# Patient Record
Sex: FEMALE | Race: WHITE | NOT HISPANIC OR LATINO | Employment: OTHER | URBAN - METROPOLITAN AREA
[De-identification: names, ages, dates, MRNs, and addresses within clinical notes are randomized per-mention and may not be internally consistent; named-entity substitution may affect disease eponyms.]

---

## 2017-01-02 ENCOUNTER — GENERIC CONVERSION - ENCOUNTER (OUTPATIENT)
Dept: OTHER | Facility: OTHER | Age: 82
End: 2017-01-02

## 2017-01-03 ENCOUNTER — ALLSCRIPTS OFFICE VISIT (OUTPATIENT)
Dept: OTHER | Facility: OTHER | Age: 82
End: 2017-01-03

## 2017-01-26 ENCOUNTER — ALLSCRIPTS OFFICE VISIT (OUTPATIENT)
Dept: OTHER | Facility: OTHER | Age: 82
End: 2017-01-26

## 2017-03-15 ENCOUNTER — GENERIC CONVERSION - ENCOUNTER (OUTPATIENT)
Dept: OTHER | Facility: OTHER | Age: 82
End: 2017-03-15

## 2017-04-28 ENCOUNTER — ALLSCRIPTS OFFICE VISIT (OUTPATIENT)
Dept: OTHER | Facility: OTHER | Age: 82
End: 2017-04-28

## 2017-05-17 DIAGNOSIS — M81.0 AGE-RELATED OSTEOPOROSIS WITHOUT CURRENT PATHOLOGICAL FRACTURE: ICD-10-CM

## 2017-05-17 DIAGNOSIS — Z13.820 ENCOUNTER FOR SCREENING FOR OSTEOPOROSIS: ICD-10-CM

## 2017-06-06 ENCOUNTER — ALLSCRIPTS OFFICE VISIT (OUTPATIENT)
Dept: OTHER | Facility: OTHER | Age: 82
End: 2017-06-06

## 2017-07-03 DIAGNOSIS — R58 HEMORRHAGE, NOT ELSEWHERE CLASSIFIED: ICD-10-CM

## 2017-07-03 DIAGNOSIS — E11.9 TYPE 2 DIABETES MELLITUS WITHOUT COMPLICATIONS (HCC): ICD-10-CM

## 2017-07-03 DIAGNOSIS — E78.5 HYPERLIPIDEMIA: ICD-10-CM

## 2017-07-03 DIAGNOSIS — M54.2 CERVICALGIA: ICD-10-CM

## 2017-07-03 DIAGNOSIS — E03.9 HYPOTHYROIDISM: ICD-10-CM

## 2017-07-03 DIAGNOSIS — I10 ESSENTIAL (PRIMARY) HYPERTENSION: ICD-10-CM
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
## 2017-07-03 DIAGNOSIS — Z12.31 ENCOUNTER FOR SCREENING MAMMOGRAM FOR MALIGNANT NEOPLASM OF BREAST: ICD-10-CM

## 2017-07-03 DIAGNOSIS — E53.8 DEFICIENCY OF OTHER SPECIFIED B GROUP VITAMINS: ICD-10-CM

## 2017-07-03 DIAGNOSIS — E55.9 VITAMIN D DEFICIENCY: ICD-10-CM

## 2017-07-05 ENCOUNTER — GENERIC CONVERSION - ENCOUNTER (OUTPATIENT)
Dept: OTHER | Facility: OTHER | Age: 82
End: 2017-07-05

## 2017-07-17 ENCOUNTER — HOSPITAL ENCOUNTER (OUTPATIENT)
Dept: MAMMOGRAPHY | Facility: CLINIC | Age: 82
Discharge: HOME | End: 2017-07-17
Payer: MEDICARE

## 2017-07-17 DIAGNOSIS — Z13.820 ENCOUNTER FOR SCREENING FOR OSTEOPOROSIS: ICD-10-CM

## 2017-07-17 DIAGNOSIS — M81.0 AGE-RELATED OSTEOPOROSIS WITHOUT CURRENT PATHOLOGICAL FRACTURE: ICD-10-CM

## 2017-07-17 PROCEDURE — 77080 DXA BONE DENSITY AXIAL: CPT

## 2017-08-16 ENCOUNTER — ALLSCRIPTS OFFICE VISIT (OUTPATIENT)
Dept: OTHER | Facility: OTHER | Age: 82
End: 2017-08-16

## 2017-09-13 ENCOUNTER — GENERIC CONVERSION - ENCOUNTER (OUTPATIENT)
Dept: OTHER | Facility: OTHER | Age: 82
End: 2017-09-13

## 2017-09-14 ENCOUNTER — GENERIC CONVERSION - ENCOUNTER (OUTPATIENT)
Dept: OTHER | Facility: OTHER | Age: 82
End: 2017-09-14

## 2017-09-28 ENCOUNTER — GENERIC CONVERSION - ENCOUNTER (OUTPATIENT)
Dept: OTHER | Facility: OTHER | Age: 82
End: 2017-09-28

## 2017-10-11 ENCOUNTER — ALLSCRIPTS OFFICE VISIT (OUTPATIENT)
Dept: OTHER | Facility: OTHER | Age: 82
End: 2017-10-11

## 2017-10-13 NOTE — PROGRESS NOTES
Assessment  1  Osteoporosis (733 00) (M81 0)   2  Lumbar compression fracture (805 4) (S32 000A)   3  Vitamin D deficiency (268 9) (E55 9)   4  Hypertension (401 9) (I10)    Plan  Hyperlipidemia, Hypertension, Hypothyroidism, Lumbar compression fracture, Osteoporosis, Type 2  diabetes mellitus, Vitamin D deficiency    · (1) CALCIUM IONIZED; Status:Active; Requested for:89Vli5455;    · (1) CALCIUM, URINE 24HR; Status:Active; Requested for:29Lmt4796;    · (1) CBC/PLT/DIFF; Status:Active; Requested for:82Gqo8523;    · (1) CELIAC DISEASE AB PROFILE; Status:Active; Requested for:11Feb2018;    · (1) COMPREHENSIVE METABOLIC PANEL; Status:Active; Requested for:11Feb2018;    · (1) HEMOGLOBIN A1C; Status:Active; Requested for:11Feb2018;    · (1) LDL,DIRECT; Status:Active; Requested for:38Hnl2719;    · (1) PROTEIN ELECTRO, SERUM; Status:Active; Requested for:12Gvq7150;    · (1) T4, FREE; Status:Active; Requested for:11Feb2018;    · (1) TRIGLYCERIDE; Status:Active; Requested for:55Jfl2924;    · (1) TSH; Status:Active; Requested for:11Feb2018;    · (1) VITAMIN D 25-HYDROXY; Status:Active; Requested for:04Dbc0640;    · (LC) Immunofixation, Serum; Status:Active; Requested for:96Oxp9555;    · (Q) PROTEIN,TOTAL AND PROTEIN ELECTROPHORESIS,24 HOUR URINE AND IMMUNOFIXATION;  Status:Active;  Requested for:11Feb2018;    · Follow-up visit in 6 months Evaluation and Treatment  Follow-up  Status: Hold For - Scheduling   Requested for: 11Apr2018  Hypertension, Type 2 diabetes mellitus    · Losartan Potassium 50 MG Oral Tablet; TAKE 1 TABLET DAILY  Lumbar compression fracture, Osteoporosis    · Forteo 600 MCG/2 4ML Subcutaneous Solution; USE 1 INJECTION DAILY  Lumbar compression fracture, Osteoporosis, Vitamin D deficiency    · Vitamin D (Ergocalciferol) 86059 UNIT Oral Capsule; TAKE 1 CAPSULE BY MOUTH EVERY  WEEK  Osteoporosis    · Begin a limited exercise program ; Status:Complete;   Done: 80DLF7122   · Begin or continue regular aerobic exercise  Gradually work up to at least 3 sessions of 30 minutes  of exercise a week ; Status:Complete;   Done: 03MAT1717   · Continue with our present treatment plan ; Status:Complete;   Done: 41GWJ9672   · Eat foods that are high in calcium ; Status:Complete;   Done: 29FYD4188   · Good balance will help you avoid falls  There are many things you can do to maintain or improve  your sense of balance ; Status:Complete;   Done: 97WQM4682   · Limit your use of alcohol to 2 drinks or cans of beer a day ; Status:Complete;   Done: 74MZW2358   · There are many exercise options for seniors:; Status:Complete;   Done: 24TQV1952   · These are things you can do to prevent falls ; Status:Complete;   Done: 96CJN1889   · Use good body mechanics when lifting ; Status:Complete;   Done: 95EMH8148   · We recommend that you follow these steps to lower your risk of osteoporosis  ; Status:Complete;    Done: 62GBT9298   · We want you to wear hip protectors ; Status:Complete;   Done: 93JQE1812   · You need to quit smoking ; Status:Complete;   Done: 03MRY7969   · Call [phone] if: You are constipated ; Status:Complete;   Done: 10QBU8374   · Call [phone] if: You begin to have pain in your lower back ; Status:Complete;   Done:  90TAZ2544   · Call [phone] if: You get a rash ; Status:Complete;   Done: 38FVC6783   · Call [phone] if: You have pain or burning in your stomach after eating ; Status:Complete;    Done: 33QKO9006    Discussion/Summary  Discussion Summary:   Osteoporosis with lumbar compression fracture and vitamin D deficiency will be getting 50,000 units of vitamin D recheck in 4 monthslumbar compression fracture will try and get Forteo approved this medication decrease risk of another fracture with the next 2 years continue follow-up with Dr Liang Bobby     Counseling Documentation With Imm: The patient, patient's family was counseled regarding diagnostic results,-instructions for [facility] factor reductions,-prognosis,-impressions,-risks and benefits of treatment options,-importance of compliance with treatment  total time of encounter was 30 [id number] minutes was spent counseling  Medication SE Review and Pt Understands Tx: Possible side effects of new medications were reviewed with the patient/guardian today  The treatment plan was reviewed with the patient/guardian  The patient/guardian understands and agrees with the treatment plan      Chief Complaint  Chief Complaint Free Text Note Form: f/u compression fx and consult Adeline Winters  History of Present Illness  Osteoporosis (Follow-Up): Most recent DXA results: T-score -1 0 - -2 0  Complications of osteoporosis include a spine fracture  Symptoms:   The patient presents with complaints of severe stable back pain [facility] thoracic lumbar brace)  Vitamin D Deficiency: Disease type: vitamin D deficiency  Hypertension (Follow-Up): The patient presents for follow-up of essential hypertension  The patient states she has been doing well with her blood pressure control since the last visit  She has no comorbid illnesses  Symptoms: The patient is currently asymptomatic  Associated symptoms include no headache,-no focal neurologic deficits-and-no memory loss  Home monitoring: The patient checks her blood pressure sporadically  Blood pressure control has been good  Lifestyle: Diet: She consumes a diverse and healthy diet  Weight Issues: She does not have any weight concerns  Exercise: She does not exercise regularly  Smoking: She does not use tobacco Alcohol: She denies alcohol use  Drug Use: She denies drug use  Medications: the patient is adherent with her medication regimen -She denies medication side effects  Review of Systems  Complete-Female:   Constitutional: No fever, no chills, feels well, no tiredness, no recent weight gain or weight loss     Eyes: No complaints of eye pain, no red eyes, no eyesight problems, no discharge, no dry eyes, no itching of eyes  ENT: no complaints of earache, no loss of hearing, no nose bleeds, no nasal discharge, no sore throat, no hoarseness  Cardiovascular: No complaints of slow heart rate, no fast heart rate, no chest pain, no palpitations, no leg claudication, no lower extremity edema  Respiratory: No complaints of shortness of breath, no wheezing, no cough, no SOB on exertion, no orthopnea, no PND  Gastrointestinal: No complaints of abdominal pain, no constipation, no nausea or vomiting, no diarrhea, no bloody stools  Genitourinary: Refuses mammogram secondary to pain versus benefits reviewed, but-No complaints of dysuria, no incontinence, no pelvic pain, no dysmenorrhea, no vaginal discharge or bleeding  Musculoskeletal: as noted in HPI  Integumentary: No complaints of skin rash or lesions, no itching, no skin wounds, no breast pain or lump  Neurological: No complaints of headache, no confusion, no convulsions, no numbness, no dizziness or fainting, no tingling, no limb weakness, no difficulty walking  Psychiatric: Not suicidal, no sleep disturbance, no anxiety or depression, no change in personality, no emotional problems  Endocrine: No complaints of proptosis, no hot flashes, no muscle weakness, no deepening of the voice, no feelings of weakness  Hematologic/Lymphatic: No complaints of swollen glands, no swollen glands in the neck, does not bleed easily, does not bruise easily  Preventive Quality 65 Older:   Preventive Quality 65 and Older: Falls Risk: The patient fell 0 times in the past 12 months  The patient is currently asymptomatic Symptoms Include:  Associated symptoms:  No associated symptoms are reported  The patient currently has no urinary incontinence symptoms  Glaucoma Screen: Date of last glaucoma screen was,-2014  Active Problems  1  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)   2  Arteriosclerosis of coronary artery (414 00) (I25 10)   3  Cervicalgia (723 1) (M54 2)   4  Depression (311) (F32 9)   5  Encounter for screening mammogram for malignant neoplasm of breast (V76 12) (Z12 31)   6  Esophageal reflux (530 81) (K21 9)   7  Flu vaccine need (V04 81) (Z23)   8  Greater trochanteric bursitis of right hip (726 5) (M70 61)   9  Hyperlipidemia (272 4) (E78 5)   10  Hypertension (401 9) (I10)   11  Hypothyroidism (244 9) (E03 9)   12  Insomnia (780 52) (G47 00)   13  Lumbar pain (724 2) (M54 5)   14  Lumbar spinal stenosis (724 02) (M48 061)   15  Need for pneumococcal vaccine (V03 82) (Z23)   16  Normochromic normocytic anemia (285 9) (D64 9)   17  Osteoarthritis of knee (715 36) (M17 10)   18  Osteoporosis (733 00) (M81 0)   19  Pain in joint of left shoulder (719 41) (M25 512)   20  Right shoulder pain (719 41) (M25 511)   21  Screening for osteoporosis (V82 81) (Z13 820)   22  Skin rash (782 1) (R21)   23  Spinal stenosis (724 00) (M48 00)   24  Trochanteric bursitis of both hips (726 5) (M70 61,M70 62)   25  Type 2 diabetes mellitus (250 00) (E11 9)   26  Vitamin B12 deficiency (266 2) (E53 8)    Past Medical History  1  History of Acute maxillary sinusitis (461 0) (J01 00)   2  History of Cellulitis (682 9) (L03 90)   3  History of Cough (786 2) (R05)   4  History of Ecchymosis (459 89) (R58)   5  History of constipation (V12 79) (Z87 19)   6  History of fall (V15 88) (Z91 81)   7  History of headache (V13 89) (Z87 898)   8  History of hypokalemia (V12 29) (Z86 39)   9  History of low back pain (V13 59) (Z87 39)   10  History of vitamin D deficiency (V12 1) (Z86 39)   11  History of Lower extremity weakness (729 89) (R29 898)  Active Problems And Past Medical History Reviewed: The active problems and past medical history were reviewed and updated today  Surgical History  1  History of Back Surgery   2  History of Previous Stent Placement  Surgical History Reviewed: The surgical history was reviewed and updated today         Family History  Mother    1  Family history of Heart Disease (V17 49)  Father    2  Family history of Cerebral Embolism - With Cerebral Infarction  Family History Reviewed: The family history was reviewed and updated today  Social History   · Caffeine Use   · Former smoker (W21 83) (A06 527)   · Never Drank Alcohol  Social History Reviewed: The social history was reviewed and updated today  The social history was reviewed and is unchanged  Current Meds   1  Aspirin 81 MG TABS; TAKE 1 TABLET DAILY; Therapy: (Jaimee Richmond) to Recorded   2  Atorvastatin Calcium 40 MG Oral Tablet; take 1 tablet by mouth once daily; Therapy: 01DRF9261 to (Evaluate:09Sep2017)  Requested for: 93AFU4109; Last Rx:66Fwx3379   Ordered   3  Baclofen 10 MG Oral Tablet; take 1 tablet daily prn; Therapy: 62WJV0305 to (Evaluate:27Apr2016) Recorded   4  Carvedilol 6 25 MG Oral Tablet; take one tablet by mouth twice daily; Therapy: 78FMG4490 to (Roel Rodriguez)  Requested for: 88HHN5966; Last Rx:06Apr2017   Ordered   5  Clopidogrel Bisulfate 75 MG Oral Tablet; TAKE ONE TABLET BY MOUTH ONCE DAILY; Therapy: 71WIR6544 to (Celestino Breen)  Requested for: 57NVP6856; Last Rx:56Fkl2828   Ordered   6  Cyanocobalamin 1000 MCG/ML Injection Solution; inject one ml sub q mos; Therapy: 49EIW2629 to (Last Rx:51Cwa0856) Ordered   7  FLUoxetine HCl - 10 MG Oral Capsule; TAKE ONE CAPSULE BY MOUTH ONCE DAILY; Therapy: 03Mwy6087 to (Evaluate:08Apr2018)  Requested for: 22QRU7128; Last Rx:32Ros2541   Ordered   8  Levothyroxine Sodium 50 MCG Oral Tablet; TAKE ONE TABLET BY MOUTH ONCE DAILY; Therapy: 56Twf3270 to (Evaluate:88Pln9080)  Requested for: 59Gxp2995; Last Rx:45Tbg6585   Ordered   9  Losartan Potassium 25 MG Oral Tablet; TAKE ONE TABLET BY MOUTH ONCE DAILY; Therapy: 81RVU0599 to (Evaluate:01Apr2018)  Requested for: 74SZL4601; Last Rx:27Yjc5672   Ordered   10   Nitroglycerin 0 4 MG/SPRAY Translingual Solution; USE ONE SPRAY UNDER THE TONGUE EVERY 5    MINUTES, TIMES THREE DOSES, ASDIRECTED BY YOUR PHYSICIAN; Therapy: 02KAJ4562 to (Last Rx:61Myc1092)  Requested for: 60Fpp2489 Ordered   11  Pioglitazone HCl - 15 MG Oral Tablet; TAKE ONE TABLET BY MOUTH ONCE DAILY; Therapy: 56LDZ9569 to (Evaluate:28Wmy9381)  Requested for: 13Vyr1871; Last Rx:27Qfg8960    Ordered   12  TraMADol HCl - 50 MG Oral Tablet; take 0 5 or 1 or 2 tablets by mouth every 8 hours if needed; Therapy: 56ZET8461 to (Evaluate:15Oct2017)  Requested for: 65HLC7972; Last Rx:97Phj9599    Ordered   13  Triamcinolone Acetonide 0 025 % External Cream; APPLY SPARINGLY TO AFFECTED AREA(S) 2 TO    3 TIMES DAILY; Therapy: 14HGO5393 to (Last Rx:62Tee6106)  Requested for: 94EOX6702 Ordered   14  Zolpidem Tartrate 5 MG Oral Tablet; TAKE 1 TABLET DAILY AT BEDTIME; Therapy: 19CDB8998 to (Evaluate:38Vpk0570); Last Rx:27Fey9803 Ordered  Medication List Reviewed: The medication list was reviewed and updated today  Allergies  1  No Known Drug Allergies  2  IV Dye    Vitals  Vital Signs    Recorded: 65JKM7612 03:22PM Recorded: 43ERU1946 03:02PM   Temperature  99 1 F   Heart Rate  66   Systolic 354, LUE, Sitting    Diastolic 85, LUE, Sitting    Height  5 ft 2 in   Weight  155 lb 2 oz   BMI Calculated  28 37   BSA Calculated  1 72   O2 Saturation  95     Physical Exam    Constitutional   General appearance: No acute distress, well appearing and well nourished  Ears, Nose, Mouth, and Throat   Oropharynx: Normal with no erythema, edema, exudate or lesions  Neck   Neck: Supple, symmetric, trachea midline, no masses  Thyroid: Normal, no thyromegaly  Pulmonary   Respiratory effort: No increased work of breathing or signs of respiratory distress  Auscultation of lungs: Clear to auscultation  Cardiovascular   Auscultation of heart: Normal rate and rhythm, normal S1 and S2, no murmurs  Carotid pulses: 2+ bilaterally      Examination of extremities for edema and/or varicosities: Normal     Musculoskeletal She is wearing lumbar support brace  Muscle strength/tone: Normal     Neurologic   Cranial nerves: Cranial nerves II-XII intact  Cortical function: Normal mental status  Reflexes: 2+ and symmetric  Sensation: No sensory loss  Psychiatric   Judgment and insight: Normal     Orientation to person, place, and time: Normal     Recent and remote memory: Intact  Mood and affect: Normal       Socks and shoes removed, Right Foot Findings: normal foot, no swelling, no erythema  The right toes were normal    The sensory exam showed normal vibratory sensation at the level of the toes on the right  Socks and shoes removed, Left Foot Findings: normal foot, no swelling, no erythema  The left toes were normal    The sensory exam showed normal vibratory sensation at the level of the toes on the left  Normal tactile sensation with monofilament testing throughout the left foot  Pulses:   1+ in the posterior tibialis on the right   1+ in the dorsalis pedis on the right  Pulses:   1+ in the posterior tibialis on the left   1+ in the dorsalis pedis on the left  Assign Risk Category: 0: No loss of protective sensation, no deformity  No present risk        Future Appointments    Date/Time Provider Specialty Site   01/16/2018 01:15 PM Doretha Espinal DO Internal Medicine [facility] INTERNAL [address]   05/07/2018 01:00 PM Cole Draper  Internal Medicine [facility] INTERNAL MED [address] [place] [address]     Signatures   Electronically signed by : Delmar Patterson DO; Oct 11 2017  3:31PM EST                       (Author)

## 2017-10-26 ENCOUNTER — GENERIC CONVERSION - ENCOUNTER (OUTPATIENT)
Dept: OTHER | Facility: OTHER | Age: 82
End: 2017-10-26

## 2017-12-16 DIAGNOSIS — E55.9 VITAMIN D DEFICIENCY: ICD-10-CM

## 2017-12-16 DIAGNOSIS — E03.9 HYPOTHYROIDISM: ICD-10-CM

## 2017-12-16 DIAGNOSIS — D64.9 ANEMIA: ICD-10-CM

## 2017-12-16 DIAGNOSIS — I10 ESSENTIAL (PRIMARY) HYPERTENSION: ICD-10-CM

## 2017-12-16 DIAGNOSIS — E53.8 DEFICIENCY OF OTHER SPECIFIED B GROUP VITAMINS: ICD-10-CM

## 2017-12-16 DIAGNOSIS — E11.9 TYPE 2 DIABETES MELLITUS WITHOUT COMPLICATIONS (HCC): ICD-10-CM

## 2017-12-16 DIAGNOSIS — E78.5 HYPERLIPIDEMIA: ICD-10-CM

## 2018-01-13 VITALS
HEART RATE: 70 BPM | DIASTOLIC BLOOD PRESSURE: 82 MMHG | SYSTOLIC BLOOD PRESSURE: 140 MMHG | HEIGHT: 62 IN | TEMPERATURE: 99.3 F | OXYGEN SATURATION: 96 % | BODY MASS INDEX: 27.79 KG/M2 | WEIGHT: 151 LBS

## 2018-01-13 VITALS
SYSTOLIC BLOOD PRESSURE: 160 MMHG | TEMPERATURE: 99.1 F | OXYGEN SATURATION: 95 % | WEIGHT: 155.13 LBS | BODY MASS INDEX: 28.55 KG/M2 | HEART RATE: 66 BPM | DIASTOLIC BLOOD PRESSURE: 85 MMHG | HEIGHT: 62 IN

## 2018-01-13 VITALS
WEIGHT: 151 LBS | OXYGEN SATURATION: 98 % | HEIGHT: 62 IN | BODY MASS INDEX: 27.79 KG/M2 | SYSTOLIC BLOOD PRESSURE: 130 MMHG | HEART RATE: 71 BPM | TEMPERATURE: 98.6 F | DIASTOLIC BLOOD PRESSURE: 70 MMHG

## 2018-01-13 VITALS
DIASTOLIC BLOOD PRESSURE: 82 MMHG | OXYGEN SATURATION: 97 % | HEART RATE: 76 BPM | HEIGHT: 62 IN | BODY MASS INDEX: 28.06 KG/M2 | TEMPERATURE: 99.6 F | WEIGHT: 152.5 LBS | SYSTOLIC BLOOD PRESSURE: 160 MMHG

## 2018-01-14 VITALS
DIASTOLIC BLOOD PRESSURE: 85 MMHG | TEMPERATURE: 99.2 F | WEIGHT: 150.5 LBS | OXYGEN SATURATION: 93 % | HEIGHT: 62 IN | SYSTOLIC BLOOD PRESSURE: 140 MMHG | BODY MASS INDEX: 27.7 KG/M2 | HEART RATE: 89 BPM

## 2018-01-15 NOTE — PROGRESS NOTES
Assessment    1  Encounter for preventive health examination (V70 0) (Z00 00)    Discussion/Summary    Medicare wellness completed  Follow up with Dr Vonnie Hodge as scheduled, labs prior  Impression: Initial Annual Wellness Visit, with preventive exam as well as age and risk appropriate counseling completed  Cardiovascular screening and counseling: screening is current  Diabetes screening and counseling: screening is current  Colorectal cancer screening and counseling: screening not indicated  Breast cancer screening and counseling: screening is current  Cervical cancer screening and counseling: screening not indicated  Abdominal aortic aneurysm screening and counseling: screening not indicated  Glaucoma screening and counseling: screening is current  Immunizations: influenza vaccine is up to date this year, pneumococcal vaccine due today, the patient declines the Zostavax vaccine and the patient declines the Tdap vaccine  Advance Directive Planning: complete and up to date, she was encouraged to follow-up with me to discuss her questions and/or decisions  History of Present Illness  Welcome to Medicare and Wellness Visits: The patient is being seen for the initial annual wellness visit  Medicare Screening and Risk Factors   Hospitalizations: no previous hospitalizations  Once per lifetime medicare screening tests: AAA screening US has not yet been done  Medicare Screening Tests Risk Questions   Abdominal aortic aneurysm risk assessment: none indicated  Osteoporosis risk assessment: female gender, over 48years of age and hx osteoporosis  HIV risk assessment: none indicated  Drug and Alcohol Use: The patient has never smoked cigarettes  The patient reports never drinking alcohol  She has never used illicit drugs  Diet and Physical Activity: Current diet includes well balanced meals  She exercises infrequently  Exercise: walking 10 minutes per day     Mood Disorder and Cognitive Impairment Screening: PHQ-9 Depression Scale   Over the past 2 weeks, how often have you been bothered by the following problems? 1 ) Little interest or pleasure in doing things? Not at all    2 ) Feeling down, depressed or hopeless? Not at all    3 ) Trouble falling asleep or sleeping too much? Not at all    4 ) Feeling tired or having little energy? Not at all    5 ) Poor appetite or overeating? Not at all    6 ) Feeling bad about yourself, or that you are a failure, or have let yourself or your family down? Not at all    7 ) Trouble concentrating on things, such as reading a newspaper or watching television? Not at all    8 ) Moving or speaking so slowly that other people could have noticed, or the opposite, moving or speaking faster than usual? Not at all  How difficult have these problems made it for you to do your work, take care of things at home, or get along with people? Not at all  She denies feeling down, depressed, or hopeless over the past two weeks  She denies feeling little interest or pleasure in doing things over the past two weeks  Cognitive impairment screening: denies difficulty learning/retaining new information, denies difficulty handling complex tasks, denies difficulty with reasoning, denies difficulty with spatial ability and orientation, denies difficulty with language and denies difficulty with behavior  Functional Ability/Level of Safety: Hearing is slightly decreased, slightly decreased in the right ear, slightly decreased in the left ear and a hearing aid is not used  She denies hearing difficulties  The patient is currently able to do activities of daily living with limitations, but able to participate in social activities without limitations and able to drive without limitations  Fall risk factors: The patient fell 1 times in the past 12 months  fell on ice     Advance Directives: Advance directives: living will and advance directives, but no durable power of  for health care directives  Co-Managers and Medical Equipment/Suppliers: See Patient Care Team      Patient Care Team    Care Team Member Role Specialty Office Number   Debbie Mary   [phone]     Review of Systems    Constitutional: negative  Head and Face: negative  Eyes: negative  ENT: negative  Cardiovascular: negative  Respiratory: negative  Gastrointestinal: negative  Genitourinary: negative  Musculoskeletal: negative  Integumentary and Breasts: negative  Neurological: negative  Psychiatric: negative  Active Problems    1  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)   2  Arteriosclerosis of coronary artery (414 00) (I25 10)   3  Cervicalgia (723 1) (M54 2)   4  Depression (311) (F32 9)   5  Esophageal reflux (530 81) (K21 9)   6  Flu vaccine need (V04 81) (Z23)   7  Greater trochanteric bursitis of right hip (726 5) (M70 61)   8  Hyperlipidemia (272 4) (E78 5)   9  Hypertension (401 9) (I10)   10  Hypothyroidism (244 9) (E03 9)   11  Insomnia (780 52) (G47 00)   12  Lumbar spinal stenosis (724 02) (M48 06)   13  Normochromic normocytic anemia (285 9) (D64 9)   14  Osteoarthritis of knee (715 36) (M17 10)   15  Osteoporosis (733 00) (M81 0)   16  Pain in joint of left shoulder (719 41) (M25 512)   17  Right shoulder pain (719 41) (M25 511)   18  Screening for osteoporosis (V82 81) (Z13 820)   19  Skin rash (782 1) (R21)   20  Spinal stenosis (724 00) (M48 00)   21  Type 2 diabetes mellitus (250 00) (E11 9)   22  Vitamin B12 deficiency (266 2) (E53 8)   23   Vitamin D deficiency (268 9) (E55 9)    Past Medical History    · History of Acute maxillary sinusitis (461 0) (J01 00)   · History of Cellulitis (682 9) (L03 90)   · History of Cough (786 2) (R05)   · History of Ecchymosis (459 89) (R58)   · History of constipation (V12 79) (Z87 19)   · History of fall (V15 88) (Z91 81)   · History of headache (V13 89) (N71 153)   · History of hypokalemia (V12 29) (Z86 39)   · History of low back pain (V13 59) (Z87 39)   · History of Lower extremity weakness (729 89) (R29 898)    The active problems and past medical history were reviewed and updated today  Surgical History    · History of Back Surgery   · History of Previous Stent Placement    The surgical history was reviewed and updated today  Family History  Mother    · Family history of Heart Disease (V17 49)  Father    · Family history of Cerebral Embolism - With Cerebral Infarction    The family history was reviewed and updated today  Social History    · Caffeine Use   · Former smoker (Q72 21) (G90 570)   · Never Drank Alcohol  The social history was reviewed and updated today  Current Meds   1  Aspirin 81 MG TABS; TAKE 1 TABLET DAILY; Therapy: (Ramon Goodrich) to Recorded   2  Atorvastatin Calcium 40 MG Oral Tablet; take 1 tablet by mouth once daily; Therapy: 57YGO9182 to (Evaluate:36Spx6435)  Requested for: 97MCG8041; Last   Rx:15Mar2017 Ordered   3  Baclofen 10 MG Oral Tablet; take 1 tablet daily prn; Therapy: 74SUE8065 to (Evaluate:27Apr2016) Recorded   4  Carvedilol 6 25 MG Oral Tablet; take one tablet by mouth twice daily; Therapy: 96IPN1678 to (James Rojas)  Requested for: 90CUE6306; Last   Rx:06Apr2017 Ordered   5  Clopidogrel Bisulfate 75 MG Oral Tablet; TAKE ONE TABLET BY MOUTH ONCE DAILY; Therapy: 07AUL7477 to (Evaluate:44Jlh0764)  Requested for: 94ZFD2702; Last   Rx:44Jeg2090 Ordered   6  Cyanocobalamin 1000 MCG/ML Injection Solution; inject one ml sub q mos; Therapy: 08NXO3012 to (Last Rx:17Oct2016) Ordered   7  FLUoxetine HCl - 10 MG Oral Capsule; TAKE ONE CAPSULE BY MOUTH ONCE DAILY; Therapy: 32Hja2228 to (Evaluate:74Gnh7532)  Requested for: 61EQU4421; Last   Rx:44Baq5440 Ordered   8  Levothyroxine Sodium 50 MCG Oral Tablet; TAKE ONE TABLET BY MOUTH ONCE   DAILY; Therapy: 47Vxq8979 to (Evaluate:02Npi8550)  Requested for: 14Wjj8387; Last   Rx:07Apr2017 Ordered   9  Losartan Potassium 25 MG Oral Tablet; TAKE 1 TABLET DAILY; Therapy: 95YJC8984 to (Aiyana Mask)  Requested for: 68SJB2086; Last   Rx:2017; Status: ACTIVE - Transmit to [facility] Verification Ordered   10  Pioglitazone HCl - 15 MG Oral Tablet; TAKE ONE TABLET BY MOUTH ONCE DAILY; Therapy: 65HSZ6364 to (Evaluate:50Hlr9100)  Requested for: 11Dqy4936; Last    Rx:92Tfi9590 Ordered   11  Triamcinolone Acetonide 0 025 % External Cream; APPLY SPARINGLY TO AFFECTED    AREA(S) 2 TO 3 TIMES DAILY; Therapy: 05LEN8935 to (Last Rx:11Ymi3992)  Requested for: 94ZAB3615 Ordered   12  Zolpidem Tartrate 5 MG Oral Tablet; TAKE 1 TABLET DAILY AT BEDTIME; Therapy: 47YVV8822 to (Evaluate:33Jpr8168); Last Rx:42Njd9740 Ordered    The medication list was reviewed and updated today  Allergies    1  No Known Drug Allergies    2  IV Dye    Immunizations   ** Printed in Appendix #1 below  Vitals  Signs    Temperature: 98 9 F  Heart Rate: 67  Systolic: 975  Diastolic: 80  Height: 5 ft 2 in  Weight: 151 lb 8 0 oz  BMI Calculated: 27 71  BSA Calculated: 1 7  O2 Saturation: 96    Procedure    Procedure:   Inforrmation supplied by a Snellen chart  Results: 20/20 in both eyes with corrective device, 20/20 in the right eye with corrective device, 20/20 in the left eye with corrective device   Color vision was reported by rohini and the results were normal       Future Appointments    Date/Time Provider Specialty Site   2017 01:15 PM Jon Mir DO Internal Medicine [address] [place] [address]   Electronically signed by : Weston Flowers, [facility];  2017  1:52PM EST                       (Author)    Electronically signed by : Margarita Sweeney DO; May  2 2017  2:16PM EST                          Appendix #1     Patient: Rodriguez Doyle; : 1934; MRN: [id number]      1 2 3 4 5    Influenza  06-Nov-2012 18-Sep-2013 15-Oct-2014 30-Sep-2015 14-Oct-2016    PPSV  2006 2012

## 2018-01-22 VITALS
SYSTOLIC BLOOD PRESSURE: 146 MMHG | BODY MASS INDEX: 27.88 KG/M2 | HEIGHT: 62 IN | OXYGEN SATURATION: 96 % | TEMPERATURE: 98.9 F | WEIGHT: 151.5 LBS | HEART RATE: 67 BPM | DIASTOLIC BLOOD PRESSURE: 80 MMHG

## 2018-01-23 NOTE — PROGRESS NOTES
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)  2  Arteriosclerosis of coronary artery (414 00) (I25 10)  3  Cellulitis (682 9) (L03 90)  4  Cough (786 2) (R05)  5  Depression (311) (F32 9)  6  Esophageal reflux (530 81) (K21 9)  7  Flu vaccine need (V04 81) (Z23)  8  Greater trochanteric bursitis of right hip (726 5) (M70 61)  9  Hyperlipidemia (272 4) (E78 5)  10  Hypertension (401 9) (I10)  11  Hypothyroidism (244 9) (E03 9)  12  Insomnia (780 52) (G47 00)  13  Lower back pain (724 2) (M54 5)  14  Lower extremity weakness (729 89) (M62 81)  15  Lumbar spinal stenosis (724 02) (M48 06)  16  Normochromic normocytic anemia (285 9) (D64 9)  17  Osteoarthritis of knee (715 36) (M17 9)  18  Osteoporosis (733 00) (M81 0)  19  Pain in joint of left shoulder (719 41) (M25 512)  20  Right shoulder pain (719 41) (M25 511)  21  Screening for osteoporosis (V82 81) (Z13 820)  22  Skin rash (782 1) (R21)  23  Spinal stenosis (724 00) (M48 00)  24  Type 2 diabetes mellitus (250 00) (E11 9)  25  Vitamin B12 deficiency (266 2) (E53 8)  26  Vitamin D deficiency (268 9) (E55 9)    Social History    · Caffeine Use   · Former smoker (O50 32) (N98 739)   · Never Drank Alcohol    Current Meds  1  Aspirin 81 MG TABS; TAKE 1 TABLET DAILY; Therapy: (Irene Lopez) to Recorded  2   Atorvastatin Calcium 40 MG Oral Tablet; take 1 tablet by mouth once daily; Therapy: 80DPP5130 to (Evaluate:40Iid4297)  Requested for: 14SCH3672; Last   Rx:08Oun2230 Ordered  3  Baclofen 10 MG Oral Tablet; take 1 tablet daily prn; Therapy: 27TMV3177 to (Evaluate:67Kyw3205) Recorded  4  Carvedilol 6 25 MG Oral Tablet; take one tablet by mouth twice daily; Therapy: 08DJV8344 to (Evaluate:19Wgj8856)  Requested for: 25GED7043; Last   Rx:52Mvl5422 Ordered  5  Clopidogrel Bisulfate 75 MG Oral Tablet; TAKE ONE TABLET BY MOUTH ONCE DAILY; Therapy: 00OFJ8935 to (Evaluate:81Nhb1610)  Requested for: 42Hns3179; Last   Rx:48Aee0323 Ordered  6  FLUoxetine HCl - 10 MG Oral Capsule; TAKE ONE CAPSULE BY MOUTH ONCE DAILY; Therapy: 81Yth7115 to (Evaluate:65Sgc6567)  Requested for: 88Mrw0996; Last   Rx:91Ojf8838 Ordered  7  Furosemide 20 MG Oral Tablet; take one tablet by mouth every other day; Therapy: 24LAD3732 to (Evaluate:49Buw9019)  Requested for: 56BXM1917 Recorded  8  Levothyroxine Sodium 50 MCG Oral Tablet; TAKE ONE TABLET BY MOUTH ONCE DAILY; Therapy: 69Gnd0763 to (Garrel Leaver)  Requested for: 33LZY3435; Last   Rx:18Aga8755 Ordered  9  Pioglitazone HCl - 15 MG Oral Tablet; TAKE ONE TABLET BY MOUTH ONCE DAILY; Therapy: 27HEL8838 to (Jadine Grams)  Requested for: 35Mrn6056; Last   Rx:92Llk8531 Ordered  10  Potassium Chloride ER 10 MEQ Oral Tablet Extended Release; TAKE TWO TABLETS BY    MOUTH every other day; Therapy: 25RLA0998 to (Evaluate:84Zps9152)  Requested for: 57DEC9991 Recorded  11  Triamcinolone Acetonide 0 025 % External Cream; APPLY SPARINGLY TO AFFECTED    AREA(S) 2 TO 3 TIMES DAILY; Therapy: 67IUZ4839 to (Last Rx:11Afb3592)  Requested for: 58RDA0171 Ordered  12  Zolpidem Tartrate 5 MG Oral Tablet; TAKE 1 TABLET DAILY AT BEDTIME; Therapy: 08EXP4251 to (Evaluate:22Yfr2938); Last Rx:63Tfy4750 Ordered    Allergies   1  No Known Drug Allergies   2   IV Dye    Vitals   Recorded: 09Aug2016 05:02PM Recorded: 93ZUE1697 89:17JZ   Systolic 827, LUE, Sitting Diastolic 65, LUE, Sitting    Heart Rate  75   Temperature  99 7 F   O2 Saturation  96   Height  5 ft 2 in   Weight  150 lb 8 0 oz   BMI Calculated  27 53   BSA Calculated  1 69     Procedure      Procedure:1  Trigger point injection of the right upper outer quadrant of the buttock1   The site was prepped with alcohol  A 27 gauge needle was inserted  The site was injected with 1 ml(s) 1% Lidocaine  The injectate was mixed with of methylprednisolone (40 mg/ml)  a sterile dressing was applied  Post-Procedure:       1 Amended By: Cheryl Waller;  Aug 10 2016 12:58 PM EST    Future Appointments    Date/Time Provider Specialty Site   01/11/2017 01:00 PM Cheryl Waller DO Internal Medicine [address]   01/11/2017 01:30 PM [facility] [address] INTERNAL [address]     Signatures   Electronically signed by : Duane Velez DO; Aug 10 2016 12:59PM EST                       (Author)

## 2018-02-11 DIAGNOSIS — I10 ESSENTIAL (PRIMARY) HYPERTENSION: ICD-10-CM

## 2018-02-11 DIAGNOSIS — M81.0 AGE-RELATED OSTEOPOROSIS WITHOUT CURRENT PATHOLOGICAL FRACTURE: ICD-10-CM

## 2018-02-11 DIAGNOSIS — E55.9 VITAMIN D DEFICIENCY: ICD-10-CM

## 2018-02-11 DIAGNOSIS — E78.5 HYPERLIPIDEMIA: ICD-10-CM

## 2018-02-11 DIAGNOSIS — E03.9 HYPOTHYROIDISM: ICD-10-CM

## 2018-02-11 DIAGNOSIS — S32.000A CLOSED WEDGE COMPRESSION FRACTURE OF LUMBAR VERTEBRA (HCC): ICD-10-CM

## 2018-02-11 DIAGNOSIS — E11.9 TYPE 2 DIABETES MELLITUS WITHOUT COMPLICATIONS (HCC): ICD-10-CM

## 2018-03-05 DIAGNOSIS — E03.9 HYPOTHYROIDISM, UNSPECIFIED TYPE: Primary | ICD-10-CM

## 2018-03-05 RX ORDER — LEVOTHYROXINE SODIUM 0.05 MG/1
TABLET ORAL
Qty: 90 TABLET | Refills: 0 | Status: SHIPPED | OUTPATIENT
Start: 2018-03-05 | End: 2018-06-06 | Stop reason: SDUPTHER

## 2018-04-02 DIAGNOSIS — M81.0 OSTEOPOROSIS, UNSPECIFIED OSTEOPOROSIS TYPE, UNSPECIFIED PATHOLOGICAL FRACTURE PRESENCE: Primary | ICD-10-CM

## 2018-04-09 ENCOUNTER — IMMUNIZATION (OUTPATIENT)
Dept: INTERNAL MEDICINE CLINIC | Facility: CLINIC | Age: 83
End: 2018-04-09
Payer: MEDICARE

## 2018-04-09 DIAGNOSIS — E53.8 B12 DEFICIENCY: Primary | ICD-10-CM

## 2018-04-09 PROCEDURE — 96372 THER/PROPH/DIAG INJ SC/IM: CPT

## 2018-04-09 RX ORDER — CYANOCOBALAMIN 1000 UG/ML
INJECTION INTRAMUSCULAR; SUBCUTANEOUS
COMMUNITY
Start: 2016-10-14 | End: 2019-11-06 | Stop reason: SDUPTHER

## 2018-04-11 DIAGNOSIS — F32.9 MAJOR DEPRESSIVE DISORDER WITH SINGLE EPISODE, REMISSION STATUS UNSPECIFIED: Primary | ICD-10-CM

## 2018-04-11 RX ORDER — FLUOXETINE 10 MG/1
1 CAPSULE ORAL DAILY
COMMUNITY
Start: 2015-04-24 | End: 2018-04-11 | Stop reason: SDUPTHER

## 2018-04-11 RX ORDER — FLUOXETINE 10 MG/1
10 CAPSULE ORAL DAILY
Qty: 30 CAPSULE | Refills: 5 | Status: SHIPPED | OUTPATIENT
Start: 2018-04-11 | End: 2018-10-09 | Stop reason: SDUPTHER

## 2018-04-18 DIAGNOSIS — E78.5 HYPERLIPIDEMIA, UNSPECIFIED HYPERLIPIDEMIA TYPE: Primary | ICD-10-CM

## 2018-04-18 DIAGNOSIS — E11.8 TYPE 2 DIABETES MELLITUS WITH COMPLICATION, WITHOUT LONG-TERM CURRENT USE OF INSULIN (HCC): ICD-10-CM

## 2018-04-18 RX ORDER — PIOGLITAZONEHYDROCHLORIDE 15 MG/1
1 TABLET ORAL DAILY
COMMUNITY
Start: 2013-12-10 | End: 2018-04-18 | Stop reason: SDUPTHER

## 2018-04-18 RX ORDER — ATORVASTATIN CALCIUM 40 MG/1
1 TABLET, FILM COATED ORAL DAILY
COMMUNITY
Start: 2016-02-17 | End: 2018-04-18 | Stop reason: SDUPTHER

## 2018-04-18 RX ORDER — PIOGLITAZONEHYDROCHLORIDE 15 MG/1
15 TABLET ORAL DAILY
Qty: 90 TABLET | Refills: 2 | Status: SHIPPED | OUTPATIENT
Start: 2018-04-18 | End: 2018-10-18 | Stop reason: SDUPTHER

## 2018-04-18 RX ORDER — ATORVASTATIN CALCIUM 40 MG/1
40 TABLET, FILM COATED ORAL DAILY
Qty: 90 TABLET | Refills: 2 | Status: SHIPPED | OUTPATIENT
Start: 2018-04-18 | End: 2018-10-18 | Stop reason: SDUPTHER

## 2018-04-30 DIAGNOSIS — E55.9 VITAMIN D DEFICIENCY: ICD-10-CM

## 2018-04-30 DIAGNOSIS — I25.84 CORONARY ARTERY DISEASE DUE TO CALCIFIED CORONARY LESION: Primary | ICD-10-CM

## 2018-04-30 DIAGNOSIS — I25.10 CORONARY ARTERY DISEASE DUE TO CALCIFIED CORONARY LESION: Primary | ICD-10-CM

## 2018-04-30 RX ORDER — CARVEDILOL 6.25 MG/1
TABLET ORAL
Qty: 60 TABLET | Refills: 5 | Status: SHIPPED | OUTPATIENT
Start: 2018-04-30 | End: 2018-10-18 | Stop reason: SDUPTHER

## 2018-04-30 RX ORDER — ERGOCALCIFEROL 1.25 MG/1
CAPSULE ORAL
Qty: 4 CAPSULE | Refills: 6 | Status: SHIPPED | OUTPATIENT
Start: 2018-04-30 | End: 2018-10-18 | Stop reason: SDUPTHER

## 2018-05-07 ENCOUNTER — OFFICE VISIT (OUTPATIENT)
Dept: INTERNAL MEDICINE CLINIC | Facility: CLINIC | Age: 83
End: 2018-05-07
Payer: MEDICARE

## 2018-05-07 VITALS
RESPIRATION RATE: 18 BRPM | TEMPERATURE: 97.8 F | HEIGHT: 62 IN | HEART RATE: 61 BPM | SYSTOLIC BLOOD PRESSURE: 134 MMHG | DIASTOLIC BLOOD PRESSURE: 70 MMHG | OXYGEN SATURATION: 96 % | WEIGHT: 154 LBS | BODY MASS INDEX: 28.34 KG/M2

## 2018-05-07 DIAGNOSIS — Z00.00 HEALTH CARE MAINTENANCE: ICD-10-CM

## 2018-05-07 DIAGNOSIS — Z12.39 SCREENING FOR MALIGNANT NEOPLASM OF BREAST: ICD-10-CM

## 2018-05-07 DIAGNOSIS — E53.8 VITAMIN B12 DEFICIENCY: Primary | ICD-10-CM

## 2018-05-07 DIAGNOSIS — E53.8 VITAMIN B 12 DEFICIENCY: Primary | ICD-10-CM

## 2018-05-07 PROBLEM — S32.000A LUMBAR COMPRESSION FRACTURE (HCC): Status: ACTIVE | Noted: 2017-10-11

## 2018-05-07 PROBLEM — E55.9 VITAMIN D DEFICIENCY: Status: ACTIVE | Noted: 2017-10-11

## 2018-05-07 PROCEDURE — 96372 THER/PROPH/DIAG INJ SC/IM: CPT | Performed by: NURSE PRACTITIONER

## 2018-05-07 PROCEDURE — G0439 PPPS, SUBSEQ VISIT: HCPCS | Performed by: NURSE PRACTITIONER

## 2018-05-07 RX ORDER — CYANOCOBALAMIN 1000 UG/ML
1000 INJECTION INTRAMUSCULAR; SUBCUTANEOUS ONCE
Status: COMPLETED | OUTPATIENT
Start: 2018-05-07 | End: 2018-05-07

## 2018-05-07 RX ORDER — CYANOCOBALAMIN 1000 UG/ML
1000 INJECTION INTRAMUSCULAR; SUBCUTANEOUS
Status: CANCELLED | OUTPATIENT
Start: 2018-05-07

## 2018-05-07 RX ADMIN — CYANOCOBALAMIN 1000 MCG: 1000 INJECTION INTRAMUSCULAR; SUBCUTANEOUS at 14:27

## 2018-05-07 NOTE — PROGRESS NOTES
Medicare Wellness completed  Will have mammogram this summer  Shingrix rx given  Will consider Tdap at next visit  HPI:  Jackson Galan is a 80 y o  female here for her Subsequent Wellness Visit      Patient Active Problem List   Diagnosis    Arteriosclerosis of coronary artery    Depression    Esophageal reflux    Hyperlipidemia    Hypertension    Hypothyroidism    Insomnia    Lumbar compression fracture (HCC)    Lumbar spinal stenosis    Normochromic normocytic anemia    Osteoarthritis of knee    Osteoporosis    Spinal stenosis    Type 2 diabetes mellitus ([address] )    Vitamin B12 deficiency    Vitamin D deficiency    Screening for malignant neoplasm of breast    Health care maintenance     Past Medical History:   Diagnosis Date    Cellulitis     last assessed - 09MVF6942    Constipation     last assessed - 97FKV0184    Ecchymosis     last assessed - 97DXT6971    Fall     last assessed - 46EZJ2438    Hypokalemia     last assessed - 02Jun2015    Lower extremity weakness     last assessed - 02DQR0628    Vitamin D deficiency     last assessed - 40Kwu9699     Past Surgical History:   Procedure Laterality Date    BACK SURGERY      OTHER SURGICAL HISTORY      Previous stent placement     Family History   Problem Relation Age of Onset    Heart disease Mother     Other Father      cerebral embolism - with cerebral infarction     History   Smoking Status    Former Smoker    Start date: 5/7/1958   Smokeless Tobacco    Never Used     History   Alcohol Use No      History   Drug Use No       Current Outpatient Prescriptions   Medication Sig Dispense Refill    atorvastatin (LIPITOR) 40 mg tablet Take 1 tablet (40 mg total) by mouth daily 90 tablet 2    carvedilol (COREG) 6 25 mg tablet TAKE ONE TABLET BY MOUTH TWICE DAILY 60 tablet 5    cyanocobalamin 1,000 mcg/mL Inject as directed      ergocalciferol (VITAMIN D2) 50,000 units TAKE ONE CAPSULE BY MOUTH ONCE A WEEK 4 capsule 6    FLUoxetine (PROzac) 10 mg capsule Take 1 capsule (10 mg total) by mouth daily 30 capsule 5    levothyroxine 50 mcg tablet TAKE ONE TABLET BY MOUTH ONCE DAILY 90 tablet 0    pioglitazone (ACTOS) 15 mg tablet Take 1 tablet (15 mg total) by mouth daily 90 tablet 2    teriparatide (FORTEO) 600 MCG/2 4ML injection Inject 0 08 mL (20 mcg total) under the skin daily 2 4 mL 5     No current facility-administered medications for this visit  Allergies not on file  Immunization History   Administered Date(s) Administered    Influenza 10/14/2016, 09/13/2017    Influenza Split High Dose Preservative Free IM 10/15/2014, 09/30/2015, 10/14/2016    Influenza TIV (IM) 11/06/2012, 09/18/2013, 09/13/2017    Pneumococcal Conjugate 13-Valent 04/28/2017    Pneumococcal Polysaccharide PPV23 01/01/2006, 11/06/2012       Patient Care Team:  DO chencho Alegria PCP - General      Medicare Screening Tests and Risk Assessments:  AWV Clinical     ISAR:       Once in a Lifetime Medicare Screening:       Medicare Screening Tests and Risk Assessment:   AAA Risk Assessment    None Indicated:  Yes    Osteoporosis Risk Assessment     Female:  Yes   :  Yes    Age over 48:  Yes    HIV Risk Assessment    None indicated:  Yes        Drug and Alcohol Use:   Tobacco use    Cigarettes:  former smoker    Quit date:  5/7/1958   Tobacco use duration    Tobacco Cessation Readiness    Alcohol use    Alcohol use:  never    Alcohol Treatment Readiness   Illicit Drug Use    Drug use:  never        Diet & Exercise:   Diet   How many servings a day of the following:   Fruits and Vegetables:  1-2 Meat:  1-2    Simple Carbs:  1   Dairy:  1 Soda:  2   Coffee:  2    Exercise    Do you currently exercise?:  currently not exercising       Cognitive Impairment Screening:   Anxiety screenings preformed:   Yes Anxiety screen score:  0   Depression screening preformed:  Yes    Geriatric Depression scale score:  2 PHQ-9 Depression scale score:  2   Depression screening results:  mild to moderate symptoms   Cognitive Impairment Screening    Do you have difficulty learning or retaining new information?:  No Do you have difficulty handling new tasks?:  No   Do you have difficulty with reasoning?:  No Do you have difficulty with spatial ability and orientation?:  No   Do you have difficulty with language?:  No Do you have difficulty with behavior?:  No       Functional Ability/Level of Safety:   Hearing    Hearing difficulties:  No Bilateral:  normal   Left:  normal Right:  normal   Hearing aid:  No    Hearing Impairment Assessment    Hearing status:  No impairment   Current Activities    Status:  unlimited ADL's, unlimited driving, limited social activities   Help needed with the folllowing:    Using the phone:  No Transportation:  No    Preparing Meals:  No   Doing Housework:  No Doing Laundry:  No   Managing Medications:  No Managing Money:  No   ADL    Fall Risk   Have you fallen in the last 12 months?:  No    Injury History       Home Safety:   Are there hazards in your environment?:  No   Home Safety Risk Factors   Unfamilar with surroundings:  No Uneven floors:  No   Stairs or handrail saftey risk:  Yes Loose rugs:  No   Household clutter:  No Poor household lighting:  No   No grab bars in bathroom:  No Further evaluation needed:  No       Advanced Directives:   Advanced Directives    Living Will:  No Durable POA for healthcare:  No   Advanced directive:  No    Patient's End of Life Decisions        Urinary Incontinence:   Do you have urinary incontinence?:  No        Glaucoma:            Provider Screening     Preventative Screening/Counseling:   Cardiovascular Screening/Counseling:   (Labs Q5 years, EKG optional one-time)   General:  Screening Current           Diabetes Screening/Counseling:   (2 tests/year if Pre-Diabetes or 1 test/year if no Diabetes)   General:  Screening Current           Colorectal Cancer Screening/Counseling:   (FOBT Q1 yr; Flex Sig Q4 yrs or Q10 yrs after Screening Colonoscopy; Screening Colonoscpy Q2 yrs High Risk or Q10 yrs Low Risk; Barium Enema Q2 yrs High Risk or Q4 yrs Low Risk)   General:  Screening Current           Prostate Cancer Screening/Counseling:   (Annual)          Breast Cancer Screening/Counseling:   (Baseline Age 28 - 43; Annual Age 36+)   General:  Screening Current          Cervical Cancer Screening/Counseling:   (Annual for High Risk or Childbearing Age with Abnormal Pap in Last 3 yrs; Every 2 all others)   General:  Screening Not Indicated           Osteoporosis Screening/Counseling:   (Every 2 Yrs if at risk or more if medically necessary)   General:  Screening Current           AAA Screening/Counseling:   (Once per Lifetime with risk factors)    General:  Screening Not Indicated           Glaucoma Screening/Counseling:   (Annual)   General:  Screening Current          HIV Screening/Counseling:   (Voluntary; Once annually for high risk OR 3 times for Pregnancy at diagnosis of IUP; 3rd trimester; and at Labor   General:  Screening Not Indicated           Hepatitis C Screening:             Immunizations:   Influenza (annual): Influenza UTD This Year   Pneumococcal (Once in a Lifetime):  Lifetime Vaccine Completed   Zostavax (Medicare D Coverage, Pt >70 yo):  Risks & Benefits Discussed   Tdap (Non-Medicare Wellness Visit required):  Risks & Benefits Discussed       Other Preventative Couseling (Non-Medicare Wellness Visit Required):       Referrals (Non-Medicare Wellness Visit Required):       Medical Equipment/Suppliers:           No exam data present  Reviewed Updated [facility] Prior Wellness Visits:   Last Medicare wellness visit information was reviewed, patient interviewed , no change since last AWVno  Last Medicare wellness visit information was reviewed, patient interviewed and updates made to the record today yes    Assessment and Plan:  1   Screening for malignant neoplasm of breast  Mammo screening bilateral w 3d & cad 2   Health care maintenance         Health Maintenance Due   Topic Date Due    URINE MICROALBUMIN  05/27/1944    Depression Screening PHQ-9  05/27/1946    DTaP,Tdap,and Td Vaccines (1 - Tdap) 05/27/1955    Fall Risk  05/27/1999    Urinary Incontinence Screening  05/27/1999    GLAUCOMA SCREENING 67+ YR  05/27/2001    HEMOGLOBIN A1C  12/17/2016    OPHTHALMOLOGY EXAM  09/13/2017

## 2018-05-09 RX ORDER — CYANOCOBALAMIN 1000 UG/ML
1000 INJECTION INTRAMUSCULAR; SUBCUTANEOUS
Status: SHIPPED | OUTPATIENT
Start: 2018-05-09

## 2018-05-09 RX ADMIN — CYANOCOBALAMIN 1000 MCG: 1000 INJECTION INTRAMUSCULAR; SUBCUTANEOUS at 11:35

## 2018-05-10 LAB — HBA1C MFR BLD HPLC: 7.9 %

## 2018-06-06 DIAGNOSIS — E03.9 HYPOTHYROIDISM, UNSPECIFIED TYPE: ICD-10-CM

## 2018-06-08 ENCOUNTER — TELEPHONE (OUTPATIENT)
Dept: INTERNAL MEDICINE CLINIC | Facility: CLINIC | Age: 83
End: 2018-06-08

## 2018-06-13 RX ORDER — LEVOTHYROXINE SODIUM 0.05 MG/1
TABLET ORAL
Qty: 90 TABLET | Refills: 0 | Status: SHIPPED | OUTPATIENT
Start: 2018-06-13 | End: 2018-10-18 | Stop reason: SDUPTHER

## 2018-06-18 DIAGNOSIS — I25.10 CORONARY ARTERY DISEASE INVOLVING NATIVE HEART WITHOUT ANGINA PECTORIS, UNSPECIFIED VESSEL OR LESION TYPE: Primary | ICD-10-CM

## 2018-06-18 RX ORDER — CLOPIDOGREL BISULFATE 75 MG/1
75 TABLET ORAL DAILY
Qty: 90 TABLET | Refills: 2 | Status: SHIPPED | OUTPATIENT
Start: 2018-06-18 | End: 2018-10-18 | Stop reason: SDUPTHER

## 2018-07-06 ENCOUNTER — HOSPITAL ENCOUNTER (OUTPATIENT)
Dept: MAMMOGRAPHY | Facility: CLINIC | Age: 83
Discharge: HOME | End: 2018-07-06
Payer: MEDICARE

## 2018-07-06 ENCOUNTER — OFFICE VISIT (OUTPATIENT)
Dept: INTERNAL MEDICINE CLINIC | Facility: CLINIC | Age: 83
End: 2018-07-06
Payer: MEDICARE

## 2018-07-06 VITALS
HEART RATE: 63 BPM | WEIGHT: 153 LBS | DIASTOLIC BLOOD PRESSURE: 80 MMHG | RESPIRATION RATE: 18 BRPM | BODY MASS INDEX: 28.16 KG/M2 | TEMPERATURE: 98.2 F | OXYGEN SATURATION: 95 % | SYSTOLIC BLOOD PRESSURE: 122 MMHG | HEIGHT: 62 IN

## 2018-07-06 DIAGNOSIS — Z12.39 SCREENING FOR MALIGNANT NEOPLASM OF BREAST: ICD-10-CM

## 2018-07-06 DIAGNOSIS — Z12.39 SCREENING FOR MALIGNANT NEOPLASM OF BREAST: Primary | ICD-10-CM

## 2018-07-06 PROBLEM — Z12.31 SCREENING MAMMOGRAM, ENCOUNTER FOR: Status: ACTIVE | Noted: 2018-07-06

## 2018-07-06 PROCEDURE — 99212 OFFICE O/P EST SF 10 MIN: CPT | Performed by: NURSE PRACTITIONER

## 2018-07-06 PROCEDURE — 77063 BREAST TOMOSYNTHESIS BI: CPT

## 2018-07-06 PROCEDURE — 77067 SCR MAMMO BI INCL CAD: CPT

## 2018-07-06 NOTE — PROGRESS NOTES
Assessment/Plan:    Clinical breast exam within normal limits, will do screening mammogram        Diagnoses and all orders for this visit:    Screening for malignant neoplasm of breast  -     Mammo screening bilateral w 3d & cad; Future    The patient was counseled regarding instructions for management, risk factor reductions, patient and family education,impressions, risks and benefits of treatment options, side effects of medications, importance of compliance with treatment  The treatment plan was reviewed with the patient/guardian and patient/guardian understands and agrees with the treatment plan  Current Outpatient Prescriptions:     atorvastatin (LIPITOR) 40 mg tablet, Take 1 tablet (40 mg total) by mouth daily, Disp: 90 tablet, Rfl: 2    carvedilol (COREG) 6 25 mg tablet, TAKE ONE TABLET BY MOUTH TWICE DAILY, Disp: 60 tablet, Rfl: 5    clopidogrel (PLAVIX) 75 mg tablet, Take 1 tablet (75 mg total) by mouth daily, Disp: 90 tablet, Rfl: 2    cyanocobalamin 1,000 mcg/mL, Inject as directed, Disp: , Rfl:     ergocalciferol (VITAMIN D2) 50,000 units, TAKE ONE CAPSULE BY MOUTH ONCE A WEEK, Disp: 4 capsule, Rfl: 6    FLUoxetine (PROzac) 10 mg capsule, Take 1 capsule (10 mg total) by mouth daily, Disp: 30 capsule, Rfl: 5    levothyroxine 50 mcg tablet, TAKE 1 TABLET BY MOUTH ONCE DAILY, Disp: 90 tablet, Rfl: 0    pioglitazone (ACTOS) 15 mg tablet, Take 1 tablet (15 mg total) by mouth daily, Disp: 90 tablet, Rfl: 2    teriparatide (FORTEO) 600 MCG/2 4ML injection, Inject 0 08 mL (20 mcg total) under the skin daily, Disp: 2 4 mL, Rfl: 5    Current Facility-Administered Medications:     cyanocobalamin injection 1,000 mcg, 1,000 mcg, Intramuscular, Q30 Days, Mary Santiago DO, 1,000 mcg at 05/09/18 1135    Subjective:      Patient ID: Jon Alex is a 80 y o  female  Edy Naylor is here for a CBE, needs mammogram  No complaints today          The following portions of the patient's history were reviewed and updated as appropriate:   She has a past medical history of Cellulitis; Constipation; Ecchymosis; Fall; Hypokalemia; Lower extremity weakness; and Vitamin D deficiency  ,   does not have any pertinent problems on file  ,   has a past surgical history that includes Back surgery and Other surgical history  ,  family history includes Heart disease in her mother; Other in her father  ,   reports that she has quit smoking  She started smoking about 60 years ago  She has never used smokeless tobacco  She reports that she does not drink alcohol or use drugs  ,  is allergic to iodine; iv dye  [iodinated diagnostic agents]; and other       Review of Systems   Constitutional: Negative  Psychiatric/Behavioral: Negative  Objective:  /80 (BP Location: Left arm)   Pulse 63   Temp 98 2 °F (36 8 °C)   Resp 18   Ht 5' 2" (1 575 m)   Wt 69 4 kg (153 lb)   SpO2 95%   BMI 27 98 kg/m²     Lab Review  not applicable     Imaging: No results found  Physical Exam   Constitutional: She is oriented to person, place, and time  She appears well-developed and well-nourished  Pulmonary/Chest: Right breast exhibits no inverted nipple, no mass, no nipple discharge, no skin change and no tenderness  Left breast exhibits no inverted nipple, no mass, no nipple discharge, no skin change and no tenderness  Breasts are symmetrical    Neurological: She is alert and oriented to person, place, and time  Psychiatric: She has a normal mood and affect

## 2018-09-04 ENCOUNTER — CLINICAL SUPPORT (OUTPATIENT)
Dept: INTERNAL MEDICINE CLINIC | Facility: CLINIC | Age: 83
End: 2018-09-04
Payer: MEDICARE

## 2018-09-04 DIAGNOSIS — Z23 NEED FOR INFLUENZA VACCINATION: Primary | ICD-10-CM

## 2018-09-04 PROCEDURE — G0008 ADMIN INFLUENZA VIRUS VAC: HCPCS

## 2018-09-04 PROCEDURE — 90662 IIV NO PRSV INCREASED AG IM: CPT

## 2018-10-01 DIAGNOSIS — M81.0 OSTEOPOROSIS, UNSPECIFIED OSTEOPOROSIS TYPE, UNSPECIFIED PATHOLOGICAL FRACTURE PRESENCE: ICD-10-CM

## 2018-10-09 DIAGNOSIS — F32.9 MAJOR DEPRESSIVE DISORDER WITH SINGLE EPISODE, REMISSION STATUS UNSPECIFIED: ICD-10-CM

## 2018-10-09 RX ORDER — FLUOXETINE 10 MG/1
CAPSULE ORAL
Qty: 30 CAPSULE | Refills: 5 | Status: SHIPPED | OUTPATIENT
Start: 2018-10-09 | End: 2018-10-18 | Stop reason: SDUPTHER

## 2018-10-18 ENCOUNTER — OFFICE VISIT (OUTPATIENT)
Dept: INTERNAL MEDICINE CLINIC | Facility: CLINIC | Age: 83
End: 2018-10-18
Payer: MEDICARE

## 2018-10-18 VITALS
DIASTOLIC BLOOD PRESSURE: 80 MMHG | TEMPERATURE: 97.7 F | WEIGHT: 154 LBS | SYSTOLIC BLOOD PRESSURE: 130 MMHG | RESPIRATION RATE: 16 BRPM | BODY MASS INDEX: 28.34 KG/M2 | HEART RATE: 63 BPM | HEIGHT: 62 IN | OXYGEN SATURATION: 94 %

## 2018-10-18 DIAGNOSIS — I10 HYPERTENSION, UNSPECIFIED TYPE: ICD-10-CM

## 2018-10-18 DIAGNOSIS — F32.9 MAJOR DEPRESSIVE DISORDER WITH SINGLE EPISODE, REMISSION STATUS UNSPECIFIED: ICD-10-CM

## 2018-10-18 DIAGNOSIS — I25.10 CORONARY ARTERY DISEASE DUE TO CALCIFIED CORONARY LESION: ICD-10-CM

## 2018-10-18 DIAGNOSIS — M80.00XD OSTEOPOROSIS WITH CURRENT PATHOLOGICAL FRACTURE WITH ROUTINE HEALING, UNSPECIFIED OSTEOPOROSIS TYPE, SUBSEQUENT ENCOUNTER: Primary | ICD-10-CM

## 2018-10-18 DIAGNOSIS — M48.062 SPINAL STENOSIS OF LUMBAR REGION WITH NEUROGENIC CLAUDICATION: ICD-10-CM

## 2018-10-18 DIAGNOSIS — I25.84 CORONARY ARTERY DISEASE DUE TO CALCIFIED CORONARY LESION: ICD-10-CM

## 2018-10-18 DIAGNOSIS — M81.0 OSTEOPOROSIS, UNSPECIFIED OSTEOPOROSIS TYPE, UNSPECIFIED PATHOLOGICAL FRACTURE PRESENCE: ICD-10-CM

## 2018-10-18 DIAGNOSIS — E03.9 HYPOTHYROIDISM, UNSPECIFIED TYPE: ICD-10-CM

## 2018-10-18 DIAGNOSIS — E53.8 VITAMIN B12 DEFICIENCY: ICD-10-CM

## 2018-10-18 DIAGNOSIS — E78.5 HYPERLIPIDEMIA, UNSPECIFIED HYPERLIPIDEMIA TYPE: ICD-10-CM

## 2018-10-18 DIAGNOSIS — D64.9 NORMOCHROMIC NORMOCYTIC ANEMIA: ICD-10-CM

## 2018-10-18 DIAGNOSIS — E55.9 VITAMIN D DEFICIENCY: ICD-10-CM

## 2018-10-18 DIAGNOSIS — E11.9 TYPE 2 DIABETES MELLITUS WITHOUT COMPLICATION, WITHOUT LONG-TERM CURRENT USE OF INSULIN (HCC): ICD-10-CM

## 2018-10-18 DIAGNOSIS — E11.8 TYPE 2 DIABETES MELLITUS WITH COMPLICATION, WITHOUT LONG-TERM CURRENT USE OF INSULIN (HCC): ICD-10-CM

## 2018-10-18 DIAGNOSIS — I25.10 CORONARY ARTERY DISEASE INVOLVING NATIVE HEART WITHOUT ANGINA PECTORIS, UNSPECIFIED VESSEL OR LESION TYPE: ICD-10-CM

## 2018-10-18 LAB — SL AMB POCT HEMOGLOBIN AIC: 7.7

## 2018-10-18 PROCEDURE — 99214 OFFICE O/P EST MOD 30 MIN: CPT | Performed by: INTERNAL MEDICINE

## 2018-10-18 PROCEDURE — 83036 HEMOGLOBIN GLYCOSYLATED A1C: CPT | Performed by: INTERNAL MEDICINE

## 2018-10-18 PROCEDURE — 36416 COLLJ CAPILLARY BLOOD SPEC: CPT | Performed by: INTERNAL MEDICINE

## 2018-10-18 RX ORDER — FLUOXETINE 10 MG/1
10 CAPSULE ORAL DAILY
Qty: 90 CAPSULE | Refills: 2 | Status: SHIPPED | OUTPATIENT
Start: 2018-10-18 | End: 2019-09-10

## 2018-10-18 RX ORDER — ERGOCALCIFEROL 1.25 MG/1
50000 CAPSULE ORAL WEEKLY
Qty: 12 CAPSULE | Refills: 3 | Status: SHIPPED | OUTPATIENT
Start: 2018-10-18 | End: 2019-02-27

## 2018-10-18 RX ORDER — ZOLPIDEM TARTRATE 5 MG/1
5 TABLET ORAL
COMMUNITY

## 2018-10-18 RX ORDER — LEVOTHYROXINE SODIUM 0.05 MG/1
50 TABLET ORAL DAILY
Qty: 90 TABLET | Refills: 2 | Status: SHIPPED | OUTPATIENT
Start: 2018-10-18 | End: 2019-10-25 | Stop reason: SDUPTHER

## 2018-10-18 RX ORDER — ASPIRIN 81 MG/1
81 TABLET, CHEWABLE ORAL
COMMUNITY

## 2018-10-18 RX ORDER — PIOGLITAZONEHYDROCHLORIDE 15 MG/1
15 TABLET ORAL DAILY
Qty: 90 TABLET | Refills: 0 | Status: SHIPPED | OUTPATIENT
Start: 2018-10-18 | End: 2019-07-15 | Stop reason: SDUPTHER

## 2018-10-18 RX ORDER — CLOPIDOGREL BISULFATE 75 MG/1
75 TABLET ORAL DAILY
Qty: 90 TABLET | Refills: 0 | Status: SHIPPED | OUTPATIENT
Start: 2018-10-18 | End: 2020-06-25 | Stop reason: SDUPTHER

## 2018-10-18 RX ORDER — CARVEDILOL 6.25 MG/1
6.25 TABLET ORAL 2 TIMES DAILY
Qty: 180 TABLET | Refills: 2 | Status: SHIPPED | OUTPATIENT
Start: 2018-10-18 | End: 2019-09-02 | Stop reason: SDUPTHER

## 2018-10-18 RX ORDER — ATORVASTATIN CALCIUM 40 MG/1
40 TABLET, FILM COATED ORAL DAILY
Qty: 90 TABLET | Refills: 2 | Status: SHIPPED | OUTPATIENT
Start: 2018-10-18 | End: 2019-05-30 | Stop reason: SDUPTHER

## 2018-10-18 NOTE — PROGRESS NOTES
Assessment/Plan:    1  Type 2 diabetes A1c is elevated tight control not indicated in this patient diet lifestyle medications will continue  2  Patient with low back pain has resolved does have some evidence of trochanteric bursitis may consider injection if pain recurs is doing much better at this time  3  Patient with coronary disease hyperlipidemia medications will continue will see her back in 4 months labs for her return is presently asymptomatic  4  History of compression fracture osteoporosis will be continuing [facility]         Diagnoses and all orders for this visit:    Osteoporosis with current pathological fracture with routine healing, unspecified osteoporosis type, subsequent encounter  -     POCT hemoglobin A1c  -     CBC and differential; Future  -     Comprehensive metabolic panel; Future  -     Hemoglobin A1C; Future  -     Vitamin D 25 hydroxy; Future  -     Lipid Panel with Direct LDL reflex; Future  -     LDL cholesterol, direct; Future  -     Microalbumin / creatinine urine ratio; Future    Vitamin D deficiency  -     POCT hemoglobin A1c  -     CBC and differential; Future  -     Comprehensive metabolic panel; Future  -     Hemoglobin A1C; Future  -     Vitamin D 25 hydroxy; Future  -     Lipid Panel with Direct LDL reflex; Future  -     LDL cholesterol, direct; Future  -     Microalbumin / creatinine urine ratio; Future  -     ergocalciferol (VITAMIN D2) 50,000 units; Take 1 capsule (50,000 Units total) by mouth once a week    Vitamin B12 deficiency  -     POCT hemoglobin A1c  -     CBC and differential; Future  -     Comprehensive metabolic panel; Future  -     Hemoglobin A1C; Future  -     Vitamin D 25 hydroxy; Future  -     Lipid Panel with Direct LDL reflex; Future  -     LDL cholesterol, direct; Future  -     Microalbumin / creatinine urine ratio;  Future    Normochromic normocytic anemia  -     POCT hemoglobin A1c  -     CBC and differential; Future  -     Comprehensive metabolic panel; Future  -     Hemoglobin A1C; Future  -     Vitamin D 25 hydroxy; Future  -     Lipid Panel with Direct LDL reflex; Future  -     LDL cholesterol, direct; Future  -     Microalbumin / creatinine urine ratio; Future    Type 2 diabetes mellitus without complication, without long-term current use of insulin (HCC)  -     POCT hemoglobin A1c  -     CBC and differential; Future  -     Comprehensive metabolic panel; Future  -     Hemoglobin A1C; Future  -     Vitamin D 25 hydroxy; Future  -     Lipid Panel with Direct LDL reflex; Future  -     LDL cholesterol, direct; Future  -     Microalbumin / creatinine urine ratio; Future    Spinal stenosis of lumbar region with neurogenic claudication    Hypothyroidism, unspecified type  -     TSH, 3rd generation with Free T4 reflex; Future  -     levothyroxine 50 mcg tablet; Take 1 tablet (50 mcg total) by mouth daily    Hyperlipidemia, unspecified hyperlipidemia type  -     pioglitazone (ACTOS) 15 mg tablet; Take 1 tablet (15 mg total) by mouth daily  -     atorvastatin (LIPITOR) 40 mg tablet; Take 1 tablet (40 mg total) by mouth daily    Hypertension, unspecified type    Major depressive disorder with single episode, remission status unspecified  -     FLUoxetine (PROzac) 10 mg capsule; Take 1 capsule (10 mg total) by mouth daily    Type 2 diabetes mellitus with complication, without long-term current use of insulin ([facility])  -     pioglitazone (ACTOS) 15 mg tablet; Take 1 tablet (15 mg total) by mouth daily  -     atorvastatin (LIPITOR) 40 mg tablet; Take 1 tablet (40 mg total) by mouth daily    Coronary artery disease due to calcified coronary lesion  -     carvedilol (COREG) 6 25 mg tablet; Take 1 tablet (6 25 mg total) by mouth 2 (two) times a day    Osteoporosis, unspecified osteoporosis type, unspecified pathological fracture presence  -     teriparatide (FORTEO) 600 MCG/2 4ML injection;  Inject 0 08 mL (20 mcg total) under the skin daily    Coronary artery disease involving native heart without angina pectoris, unspecified vessel or lesion type  -     clopidogrel (PLAVIX) 75 mg tablet; Take 1 tablet (75 mg total) by mouth daily    Other orders  -     zolpidem (AMBIEN) 5 mg tablet; Take 5 mg by mouth  -     aspirin 81 mg chewable tablet;  Chew 81 mg         Scheduled Meds:    Current Facility-Administered Medications:  cyanocobalamin 1,000 mcg Intramuscular Q30 Days Rehan Pastures, DO     Continuous Infusions:   PRN Meds:   Scheduled Meds:    Current Facility-Administered Medications:  cyanocobalamin 1,000 mcg Intramuscular Q30 Days Rehan Pastures, DO     Continuous Infusions:   Scheduled Meds:    Current Facility-Administered Medications:  cyanocobalamin 1,000 mcg Intramuscular Q30 Days Rehan Pastures, DO       Current Outpatient Prescriptions:     aspirin 81 mg chewable tablet, Chew 81 mg, Disp: , Rfl:     atorvastatin (LIPITOR) 40 mg tablet, Take 1 tablet (40 mg total) by mouth daily, Disp: 90 tablet, Rfl: 2    carvedilol (COREG) 6 25 mg tablet, Take 1 tablet (6 25 mg total) by mouth 2 (two) times a day, Disp: 180 tablet, Rfl: 2    clopidogrel (PLAVIX) 75 mg tablet, Take 1 tablet (75 mg total) by mouth daily, Disp: 90 tablet, Rfl: 0    cyanocobalamin 1,000 mcg/mL, Inject as directed, Disp: , Rfl:     ergocalciferol (VITAMIN D2) 50,000 units, Take 1 capsule (50,000 Units total) by mouth once a week, Disp: 12 capsule, Rfl: 3    FLUoxetine (PROzac) 10 mg capsule, Take 1 capsule (10 mg total) by mouth daily, Disp: 90 capsule, Rfl: 2    levothyroxine 50 mcg tablet, Take 1 tablet (50 mcg total) by mouth daily, Disp: 90 tablet, Rfl: 2    pioglitazone (ACTOS) 15 mg tablet, Take 1 tablet (15 mg total) by mouth daily, Disp: 90 tablet, Rfl: 0    teriparatide (FORTEO) 600 MCG/2 4ML injection, Inject 0 08 mL (20 mcg total) under the skin daily, Disp: 2 4 mL, Rfl: 0    zolpidem (AMBIEN) 5 mg tablet, Take 5 mg by mouth, Disp: , Rfl:     Current Facility-Administered Medications:    cyanocobalamin injection 1,000 mcg, 1,000 mcg, Intramuscular, Q30 Days, Danna Solis DO, 1,000 mcg at 05/09/18 1135      The patient was counseled regarding instructions for management, risk factor reductions, patient and family education,impressions, risks and benefits of treatment options, side effects of medications, importance of compliance with treatment  The treatment plan was reviewed with the patient/guardian and patient/guardian understands and agrees with the treatment plan  Subjective:      Patient ID: Ana Lilia Lopez is a 80 y o  female  Lumbar pain 1 day improving has hx compression fx, sx improving        The following portions of the patient's history were reviewed and updated as appropriate:   She has a past medical history of Cellulitis; Constipation; Ecchymosis; Fall; Hypokalemia; Lower extremity weakness; and Vitamin D deficiency  ,   does not have any pertinent problems on file  ,   has a past surgical history that includes Back surgery and Other surgical history  ,  family history includes Heart disease in her mother; Other in her father  ,   reports that she has quit smoking  She started smoking about 60 years ago  She has never used smokeless tobacco  She reports that she does not drink alcohol or use drugs  ,  is allergic to iodine; iv dye  [iodinated diagnostic agents]; and other       Review of Systems   Constitutional: Negative for appetite change, chills, fatigue, fever and unexpected weight change  HENT: Negative for congestion, ear pain, facial swelling, hearing loss, mouth sores, nosebleeds, postnasal drip, rhinorrhea, sinus pain, sore throat, trouble swallowing and voice change  Eyes: Negative for pain, discharge, redness and visual disturbance  Respiratory: Negative for apnea, chest tightness, shortness of breath, wheezing and stridor  Cardiovascular: Negative for chest pain, palpitations and leg swelling     Gastrointestinal: Negative for abdominal distention, abdominal pain, blood in stool, constipation, diarrhea and vomiting  Endocrine: Negative for cold intolerance, heat intolerance, polydipsia, polyphagia and polyuria  Genitourinary: Negative for difficulty urinating, dysuria, flank pain, frequency, genital sores, hematuria and urgency  Musculoskeletal: Positive for back pain  Negative for arthralgias  Skin: Negative for rash and wound  Allergic/Immunologic: Negative for environmental allergies, food allergies and immunocompromised state  Neurological: Negative for dizziness, tremors, seizures, syncope, facial asymmetry, speech difficulty, weakness, light-headedness, numbness and headaches  Hematological: Negative for adenopathy  Does not bruise/bleed easily  Psychiatric/Behavioral: Negative for agitation, behavioral problems, dysphoric mood, hallucinations, self-injury, sleep disturbance and suicidal ideas  The patient is not hyperactive  Objective:     Physical Exam   Constitutional: She is oriented to person, place, and time  She appears well-developed  HENT:   Right Ear: External ear normal    Left Ear: External ear normal    Eyes: Right eye exhibits no discharge  Left eye exhibits no discharge  No scleral icterus  Neck: Carotid bruit is not present  No tracheal deviation present  No thyroid mass and no thyromegaly present  Cardiovascular: Normal rate, regular rhythm, normal heart sounds and intact distal pulses  Exam reveals no gallop and no friction rub  No murmur heard  Pulmonary/Chest: No respiratory distress  She has no wheezes  She has no rales  Musculoskeletal: She exhibits no edema  Lymphadenopathy:     She has no cervical adenopathy  Neurological: She is alert and oriented to person, place, and time  Coordination normal    Psychiatric: She has a normal mood and affect  Her behavior is normal  Judgment and thought content normal    Nursing note and vitals reviewed        Vitals:    10/18/18 1343 10/18/18 1527   BP: 130/80   BP Location:  Left arm   Patient Position:  Sitting   Pulse: 63    Resp: 16    Temp: 97 7 °F (36 5 °C)    SpO2: 94%    Weight: 69 9 kg (154 lb)    Height: 5' 2" (1 575 m)

## 2018-10-24 DIAGNOSIS — I10 HYPERTENSION, UNSPECIFIED TYPE: ICD-10-CM

## 2018-10-24 DIAGNOSIS — E11.9 TYPE 2 DIABETES MELLITUS WITHOUT COMPLICATION, WITHOUT LONG-TERM CURRENT USE OF INSULIN (HCC): Primary | ICD-10-CM

## 2018-10-24 RX ORDER — LOSARTAN POTASSIUM 50 MG/1
TABLET ORAL
Qty: 90 TABLET | Refills: 3 | Status: SHIPPED | OUTPATIENT
Start: 2018-10-24 | End: 2019-11-06 | Stop reason: SDUPTHER

## 2019-02-19 LAB
CREAT ?TM UR-SCNC: 286 UMOL/L
EXT MICROALBUMIN URINE RANDOM: 52.4
HBA1C MFR BLD HPLC: 7.9 %
MICROALBUMIN/CREAT UR: 18.3 MG/G{CREAT}

## 2019-02-27 ENCOUNTER — OFFICE VISIT (OUTPATIENT)
Dept: INTERNAL MEDICINE CLINIC | Facility: CLINIC | Age: 84
End: 2019-02-27
Payer: MEDICARE

## 2019-02-27 VITALS
BODY MASS INDEX: 28.26 KG/M2 | TEMPERATURE: 98.2 F | RESPIRATION RATE: 18 BRPM | OXYGEN SATURATION: 98 % | HEART RATE: 60 BPM | DIASTOLIC BLOOD PRESSURE: 80 MMHG | WEIGHT: 153.6 LBS | SYSTOLIC BLOOD PRESSURE: 140 MMHG | HEIGHT: 62 IN

## 2019-02-27 DIAGNOSIS — E55.9 VITAMIN D DEFICIENCY: ICD-10-CM

## 2019-02-27 DIAGNOSIS — E11.42 DIABETIC PERIPHERAL NEUROPATHY (HCC): ICD-10-CM

## 2019-02-27 DIAGNOSIS — F32.A DEPRESSION, UNSPECIFIED DEPRESSION TYPE: ICD-10-CM

## 2019-02-27 DIAGNOSIS — E66.3 OVERWEIGHT (BMI 25.0-29.9): ICD-10-CM

## 2019-02-27 DIAGNOSIS — E53.8 VITAMIN B12 DEFICIENCY: ICD-10-CM

## 2019-02-27 DIAGNOSIS — K21.9 GASTROESOPHAGEAL REFLUX DISEASE, ESOPHAGITIS PRESENCE NOT SPECIFIED: ICD-10-CM

## 2019-02-27 DIAGNOSIS — I10 HYPERTENSION, UNSPECIFIED TYPE: Primary | ICD-10-CM

## 2019-02-27 DIAGNOSIS — E11.8 TYPE 2 DIABETES MELLITUS WITH COMPLICATION, WITHOUT LONG-TERM CURRENT USE OF INSULIN (HCC): ICD-10-CM

## 2019-02-27 DIAGNOSIS — E11.9 TYPE 2 DIABETES MELLITUS WITHOUT COMPLICATION, WITHOUT LONG-TERM CURRENT USE OF INSULIN (HCC): ICD-10-CM

## 2019-02-27 DIAGNOSIS — M80.00XD OSTEOPOROSIS WITH CURRENT PATHOLOGICAL FRACTURE WITH ROUTINE HEALING, UNSPECIFIED OSTEOPOROSIS TYPE, SUBSEQUENT ENCOUNTER: ICD-10-CM

## 2019-02-27 DIAGNOSIS — E03.9 HYPOTHYROIDISM, UNSPECIFIED TYPE: ICD-10-CM

## 2019-02-27 DIAGNOSIS — D64.9 NORMOCHROMIC NORMOCYTIC ANEMIA: ICD-10-CM

## 2019-02-27 PROCEDURE — 96372 THER/PROPH/DIAG INJ SC/IM: CPT

## 2019-02-27 PROCEDURE — 99214 OFFICE O/P EST MOD 30 MIN: CPT | Performed by: INTERNAL MEDICINE

## 2019-02-27 RX ORDER — ACETAMINOPHEN 160 MG
2000 TABLET,DISINTEGRATING ORAL DAILY
Qty: 100 CAPSULE | Refills: 2 | Status: SHIPPED | OUTPATIENT
Start: 2019-02-27

## 2019-02-27 RX ADMIN — CYANOCOBALAMIN 1000 MCG: 1000 INJECTION INTRAMUSCULAR; SUBCUTANEOUS at 12:50

## 2019-02-27 NOTE — LETTER
February 27, 2019     Corrinne Righter, MD  [address] [place] [address]    Patient: Salina Constantino   YOB: 1934   Date of Visit: 2/27/2019       Dear Dr Crispin Greer: Thank you for referring Salina Constantino to me for evaluation  Below are my notes for this consultation  If you have questions, please do not hesitate to call me  I look forward to following your patient along with you  Sincerely,        Markus Rushing DO        CC: No Recipients  Markus Rushing DO  2/27/2019  1:50 PM  Sign at close encounter  Assessment/Plan:  Tight control type 2 diabetes not indicated non compliant with diet  1 decreased vibration sense early diabetic neuropathy will observe  2  Type 2 diabetes not well controlled diet lifestyle stressed will recheck in 6 months  3  Hypertension is at goal  4  Vitamin-D deficiency is at goal will discontinue 49340 units of vitamin D2 at start 2000 units of vitamin D3 a day  5  Hypothyroidism continue thyroid medication recheck in 6 months  6  Depression is stable on medication fluoxetine          Diagnoses and all orders for this visit:    Hypertension, unspecified type  -     CBC and differential; Future  -     Comprehensive metabolic panel; Future  -     Hemoglobin A1C; Future  -     Lipid Panel with Direct LDL reflex; Future  -     LDL cholesterol, direct; Future  -     TSH, 3rd generation with Free T4 reflex; Future  -     Microalbumin / creatinine urine ratio; Future  -     Vitamin D 25 hydroxy; Future  -     Vitamin B12; Future    Gastroesophageal reflux disease, esophagitis presence not specified  -     CBC and differential; Future  -     Comprehensive metabolic panel; Future  -     Hemoglobin A1C; Future  -     Lipid Panel with Direct LDL reflex; Future  -     LDL cholesterol, direct; Future  -     TSH, 3rd generation with Free T4 reflex; Future  -     Microalbumin / creatinine urine ratio; Future  -     Vitamin D 25 hydroxy;  Future  -     Vitamin B12; Future    Osteoporosis with current pathological fracture with routine healing, unspecified osteoporosis type, subsequent encounter  -     CBC and differential; Future  -     Comprehensive metabolic panel; Future  -     Hemoglobin A1C; Future  -     Lipid Panel with Direct LDL reflex; Future  -     LDL cholesterol, direct; Future  -     TSH, 3rd generation with Free T4 reflex; Future  -     Microalbumin / creatinine urine ratio; Future  -     Vitamin D 25 hydroxy; Future  -     Vitamin B12; Future  -     Cholecalciferol (VITAMIN D3) 2000 units capsule; Take 1 capsule (2,000 Units total) by mouth daily    Type 2 diabetes mellitus without complication, without long-term current use of insulin ([facility])  -     CBC and differential; Future  -     Comprehensive metabolic panel; Future  -     Hemoglobin A1C; Future  -     Lipid Panel with Direct LDL reflex; Future  -     LDL cholesterol, direct; Future  -     TSH, 3rd generation with Free T4 reflex; Future  -     Microalbumin / creatinine urine ratio; Future  -     Vitamin D 25 hydroxy; Future  -     Vitamin B12; Future    Vitamin B12 deficiency  -     CBC and differential; Future  -     Comprehensive metabolic panel; Future  -     Hemoglobin A1C; Future  -     Lipid Panel with Direct LDL reflex; Future  -     LDL cholesterol, direct; Future  -     TSH, 3rd generation with Free T4 reflex; Future  -     Microalbumin / creatinine urine ratio; Future  -     Vitamin D 25 hydroxy; Future  -     Vitamin B12; Future    Vitamin D deficiency  -     CBC and differential; Future  -     Comprehensive metabolic panel; Future  -     Hemoglobin A1C; Future  -     Lipid Panel with Direct LDL reflex; Future  -     LDL cholesterol, direct; Future  -     TSH, 3rd generation with Free T4 reflex; Future  -     Microalbumin / creatinine urine ratio; Future  -     Vitamin D 25 hydroxy; Future  -     Vitamin B12; Future  -     Cholecalciferol (VITAMIN D3) 2000 units capsule;  Take 1 capsule (2,000 Units total) by mouth daily    Normochromic normocytic anemia  -     Vitamin D 25 hydroxy; Future  -     Vitamin B12; Future    Type 2 diabetes mellitus with complication, without long-term current use of insulin (HCC)  -     Vitamin D 25 hydroxy; Future  -     Vitamin B12; Future    Hypothyroidism, unspecified type    Diabetic peripheral neuropathy (HCC)    Overweight (BMI 25 0-29  9)    Depression, unspecified depression type        The patient was counseled regarding instructions for management, risk factor reductions, patient and family education,impressions, risks and benefits of treatment options, side effects of medications, importance of compliance with treatment  The treatment plan was reviewed with the patient/guardian and patient/guardian understands and agrees with the treatment plan              Current Outpatient Medications:     aspirin 81 mg chewable tablet, Chew 81 mg, Disp: , Rfl:     atorvastatin (LIPITOR) 40 mg tablet, Take 1 tablet (40 mg total) by mouth daily, Disp: 90 tablet, Rfl: 2    carvedilol (COREG) 6 25 mg tablet, Take 1 tablet (6 25 mg total) by mouth 2 (two) times a day, Disp: 180 tablet, Rfl: 2    clopidogrel (PLAVIX) 75 mg tablet, Take 1 tablet (75 mg total) by mouth daily, Disp: 90 tablet, Rfl: 0    cyanocobalamin 1,000 mcg/mL, Inject as directed, Disp: , Rfl:     FLUoxetine (PROzac) 10 mg capsule, Take 1 capsule (10 mg total) by mouth daily, Disp: 90 capsule, Rfl: 2    levothyroxine 50 mcg tablet, Take 1 tablet (50 mcg total) by mouth daily, Disp: 90 tablet, Rfl: 2    losartan (COZAAR) 50 mg tablet, TAKE ONE TABLET BY MOUTH ONCE DAILY, Disp: 90 tablet, Rfl: 3    pioglitazone (ACTOS) 15 mg tablet, Take 1 tablet (15 mg total) by mouth daily, Disp: 90 tablet, Rfl: 0    teriparatide (FORTEO) 600 MCG/2 4ML injection, Inject 0 08 mL (20 mcg total) under the skin daily, Disp: 2 4 mL, Rfl: 0    zolpidem (AMBIEN) 5 mg tablet, Take 5 mg by mouth, Disp: , Rfl:     Cholecalciferol (VITAMIN D3) 2000 units capsule, Take 1 capsule (2,000 Units total) by mouth daily, Disp: 100 capsule, Rfl: 2    Current Facility-Administered Medications:     cyanocobalamin injection 1,000 mcg, 1,000 mcg, Intramuscular, Q30 Days, Shaji Nguyễn DO, 1,000 mcg at 02/27/19 1250    Subjective:      Patient ID: Kurt Orozco is a 80 y o  female  HPI    The following portions of the patient's history were reviewed and updated as appropriate:   She has a past medical history of Cellulitis, Constipation, Ecchymosis, Fall, Hypokalemia, Lower extremity weakness, and Vitamin D deficiency  ,  does not have any pertinent problems on file  ,   has a past surgical history that includes Back surgery and Other surgical history  ,  family history includes Heart disease in her mother; Other in her father  ,   reports that she has quit smoking  She started smoking about 60 years ago  She has never used smokeless tobacco  She reports that she does not drink alcohol or use drugs  ,  is allergic to iodine; iv dye  [iodinated diagnostic agents]; and other       Review of Systems   Constitutional: Negative for appetite change, chills, fatigue, fever and unexpected weight change  HENT: Negative for congestion, ear pain, facial swelling, hearing loss, mouth sores, nosebleeds, postnasal drip, rhinorrhea, sinus pain, sore throat, trouble swallowing and voice change  Eyes: Negative for pain, discharge, redness and visual disturbance  Respiratory: Negative for apnea, chest tightness, shortness of breath, wheezing and stridor  Cardiovascular: Negative for chest pain, palpitations and leg swelling  Gastrointestinal: Negative for abdominal distention, abdominal pain, blood in stool, constipation, diarrhea and vomiting  Endocrine: Negative for cold intolerance, heat intolerance, polydipsia, polyphagia and polyuria  Genitourinary: Negative for difficulty urinating, dysuria, flank pain, frequency, genital sores, hematuria and urgency  Musculoskeletal: Negative for arthralgias and back pain  Skin: Negative for rash and wound  Allergic/Immunologic: Negative for environmental allergies, food allergies and immunocompromised state  Neurological: Negative for dizziness, tremors, seizures, syncope, facial asymmetry, speech difficulty, weakness, light-headedness, numbness and headaches  Hematological: Negative for adenopathy  Does not bruise/bleed easily  Psychiatric/Behavioral: Negative for agitation, behavioral problems, dysphoric mood, hallucinations, self-injury, sleep disturbance and suicidal ideas  The patient is not hyperactive  Objective:  /80 (BP Location: Left arm, Patient Position: Sitting)   Pulse 60   Temp 98 2 °F (36 8 °C) (Tympanic)   Resp 18   Ht 5' 2" (1 575 m)   Wt 69 7 kg (153 lb 9 6 oz)   SpO2 98%   BMI 28 09 kg/m²      Lab Review  Orders Only on 02/19/2019   Component Date Value    Hemoglobin A1C 02/19/2019 7 9     EXT Creatinine Urine 02/19/2019 286 0     Microalbum  ,U,Random 02/19/2019 52 4     EXTERNAL Microalb/Creat * 02/19/2019 18 3    Office Visit on 10/18/2018   Component Date Value    Hemoglobin A1C 10/18/2018 7 7          Imaging  @[email or web address]@     No orders to display     No results found for this or any previous visit  Patient's shoes and socks removed  Right Foot/Ankle   Right Foot Inspection  Skin Exam: skin normal and skin intact no dry skin, no warmth, no callus, no erythema, no maceration, no abnormal color, no pre-ulcer, no ulcer and no callus                          Toe Exam: no swelling, no tenderness, erythema and  no right toe deformity  Sensory   Vibration: diminished  Proprioception: intact   Monofilament testing: intact  Vascular    The right DP pulse is 1+  The right PT pulse is 1+     Right Toe  - Comprehensive Exam  Ecchymosis: none  Swelling: none   Tenderness: none         Left Foot/Ankle  Left Foot Inspection  Skin Exam: skin normal and skin intactno dry skin, no warmth, no erythema, no maceration, normal color, no pre-ulcer, no ulcer and no callus                         Toe Exam: no swelling, no tenderness, no erythema and no left toe deformity                   Sensory   Vibration: diminished  Proprioception: intact  Monofilament: intact  Vascular    The left DP pulse is 1+  The left PT pulse is 1+  Left Toe  - Comprehensive Exam  Ecchymosis: none  Swelling: none   Tenderness: none       Assign Risk Category:  No deformity present; Loss of protective sensation; No weak pulses       Risk: 1       Physical Exam   Constitutional: She is oriented to person, place, and time  She appears well-developed  HENT:   Right Ear: External ear normal    Left Ear: External ear normal    Eyes: Right eye exhibits no discharge  Left eye exhibits no discharge  No scleral icterus  Neck: Carotid bruit is not present  No tracheal deviation present  No thyroid mass and no thyromegaly present  Cardiovascular: Normal rate, regular rhythm, normal heart sounds and intact distal pulses  Exam reveals no gallop and no friction rub  Pulses are no weak pulses  No murmur heard  Pulses:       Dorsalis pedis pulses are 1+ on the right side, and 1+ on the left side  Posterior tibial pulses are 1+ on the right side, and 1+ on the left side  Pulmonary/Chest: No respiratory distress  She has no wheezes  She has no rales  Musculoskeletal: She exhibits no edema  Feet:   Right Foot:   Skin Integrity: Negative for ulcer, skin breakdown, erythema, warmth, callus or dry skin  Left Foot:   Skin Integrity: Negative for ulcer, skin breakdown, erythema, warmth, callus or dry skin  Lymphadenopathy:     She has no cervical adenopathy  Neurological: She is alert and oriented to person, place, and time  Coordination normal    Psychiatric: She has a normal mood and affect  Her behavior is normal  Judgment and thought content normal    Nursing note and vitals reviewed            BMI Counseling: Body mass index is 28 09 kg/m²  Discussed the patient's BMI with her  The BMI is above average  BMI counseling and education was provided to the patient  Nutrition recommendations include reducing portion sizes, decreasing overall calorie intake, 3-5 servings of fruits/vegetables daily, reducing fast food intake, consuming healthier snacks, decreasing soda and/or juice intake and moderation in carbohydrate intake  Exercise recommendations include moderate aerobic physical activity for 150 minutes/week

## 2019-02-27 NOTE — PROGRESS NOTES
Assessment/Plan:  Tight control type 2 diabetes not indicated non compliant with diet  1 decreased vibration sense early diabetic neuropathy will observe  2  Type 2 diabetes not well controlled diet lifestyle stressed will recheck in 6 months  3  Hypertension is at goal  4  Vitamin-D deficiency is at goal will discontinue 97160 units of vitamin D2 at start 2000 units of vitamin D3 a day  5  Hypothyroidism continue thyroid medication recheck in 6 months  6  Depression is stable on medication fluoxetine          Diagnoses and all orders for this visit:    Hypertension, unspecified type  -     CBC and differential; Future  -     Comprehensive metabolic panel; Future  -     Hemoglobin A1C; Future  -     Lipid Panel with Direct LDL reflex; Future  -     LDL cholesterol, direct; Future  -     TSH, 3rd generation with Free T4 reflex; Future  -     Microalbumin / creatinine urine ratio; Future  -     Vitamin D 25 hydroxy; Future  -     Vitamin B12; Future    Gastroesophageal reflux disease, esophagitis presence not specified  -     CBC and differential; Future  -     Comprehensive metabolic panel; Future  -     Hemoglobin A1C; Future  -     Lipid Panel with Direct LDL reflex; Future  -     LDL cholesterol, direct; Future  -     TSH, 3rd generation with Free T4 reflex; Future  -     Microalbumin / creatinine urine ratio; Future  -     Vitamin D 25 hydroxy; Future  -     Vitamin B12; Future    Osteoporosis with current pathological fracture with routine healing, unspecified osteoporosis type, subsequent encounter  -     CBC and differential; Future  -     Comprehensive metabolic panel; Future  -     Hemoglobin A1C; Future  -     Lipid Panel with Direct LDL reflex; Future  -     LDL cholesterol, direct; Future  -     TSH, 3rd generation with Free T4 reflex; Future  -     Microalbumin / creatinine urine ratio; Future  -     Vitamin D 25 hydroxy;  Future  -     Vitamin B12; Future  -     Cholecalciferol (VITAMIN D3) 2000 units capsule; Take 1 capsule (2,000 Units total) by mouth daily    Type 2 diabetes mellitus without complication, without long-term current use of insulin (HCC)  -     CBC and differential; Future  -     Comprehensive metabolic panel; Future  -     Hemoglobin A1C; Future  -     Lipid Panel with Direct LDL reflex; Future  -     LDL cholesterol, direct; Future  -     TSH, 3rd generation with Free T4 reflex; Future  -     Microalbumin / creatinine urine ratio; Future  -     Vitamin D 25 hydroxy; Future  -     Vitamin B12; Future    Vitamin B12 deficiency  -     CBC and differential; Future  -     Comprehensive metabolic panel; Future  -     Hemoglobin A1C; Future  -     Lipid Panel with Direct LDL reflex; Future  -     LDL cholesterol, direct; Future  -     TSH, 3rd generation with Free T4 reflex; Future  -     Microalbumin / creatinine urine ratio; Future  -     Vitamin D 25 hydroxy; Future  -     Vitamin B12; Future    Vitamin D deficiency  -     CBC and differential; Future  -     Comprehensive metabolic panel; Future  -     Hemoglobin A1C; Future  -     Lipid Panel with Direct LDL reflex; Future  -     LDL cholesterol, direct; Future  -     TSH, 3rd generation with Free T4 reflex; Future  -     Microalbumin / creatinine urine ratio; Future  -     Vitamin D 25 hydroxy; Future  -     Vitamin B12; Future  -     Cholecalciferol (VITAMIN D3) 2000 units capsule; Take 1 capsule (2,000 Units total) by mouth daily    Normochromic normocytic anemia  -     Vitamin D 25 hydroxy; Future  -     Vitamin B12; Future    Type 2 diabetes mellitus with complication, without long-term current use of insulin (HCC)  -     Vitamin D 25 hydroxy; Future  -     Vitamin B12; Future    Hypothyroidism, unspecified type    Diabetic peripheral neuropathy (HCC)    Overweight (BMI 25 0-29  9)    Depression, unspecified depression type        The patient was counseled regarding instructions for management, risk factor reductions, patient and family education,impressions, risks and benefits of treatment options, side effects of medications, importance of compliance with treatment  The treatment plan was reviewed with the patient/guardian and patient/guardian understands and agrees with the treatment plan  Current Outpatient Medications:     aspirin 81 mg chewable tablet, Chew 81 mg, Disp: , Rfl:     atorvastatin (LIPITOR) 40 mg tablet, Take 1 tablet (40 mg total) by mouth daily, Disp: 90 tablet, Rfl: 2    carvedilol (COREG) 6 25 mg tablet, Take 1 tablet (6 25 mg total) by mouth 2 (two) times a day, Disp: 180 tablet, Rfl: 2    clopidogrel (PLAVIX) 75 mg tablet, Take 1 tablet (75 mg total) by mouth daily, Disp: 90 tablet, Rfl: 0    cyanocobalamin 1,000 mcg/mL, Inject as directed, Disp: , Rfl:     FLUoxetine (PROzac) 10 mg capsule, Take 1 capsule (10 mg total) by mouth daily, Disp: 90 capsule, Rfl: 2    levothyroxine 50 mcg tablet, Take 1 tablet (50 mcg total) by mouth daily, Disp: 90 tablet, Rfl: 2    losartan (COZAAR) 50 mg tablet, TAKE ONE TABLET BY MOUTH ONCE DAILY, Disp: 90 tablet, Rfl: 3    pioglitazone (ACTOS) 15 mg tablet, Take 1 tablet (15 mg total) by mouth daily, Disp: 90 tablet, Rfl: 0    teriparatide (FORTEO) 600 MCG/2 4ML injection, Inject 0 08 mL (20 mcg total) under the skin daily, Disp: 2 4 mL, Rfl: 0    zolpidem (AMBIEN) 5 mg tablet, Take 5 mg by mouth, Disp: , Rfl:     Cholecalciferol (VITAMIN D3) 2000 units capsule, Take 1 capsule (2,000 Units total) by mouth daily, Disp: 100 capsule, Rfl: 2    Current Facility-Administered Medications:     cyanocobalamin injection 1,000 mcg, 1,000 mcg, Intramuscular, Q30 Days, Michaela Chawla DO, 1,000 mcg at 02/27/19 1250    Subjective:      Patient ID: Ryley Harding is a 80 y o  female      HPI    The following portions of the patient's history were reviewed and updated as appropriate:   She has a past medical history of Cellulitis, Constipation, Ecchymosis, Fall, Hypokalemia, Lower extremity weakness, and Vitamin D deficiency  ,  does not have any pertinent problems on file  ,   has a past surgical history that includes Back surgery and Other surgical history  ,  family history includes Heart disease in her mother; Other in her father  ,   reports that she has quit smoking  She started smoking about 60 years ago  She has never used smokeless tobacco  She reports that she does not drink alcohol or use drugs  ,  is allergic to iodine; iv dye  [iodinated diagnostic agents]; and other       Review of Systems   Constitutional: Negative for appetite change, chills, fatigue, fever and unexpected weight change  HENT: Negative for congestion, ear pain, facial swelling, hearing loss, mouth sores, nosebleeds, postnasal drip, rhinorrhea, sinus pain, sore throat, trouble swallowing and voice change  Eyes: Negative for pain, discharge, redness and visual disturbance  Respiratory: Negative for apnea, chest tightness, shortness of breath, wheezing and stridor  Cardiovascular: Negative for chest pain, palpitations and leg swelling  Gastrointestinal: Negative for abdominal distention, abdominal pain, blood in stool, constipation, diarrhea and vomiting  Endocrine: Negative for cold intolerance, heat intolerance, polydipsia, polyphagia and polyuria  Genitourinary: Negative for difficulty urinating, dysuria, flank pain, frequency, genital sores, hematuria and urgency  Musculoskeletal: Negative for arthralgias and back pain  Skin: Negative for rash and wound  Allergic/Immunologic: Negative for environmental allergies, food allergies and immunocompromised state  Neurological: Negative for dizziness, tremors, seizures, syncope, facial asymmetry, speech difficulty, weakness, light-headedness, numbness and headaches  Hematological: Negative for adenopathy  Does not bruise/bleed easily     Psychiatric/Behavioral: Negative for agitation, behavioral problems, dysphoric mood, hallucinations, self-injury, sleep disturbance and suicidal ideas  The patient is not hyperactive  Objective:  /80 (BP Location: Left arm, Patient Position: Sitting)   Pulse 60   Temp 98 2 °F (36 8 °C) (Tympanic)   Resp 18   Ht 5' 2" (1 575 m)   Wt 69 7 kg (153 lb 9 6 oz)   SpO2 98%   BMI 28 09 kg/m²     Lab Review  Orders Only on 02/19/2019   Component Date Value    Hemoglobin A1C 02/19/2019 7 9     EXT Creatinine Urine 02/19/2019 286 0     Microalbum  ,U,Random 02/19/2019 52 4     EXTERNAL Microalb/Creat * 02/19/2019 18 3    Office Visit on 10/18/2018   Component Date Value    Hemoglobin A1C 10/18/2018 7 7          Imaging  @[email or web address]@     No orders to display     No results found for this or any previous visit  Patient's shoes and socks removed  Right Foot/Ankle   Right Foot Inspection  Skin Exam: skin normal and skin intact no dry skin, no warmth, no callus, no erythema, no maceration, no abnormal color, no pre-ulcer, no ulcer and no callus                          Toe Exam: no swelling, no tenderness, erythema and  no right toe deformity  Sensory   Vibration: diminished  Proprioception: intact   Monofilament testing: intact  Vascular    The right DP pulse is 1+  The right PT pulse is 1+  Right Toe  - Comprehensive Exam  Ecchymosis: none  Swelling: none   Tenderness: none         Left Foot/Ankle  Left Foot Inspection  Skin Exam: skin normal and skin intactno dry skin, no warmth, no erythema, no maceration, normal color, no pre-ulcer, no ulcer and no callus                         Toe Exam: no swelling, no tenderness, no erythema and no left toe deformity                   Sensory   Vibration: diminished  Proprioception: intact  Monofilament: intact  Vascular    The left DP pulse is 1+  The left PT pulse is 1+  Left Toe  - Comprehensive Exam  Ecchymosis: none  Swelling: none   Tenderness: none       Assign Risk Category:  No deformity present; Loss of protective sensation;  No weak pulses       Risk: 1 Physical Exam   Constitutional: She is oriented to person, place, and time  She appears well-developed  HENT:   Right Ear: External ear normal    Left Ear: External ear normal    Eyes: Right eye exhibits no discharge  Left eye exhibits no discharge  No scleral icterus  Neck: Carotid bruit is not present  No tracheal deviation present  No thyroid mass and no thyromegaly present  Cardiovascular: Normal rate, regular rhythm, normal heart sounds and intact distal pulses  Exam reveals no gallop and no friction rub  Pulses are no weak pulses  No murmur heard  Pulses:       Dorsalis pedis pulses are 1+ on the right side, and 1+ on the left side  Posterior tibial pulses are 1+ on the right side, and 1+ on the left side  Pulmonary/Chest: No respiratory distress  She has no wheezes  She has no rales  Musculoskeletal: She exhibits no edema  Feet:   Right Foot:   Skin Integrity: Negative for ulcer, skin breakdown, erythema, warmth, callus or dry skin  Left Foot:   Skin Integrity: Negative for ulcer, skin breakdown, erythema, warmth, callus or dry skin  Lymphadenopathy:     She has no cervical adenopathy  Neurological: She is alert and oriented to person, place, and time  Coordination normal    Psychiatric: She has a normal mood and affect  Her behavior is normal  Judgment and thought content normal    Nursing note and vitals reviewed  BMI Counseling: Body mass index is 28 09 kg/m²  Discussed the patient's BMI with her  The BMI is above average  BMI counseling and education was provided to the patient  Nutrition recommendations include reducing portion sizes, decreasing overall calorie intake, 3-5 servings of fruits/vegetables daily, reducing fast food intake, consuming healthier snacks, decreasing soda and/or juice intake and moderation in carbohydrate intake  Exercise recommendations include moderate aerobic physical activity for 150 minutes/week

## 2019-02-27 NOTE — PATIENT INSTRUCTIONS
Diabetes in the Older Adult   WHAT YOU NEED TO KNOW:   What do I need to know if I am an older adult with diabetes? Older adults with diabetes are at risk for heart disease, stroke, kidney disease, blindness, and nerve damage  You may also be at risk for any of the following:  · Poor nutrition or low blood sugar levels    · Confusion or problems with memory, attention, or learning new things    · Trouble controlling urination or frequent urinary tract infections    · Trouble with coordination or balance    · Falls and injuries    · Pain    · Depression    · Open sores on your legs or feet  What are the ABCs of diabetes? The ABCs stand for certain things you can do to manage or prevent problems caused by diabetes:  · A  stands for A1c test   This test shows the average amount of sugar in your blood over the past 2 to 3 months  High levels of sugar in your blood can cause damage to your heart, blood vessels, kidneys, feet, and eyes  Most older adults with diabetes should have an A1c level less than 7 5  Ask your healthcare provider if this A1c goal is right for you  Your provider can help you make changes if your A1c is too high  · B  stands for blood pressure   High blood pressure can increase your risk for a heart attack, stroke, or kidney disease  Most older adults with diabetes should have a systolic blood pressure (first number) of 140  Your diastolic blood pressure (second number) should be below 90  Ask your healthcare provider if these blood pressure goals are right for you  · C  stands for cholesterol   High levels of cholesterol can block your arteries and cause a heart attack or stroke  Ask your healthcare provider what your cholesterol levels should be  · S  stands for stop smoking   Nicotine and other chemicals in cigarettes and cigars can cause lung damage and make it more difficult to manage your diabetes  What can I do to manage the ABCs and prevent problems caused by diabetes?    · Check your blood sugar levels as directed  Your healthcare provider will tell you when and how often to check during the day  Your healthcare provider will also tell you what your blood sugar levels should be before and after a meal  You may need to check for ketones in your urine or blood if your level is higher than directed  Write down your results and show them to your healthcare provider  Your provider may use the results to make changes to your medicine, food, or exercise schedules  Ask your healthcare provider for more information about how to treat a high or low blood sugar level  · Follow your meal plan as directed  A dietitian will help you make a meal plan to keep your blood sugar level steady and make sure you get enough nutrition  Do not skip meals  Your blood sugar level may drop too low if you have taken diabetes medicine and do not eat  Ask your healthcare provider about programs in your community that can deliver the meals to your home  · Try to be active for 30 to 60 minutes most days of the week  Exercise can help keep your blood sugar level steady, decrease your risk of heart disease, and help you lose weight  It can also help improve your balance and decrease your risk for falls  Work with your healthcare provider to create an exercise plan  Ask a family member or friend to exercise with you  Start slow and exercise for 5 to 10 minutes at a time  Examples of activities include walking or swimming  Include muscle strengthening activities 2 to 3 days each week  Include balance training 2 to 3 times each week  Activities that help increase balance include yoga and south chi      · Maintain a healthy weight  Ask your healthcare provider how much you should weigh  A healthy weight can help you control your diabetes and prevent heart disease  Ask your provider to help you create a weight loss plan if you are overweight  Together you can set manageable weight loss goals  · Do not smoke  Ask your healthcare provider for information if you currently smoke and need help to quit  Do not use e-cigarettes or smokeless tobacco in place of cigarettes or to help you quit  They still contain nicotine  · Manage stress  Stress may increase your blood sugar level  Deep breathing, muscle relaxation, and music may help you relax  Ask your healthcare provider for more information about these practices  What else can I do to manage my diabetes? · Check your feet every day for sores  Look at your whole foot, including the bottom, and between and under your toes  Check for wounds, corns, and calluses  Use a mirror to see the bottom of your feet  The skin on your feet may be shiny, tight, dry, or darker than normal  Your feet may also be cold and pale  Feel your feet by running your hands along the tops, bottoms, sides, and between your toes  Redness, swelling, and warmth are signs of blood flow problems that can lead to a foot ulcer  Do not try to remove corns or calluses yourself  · Wear medical alert identification  Wear medical alert jewelry or carry a card that says you have diabetes  Ask your healthcare provider where to get these items  · Ask about vaccines  You have a higher risk for serious illness if you get the flu, pneumonia, or hepatitis  Ask your healthcare provider if you should get a flu, pneumonia, shingles, or hepatitis B vaccine, and when to get the vaccine  · Keep all appointments  You may need to return to have your A1c checked every 3 months  You will need to return at least once each year to have your feet checked  You will need an eye exam once a year to check for retinopathy  You will also need urine tests every year to check for kidney problems  You may need tests to monitor for heart disease  Write down your questions so you remember to ask them during your visits  · Get help from family and friends    You may need help checking your blood sugar level, giving insulin injections, or preparing your meals  Ask your family and friends to help you with these tasks  Talk to your healthcare provider if you do not have someone at home to help you  A healthcare provider can come to your home to help you with these tasks  Call 911 for any of the following:   · You have any of the following signs of a stroke:      ¨ Numbness or drooping on one side of your face     ¨ Weakness in an arm or leg    ¨ Confusion or difficulty speaking    ¨ Dizziness, a severe headache, or vision loss    · You have any of the following signs of a heart attack:      ¨ Squeezing, pressure, or pain in your chest that lasts longer than 5 minutes or returns    ¨ Discomfort or pain in your back, neck, jaw, stomach, or arm     ¨ Trouble breathing    ¨ Nausea or vomiting    ¨ Lightheadedness or a sudden cold sweat, especially with chest pain or trouble breathing  When should I seek immediate care? · You have severe abdominal pain, or the pain spreads to your back  You may also be vomiting  · You have trouble staying awake or focusing  · You are shaking or sweating  · You have blurred or double vision  · Your breath has a fruity, sweet smell  · Your breathing is deep and labored, or rapid and shallow  · Your heartbeat is fast and weak  · You fall and get hurt  When should I contact my healthcare provider? · You are vomiting or have diarrhea  · You have an upset stomach and cannot eat the foods on your meal plan  · You feel weak or more tired than usual      · You feel dizzy, have headaches, or are easily irritated  · Your skin is red, warm, dry, or swollen  · You have a wound that does not heal      · You have numbness in your arms or legs  · You have trouble coping with your illness, or you feel anxious or depressed  · You have problems with your memory  · You have changes in your vision       · You have questions or concerns about your condition or care  CARE AGREEMENT:   You have the right to help plan your care  Learn about your health condition and how it may be treated  Discuss treatment options with your caregivers to decide what care you want to receive  You always have the right to refuse treatment  The above information is an  only  It is not intended as medical advice for individual conditions or treatments  Talk to your doctor, nurse or pharmacist before following any medical regimen to see if it is safe and effective for you  © 2017 [employer]  Information is for End User's use only and may not be sold, redistributed or otherwise used for commercial purposes  All illustrations and images included in CareNotes® are the copyrighted property of [employer]  or Stevie Dickerson

## 2019-03-12 ENCOUNTER — TELEPHONE (OUTPATIENT)
Dept: INTERNAL MEDICINE CLINIC | Facility: CLINIC | Age: 84
End: 2019-03-12

## 2019-05-30 DIAGNOSIS — E11.8 TYPE 2 DIABETES MELLITUS WITH COMPLICATION, WITHOUT LONG-TERM CURRENT USE OF INSULIN (HCC): ICD-10-CM

## 2019-05-30 DIAGNOSIS — E78.5 HYPERLIPIDEMIA, UNSPECIFIED HYPERLIPIDEMIA TYPE: ICD-10-CM

## 2019-05-30 RX ORDER — ATORVASTATIN CALCIUM 40 MG/1
40 TABLET, FILM COATED ORAL DAILY
Qty: 90 TABLET | Refills: 2 | Status: SHIPPED | OUTPATIENT
Start: 2019-05-30 | End: 2020-09-08

## 2019-07-08 DIAGNOSIS — M80.00XD OSTEOPOROSIS WITH CURRENT PATHOLOGICAL FRACTURE WITH ROUTINE HEALING, UNSPECIFIED OSTEOPOROSIS TYPE, SUBSEQUENT ENCOUNTER: Primary | ICD-10-CM

## 2019-07-15 DIAGNOSIS — E78.5 HYPERLIPIDEMIA, UNSPECIFIED HYPERLIPIDEMIA TYPE: ICD-10-CM

## 2019-07-15 DIAGNOSIS — E11.8 TYPE 2 DIABETES MELLITUS WITH COMPLICATION, WITHOUT LONG-TERM CURRENT USE OF INSULIN (HCC): ICD-10-CM

## 2019-07-15 RX ORDER — PIOGLITAZONEHYDROCHLORIDE 15 MG/1
TABLET ORAL
Qty: 90 TABLET | Refills: 0 | Status: SHIPPED | OUTPATIENT
Start: 2019-07-15 | End: 2019-11-18 | Stop reason: SDUPTHER

## 2019-08-13 ENCOUNTER — OFFICE VISIT (OUTPATIENT)
Dept: INTERNAL MEDICINE CLINIC | Facility: CLINIC | Age: 84
End: 2019-08-13
Payer: MEDICARE

## 2019-08-13 VITALS
WEIGHT: 152 LBS | TEMPERATURE: 98 F | HEART RATE: 60 BPM | BODY MASS INDEX: 27.97 KG/M2 | DIASTOLIC BLOOD PRESSURE: 80 MMHG | RESPIRATION RATE: 16 BRPM | HEIGHT: 62 IN | SYSTOLIC BLOOD PRESSURE: 140 MMHG | OXYGEN SATURATION: 98 %

## 2019-08-13 DIAGNOSIS — M70.62 GREATER TROCHANTERIC BURSITIS OF LEFT HIP: Primary | ICD-10-CM

## 2019-08-13 DIAGNOSIS — M54.32 LEFT SIDED SCIATICA: ICD-10-CM

## 2019-08-13 PROCEDURE — 20610 DRAIN/INJ JOINT/BURSA W/O US: CPT | Performed by: INTERNAL MEDICINE

## 2019-08-13 PROCEDURE — 99213 OFFICE O/P EST LOW 20 MIN: CPT | Performed by: INTERNAL MEDICINE

## 2019-08-13 RX ORDER — METHYLPREDNISOLONE ACETATE 40 MG/ML
2 INJECTION, SUSPENSION INTRA-ARTICULAR; INTRALESIONAL; INTRAMUSCULAR; SOFT TISSUE
Status: COMPLETED | OUTPATIENT
Start: 2019-08-13 | End: 2019-08-13

## 2019-08-13 RX ORDER — LIDOCAINE HYDROCHLORIDE 20 MG/ML
3 INJECTION, SOLUTION INFILTRATION; PERINEURAL
Status: COMPLETED | OUTPATIENT
Start: 2019-08-13 | End: 2019-08-13

## 2019-08-13 RX ADMIN — LIDOCAINE HYDROCHLORIDE 3 ML: 20 INJECTION, SOLUTION INFILTRATION; PERINEURAL at 15:40

## 2019-08-13 RX ADMIN — METHYLPREDNISOLONE ACETATE 2 ML: 40 INJECTION, SUSPENSION INTRA-ARTICULAR; INTRALESIONAL; INTRAMUSCULAR; SOFT TISSUE at 15:40

## 2019-08-13 NOTE — PATIENT INSTRUCTIONS
1 patient with left trochanteric bursitis much improved after injection see her back in September is scheduled  2   Patient with left sciatica decreased straight leg raising on the left I felt her problem was mostly left trochanteric bursitis injection was given which is probably correct because of her improvement with injection

## 2019-08-13 NOTE — PROGRESS NOTES
Assessment/Plan:    1 patient with left trochanteric bursitis much improved after injection see her back in September is scheduled  2  Patient with left sciatica decreased straight leg raising on the left I felt her problem was mostly left trochanteric bursitis injection was given which is probably correct because of her improvement with injection    Large joint arthrocentesis: L greater trochanteric bursa  Date/Time: 8/13/2019 3:40 PM  Consent given by: patient  Site marked: site marked  Timeout: Immediately prior to procedure a time out was called to verify the correct patient, procedure, equipment, support staff and site/side marked as required   Supporting Documentation  Indications: pain   Procedure Details  Location: hip - L greater trochanteric bursa  Preparation: Patient was prepped and draped in the usual sterile fashion  Needle gauge: Spinal needle  Ultrasound guidance: no  Approach: lateral  Medications administered: 2 mL methylPREDNISolone acetate 40 mg/mL; 3 mL lidocaine 2 %    Patient tolerance: patient tolerated the procedure well with no immediate complications  Dressing:  Sterile dressing applied           Diagnoses and all orders for this visit:    Greater trochanteric bursitis of left hip    Left sided sciatica    Other orders  -     Large joint arthrocentesis        The patient was counseled regarding instructions for management, risk factor reductions, patient and family education,impressions, risks and benefits of treatment options, side effects of medications, importance of compliance with treatment  The treatment plan was reviewed with the patient/guardian and patient/guardian understands and agrees with the treatment plan              Current Outpatient Medications:     aspirin 81 mg chewable tablet, Chew 81 mg, Disp: , Rfl:     atorvastatin (LIPITOR) 40 mg tablet, Take 1 tablet (40 mg total) by mouth daily, Disp: 90 tablet, Rfl: 2    carvedilol (COREG) 6 25 mg tablet, Take 1 tablet (6 25 mg total) by mouth 2 (two) times a day, Disp: 180 tablet, Rfl: 2    Cholecalciferol (VITAMIN D3) 2000 units capsule, Take 1 capsule (2,000 Units total) by mouth daily, Disp: 100 capsule, Rfl: 2    clopidogrel (PLAVIX) 75 mg tablet, Take 1 tablet (75 mg total) by mouth daily, Disp: 90 tablet, Rfl: 0    cyanocobalamin 1,000 mcg/mL, Inject as directed, Disp: , Rfl:     FLUoxetine (PROzac) 10 mg capsule, Take 1 capsule (10 mg total) by mouth daily, Disp: 90 capsule, Rfl: 2    Insulin Pen Needle 31G X 5 MM MISC, by Does not apply route daily, Disp: 100 each, Rfl: 2    levothyroxine 50 mcg tablet, Take 1 tablet (50 mcg total) by mouth daily, Disp: 90 tablet, Rfl: 2    losartan (COZAAR) 50 mg tablet, TAKE ONE TABLET BY MOUTH ONCE DAILY, Disp: 90 tablet, Rfl: 3    pioglitazone (ACTOS) 15 mg tablet, TAKE 1 TABLET BY MOUTH DAILY, Disp: 90 tablet, Rfl: 0    teriparatide (FORTEO) 600 MCG/2 4ML injection, Inject 0 08 mL (20 mcg total) under the skin daily, Disp: 2 4 mL, Rfl: 0    zolpidem (AMBIEN) 5 mg tablet, Take 5 mg by mouth, Disp: , Rfl:     Current Facility-Administered Medications:     cyanocobalamin injection 1,000 mcg, 1,000 mcg, Intramuscular, Q30 Days, Gila Gates DO, 1,000 mcg at 02/27/19 1250    Subjective:      Patient ID: Alisia Rutherford is a 80 y o  female  Left sciatica pain 1 week ago down pain now level 3-4 at worst 9 last week      The following portions of the patient's history were reviewed and updated as appropriate:   She has a past medical history of Cellulitis, Constipation, Ecchymosis, Fall, Hypokalemia, Lower extremity weakness, and Vitamin D deficiency  ,  does not have any pertinent problems on file  ,   has a past surgical history that includes Back surgery and Other surgical history  ,  family history includes Heart disease in her mother; Other in her father  ,   reports that she has quit smoking  She started smoking about 61 years ago   She has never used smokeless tobacco  She reports that she does not drink alcohol or use drugs  ,  is allergic to iodine; iv dye  [iodinated diagnostic agents]; and other       Review of Systems   Constitutional: Negative for appetite change, chills, fatigue, fever and unexpected weight change  HENT: Negative for congestion, ear pain, facial swelling, hearing loss, mouth sores, nosebleeds, postnasal drip, rhinorrhea, sinus pain, sore throat, trouble swallowing and voice change  Eyes: Negative for pain, discharge, redness and visual disturbance  Respiratory: Negative for apnea, chest tightness, shortness of breath, wheezing and stridor  Cardiovascular: Negative for chest pain, palpitations and leg swelling  Gastrointestinal: Negative for abdominal distention, abdominal pain, blood in stool, constipation, diarrhea and vomiting  Endocrine: Negative for cold intolerance, heat intolerance, polydipsia, polyphagia and polyuria  Genitourinary: Negative for difficulty urinating, dysuria, flank pain, frequency, genital sores, hematuria and urgency  Musculoskeletal: Positive for back pain and gait problem  Negative for arthralgias  Skin: Negative for rash and wound  Allergic/Immunologic: Negative for environmental allergies, food allergies and immunocompromised state  Neurological: Negative for dizziness, tremors, seizures, syncope, facial asymmetry, speech difficulty, weakness, light-headedness, numbness and headaches  Hematological: Negative for adenopathy  Does not bruise/bleed easily  Psychiatric/Behavioral: Negative for agitation, behavioral problems, dysphoric mood, hallucinations, self-injury, sleep disturbance and suicidal ideas  The patient is not hyperactive            Objective:  /80 (BP Location: Left arm, Patient Position: Sitting)   Pulse 60   Temp 98 °F (36 7 °C) (Tympanic)   Resp 16   Ht 5' 2" (1 575 m)   Wt 68 9 kg (152 lb)   SpO2 98%   BMI 27 80 kg/m²     Lab Review  Orders Only on 02/19/2019   Component Date Value    Hemoglobin A1C 02/19/2019 7 9     EXT Creatinine Urine 02/19/2019 286 0     Microalbum  ,U,Random 02/19/2019 52 4     EXTERNAL Microalb/Creat * 02/19/2019 18 3          Imaging  @[email or web address]@     No orders to display     No results found for this or any previous visit  Physical Exam   Constitutional: She is oriented to person, place, and time  She appears well-developed  HENT:   Right Ear: External ear normal    Left Ear: External ear normal    Eyes: Right eye exhibits no discharge  Left eye exhibits no discharge  No scleral icterus  Neck: Carotid bruit is not present  No tracheal deviation present  No thyroid mass and no thyromegaly present  Cardiovascular: Normal rate, regular rhythm, normal heart sounds and intact distal pulses  Exam reveals no gallop and no friction rub  No murmur heard  Pulmonary/Chest: No respiratory distress  She has no wheezes  She has no rales  Musculoskeletal: She exhibits no edema  Straight leg raising on the right 30° with pain on the left 30° with more pain did have severe pain with pressure over the left greater trochanter   Lymphadenopathy:     She has no cervical adenopathy  Neurological: She is alert and oriented to person, place, and time  Coordination normal    Psychiatric: She has a normal mood and affect  Her behavior is normal  Judgment and thought content normal    Nursing note and vitals reviewed

## 2019-08-28 LAB
CREAT ?TM UR-SCNC: 96.9 UMOL/L
EXT MICROALBUMIN URINE RANDOM: 12.8
HBA1C MFR BLD HPLC: 8.3 %
MICROALBUMIN/CREAT UR: 13.2 MG/G{CREAT}

## 2019-09-02 DIAGNOSIS — I25.84 CORONARY ARTERY DISEASE DUE TO CALCIFIED CORONARY LESION: ICD-10-CM

## 2019-09-02 DIAGNOSIS — I25.10 CORONARY ARTERY DISEASE DUE TO CALCIFIED CORONARY LESION: ICD-10-CM

## 2019-09-04 RX ORDER — CARVEDILOL 6.25 MG/1
TABLET ORAL
Qty: 180 TABLET | Refills: 2 | Status: SHIPPED | OUTPATIENT
Start: 2019-09-04 | End: 2020-06-18 | Stop reason: SDUPTHER

## 2019-09-10 ENCOUNTER — OFFICE VISIT (OUTPATIENT)
Dept: INTERNAL MEDICINE CLINIC | Facility: CLINIC | Age: 84
End: 2019-09-10
Payer: MEDICARE

## 2019-09-10 VITALS
HEIGHT: 62 IN | BODY MASS INDEX: 28.52 KG/M2 | TEMPERATURE: 99 F | SYSTOLIC BLOOD PRESSURE: 125 MMHG | WEIGHT: 155 LBS | OXYGEN SATURATION: 98 % | HEART RATE: 56 BPM | RESPIRATION RATE: 16 BRPM | DIASTOLIC BLOOD PRESSURE: 75 MMHG

## 2019-09-10 DIAGNOSIS — E53.8 VITAMIN B12 DEFICIENCY: ICD-10-CM

## 2019-09-10 DIAGNOSIS — E78.5 HYPERLIPIDEMIA, UNSPECIFIED HYPERLIPIDEMIA TYPE: ICD-10-CM

## 2019-09-10 DIAGNOSIS — E55.9 VITAMIN D DEFICIENCY: ICD-10-CM

## 2019-09-10 DIAGNOSIS — F32.A DEPRESSION, UNSPECIFIED DEPRESSION TYPE: ICD-10-CM

## 2019-09-10 DIAGNOSIS — M80.00XD OSTEOPOROSIS WITH CURRENT PATHOLOGICAL FRACTURE WITH ROUTINE HEALING, UNSPECIFIED OSTEOPOROSIS TYPE, SUBSEQUENT ENCOUNTER: ICD-10-CM

## 2019-09-10 DIAGNOSIS — E03.9 HYPOTHYROIDISM, UNSPECIFIED TYPE: Primary | ICD-10-CM

## 2019-09-10 DIAGNOSIS — Z23 NEED FOR INFLUENZA VACCINATION: ICD-10-CM

## 2019-09-10 DIAGNOSIS — E11.9 TYPE 2 DIABETES MELLITUS WITHOUT COMPLICATION, WITHOUT LONG-TERM CURRENT USE OF INSULIN (HCC): ICD-10-CM

## 2019-09-10 DIAGNOSIS — I10 HYPERTENSION, UNSPECIFIED TYPE: ICD-10-CM

## 2019-09-10 PROCEDURE — 90662 IIV NO PRSV INCREASED AG IM: CPT

## 2019-09-10 PROCEDURE — 99214 OFFICE O/P EST MOD 30 MIN: CPT | Performed by: INTERNAL MEDICINE

## 2019-09-10 PROCEDURE — G0008 ADMIN INFLUENZA VIRUS VAC: HCPCS

## 2019-09-10 NOTE — PROGRESS NOTES
Assessment/Plan:    1 type 2 diabetes A1c is at goal recheck in 6 months  2  Hypertension is at goal recheck in 6 months continue medication  3  Hyperlipidemia at goal recheck in 6 months  4  Osteoporosis will finish out Forteo will check DEXA scan will check vitamin-D level in 6 months  5  Hypothyroidism is at goal recheck in 6 months  6  Patient with depression will wean off fluoxetine by taking 10 mg every other day for 2 weeks and then stop            Diagnoses and all orders for this visit:    Hypothyroidism, unspecified type  -     Vitamin B12; Future  -     CBC and differential; Future  -     Comprehensive metabolic panel; Future  -     Hemoglobin A1C; Future  -     Lipid Panel with Direct LDL reflex; Future  -     LDL cholesterol, direct; Future  -     Vitamin D 25 hydroxy; Future  -     TSH, 3rd generation with Free T4 reflex; Future  -     Magnesium; Future    Hypertension, unspecified type  -     Vitamin B12; Future  -     CBC and differential; Future  -     Comprehensive metabolic panel; Future  -     Hemoglobin A1C; Future  -     Lipid Panel with Direct LDL reflex; Future  -     LDL cholesterol, direct; Future  -     Vitamin D 25 hydroxy; Future  -     TSH, 3rd generation with Free T4 reflex; Future  -     Magnesium; Future    Hyperlipidemia, unspecified hyperlipidemia type  -     Vitamin B12; Future  -     CBC and differential; Future  -     Comprehensive metabolic panel; Future  -     Hemoglobin A1C; Future  -     Lipid Panel with Direct LDL reflex; Future  -     LDL cholesterol, direct; Future  -     Vitamin D 25 hydroxy; Future  -     TSH, 3rd generation with Free T4 reflex; Future  -     Magnesium; Future    Depression, unspecified depression type  -     Vitamin B12; Future  -     CBC and differential; Future  -     Comprehensive metabolic panel; Future  -     Hemoglobin A1C; Future  -     Lipid Panel with Direct LDL reflex; Future  -     LDL cholesterol, direct;  Future  -     Vitamin D 25 hydroxy; Future  -     TSH, 3rd generation with Free T4 reflex; Future  -     Magnesium; Future    Type 2 diabetes mellitus without complication, without long-term current use of insulin (HCC)  -     Vitamin B12; Future  -     CBC and differential; Future  -     Comprehensive metabolic panel; Future  -     Hemoglobin A1C; Future  -     Lipid Panel with Direct LDL reflex; Future  -     LDL cholesterol, direct; Future  -     Vitamin D 25 hydroxy; Future  -     TSH, 3rd generation with Free T4 reflex; Future  -     Magnesium; Future    Vitamin B12 deficiency  -     Vitamin B12; Future  -     CBC and differential; Future  -     Comprehensive metabolic panel; Future  -     Hemoglobin A1C; Future  -     Lipid Panel with Direct LDL reflex; Future  -     LDL cholesterol, direct; Future  -     Vitamin D 25 hydroxy; Future  -     TSH, 3rd generation with Free T4 reflex; Future  -     Magnesium; Future    Vitamin D deficiency  -     Vitamin B12; Future  -     CBC and differential; Future  -     Comprehensive metabolic panel; Future  -     Hemoglobin A1C; Future  -     Lipid Panel with Direct LDL reflex; Future  -     LDL cholesterol, direct; Future  -     Vitamin D 25 hydroxy; Future  -     TSH, 3rd generation with Free T4 reflex; Future  -     Magnesium; Future    Osteoporosis with current pathological fracture with routine healing, unspecified osteoporosis type, subsequent encounter  -     Vitamin B12; Future  -     CBC and differential; Future  -     Comprehensive metabolic panel; Future  -     Hemoglobin A1C; Future  -     Lipid Panel with Direct LDL reflex; Future  -     LDL cholesterol, direct; Future  -     Vitamin D 25 hydroxy; Future  -     TSH, 3rd generation with Free T4 reflex; Future  -     Magnesium; Future  -     DXA bone density spine hip and pelvis;  Future        The patient was counseled regarding instructions for management, risk factor reductions, patient and family education,impressions, risks and benefits of treatment options, side effects of medications, importance of compliance with treatment  The treatment plan was reviewed with the patient/guardian and patient/guardian understands and agrees with the treatment plan  Current Outpatient Medications:     aspirin 81 mg chewable tablet, Chew 81 mg, Disp: , Rfl:     atorvastatin (LIPITOR) 40 mg tablet, Take 1 tablet (40 mg total) by mouth daily, Disp: 90 tablet, Rfl: 2    carvedilol (COREG) 6 25 mg tablet, TAKE 1 TABLET BY MOUTH TWICE DAILY, Disp: 180 tablet, Rfl: 2    Cholecalciferol (VITAMIN D3) 2000 units capsule, Take 1 capsule (2,000 Units total) by mouth daily, Disp: 100 capsule, Rfl: 2    clopidogrel (PLAVIX) 75 mg tablet, Take 1 tablet (75 mg total) by mouth daily, Disp: 90 tablet, Rfl: 0    cyanocobalamin 1,000 mcg/mL, Inject as directed, Disp: , Rfl:     Insulin Pen Needle 31G X 5 MM MISC, by Does not apply route daily, Disp: 100 each, Rfl: 2    levothyroxine 50 mcg tablet, Take 1 tablet (50 mcg total) by mouth daily, Disp: 90 tablet, Rfl: 2    losartan (COZAAR) 50 mg tablet, TAKE ONE TABLET BY MOUTH ONCE DAILY, Disp: 90 tablet, Rfl: 3    pioglitazone (ACTOS) 15 mg tablet, TAKE 1 TABLET BY MOUTH DAILY, Disp: 90 tablet, Rfl: 0    teriparatide (FORTEO) 600 MCG/2 4ML injection, Inject 0 08 mL (20 mcg total) under the skin daily, Disp: 2 4 mL, Rfl: 0    zolpidem (AMBIEN) 5 mg tablet, Take 5 mg by mouth, Disp: , Rfl:     Current Facility-Administered Medications:     cyanocobalamin injection 1,000 mcg, 1,000 mcg, Intramuscular, Q30 Days, Arti Lantigua DO, 1,000 mcg at 02/27/19 1250    Subjective:      Patient ID: Jennifer Lopez is a 80 y o  female      Tolerating medications well no chest pain or shortness of breath      The following portions of the patient's history were reviewed and updated as appropriate:   She has a past medical history of Cellulitis, Constipation, Ecchymosis, Fall, Hypokalemia, Lower extremity weakness, and Vitamin D deficiency  ,  does not have any pertinent problems on file  ,   has a past surgical history that includes Back surgery and Other surgical history  ,  family history includes Heart disease in her mother; Other in her father  ,   reports that she has quit smoking  She started smoking about 61 years ago  She has never used smokeless tobacco  She reports that she does not drink alcohol or use drugs  ,  is allergic to iodine; iv dye  [iodinated diagnostic agents]; and other       Review of Systems   Constitutional: Negative for appetite change, chills, fatigue, fever and unexpected weight change  HENT: Negative for congestion, ear pain, facial swelling, hearing loss, mouth sores, nosebleeds, postnasal drip, rhinorrhea, sinus pain, sore throat, trouble swallowing and voice change  Eyes: Negative for pain, discharge, redness and visual disturbance  Respiratory: Negative for apnea, chest tightness, shortness of breath, wheezing and stridor  Cardiovascular: Negative for chest pain, palpitations and leg swelling  Gastrointestinal: Negative for abdominal distention, abdominal pain, blood in stool, constipation, diarrhea and vomiting  Endocrine: Negative for cold intolerance, heat intolerance, polydipsia, polyphagia and polyuria  Genitourinary: Negative for difficulty urinating, dysuria, flank pain, frequency, genital sores, hematuria and urgency  Musculoskeletal: Negative for arthralgias and back pain  Skin: Negative for rash and wound  Allergic/Immunologic: Negative for environmental allergies, food allergies and immunocompromised state  Neurological: Negative for dizziness, tremors, seizures, syncope, facial asymmetry, speech difficulty, weakness, light-headedness, numbness and headaches  Hematological: Negative for adenopathy  Does not bruise/bleed easily     Psychiatric/Behavioral: Negative for agitation, behavioral problems, dysphoric mood, hallucinations, self-injury, sleep disturbance and suicidal ideas  The patient is not hyperactive  Objective:  Pulse 56   Temp 99 °F (37 2 °C)   Resp 16   Ht 5' 2" (1 575 m)   Wt 70 3 kg (155 lb)   SpO2 98%   BMI 28 35 kg/m²     Lab Review  Orders Only on 08/28/2019   Component Date Value    Hemoglobin A1C 08/28/2019 8 3    Orders Only on 08/28/2019   Component Date Value    EXT Creatinine Urine 08/28/2019 96 9     Microalbum  ,U,Random 08/28/2019 12 8     EXTERNAL Microalb/Creat * 08/28/2019 13 2          Imaging  @[email or web address]@     DXA bone density spine hip and pelvis    (Results Pending)     No results found for this or any previous visit  Physical Exam   Constitutional: She is oriented to person, place, and time  She appears well-developed  HENT:   Right Ear: External ear normal    Left Ear: External ear normal    Eyes: Right eye exhibits no discharge  Left eye exhibits no discharge  No scleral icterus  Neck: Carotid bruit is not present  No tracheal deviation present  No thyroid mass and no thyromegaly present  Cardiovascular: Normal rate, regular rhythm, normal heart sounds and intact distal pulses  Exam reveals no gallop and no friction rub  No murmur heard  Pulmonary/Chest: No respiratory distress  She has no wheezes  She has no rales  Musculoskeletal: She exhibits no edema  Lymphadenopathy:     She has no cervical adenopathy  Neurological: She is alert and oriented to person, place, and time  Coordination normal    Psychiatric: She has a normal mood and affect  Her behavior is normal  Judgment and thought content normal    Nursing note and vitals reviewed

## 2019-09-10 NOTE — PATIENT INSTRUCTIONS
1 type 2 diabetes A1c is at goal recheck in 6 months  2  Hypertension is at goal recheck in 6 months continue medication  3  Hyperlipidemia at goal recheck in 6 months  4  Osteoporosis will finish out Forteo will check DEXA scan will check vitamin-D level in 6 months  5  Hypothyroidism is at goal recheck in 6 months  6   Patient with depression will wean off fluoxetine by taking 10 mg every other day for 2 weeks and then stop

## 2019-09-14 DIAGNOSIS — I25.10 CORONARY ARTERY DISEASE INVOLVING NATIVE HEART WITHOUT ANGINA PECTORIS, UNSPECIFIED VESSEL OR LESION TYPE: ICD-10-CM

## 2019-09-16 RX ORDER — CLOPIDOGREL BISULFATE 75 MG/1
TABLET ORAL
Qty: 90 TABLET | Refills: 2 | Status: SHIPPED | OUTPATIENT
Start: 2019-09-16 | End: 2019-11-06 | Stop reason: SDUPTHER

## 2019-09-17 DIAGNOSIS — I25.10 CORONARY ARTERY DISEASE INVOLVING NATIVE HEART WITHOUT ANGINA PECTORIS, UNSPECIFIED VESSEL OR LESION TYPE: ICD-10-CM

## 2019-09-20 RX ORDER — CLOPIDOGREL BISULFATE 75 MG/1
TABLET ORAL
Qty: 90 TABLET | Refills: 2 | Status: SHIPPED | OUTPATIENT
Start: 2019-09-20 | End: 2019-11-06 | Stop reason: SDUPTHER

## 2019-10-01 ENCOUNTER — HOSPITAL ENCOUNTER (OUTPATIENT)
Dept: MAMMOGRAPHY | Facility: CLINIC | Age: 84
Discharge: HOME | End: 2019-10-01
Payer: MEDICARE

## 2019-10-01 DIAGNOSIS — M80.00XD OSTEOPOROSIS WITH CURRENT PATHOLOGICAL FRACTURE WITH ROUTINE HEALING, UNSPECIFIED OSTEOPOROSIS TYPE, SUBSEQUENT ENCOUNTER: ICD-10-CM

## 2019-10-01 PROCEDURE — 77080 DXA BONE DENSITY AXIAL: CPT

## 2019-10-21 NOTE — PATIENT INSTRUCTIONS
Medicare Wellness completed  Will have mammogram this summer  Shingrix rx given  Will consider Tdap at next visit  DISCHARGE

## 2019-10-25 DIAGNOSIS — E03.9 HYPOTHYROIDISM, UNSPECIFIED TYPE: ICD-10-CM

## 2019-10-29 RX ORDER — LEVOTHYROXINE SODIUM 0.05 MG/1
TABLET ORAL
Qty: 90 TABLET | Refills: 2 | Status: SHIPPED | OUTPATIENT
Start: 2019-10-29 | End: 2020-08-04 | Stop reason: SDUPTHER

## 2019-11-06 ENCOUNTER — APPOINTMENT (OUTPATIENT)
Dept: LAB | Facility: HOSPITAL | Age: 84
End: 2019-11-06
Attending: INTERNAL MEDICINE
Payer: MEDICARE

## 2019-11-06 ENCOUNTER — OFFICE VISIT (OUTPATIENT)
Dept: INTERNAL MEDICINE CLINIC | Facility: CLINIC | Age: 84
End: 2019-11-06
Payer: MEDICARE

## 2019-11-06 VITALS
OXYGEN SATURATION: 98 % | WEIGHT: 156 LBS | BODY MASS INDEX: 28.71 KG/M2 | SYSTOLIC BLOOD PRESSURE: 180 MMHG | HEART RATE: 56 BPM | RESPIRATION RATE: 16 BRPM | TEMPERATURE: 98.3 F | HEIGHT: 62 IN | DIASTOLIC BLOOD PRESSURE: 85 MMHG

## 2019-11-06 DIAGNOSIS — E03.9 HYPOTHYROIDISM, UNSPECIFIED TYPE: Primary | ICD-10-CM

## 2019-11-06 DIAGNOSIS — E53.8 VITAMIN B12 DEFICIENCY: ICD-10-CM

## 2019-11-06 DIAGNOSIS — E11.42 DIABETIC PERIPHERAL NEUROPATHY (HCC): ICD-10-CM

## 2019-11-06 DIAGNOSIS — E55.9 VITAMIN D DEFICIENCY: ICD-10-CM

## 2019-11-06 DIAGNOSIS — E11.9 TYPE 2 DIABETES MELLITUS WITHOUT COMPLICATION, WITHOUT LONG-TERM CURRENT USE OF INSULIN (HCC): ICD-10-CM

## 2019-11-06 DIAGNOSIS — I10 HYPERTENSION, UNSPECIFIED TYPE: ICD-10-CM

## 2019-11-06 DIAGNOSIS — E03.9 HYPOTHYROIDISM, UNSPECIFIED TYPE: ICD-10-CM

## 2019-11-06 LAB
25(OH)D3 SERPL-MCNC: 51.9 NG/ML (ref 30–100)
CA-I BLD-SCNC: 1.18 MMOL/L (ref 1.12–1.32)

## 2019-11-06 PROCEDURE — 84165 PROTEIN E-PHORESIS SERUM: CPT | Performed by: PATHOLOGY

## 2019-11-06 PROCEDURE — 84165 PROTEIN E-PHORESIS SERUM: CPT

## 2019-11-06 PROCEDURE — 82306 VITAMIN D 25 HYDROXY: CPT

## 2019-11-06 PROCEDURE — 86334 IMMUNOFIX E-PHORESIS SERUM: CPT | Performed by: PATHOLOGY

## 2019-11-06 PROCEDURE — 84166 PROTEIN E-PHORESIS/URINE/CSF: CPT | Performed by: PATHOLOGY

## 2019-11-06 PROCEDURE — 82607 VITAMIN B-12: CPT

## 2019-11-06 PROCEDURE — 36415 COLL VENOUS BLD VENIPUNCTURE: CPT

## 2019-11-06 PROCEDURE — 84166 PROTEIN E-PHORESIS/URINE/CSF: CPT | Performed by: INTERNAL MEDICINE

## 2019-11-06 PROCEDURE — 82330 ASSAY OF CALCIUM: CPT

## 2019-11-06 PROCEDURE — 99214 OFFICE O/P EST MOD 30 MIN: CPT | Performed by: INTERNAL MEDICINE

## 2019-11-06 PROCEDURE — 86334 IMMUNOFIX E-PHORESIS SERUM: CPT

## 2019-11-06 PROCEDURE — 83036 HEMOGLOBIN GLYCOSYLATED A1C: CPT

## 2019-11-06 RX ORDER — LOSARTAN POTASSIUM 100 MG/1
100 TABLET ORAL DAILY
Qty: 90 TABLET | Refills: 2 | Status: SHIPPED | OUTPATIENT
Start: 2019-11-06 | End: 2020-09-08

## 2019-11-06 NOTE — PROGRESS NOTES
Assessment/Plan:  140/90  1 type 2 diabetes will get A1c  2  Patient with compression fractures of the lumbar spine DEXA scan not able to adequately assess her bone density in the lumbar spine did have some decreased bone density of her arm despite being on Forteo for 1 year will maintain her on Prolia injections twice a year will be obtaining that  3  Patient with hypertension not at goal will increase losartan to 100 mg once a day she may take 2  50 mg until they are gone   Will be getting her blood pressure check at the local hospital or lab blood pressure goal less than 140/90  4  Osteoporosis without the expected improvement on Forteo will get laboratory work now           There are no diagnoses linked to this encounter  The patient was counseled regarding instructions for management, risk factor reductions, patient and family education,impressions, risks and benefits of treatment options, side effects of medications, importance of compliance with treatment  The treatment plan was reviewed with the patient/guardian and patient/guardian understands and agrees with the treatment plan              Current Outpatient Medications:     aspirin 81 mg chewable tablet, Chew 81 mg, Disp: , Rfl:     atorvastatin (LIPITOR) 40 mg tablet, Take 1 tablet (40 mg total) by mouth daily, Disp: 90 tablet, Rfl: 2    carvedilol (COREG) 6 25 mg tablet, TAKE 1 TABLET BY MOUTH TWICE DAILY, Disp: 180 tablet, Rfl: 2    Cholecalciferol (VITAMIN D3) 2000 units capsule, Take 1 capsule (2,000 Units total) by mouth daily, Disp: 100 capsule, Rfl: 2    clopidogrel (PLAVIX) 75 mg tablet, Take 1 tablet (75 mg total) by mouth daily, Disp: 90 tablet, Rfl: 0    levothyroxine 50 mcg tablet, TAKE 1 TABLET BY MOUTH ONCE DAILY, Disp: 90 tablet, Rfl: 2    losartan (COZAAR) 50 mg tablet, TAKE ONE TABLET BY MOUTH ONCE DAILY, Disp: 90 tablet, Rfl: 3    pioglitazone (ACTOS) 15 mg tablet, TAKE 1 TABLET BY MOUTH DAILY, Disp: 90 tablet, Rfl: 0   zolpidem (AMBIEN) 5 mg tablet, Take 5 mg by mouth, Disp: , Rfl:     Current Facility-Administered Medications:     cyanocobalamin injection 1,000 mcg, 1,000 mcg, Intramuscular, Q30 Days, Aaron Parada DO, 1,000 mcg at 02/27/19 1250    Subjective:      Patient ID: Roby Stahl is a 80 y o  female  Tolerating medications well no pain      The following portions of the patient's history were reviewed and updated as appropriate:   She has a past medical history of Cellulitis, Constipation, Ecchymosis, Fall, Hypokalemia, Lower extremity weakness, and Vitamin D deficiency  ,  does not have any pertinent problems on file  ,   has a past surgical history that includes Back surgery and Other surgical history  ,  family history includes Heart disease in her mother; Other in her father  ,   reports that she has quit smoking  She started smoking about 61 years ago  She has never used smokeless tobacco  She reports that she does not drink alcohol or use drugs  ,  is allergic to iodine; iv dye  [iodinated diagnostic agents]; and other       Review of Systems   Constitutional: Negative for appetite change, chills, fatigue, fever and unexpected weight change  HENT: Negative for congestion, ear pain, facial swelling, hearing loss, mouth sores, nosebleeds, postnasal drip, rhinorrhea, sinus pain, sore throat, trouble swallowing and voice change  Eyes: Negative for pain, discharge, redness and visual disturbance  Respiratory: Negative for apnea, chest tightness, shortness of breath, wheezing and stridor  Cardiovascular: Negative for chest pain, palpitations and leg swelling  Gastrointestinal: Negative for abdominal distention, abdominal pain, blood in stool, constipation, diarrhea and vomiting  Endocrine: Negative for cold intolerance, heat intolerance, polydipsia, polyphagia and polyuria  Genitourinary: Negative for difficulty urinating, dysuria, flank pain, frequency, genital sores, hematuria and urgency  Musculoskeletal: Negative for arthralgias and back pain  Skin: Negative for rash and wound  Allergic/Immunologic: Negative for environmental allergies, food allergies and immunocompromised state  Neurological: Negative for dizziness, tremors, seizures, syncope, facial asymmetry, speech difficulty, weakness, light-headedness, numbness and headaches  Hematological: Negative for adenopathy  Does not bruise/bleed easily  Psychiatric/Behavioral: Negative for agitation, behavioral problems, dysphoric mood, hallucinations, self-injury, sleep disturbance and suicidal ideas  The patient is not hyperactive  Objective:  Pulse 56   Temp 98 3 °F (36 8 °C) (Tympanic)   Resp 16   Ht 5' 2" (1 575 m)   Wt 70 8 kg (156 lb)   SpO2 98%   BMI 28 53 kg/m²     Lab Review  Orders Only on 08/28/2019   Component Date Value    Hemoglobin A1C 08/28/2019 8 3    Orders Only on 08/28/2019   Component Date Value    EXT Creatinine Urine 08/28/2019 96 9     Microalbum  ,U,Random 08/28/2019 12 8     EXTERNAL Microalb/Creat * 08/28/2019 13 2          Imaging  @[email or web address]@     No orders to display     No results found for this or any previous visit  Physical Exam   Constitutional: She is oriented to person, place, and time  She appears well-developed  HENT:   Right Ear: External ear normal    Left Ear: External ear normal    Eyes: Right eye exhibits no discharge  Left eye exhibits no discharge  No scleral icterus  Neck: Carotid bruit is not present  No tracheal deviation present  No thyroid mass and no thyromegaly present  Cardiovascular: Normal rate, regular rhythm, normal heart sounds and intact distal pulses  Exam reveals no gallop and no friction rub  No murmur heard  Pulmonary/Chest: No respiratory distress  She has no wheezes  She has no rales  Musculoskeletal: She exhibits no edema  Lymphadenopathy:     She has no cervical adenopathy     Neurological: She is alert and oriented to person, place, and time  Coordination normal    Psychiatric: She has a normal mood and affect  Her behavior is normal  Judgment and thought content normal    Nursing note and vitals reviewed

## 2019-11-06 NOTE — PATIENT INSTRUCTIONS
1 type 2 diabetes will get A1c  2  Patient with compression fractures of the lumbar spine DEXA scan not able to adequately assess her bone density in the lumbar spine did have some decreased bone density of her arm despite being on Forteo for 1 year will maintain her on Prolia injections twice a year will be obtaining that  3  Patient with hypertension not at goal will increase losartan to 100 mg once a day she may take 2  50 mg until they are gone   Will be getting her blood pressure check at the local hospital or lab blood pressure goal less than 140/90  4   Osteoporosis without the expected improvement on Forteo will get laboratory work now

## 2019-11-07 LAB
ALBUMIN SERPL ELPH-MCNC: 3.86 G/DL (ref 3.5–5)
ALBUMIN SERPL ELPH-MCNC: 48.2 % (ref 52–65)
ALBUMIN UR ELPH-MCNC: 100 %
ALPHA1 GLOB MFR UR ELPH: 0 %
ALPHA1 GLOB SERPL ELPH-MCNC: 0.33 G/DL (ref 0.1–0.4)
ALPHA1 GLOB SERPL ELPH-MCNC: 4.1 % (ref 2.5–5)
ALPHA2 GLOB MFR UR ELPH: 0 %
ALPHA2 GLOB SERPL ELPH-MCNC: 0.97 G/DL (ref 0.4–1.2)
ALPHA2 GLOB SERPL ELPH-MCNC: 12.1 % (ref 7–13)
B-GLOBULIN MFR UR ELPH: 0 %
BETA GLOB ABNORMAL SERPL ELPH-MCNC: 0.42 G/DL (ref 0.4–0.8)
BETA1 GLOB SERPL ELPH-MCNC: 5.2 % (ref 5–13)
BETA2 GLOB SERPL ELPH-MCNC: 6 % (ref 2–8)
BETA2+GAMMA GLOB SERPL ELPH-MCNC: 0.48 G/DL (ref 0.2–0.5)
EST. AVERAGE GLUCOSE BLD GHB EST-MCNC: 194 MG/DL
GAMMA GLOB ABNORMAL SERPL ELPH-MCNC: 1.95 G/DL (ref 0.5–1.6)
GAMMA GLOB MFR UR ELPH: 0 %
GAMMA GLOB SERPL ELPH-MCNC: 24.4 % (ref 12–22)
HBA1C MFR BLD: 8.4 % (ref 4.2–6.3)
IGG/ALB SER: 0.93 {RATIO} (ref 1.1–1.8)
INTERPRETATION UR IFE-IMP: NORMAL
M PEAK ID 2: 5.6 %
M PROTEIN 1 MFR SERPL ELPH: 4.4 %
M PROTEIN 1 SERPL ELPH-MCNC: 0.35 G/DL
M PROTEIN 2 SERPL ELPH-MCNC: 0.45 G/DL
M PROTEIN 3 MFR SERPL ELPH: 11.9 %
M PROTEIN 3 SERPL ELPH-MCNC: 0.95 G/DL
PROT PATTERN SERPL ELPH-IMP: ABNORMAL
PROT PATTERN UR ELPH-IMP: NORMAL
PROT SERPL-MCNC: 8 G/DL (ref 6.4–8.2)
PROT UR-MCNC: 8 MG/DL
VIT B12 SERPL-MCNC: 378 PG/ML (ref 100–900)

## 2019-11-18 DIAGNOSIS — E11.8 TYPE 2 DIABETES MELLITUS WITH COMPLICATION, WITHOUT LONG-TERM CURRENT USE OF INSULIN (HCC): ICD-10-CM

## 2019-11-18 DIAGNOSIS — E78.5 HYPERLIPIDEMIA, UNSPECIFIED HYPERLIPIDEMIA TYPE: ICD-10-CM

## 2019-11-18 RX ORDER — PIOGLITAZONEHYDROCHLORIDE 15 MG/1
TABLET ORAL
Qty: 90 TABLET | Refills: 0 | Status: SHIPPED | OUTPATIENT
Start: 2019-11-18 | End: 2020-03-02

## 2019-11-27 DIAGNOSIS — D47.2 TRICLONAL GAMMOPATHY: Primary | ICD-10-CM

## 2019-11-29 ENCOUNTER — TELEPHONE (OUTPATIENT)
Dept: INTERNAL MEDICINE CLINIC | Facility: CLINIC | Age: 84
End: 2019-11-29

## 2019-11-29 ENCOUNTER — TELEPHONE (OUTPATIENT)
Dept: HEMATOLOGY ONCOLOGY | Facility: CLINIC | Age: 84
End: 2019-11-29

## 2019-11-29 NOTE — TELEPHONE ENCOUNTER
----- Message from Annie Treviño DO sent at 11/27/2019  3:16 PM EST -----  Patient with abnormal laboratory work a Patient with ticlonal gammopathy will refer to Dr Oilver Isaac

## 2019-11-29 NOTE — TELEPHONE ENCOUNTER
New Patient Encounter    New Patient Intake Form   Patient Details:  Bebe Spring  1934  [id number]    Background Information:  [address] starts by opening a telephone encounter and gathering the following information   Who is calling to schedule? If not self, relationship to patient? 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 11/2019   Is patient aware of diagnosis? Yes   Reason for visit? NP DX   Have you had any testing done? If so: when, where? Yes   Are records in [email or web address]? yes   Was the patient told to bring a disk? no   Scheduling Information:   Preferred [facility]:  [facility]     Requesting Specific Provider? Dr Vladimir Draper   Are there any dates/time the patient cannot be seen? Afternoons only prefers Friday      Miscellaneous: N/A   After completing the above information, please route to Financial Counselor and the appropriate Nurse Navigator for review

## 2019-12-06 ENCOUNTER — TELEPHONE (OUTPATIENT)
Dept: INTERNAL MEDICINE CLINIC | Facility: CLINIC | Age: 84
End: 2019-12-06

## 2019-12-06 NOTE — TELEPHONE ENCOUNTER
Dr Jana Sahu [facility] doctors office called with blood pressure changes and vision changes in pt today, looking to speak to dr Preston Tolentino       Call addressed with marleni in meantime     Dr would like to speak with dr Preston Tolentino when in:    Phone - [phone] -  Direct to the dr Familia Moreno   optomitrist

## 2020-01-10 ENCOUNTER — CONSULT (OUTPATIENT)
Dept: HEMATOLOGY ONCOLOGY | Facility: CLINIC | Age: 85
End: 2020-01-10
Payer: COMMERCIAL

## 2020-01-10 ENCOUNTER — HOSPITAL ENCOUNTER (OUTPATIENT)
Dept: RADIOLOGY | Facility: HOSPITAL | Age: 85
Discharge: HOME | End: 2020-01-10
Attending: INTERNAL MEDICINE
Payer: COMMERCIAL

## 2020-01-10 ENCOUNTER — APPOINTMENT (OUTPATIENT)
Dept: LAB | Facility: HOSPITAL | Age: 85
End: 2020-01-10
Attending: INTERNAL MEDICINE
Payer: COMMERCIAL

## 2020-01-10 VITALS
BODY MASS INDEX: 28.34 KG/M2 | RESPIRATION RATE: 16 BRPM | DIASTOLIC BLOOD PRESSURE: 88 MMHG | WEIGHT: 154 LBS | HEIGHT: 62 IN | TEMPERATURE: 98.8 F | HEART RATE: 66 BPM | OXYGEN SATURATION: 98 % | SYSTOLIC BLOOD PRESSURE: 120 MMHG

## 2020-01-10 DIAGNOSIS — D47.2 MGUS (MONOCLONAL GAMMOPATHY OF UNKNOWN SIGNIFICANCE): ICD-10-CM

## 2020-01-10 DIAGNOSIS — D47.2 MGUS (MONOCLONAL GAMMOPATHY OF UNKNOWN SIGNIFICANCE): Primary | ICD-10-CM

## 2020-01-10 DIAGNOSIS — D47.2 TRICLONAL GAMMOPATHY: ICD-10-CM

## 2020-01-10 LAB
ALBUMIN SERPL BCP-MCNC: 3.4 G/DL (ref 3.5–5)
ALP SERPL-CCNC: 61 U/L (ref 46–116)
ALT SERPL W P-5'-P-CCNC: 19 U/L (ref 12–78)
ANION GAP SERPL CALCULATED.3IONS-SCNC: 7 MMOL/L (ref 4–13)
AST SERPL W P-5'-P-CCNC: 13 U/L (ref 5–45)
BASOPHILS # BLD AUTO: 0.05 THOUSANDS/ΜL (ref 0–0.1)
BASOPHILS NFR BLD AUTO: 1 % (ref 0–1)
BILIRUB SERPL-MCNC: 0.8 MG/DL (ref 0.2–1)
BUN SERPL-MCNC: 16 MG/DL (ref 5–25)
CALCIUM SERPL-MCNC: 8.9 MG/DL (ref 8.3–10.1)
CHLORIDE SERPL-SCNC: 104 MMOL/L (ref 100–108)
CO2 SERPL-SCNC: 30 MMOL/L (ref 21–32)
CREAT SERPL-MCNC: 0.96 MG/DL (ref 0.6–1.3)
EOSINOPHIL # BLD AUTO: 0.15 THOUSAND/ΜL (ref 0–0.61)
EOSINOPHIL NFR BLD AUTO: 2 % (ref 0–6)
ERYTHROCYTE [DISTWIDTH] IN BLOOD BY AUTOMATED COUNT: 13.7 % (ref 11.6–15.1)
GFR SERPL CREATININE-BSD FRML MDRD: 54 ML/MIN/1.73SQ M
GLUCOSE SERPL-MCNC: 205 MG/DL (ref 65–140)
HCT VFR BLD AUTO: 40.8 % (ref 34.8–46.1)
HGB BLD-MCNC: 12.6 G/DL (ref 11.5–15.4)
IMM GRANULOCYTES # BLD AUTO: 0.04 THOUSAND/UL (ref 0–0.2)
IMM GRANULOCYTES NFR BLD AUTO: 1 % (ref 0–2)
LYMPHOCYTES # BLD AUTO: 2.08 THOUSANDS/ΜL (ref 0.6–4.47)
LYMPHOCYTES NFR BLD AUTO: 29 % (ref 14–44)
MCH RBC QN AUTO: 29.3 PG (ref 26.8–34.3)
MCHC RBC AUTO-ENTMCNC: 30.9 G/DL (ref 31.4–37.4)
MCV RBC AUTO: 95 FL (ref 82–98)
MONOCYTES # BLD AUTO: 0.96 THOUSAND/ΜL (ref 0.17–1.22)
MONOCYTES NFR BLD AUTO: 14 % (ref 4–12)
NEUTROPHILS # BLD AUTO: 3.84 THOUSANDS/ΜL (ref 1.85–7.62)
NEUTS SEG NFR BLD AUTO: 53 % (ref 43–75)
NRBC BLD AUTO-RTO: 0 /100 WBCS
PLATELET # BLD AUTO: 258 THOUSANDS/UL (ref 149–390)
PMV BLD AUTO: 10.2 FL (ref 8.9–12.7)
POTASSIUM SERPL-SCNC: 3.4 MMOL/L (ref 3.5–5.3)
PROT SERPL-MCNC: 8.3 G/DL (ref 6.4–8.2)
RBC # BLD AUTO: 4.3 MILLION/UL (ref 3.81–5.12)
SODIUM SERPL-SCNC: 141 MMOL/L (ref 136–145)
WBC # BLD AUTO: 7.12 THOUSAND/UL (ref 4.31–10.16)

## 2020-01-10 PROCEDURE — 99203 OFFICE O/P NEW LOW 30 MIN: CPT | Performed by: INTERNAL MEDICINE

## 2020-01-10 PROCEDURE — 83883 ASSAY NEPHELOMETRY NOT SPEC: CPT

## 2020-01-10 PROCEDURE — 84165 PROTEIN E-PHORESIS SERUM: CPT

## 2020-01-10 PROCEDURE — 36415 COLL VENOUS BLD VENIPUNCTURE: CPT

## 2020-01-10 PROCEDURE — 80053 COMPREHEN METABOLIC PANEL: CPT

## 2020-01-10 PROCEDURE — 85810 BLOOD VISCOSITY EXAMINATION: CPT

## 2020-01-10 PROCEDURE — 77075 RADEX OSSEOUS SURVEY COMPL: CPT

## 2020-01-10 PROCEDURE — 84165 PROTEIN E-PHORESIS SERUM: CPT | Performed by: PATHOLOGY

## 2020-01-10 PROCEDURE — 82232 ASSAY OF BETA-2 PROTEIN: CPT

## 2020-01-10 PROCEDURE — 85025 COMPLETE CBC W/AUTO DIFF WBC: CPT

## 2020-01-10 RX ORDER — BACLOFEN 10 MG/1
TABLET ORAL
Refills: 3 | COMMUNITY
Start: 2019-10-14

## 2020-01-10 NOTE — PROGRESS NOTES
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, IgA, IgG, and IgM  Patient is overall asymptomatic  Patient will need further workup and follow-up afterwards  - XR bone survey complete >12 mos; Future  - CBC and differential; Future  - Comprehensive metabolic panel; Future  - Protein electrophoresis, serum; Future  - Immunoglobulin free LT chains blood; Future  - Beta 2 microglobulin, serum; Future  - Viscosity, serum; Future  - CT bone marrow biopsy and aspiration; Future    2  Triclonal gammopathy     - Ambulatory referral to Hematology / Oncology      Made patient aware regarding side effects of chemotherapy, including but not limited to fatigue cytopenia, increased risk for infection, potential kidney, liver injuries neuropathies et al    Made patient aware to call MD or go to ED for any fever,  Chills, bleeding, easy bruise, unhealed wound, chest pain, abdominal pain et al                45        minutes were spent face to face with patient with greater than 50% of the time spent in counseling or coordination of care including discussions of treatment instructions  All of the patient's questions were answered to their satisfactory during this discussion  Advised pt to call if there is any further questions  Interval History:     1/10/2020: His 11year-old female, above summarized MGUS, hypertension, hyperlipidemia, hypothyroidism, transferring care to [facility]      Reported overall at baseline health status body weight stable no bone pain no lumps bumps no bleeding easy bruise  No other hematology oncology history in the past            Problem list:     Patient Active Problem List   Diagnosis    Arteriosclerosis of coronary artery    Depression    Esophageal reflux    Hyperlipidemia    Hypertension    Hypothyroidism    Insomnia    Lumbar compression fracture (HCC)    Lumbar spinal stenosis    Normochromic normocytic anemia    Osteoarthritis of knee    Osteoporosis    Spinal stenosis    Type 2 diabetes mellitus (HCC)    Vitamin B12 deficiency    Vitamin D deficiency    Screening for malignant neoplasm of breast    Health care maintenance    Screening mammogram, encounter for    Diabetic peripheral neuropathy (HCC)    Greater trochanteric bursitis of left hip    Left sided sciatica    Triclonal gammopathy         PHYSICIAL EXAMINATION:     Vital Signs:   /88 (BP Location: Right arm)   Pulse 66   Temp 98 8 °F (37 1 °C) (Oral)   Resp 16   Ht 5' 2" (1 575 m)   Wt 69 9 kg (154 lb)   SpO2 98%   BMI 28 17 kg/m²   Body surface area is 1 71 meters squared  Ht Readings from Last 3 Encounters:   01/10/20 5' 2" (1 575 m)   11/06/19 5' 2" (1 575 m)   09/10/19 5' 2" (1 575 m)       Wt Readings from Last 3 Encounters:   01/10/20 69 9 kg (154 lb)   11/06/19 70 8 kg (156 lb)   09/10/19 70 3 kg (155 lb)        Temp Readings from Last 3 Encounters:   01/10/20 98 8 °F (37 1 °C) (Oral)   11/06/19 98 3 °F (36 8 °C) (Tympanic)   09/10/19 99 °F (37 2 °C)        BP Readings from Last 3 Encounters:   01/10/20 120/88   11/06/19 (!) 180/85   09/10/19 125/75         Pulse Readings from Last 3 Encounters:   01/10/20 66   11/06/19 56   09/10/19 56         No significant change comparing to last visit       GEN: Alert, awake oriented x3, in no acute distress  HEENT- No pallor, icterus, cyanosis, no oral mucosal lesions,   LAD - no palpable cervical, clavicle, axillary, inguinal LAD  Heart- normal S1 S2, regular rate and rhythm, No murmur, rubs  Lungs- decreased breathing sound bilateral    Abdomen- soft, Non tender, bowel sounds present  Extremities- No cyanosis, clubbing, edema  Neuro- No focal neurological deficit           PAST MEDICAL HISTORY:   has a past medical history of Cellulitis, Constipation, Ecchymosis, Fall, Hypokalemia, Lower extremity weakness, and Vitamin D deficiency  PAST SURGICAL HISTORY:   has a past surgical history that includes Back surgery and Other surgical history  CURRENT MEDICATIONS:   Current Outpatient Medications   Medication Sig Dispense Refill    aspirin 81 mg chewable tablet Chew 81 mg      atorvastatin (LIPITOR) 40 mg tablet Take 1 tablet (40 mg total) by mouth daily 90 tablet 2    baclofen 10 mg tablet TAKE 1 TABLET BY MOUTH NIGHTLY AT BEDTIME AS NEEDED AS DIRECTED  3    carvedilol (COREG) 6 25 mg tablet TAKE 1 TABLET BY MOUTH TWICE DAILY 180 tablet 2    Cholecalciferol (VITAMIN D3) 2000 units capsule Take 1 capsule (2,000 Units total) by mouth daily 100 capsule 2    clopidogrel (PLAVIX) 75 mg tablet Take 1 tablet (75 mg total) by mouth daily 90 tablet 0    levothyroxine 50 mcg tablet TAKE 1 TABLET BY MOUTH ONCE DAILY 90 tablet 2    losartan (COZAAR) 100 MG tablet Take 1 tablet (100 mg total) by mouth daily 90 tablet 2    pioglitazone (ACTOS) 15 mg tablet TAKE 1 TABLET BY MOUTH DAILY 90 tablet 0    zolpidem (AMBIEN) 5 mg tablet Take 5 mg by mouth       Current Facility-Administered Medications   Medication Dose Route Frequency Provider Last Rate Last Dose    cyanocobalamin injection 1,000 mcg  1,000 mcg Intramuscular Q30 Days Raiza Sheriff DO   1,000 mcg at 02/27/19 1250     [unfilled]    SOCIAL HISTORY:   reports that she has quit smoking  She started smoking about 61 years ago  She has never used smokeless tobacco  She reports that she does not drink alcohol or use drugs  FAMILY HISTORY:  family history includes Heart disease in her mother;  Other in her father  ALLERGIES:  is allergic to iodine; iv dye  [iodinated diagnostic agents]; and other  REVIEW OF SYSTEMS:  Please note that a 14-point review of systems was performed to include Constitutional, HEENT, Respiratory, CVS, GI, , Musculoskeletal, Integumentary, Neurologic, Rheumatologic, Endocrinologic, Psychiatric, Lymphatic, and Hematologic/Oncologic systems were reviewed and are negative unless otherwise stated in HPI  Positive and negative findings pertinent to this evaluation are incorporated into the history of present illness  LAB:  Lab Results   Component Value Date    WBC 5 8 02/08/2016    HGB 12 2 02/08/2016    HCT 35 9 02/08/2016    MCV 89 02/08/2016     02/08/2016     Lab Results   Component Value Date     02/08/2016    K 4 1 02/08/2016    CL 99 02/08/2016    CO2 23 02/08/2016    BUN 25 02/08/2016    CREATININE 0 98 02/08/2016    CALCIUM 9 2 02/08/2016    AST 13 02/08/2016    ALT 8 02/08/2016    ALKPHOS 41 02/08/2016    PROT 7 1 02/08/2016    TP 8 0 11/06/2019    BILITOT 0 4 02/08/2016       CBC with diff:       Invalid input(s):  RBC, TOTALCELLSCOUNTED, SEGS%, GRANS%, LYMPHS%, EOS%, BASO%, ABNEUT, ABGRANS, ABLYMPHS, ABMOMOS, ABEOS, ABBASO    CMP:      Invalid input(s): ALBUMIN    IMAGING:  XR bone survey complete >12 mos    (Results Pending)   CT bone marrow biopsy and aspiration    (Results Pending)     No results found

## 2020-01-11 LAB
KAPPA LC FREE SER-MCNC: 37.5 MG/L (ref 3.3–19.4)
KAPPA LC FREE/LAMBDA FREE SER: 3.68 {RATIO} (ref 0.26–1.65)
LAMBDA LC FREE SERPL-MCNC: 10.2 MG/L (ref 5.7–26.3)

## 2020-01-13 LAB — B2 MICROGLOB SERPL-MCNC: 1.6 MG/L (ref 0.6–2.4)

## 2020-01-14 LAB
ALBUMIN SERPL ELPH-MCNC: 3.84 G/DL (ref 3.5–5)
ALBUMIN SERPL ELPH-MCNC: 49.2 % (ref 52–65)
ALPHA1 GLOB SERPL ELPH-MCNC: 0.3 G/DL (ref 0.1–0.4)
ALPHA1 GLOB SERPL ELPH-MCNC: 3.8 % (ref 2.5–5)
ALPHA2 GLOB SERPL ELPH-MCNC: 0.95 G/DL (ref 0.4–1.2)
ALPHA2 GLOB SERPL ELPH-MCNC: 12.2 % (ref 7–13)
BETA GLOB ABNORMAL SERPL ELPH-MCNC: 0.42 G/DL (ref 0.4–0.8)
BETA1 GLOB SERPL ELPH-MCNC: 5.4 % (ref 5–13)
BETA2 GLOB SERPL ELPH-MCNC: 1.3 % (ref 2–8)
BETA2+GAMMA GLOB SERPL ELPH-MCNC: 0.1 G/DL (ref 0.2–0.5)
GAMMA GLOB ABNORMAL SERPL ELPH-MCNC: 2.19 G/DL (ref 0.5–1.6)
GAMMA GLOB SERPL ELPH-MCNC: 28.1 % (ref 12–22)
IGG/ALB SER: 0.97 {RATIO} (ref 1.1–1.8)
M PEAK ID 2: 5.2 %
M PROTEIN 1 MFR SERPL ELPH: 4.1 %
M PROTEIN 1 SERPL ELPH-MCNC: 0.32 G/DL
M PROTEIN 2 SERPL ELPH-MCNC: 0.41 G/DL
M PROTEIN 3 MFR SERPL ELPH: 10.9 %
M PROTEIN 3 SERPL ELPH-MCNC: 0.85 G/DL
PROT PATTERN SERPL ELPH-IMP: ABNORMAL
PROT SERPL-MCNC: 7.8 G/DL (ref 6.4–8.2)

## 2020-01-15 ENCOUNTER — HOSPITAL ENCOUNTER (OUTPATIENT)
Dept: CT IMAGING | Facility: HOSPITAL | Age: 85
Discharge: HOME | End: 2020-01-15
Attending: INTERNAL MEDICINE | Admitting: RADIOLOGY
Payer: COMMERCIAL

## 2020-01-15 VITALS
TEMPERATURE: 97.3 F | HEART RATE: 60 BPM | SYSTOLIC BLOOD PRESSURE: 174 MMHG | WEIGHT: 153 LBS | BODY MASS INDEX: 28.16 KG/M2 | OXYGEN SATURATION: 98 % | DIASTOLIC BLOOD PRESSURE: 82 MMHG | RESPIRATION RATE: 15 BRPM | HEIGHT: 62 IN

## 2020-01-15 DIAGNOSIS — D47.2 MGUS (MONOCLONAL GAMMOPATHY OF UNKNOWN SIGNIFICANCE): ICD-10-CM

## 2020-01-15 LAB — GLUCOSE SERPL-MCNC: 169 MG/DL (ref 65–140)

## 2020-01-15 PROCEDURE — 88364 INSITU HYBRIDIZATION (FISH): CPT | Performed by: PATHOLOGY

## 2020-01-15 PROCEDURE — 99152 MOD SED SAME PHYS/QHP 5/>YRS: CPT

## 2020-01-15 PROCEDURE — 82948 REAGENT STRIP/BLOOD GLUCOSE: CPT

## 2020-01-15 PROCEDURE — 99153 MOD SED SAME PHYS/QHP EA: CPT

## 2020-01-15 PROCEDURE — 88311 DECALCIFY TISSUE: CPT | Performed by: PATHOLOGY

## 2020-01-15 PROCEDURE — 88185 FLOWCYTOMETRY/TC ADD-ON: CPT

## 2020-01-15 PROCEDURE — 88184 FLOWCYTOMETRY/ TC 1 MARKER: CPT

## 2020-01-15 PROCEDURE — 77012 CT SCAN FOR NEEDLE BIOPSY: CPT

## 2020-01-15 PROCEDURE — 88342 IMHCHEM/IMCYTCHM 1ST ANTB: CPT | Performed by: PATHOLOGY

## 2020-01-15 PROCEDURE — 88365 INSITU HYBRIDIZATION (FISH): CPT | Performed by: PATHOLOGY

## 2020-01-15 PROCEDURE — 38222 DX BONE MARROW BX & ASPIR: CPT

## 2020-01-15 PROCEDURE — 88313 SPECIAL STAINS GROUP 2: CPT | Performed by: PATHOLOGY

## 2020-01-15 PROCEDURE — 88305 TISSUE EXAM BY PATHOLOGIST: CPT | Performed by: PATHOLOGY

## 2020-01-15 PROCEDURE — 85097 BONE MARROW INTERPRETATION: CPT | Performed by: PATHOLOGY

## 2020-01-15 PROCEDURE — 88341 IMHCHEM/IMCYTCHM EA ADD ANTB: CPT | Performed by: PATHOLOGY

## 2020-01-15 RX ORDER — FENTANYL CITRATE 50 UG/ML
INJECTION, SOLUTION INTRAMUSCULAR; INTRAVENOUS CODE/TRAUMA/SEDATION MEDICATION
Status: COMPLETED | OUTPATIENT
Start: 2020-01-15 | End: 2020-01-15

## 2020-01-15 RX ORDER — SODIUM CHLORIDE 9 MG/ML
75 INJECTION, SOLUTION INTRAVENOUS CONTINUOUS
Status: DISCONTINUED | OUTPATIENT
Start: 2020-01-15 | End: 2020-01-19 | Stop reason: HOSPADM

## 2020-01-15 RX ORDER — MIDAZOLAM HYDROCHLORIDE 2 MG/2ML
INJECTION, SOLUTION INTRAMUSCULAR; INTRAVENOUS CODE/TRAUMA/SEDATION MEDICATION
Status: COMPLETED | OUTPATIENT
Start: 2020-01-15 | End: 2020-01-15

## 2020-01-15 RX ADMIN — MIDAZOLAM HYDROCHLORIDE 1 MG: 1 INJECTION, SOLUTION INTRAMUSCULAR; INTRAVENOUS at 09:18

## 2020-01-15 RX ADMIN — MIDAZOLAM HYDROCHLORIDE 1 MG: 1 INJECTION, SOLUTION INTRAMUSCULAR; INTRAVENOUS at 08:57

## 2020-01-15 RX ADMIN — FENTANYL CITRATE 50 MCG: 50 INJECTION, SOLUTION INTRAMUSCULAR; INTRAVENOUS at 09:18

## 2020-01-15 RX ADMIN — FENTANYL CITRATE 50 MCG: 50 INJECTION, SOLUTION INTRAMUSCULAR; INTRAVENOUS at 08:58

## 2020-01-15 NOTE — H&P
Interventional Radiology  History and Physical 1/15/2020     History of Present Illness:  80year old female with MGUS is referred for bone marrow biopsy      Past Medical History:   Diagnosis Date    Cellulitis     last assessed - [id number]    Constipation     last assessed - [id number]   Jeral Hose Ecchymosis     last assessed - [id number]    Fall     last assessed - [id number]    Hypokalemia     last assessed - [id number]    Lower extremity weakness     last assessed - [id number]    Vitamin D deficiency     last assessed - [id number]        Past Surgical History:   Procedure Laterality Date    BACK SURGERY      OTHER SURGICAL HISTORY      Previous stent placement        Social History     Tobacco Use   Smoking Status Former Smoker    Start date: 5/7/1958   Smokeless Tobacco Never Used        Social History     Substance and Sexual Activity   Alcohol Use No        Social History     Substance and Sexual Activity   Drug Use No        Allergies   Allergen Reactions    Iodine Other (See Comments)     IV CONTRAST DYE    Iv Dye  [Iodinated Diagnostic Agents]     Other        Current Outpatient Medications   Medication Sig Dispense Refill    aspirin 81 mg chewable tablet Chew 81 mg      atorvastatin (LIPITOR) 40 mg tablet Take 1 tablet (40 mg total) by mouth daily 90 tablet 2    baclofen 10 mg tablet TAKE 1 TABLET BY MOUTH NIGHTLY AT BEDTIME AS NEEDED AS DIRECTED  3    carvedilol (COREG) 6 25 mg tablet TAKE 1 TABLET BY MOUTH TWICE DAILY 180 tablet 2    Cholecalciferol (VITAMIN D3) 2000 units capsule Take 1 capsule (2,000 Units total) by mouth daily 100 capsule 2    clopidogrel (PLAVIX) 75 mg tablet Take 1 tablet (75 mg total) by mouth daily 90 tablet 0    levothyroxine 50 mcg tablet TAKE 1 TABLET BY MOUTH ONCE DAILY 90 tablet 2    losartan (COZAAR) 100 MG tablet Take 1 tablet (100 mg total) by mouth daily 90 tablet 2    pioglitazone (ACTOS) 15 mg tablet TAKE 1 TABLET BY MOUTH DAILY 90 tablet 0    zolpidem (AMBIEN) 5 mg tablet Take 5 mg by mouth       Current Facility-Administered Medications   Medication Dose Route Frequency Provider Last Rate Last Dose    cyanocobalamin injection 1,000 mcg  1,000 mcg Intramuscular Q30 Days Macrina DO Nancy   1,000 mcg at 02/27/19 1250          Objective:    Vitals:    01/15/20 0817   BP: 161/77   Pulse: 61   Resp: 12   Temp: (!) 97 °F (36 1 °C)   TempSrc: Temporal   SpO2: 98%   Weight: 69 4 kg (153 lb)   Height: 5' 2" (1 575 m)      Physical Exam    Const: NAD    Lab Results   Component Value Date    WBC 7 12 01/10/2020    HGB 12 6 01/10/2020    HCT 40 8 01/10/2020    MCV 95 01/10/2020     01/10/2020     I have personally reviewed pertinent imaging and laboratory results  Assessment/Plan:  - Bone marrow biopsy  This procedure has been fully reviewed with the patient and written informed consent has been obtained

## 2020-01-15 NOTE — DISCHARGE INSTRUCTIONS
Bone Biopsy   WHAT YOU NEED TO KNOW:   What do I need to know about a bone biopsy? A bone biopsy is a procedure to take a sample of bone tissue  It may be done using a special needle or during surgery  A bone biopsy may be done to test for cancer, infection, or bone disease  How do I prepare for a bone biopsy? · You may need blood or urine tests before the procedure  Your healthcare provider will talk to you about how to prepare for surgery  He may tell you not to eat or drink anything after midnight on the day of your surgery  He will tell you what medicines to take or not take on the day of your surgery  · You may be given an antibiotic through your IV to help prevent a bacterial infection  Contrast liquid may be used during the procedure to help the bone or tumor show up better in pictures  Tell your healthcare provider if you have an allergy to contrast liquid  You will need someone to drive you home after the procedure  What will happen during a bone biopsy? · You may be given general anesthesia to keep you asleep and free from pain during surgery  You may get general anesthesia if your healthcare provider needs to make an incision to remove a larger piece of bone  You may instead be given local anesthesia with medicine to help you relax  With local anesthesia, you may still feel pressure or pushing during surgery, but you should not feel any pain  Your healthcare provider may use contrast liquid to help your bone show up better in pictures  He may use it in both types of bone biopsies  · If a bone biopsy will be done with a needle, your healthcare provider will insert a needle through your skin and into your bone  He will remove a small amount of bone tissue through the needle  He will remove the needle and hold pressure on the wound for several minutes  A small bandage will be placed over your wound       · If your healthcare provider needs to remove a larger piece of bone, he will make an incision in your skin  He may use a drill to remove a piece of your bone  Cement or a metal yfn may be placed into your bone to prevent it from breaking  Your healthcare provider may close the incision with stitches or surgical tape and cover it with a bandage  What will happen after a bone biopsy? You will be monitored by healthcare providers until you are awake and your vital signs are stable  Your vital signs include your blood pressure, heart rate, and breathing  You may need an x-ray to look for breaks in your bone  You may go home after your procedure or may need to spend the night in the hospital    What are the risks of a bone biopsy? You may bleed more than expected  Your bone may become infected or weak  Your bone may break during or after the procedure  The needle may break and cause nerve or blood vessel damage  You may have swelling and pain  CARE AGREEMENT:   You have the right to help plan your care  Learn about your health condition and how it may be treated  Discuss treatment options with your caregivers to decide what care you want to receive  You always have the right to refuse treatment  The above information is an  only  It is not intended as medical advice for individual conditions or treatments  Talk to your doctor, nurse or pharmacist before following any medical regimen to see if it is safe and effective for you  © 2017 [employer]  Information is for End User's use only and may not be sold, redistributed or otherwise used for commercial purposes  All illustrations and images included in CareNotes® are the copyrighted property of A D [employer] M [employer]  or Stevie Dickerson  Bone Biopsy   WHAT YOU NEED TO KNOW:   A bone biopsy is a procedure to take a sample of bone tissue  It may be done using a special needle or during surgery  A bone biopsy may be done to test for cancer, infection, or bone disease     DISCHARGE INSTRUCTIONS:   Seek care immediately if:   · Blood soaks through your bandage  · Your stitches come apart  · Your bone breaks  Contact your healthcare provider if:   · You have a fever or chills  · Your wound is red, swollen, or draining pus  · You have nausea or vomiting  · Your skin is itchy, swollen, or you have a rash  · You have questions or concerns about your condition or care  Medicines: You may need any of the following:  · Acetaminophen  decreases pain and fever  It is available without a doctor's order  Ask how much to take and how often to take it  Follow directions  Acetaminophen can cause liver damage if not taken correctly  · Take your medicine as directed  Contact your healthcare provider if you think your medicine is not helping or if you have side effects  Tell him or her if you are allergic to any medicine  Keep a list of the medicines, vitamins, and herbs you take  Include the amounts, and when and why you take them  Bring the list or the pill bottles to follow-up visits  Carry your medicine list with you in case of an emergency  Care for your wound as directed:  Remove your bandage as directed  If you have strips of medical tape over your incision, allow them to fall off on their own  If they do not fall off within 14 days, gently peel them off  Carefully wash the wound with soap and water  Dry the area and put on new, clean bandages as directed  Change your bandages when they get wet or dirty  Do not go in hot tubs or take baths for 48 hours or as directed by your healthcare provider  Check your wound every day for signs of infection such as redness, swelling, or pus  Self-care:   · Apply ice  on your bone for 15 to 20 minutes every hour or as directed  Use an ice pack, or put crushed ice in a plastic bag  Cover it with a towel  Ice helps prevent tissue damage and decreases swelling and pain  · Do not exercise  for at least 48 hours  Too much activity may prevent healing   Rest and do quiet activities  Ask your healthcare provider when you can drive and return to your normal activities  · Elevate  your arm or leg above the level of your heart as often as you can  This will help decrease swelling and pain  Prop your arm or leg on pillows or blankets to keep it elevated comfortably  · Drink liquids as directed  Liquid will help flush the contrast liquid out of your body  Ask how much liquid to drink each day and which liquids are best for you  Follow up with your healthcare provider as directed: Your healthcare provider will call you with the results of your biopsy  Write down your questions so you remember to ask them during your visits  © 2017 [employer] Information is for End User's use only and may not be sold, redistributed or otherwise used for commercial purposes  All illustrations and images included in CareNotes® are the copyrighted property of [employer]  or Stevie Dickerson  The above information is an  only  It is not intended as medical advice for individual conditions or treatments  Talk to your doctor, nurse or pharmacist before following any medical regimen to see if it is safe and effective for you

## 2020-01-15 NOTE — BRIEF OP NOTE (RAD/CATH)
CT BONE MARROW BIOPSY AND ASPIRATION Procedure Note    PATIENT NAME: Devon Villafana  : 1934  MRN: [id number]    Pre-op Diagnosis:   1  MGUS (monoclonal gammopathy of unknown significance)      Post-op Diagnosis:   1  MGUS (monoclonal gammopathy of unknown significance)        Surgeon:   Pierre Ludwig MD    Estimated Blood Loss: 2 mL    Findings: Successful right ilium bone marrow biopsy with aspirate  Specimens: 7 mL bone marrow aspirate and 1 core needle sample      Complications:  None    Anesthesia: Conscious sedation and Local    Pierre Ludwig MD     Date: 1/15/2020  Time: 9:29 AM

## 2020-01-16 LAB — VISC SER: 2 REL.SALINE (ref 1.6–1.9)

## 2020-01-17 LAB — SCAN RESULT: NORMAL

## 2020-01-21 LAB — HBA1C MFR BLD HPLC: 8.3 %

## 2020-01-27 LAB — MISCELLANEOUS LAB TEST RESULT: NORMAL

## 2020-01-28 ENCOUNTER — TELEPHONE (OUTPATIENT)
Dept: INTERNAL MEDICINE CLINIC | Facility: CLINIC | Age: 85
End: 2020-01-28

## 2020-01-28 DIAGNOSIS — M80.00XD OSTEOPOROSIS WITH CURRENT PATHOLOGICAL FRACTURE WITH ROUTINE HEALING, UNSPECIFIED OSTEOPOROSIS TYPE, SUBSEQUENT ENCOUNTER: Primary | ICD-10-CM

## 2020-01-31 ENCOUNTER — OFFICE VISIT (OUTPATIENT)
Dept: HEMATOLOGY ONCOLOGY | Facility: CLINIC | Age: 85
End: 2020-01-31
Payer: COMMERCIAL

## 2020-01-31 VITALS
RESPIRATION RATE: 16 BRPM | HEIGHT: 62 IN | WEIGHT: 155 LBS | OXYGEN SATURATION: 95 % | SYSTOLIC BLOOD PRESSURE: 110 MMHG | TEMPERATURE: 96.8 F | DIASTOLIC BLOOD PRESSURE: 70 MMHG | HEART RATE: 65 BPM | BODY MASS INDEX: 28.52 KG/M2

## 2020-01-31 DIAGNOSIS — D47.2 MGUS (MONOCLONAL GAMMOPATHY OF UNKNOWN SIGNIFICANCE): ICD-10-CM

## 2020-01-31 DIAGNOSIS — C85.90 LYMPHOMA, UNSPECIFIED BODY REGION, UNSPECIFIED LYMPHOMA TYPE (HCC): ICD-10-CM

## 2020-01-31 PROCEDURE — 99214 OFFICE O/P EST MOD 30 MIN: CPT | Performed by: INTERNAL MEDICINE

## 2020-01-31 PROCEDURE — 1160F RVW MEDS BY RX/DR IN RCRD: CPT | Performed by: INTERNAL MEDICINE

## 2020-01-31 NOTE — PROGRESS NOTES
HEMATOLOGY / [address] NOTE    Primary Care Provider: Pricila Strange DO  Referring Provider:    MRN: [id number]  : 1934    Reason for Encounter:    Chief Complaint   Patient presents with    Follow-up     2 week          History of Hematology / Oncology Illness:     Manpreet Mckenzie is a 80 y o  female who came in  to establish care with oncology      1,  MGUS  - previously managed at [facility], no bone marrow biopsy, no treatment  2, B cell lymphoma / low grade   - 2020 : bone marrow biopsy showed lymphoma; no imaging ( no need)     Assessment / Plan:     1  Lymphoma, unspecified body region, unspecified lymphoma type ([facility] [address] )  - low grade, no indication for therapy; observation  - made patient aware the indication for treatment include constitutional symptoms, bulky lymph nodes, cytopenia  Will closely monitor patient  Will follow patient in 6 months after labs        - CBC and differential; Future  - Comprehensive metabolic panel; Future  - LD,Blood; Future    2  MGUS (monoclonal gammopathy of unknown significance)    - CBC and differential; Future  - Comprehensive metabolic panel; Future  - Protein electrophoresis, serum; Future  - Immunoglobulin free LT chains blood; Future  - Viscosity, serum; Future            45        minutes were spent face to face with patient with greater than 50% of the time spent in counseling or coordination of care including discussions of treatment instructions  All of the patient's questions were answered to their satisfactory during this discussion  Advised pt to call if there is any further questions  Interval History:     1/10/2020: His 11year-old female, above summarized MGUS, hypertension, hyperlipidemia, hypothyroidism, transferring care to [facility]  Reported overall at baseline health status body weight stable no bone pain no lumps bumps no bleeding easy bruise      No other hematology oncology history in the past     2020 :  80year-old female, came in for follow-up, doing well, no constitutional symptoms  Body weight stable  No new lumps bumps  Problem list:       Patient Active Problem List   Diagnosis    Arteriosclerosis of coronary artery    Depression    Esophageal reflux    Hyperlipidemia    Hypertension    Hypothyroidism    Insomnia    Lumbar compression fracture (HCC)    Lumbar spinal stenosis    Normochromic normocytic anemia    Osteoarthritis of knee    Osteoporosis    Spinal stenosis    Type 2 diabetes mellitus (HCC)    Vitamin B12 deficiency    Vitamin D deficiency    Screening for malignant neoplasm of breast    Health care maintenance    Screening mammogram, encounter for    Diabetic peripheral neuropathy (HCC)    Greater trochanteric bursitis of left hip    Left sided sciatica    Triclonal gammopathy         PHYSICIAL EXAMINATION:     Vital Signs:   /70 (BP Location: Right arm)   Pulse 65   Temp (!) 96 8 °F (36 °C) (Tympanic)   Resp 16   Ht 5' 2" (1 575 m)   Wt 70 3 kg (155 lb)   SpO2 95%   BMI 28 35 kg/m²   Body surface area is 1 72 meters squared  Ht Readings from Last 3 Encounters:   01/31/20 5' 2" (1 575 m)   01/15/20 5' 2" (1 575 m)   01/10/20 5' 2" (1 575 m)       Wt Readings from Last 3 Encounters:   01/31/20 70 3 kg (155 lb)   01/15/20 69 4 kg (153 lb)   01/10/20 69 9 kg (154 lb)        Temp Readings from Last 3 Encounters:   01/31/20 (!) 96 8 °F (36 °C) (Tympanic)   01/15/20 (!) 97 3 °F (36 3 °C) (Temporal)   01/10/20 98 8 °F (37 1 °C) (Oral)        BP Readings from Last 3 Encounters:   01/31/20 110/70   01/15/20 (!) 174/82   01/10/20 120/88         Pulse Readings from Last 3 Encounters:   01/31/20 65   01/15/20 60   01/10/20 66       No major findings on physical examination  No palpable lymphadenopathy         GEN: Alert, awake oriented x3, in no acute distress  HEENT- No pallor, icterus, cyanosis, no oral mucosal lesions,   LAD - no palpable cervical, clavicle, axillary, inguinal LAD  Heart- normal S1 S2, regular rate and rhythm, No murmur, rubs  Lungs- decreased breathing sound bilateral    Abdomen- soft, Non tender, bowel sounds present  Extremities- No cyanosis, clubbing, edema  Neuro- No focal neurological deficit           PAST MEDICAL HISTORY:   has a past medical history of Cellulitis, Constipation, Diabetes mellitus ([address] ), Disease of thyroid gland, Ecchymosis, Fall, Hyperlipidemia, Hypertension, Hypokalemia, Lower extremity weakness, and Vitamin D deficiency  PAST SURGICAL HISTORY:   has a past surgical history that includes Back surgery; Other surgical history; Cholecystectomy; Hysterectomy; Colonoscopy; Coronary angioplasty with stent; and CT bone marrow biopsy and aspiration (1/15/2020)      CURRENT MEDICATIONS:     Current Outpatient Medications   Medication Sig Dispense Refill    aspirin 81 mg chewable tablet Chew 81 mg      atorvastatin (LIPITOR) 40 mg tablet Take 1 tablet (40 mg total) by mouth daily 90 tablet 2    baclofen 10 mg tablet TAKE 1 TABLET BY MOUTH NIGHTLY AT BEDTIME AS NEEDED AS DIRECTED  3    carvedilol (COREG) 6 25 mg tablet TAKE 1 TABLET BY MOUTH TWICE DAILY 180 tablet 2    Cholecalciferol (VITAMIN D3) 2000 units capsule Take 1 capsule (2,000 Units total) by mouth daily 100 capsule 2    clopidogrel (PLAVIX) 75 mg tablet Take 1 tablet (75 mg total) by mouth daily 90 tablet 0    denosumab (PROLIA) 60 mg/mL Inject 1 mL (60 mg total) under the skin once for 1 dose 1 mL 0    levothyroxine 50 mcg tablet TAKE 1 TABLET BY MOUTH ONCE DAILY 90 tablet 2    losartan (COZAAR) 100 MG tablet Take 1 tablet (100 mg total) by mouth daily 90 tablet 2    pioglitazone (ACTOS) 15 mg tablet TAKE 1 TABLET BY MOUTH DAILY 90 tablet 0    zolpidem (AMBIEN) 5 mg tablet Take 5 mg by mouth       Current Facility-Administered Medications   Medication Dose Route Frequency Provider Last Rate Last Dose    cyanocobalamin injection 1,000 mcg  1,000 mcg Intramuscular Q30 Days Hayley Valenzuela, DO   1,000 mcg at 02/27/19 1250     [unfilled]    SOCIAL HISTORY:   reports that she has quit smoking  She started smoking about 61 years ago  She has never used smokeless tobacco  She reports that she does not drink alcohol or use drugs  FAMILY HISTORY:  family history includes Heart disease in her mother; Other in her father  ALLERGIES:  is allergic to iodine; iv dye  [iodinated diagnostic agents]; and other  REVIEW OF SYSTEMS:  Please note that a 14-point review of systems was performed to include Constitutional, HEENT, Respiratory, CVS, GI, , Musculoskeletal, Integumentary, Neurologic, Rheumatologic, Endocrinologic, Psychiatric, Lymphatic, and Hematologic/Oncologic systems were reviewed and are negative unless otherwise stated in HPI  Positive and negative findings pertinent to this evaluation are incorporated into the history of present illness  LAB:    Lab Results   Component Value Date    WBC 7 12 01/10/2020    HGB 12 6 01/10/2020    HCT 40 8 01/10/2020    MCV 95 01/10/2020     01/10/2020       Lab Results   Component Value Date     02/08/2016    SODIUM 141 01/10/2020    K 3 4 (L) 01/10/2020     01/10/2020    CO2 30 01/10/2020    AGAP 7 01/10/2020    BUN 16 01/10/2020    CREATININE 0 96 01/10/2020    GLUC 205 (H) 01/10/2020    CALCIUM 8 9 01/10/2020    AST 13 01/10/2020    ALT 19 01/10/2020    ALKPHOS 61 01/10/2020    PROT 7 1 02/08/2016    TP 8 3 (H) 01/10/2020    TP 7 8 01/10/2020    BILITOT 0 4 02/08/2016    TBILI 0 80 01/10/2020    EGFR 54 01/10/2020       CBC with diff:       Invalid input(s):  RBC, TOTALCELLSCOUNTED, SEGS%, GRANS%, LYMPHS%, EOS%, BASO%, ABNEUT, ABGRANS, ABLYMPHS, ABMOMOS, ABEOS, ABBASO    CMP:      Invalid input(s): ALBUMIN    IMAGING:    No orders to display     No results found

## 2020-02-13 LAB — MISCELLANEOUS LAB TEST RESULT: NORMAL

## 2020-03-01 DIAGNOSIS — E11.8 TYPE 2 DIABETES MELLITUS WITH COMPLICATION, WITHOUT LONG-TERM CURRENT USE OF INSULIN (HCC): ICD-10-CM

## 2020-03-01 DIAGNOSIS — E78.5 HYPERLIPIDEMIA, UNSPECIFIED HYPERLIPIDEMIA TYPE: ICD-10-CM

## 2020-03-02 RX ORDER — PIOGLITAZONEHYDROCHLORIDE 15 MG/1
TABLET ORAL
Qty: 90 TABLET | Refills: 0 | Status: SHIPPED | OUTPATIENT
Start: 2020-03-02 | End: 2020-06-25 | Stop reason: SDUPTHER

## 2020-04-14 ENCOUNTER — TELEPHONE (OUTPATIENT)
Dept: INTERNAL MEDICINE CLINIC | Facility: CLINIC | Age: 85
End: 2020-04-14

## 2020-06-18 ENCOUNTER — OFFICE VISIT (OUTPATIENT)
Dept: INTERNAL MEDICINE CLINIC | Facility: CLINIC | Age: 85
End: 2020-06-18
Payer: COMMERCIAL

## 2020-06-18 VITALS
DIASTOLIC BLOOD PRESSURE: 85 MMHG | SYSTOLIC BLOOD PRESSURE: 160 MMHG | BODY MASS INDEX: 26.17 KG/M2 | TEMPERATURE: 98.8 F | HEART RATE: 82 BPM | RESPIRATION RATE: 16 BRPM | WEIGHT: 142.2 LBS | HEIGHT: 62 IN | OXYGEN SATURATION: 96 %

## 2020-06-18 DIAGNOSIS — M80.00XD OSTEOPOROSIS WITH CURRENT PATHOLOGICAL FRACTURE WITH ROUTINE HEALING, UNSPECIFIED OSTEOPOROSIS TYPE, SUBSEQUENT ENCOUNTER: ICD-10-CM

## 2020-06-18 DIAGNOSIS — E11.8 TYPE 2 DIABETES MELLITUS WITH COMPLICATION, WITHOUT LONG-TERM CURRENT USE OF INSULIN (HCC): Primary | ICD-10-CM

## 2020-06-18 DIAGNOSIS — E53.8 VITAMIN B12 DEFICIENCY: ICD-10-CM

## 2020-06-18 DIAGNOSIS — E55.9 VITAMIN D DEFICIENCY: ICD-10-CM

## 2020-06-18 DIAGNOSIS — R10.32 LEFT LOWER QUADRANT PAIN: ICD-10-CM

## 2020-06-18 DIAGNOSIS — E11.9 TYPE 2 DIABETES MELLITUS WITHOUT COMPLICATION, WITHOUT LONG-TERM CURRENT USE OF INSULIN (HCC): ICD-10-CM

## 2020-06-18 DIAGNOSIS — I25.10 CORONARY ARTERY DISEASE DUE TO CALCIFIED CORONARY LESION: ICD-10-CM

## 2020-06-18 DIAGNOSIS — Z00.00 MEDICARE ANNUAL WELLNESS VISIT, SUBSEQUENT: ICD-10-CM

## 2020-06-18 DIAGNOSIS — E78.5 HYPERLIPIDEMIA, UNSPECIFIED HYPERLIPIDEMIA TYPE: ICD-10-CM

## 2020-06-18 DIAGNOSIS — I10 HYPERTENSION, UNSPECIFIED TYPE: ICD-10-CM

## 2020-06-18 DIAGNOSIS — I25.84 CORONARY ARTERY DISEASE DUE TO CALCIFIED CORONARY LESION: ICD-10-CM

## 2020-06-18 DIAGNOSIS — E03.9 HYPOTHYROIDISM, UNSPECIFIED TYPE: ICD-10-CM

## 2020-06-18 PROBLEM — M75.00 ADHESIVE CAPSULITIS OF SHOULDER: Status: ACTIVE | Noted: 2020-06-18

## 2020-06-18 PROBLEM — M54.2 NECK PAIN: Status: ACTIVE | Noted: 2020-06-18

## 2020-06-18 PROCEDURE — 83036 HEMOGLOBIN GLYCOSYLATED A1C: CPT | Performed by: INTERNAL MEDICINE

## 2020-06-18 PROCEDURE — G0439 PPPS, SUBSEQ VISIT: HCPCS | Performed by: INTERNAL MEDICINE

## 2020-06-18 PROCEDURE — 99214 OFFICE O/P EST MOD 30 MIN: CPT | Performed by: INTERNAL MEDICINE

## 2020-06-18 RX ORDER — CARVEDILOL 6.25 MG/1
TABLET ORAL
Qty: 180 TABLET | Refills: 2 | Status: SHIPPED | OUTPATIENT
Start: 2020-06-18 | End: 2020-06-23 | Stop reason: SDUPTHER

## 2020-06-18 NOTE — PROGRESS NOTES
Assessment/Plan: prolia will get labs then shot    1 hypertension not at goal will increase carvedilol to from 3 125 to 6 25 twice a day with meals blood pressure goal less than 140/80 will be coming in for Prolia injection for osteoporosis will check blood pressure at that time  2  Type 2 diabetes A1c is at goal will recheck in 4 months do A1c in the office at that time  3  Patient with left lower quadrant pain and diarrhea will get CT scan of the abdomen  4  Patient with hypothyroidism hyperlipidemia will be getting laboratory work soon and also following up with Dr Clif Moser  5  B12 and vitamin-D deficiency will be getting laboratory work  6  Will be getting Prolia for osteoporosis if calcium is normal         Diagnoses and all orders for this visit:    Type 2 diabetes mellitus with complication, without long-term current use of insulin ([facility])  -     POCT hemoglobin A1c    Medicare annual wellness visit, subsequent    Vitamin D deficiency    Vitamin B12 deficiency    Type 2 diabetes mellitus without complication, without long-term current use of insulin ([facility] [address] )    Hypertension, unspecified type    Osteoporosis with current pathological fracture with routine healing, unspecified osteoporosis type, subsequent encounter  -     denosumab (PROLIA) 60 mg/mL; Inject 1 mL (60 mg total) under the skin once for 1 dose    BMI 26 0-26 9,adult    Coronary artery disease due to calcified coronary lesion  -     carvedilol (COREG) 6 25 mg tablet; 1 bid with meals    Hyperlipidemia, unspecified hyperlipidemia type    Hypothyroidism, unspecified type    Left lower quadrant pain  -     CT abdomen pelvis wo contrast; Future        The patient was counseled regarding instructions for management, risk factor reductions, patient and family education,impressions, risks and benefits of treatment options, side effects of medications, importance of compliance with treatment   The treatment plan was reviewed with the patient/guardian and patient/guardian understands and agrees with the treatment plan  Current Outpatient Medications:     aspirin 81 mg chewable tablet, Chew 81 mg, Disp: , Rfl:     atorvastatin (LIPITOR) 40 mg tablet, Take 1 tablet (40 mg total) by mouth daily, Disp: 90 tablet, Rfl: 2    baclofen 10 mg tablet, TAKE 1 TABLET BY MOUTH NIGHTLY AT BEDTIME AS NEEDED AS DIRECTED, Disp: , Rfl: 3    carvedilol (COREG) 6 25 mg tablet, 1 bid with meals, Disp: 180 tablet, Rfl: 2    Cholecalciferol (VITAMIN D3) 2000 units capsule, Take 1 capsule (2,000 Units total) by mouth daily, Disp: 100 capsule, Rfl: 2    clopidogrel (PLAVIX) 75 mg tablet, Take 1 tablet (75 mg total) by mouth daily, Disp: 90 tablet, Rfl: 0    levothyroxine 50 mcg tablet, TAKE 1 TABLET BY MOUTH ONCE DAILY, Disp: 90 tablet, Rfl: 2    losartan (COZAAR) 100 MG tablet, Take 1 tablet (100 mg total) by mouth daily, Disp: 90 tablet, Rfl: 2    pioglitazone (ACTOS) 15 mg tablet, TAKE 1 TABLET BY MOUTH DAILY, Disp: 90 tablet, Rfl: 0    zolpidem (AMBIEN) 5 mg tablet, Take 5 mg by mouth, Disp: , Rfl:     denosumab (PROLIA) 60 mg/mL, Inject 1 mL (60 mg total) under the skin once for 1 dose, Disp: 1 mL, Rfl: 0    Current Facility-Administered Medications:     cyanocobalamin injection 1,000 mcg, 1,000 mcg, Intramuscular, Q30 Days, Shilpi Calero DO, 1,000 mcg at 02/27/19 1250    Subjective:      Patient ID: Maged Mireles is a 80 y o  female  Lower abdominal pain 1 month diarrhea severe pain, 1 bm per day MOM yesterday       The following portions of the patient's history were reviewed and updated as appropriate:   She has a past medical history of Cellulitis, Constipation, Diabetes mellitus ([address] ), Disease of thyroid gland, Ecchymosis, Fall, Hyperlipidemia, Hypertension, Hypokalemia, Lower extremity weakness, and Vitamin D deficiency  ,  does not have any pertinent problems on file  ,   has a past surgical history that includes Back surgery;  Other surgical history; Cholecystectomy; Hysterectomy; Colonoscopy; Coronary angioplasty with stent; and CT bone marrow biopsy and aspiration (1/15/2020)  ,  family history includes Heart disease in her mother; Other in her father  ,   reports that she has quit smoking  She started smoking about 62 years ago  She has never used smokeless tobacco  She reports that she does not drink alcohol or use drugs  ,  is allergic to iodine; iv dye  [iodinated diagnostic agents]; and other       Review of Systems   Constitutional: Negative for appetite change, chills, fatigue, fever and unexpected weight change  HENT: Negative for congestion, ear pain, facial swelling, hearing loss, mouth sores, nosebleeds, postnasal drip, rhinorrhea, sinus pain, sore throat, trouble swallowing and voice change  Eyes: Negative for pain, discharge, redness and visual disturbance  Respiratory: Negative for apnea, chest tightness, shortness of breath, wheezing and stridor  Cardiovascular: Negative for chest pain, palpitations and leg swelling  Gastrointestinal: Positive for abdominal pain and diarrhea  Negative for abdominal distention, blood in stool, constipation and vomiting  Endocrine: Negative for cold intolerance, heat intolerance, polydipsia, polyphagia and polyuria  Genitourinary: Negative for difficulty urinating, dysuria, flank pain, frequency, genital sores, hematuria and urgency  Musculoskeletal: Negative for arthralgias and back pain  Skin: Negative for rash and wound  Allergic/Immunologic: Negative for environmental allergies, food allergies and immunocompromised state  Neurological: Negative for dizziness, tremors, seizures, syncope, facial asymmetry, speech difficulty, weakness, light-headedness, numbness and headaches  Hematological: Negative for adenopathy  Does not bruise/bleed easily     Psychiatric/Behavioral: Negative for agitation, behavioral problems, dysphoric mood, hallucinations, self-injury, sleep disturbance and suicidal ideas  The patient is not hyperactive  Objective:  /85 (BP Location: Left arm, Patient Position: Sitting)   Pulse 82   Temp 98 8 °F (37 1 °C) (Tympanic)   Resp 16   Ht 5' 2" (1 575 m)   Wt 64 5 kg (142 lb 3 2 oz)   SpO2 96%   BMI 26 01 kg/m²     Lab Review  Orders Only on 01/21/2020   Component Date Value    Hemoglobin A1C 01/21/2020 8 3    Hospital Outpatient Visit on 01/15/2020   Component Date Value    Case Report 01/15/2020                      Value:Surgical Pathology Report                         Case: [id number]                                   Authorizing Provider:  Annie Worley MD PhD          Collected:           01/15/2020 0919              Ordering Location:     [facility] Received:            01/15/2020 1139                                     CT Scan                                                                      Pathologist:           Ramiro Fang MD                                                         Specimens:   A) - Iliac Crest, Right, core                                                                       B) - Iliac Crest, Right, clot                                                                       C) - Iliac Crest, Right, slide                                                             Addendum 2 01/15/2020                      Value: This result contains rich text formatting which cannot be displayed here   Addendum 01/15/2020                      Value: This result contains rich text formatting which cannot be displayed here   Final Diagnosis 01/15/2020                      Value: This result contains rich text formatting which cannot be displayed here   Note 01/15/2020                      Value: This result contains rich text formatting which cannot be displayed here   Additional Information 01/15/2020                      Value: This result contains rich text formatting which cannot be displayed here      Intraoperative Consultat* 01/15/2020                      Value: This result contains rich text formatting which cannot be displayed here  [facility] Gross Description 01/15/2020                      Value: This result contains rich text formatting which cannot be displayed here      POC Glucose 01/15/2020 169*    Miscellaneous Lab Test R* 01/15/2020 CYTOGENETICS     Scan Result 01/15/2020 SEE WRITTEN REPORT     Miscellaneous Lab Test R* 01/15/2020 SEE WRITTEN REPORT    Appointment on 01/10/2020   Component Date Value    WBC 01/10/2020 7 12     RBC 01/10/2020 4 30     Hemoglobin 01/10/2020 12 6     Hematocrit 01/10/2020 40 8     MCV 01/10/2020 95     MCH 01/10/2020 29 3     MCHC 01/10/2020 30 9*    RDW 01/10/2020 13 7     MPV 01/10/2020 10 2     Platelets 62/49/6095 258     nRBC 01/10/2020 0     Neutrophils Relative 01/10/2020 53     Immat GRANS % 01/10/2020 1     Lymphocytes Relative 01/10/2020 29     Monocytes Relative 01/10/2020 14*    Eosinophils Relative 01/10/2020 2     Basophils Relative 01/10/2020 1     Neutrophils Absolute 01/10/2020 3 84     Immature Grans Absolute 01/10/2020 0 04     Lymphocytes Absolute 01/10/2020 2 08     Monocytes Absolute 01/10/2020 0 96     Eosinophils Absolute 01/10/2020 0 15     Basophils Absolute 01/10/2020 0 05     Sodium 01/10/2020 141     Potassium 01/10/2020 3 4*    Chloride 01/10/2020 104     CO2 01/10/2020 30     ANION GAP 01/10/2020 7     BUN 01/10/2020 16     Creatinine 01/10/2020 0 96     Glucose 01/10/2020 205*    Calcium 01/10/2020 8 9     AST 01/10/2020 13     ALT 01/10/2020 19     Alkaline Phosphatase 01/10/2020 61     Total Protein 01/10/2020 8 3*    Albumin 01/10/2020 3 4*    Total Bilirubin 01/10/2020 0 80     eGFR 01/10/2020 54     A/G Ratio 01/10/2020 0 97*    Albumin Electrophoresis 01/10/2020 49 2*    Albumin CONC 01/10/2020 3 84     Alpha 1 01/10/2020 3 8     ALPHA 1 CONC 01/10/2020 0 30     Alpha 2 01/10/2020 12 2     ALPHA 2 CONC 01/10/2020 0 95     Beta-1 01/10/2020 5 4     BETA 1 CONC 01/10/2020 0 42     Beta-2 01/10/2020 1 3*    BETA 2 CONC 01/10/2020 0 10*    Gamma Globulin 01/10/2020 28 1*    GAMMA CONC 01/10/2020 2 19*    M Peak ID 1 01/10/2020 4 10     M PEAK 1 CONC 01/10/2020 0 32     M Peak ID 2 01/10/2020 5 20     M Peak 2 CONC 01/10/2020 0 41     M Peak ID 3 01/10/2020 10 90     M Peak 3 CONC 01/10/2020 0 85     Total Protein 01/10/2020 7 8     SPEP Interpretation 01/10/2020 The SPEP shows a triclonal gammopathy  Previous immunofixation on 11/06/19 identified IgA kappa (M-Peak 1), IgG kappa (M-Peak 2), and IgM kappa (M-Peak 3)  Reviewed by: Chano Palm MD  ([id number])  **Electronic Signature**     Ig [place] Free Light Chain 01/10/2020 37 5*    Ig Lambda Free Light [place]* 01/10/2020 10 2     Kappa/Lambda FluidC Ratio 01/10/2020 3 68*    Beta-2 Microglobulin 01/10/2020 1 6     Viscosity, Serum 01/10/2020 2 0*         Imaging  @[id number]@     CT abdomen pelvis wo contrast    (Results Pending)     No results found for this or any previous visit  Physical Exam   Constitutional: She is oriented to person, place, and time  She appears well-developed  HENT:   Right Ear: External ear normal    Left Ear: External ear normal    Eyes: Right eye exhibits no discharge  Left eye exhibits no discharge  No scleral icterus  Neck: Carotid bruit is not present  No tracheal deviation present  No thyroid mass and no thyromegaly present  Cardiovascular: Normal rate, regular rhythm, normal heart sounds and intact distal pulses  Exam reveals no gallop and no friction rub  No murmur heard  Pulmonary/Chest: No respiratory distress  She has no wheezes  She has no rales  Abdominal: Bowel sounds are normal  She exhibits no distension, no pulsatile liver, no fluid wave, no ascites, no pulsatile midline mass and no mass  There is no hepatosplenomegaly  There is tenderness in the left lower quadrant   There is no rigidity, no rebound, no guarding, no CVA tenderness, no tenderness at McBurney's point and negative Pearce's sign  Musculoskeletal: She exhibits no edema  Lymphadenopathy:     She has no cervical adenopathy  Neurological: She is alert and oriented to person, place, and time  Coordination normal    Psychiatric: She has a normal mood and affect  Her behavior is normal  Judgment and thought content normal    Nursing note and vitals reviewed  BMI Counseling: Body mass index is 26 01 kg/m²  The BMI is above normal  Exercise recommendations include exercising 3-5 times per week

## 2020-06-18 NOTE — PATIENT INSTRUCTIONS
Medicare Preventive Visit Patient Instructions  Thank you for completing your Welcome to Medicare Visit or Medicare Annual Wellness Visit today  Your next wellness visit will be due in one year (6/18/2021)  The screening/preventive services that you may require over the next 5-10 years are detailed below  Some tests may not apply to you based off risk factors and/or age  Screening tests ordered at today's visit but not completed yet may show as past due  Also, please note that scanned in results may not display below  Preventive Screenings:  Service Recommendations Previous Testing/Comments   Colorectal Cancer Screening  * Colonoscopy    * Fecal Occult Blood Test (FOBT)/Fecal Immunochemical Test (FIT)  * Fecal DNA/Cologuard Test  * Flexible Sigmoidoscopy Age: 54-65 years old   Colonoscopy: every 10 years (may be performed more frequently if at higher risk)  OR  FOBT/FIT: every 1 year  OR  Cologuard: every 3 years  OR  Sigmoidoscopy: every 5 years  Screening may be recommended earlier than age 48 if at higher risk for colorectal cancer  Also, an individualized decision between you and your healthcare provider will decide whether screening between the ages of 74-80 would be appropriate  Colonoscopy: Not on file  FOBT/FIT: Not on file  Cologuard: Not on file  Sigmoidoscopy: Not on file    Screening Not Indicated     Breast Cancer Screening Age: 36 years old  Frequency: every 1-2 years  Not required if history of left and right mastectomy Mammogram: 07/06/2018    Screening Current   Cervical Cancer Screening Between the ages of 21-29, pap smear recommended once every 3 years  Between the ages of 33-67, can perform pap smear with HPV co-testing every 5 years     Recommendations may differ for women with a history of total hysterectomy, cervical cancer, or abnormal pap smears in past  Pap Smear: Not on file    Screening Not Indicated   Hepatitis C Screening Once for adults born between 1945 and 1965  More frequently in patients at high risk for Hepatitis C Hep C Antibody: Not on file       Diabetes Screening 1-2 times per year if you're at risk for diabetes or have pre-diabetes Fasting glucose: No results in last 5 years   A1C: 8 3    Screening Not Indicated  History Diabetes   Cholesterol Screening Once every 5 years if you don't have a lipid disorder  May order more often based on risk factors  Lipid panel: 06/17/2016    Screening Not Indicated  History Lipid Disorder     Other Preventive Screenings Covered by Medicare:  1  Abdominal Aortic Aneurysm (AAA) Screening: covered once if your at risk  You're considered to be at risk if you have a family history of AAA  2  Lung Cancer Screening: covers low dose CT scan once per year if you meet all of the following conditions: (1) Age 50-69; (2) No signs or symptoms of lung cancer; (3) Current smoker or have quit smoking within the last 15 years; (4) You have a tobacco smoking history of at least 30 pack years (packs per day multiplied by number of years you smoked); (5) You get a written order from a healthcare provider  3  Glaucoma Screening: covered annually if you're considered high risk: (1) You have diabetes OR (2) Family history of glaucoma OR (3)  aged 48 and older OR (3)  American aged 72 and older  3  Osteoporosis Screening: covered every 2 years if you meet one of the following conditions: (1) You're estrogen deficient and at risk for osteoporosis based off medical history and other findings; (2) Have a vertebral abnormality; (3) On glucocorticoid therapy for more than 3 months; (4) Have primary hyperparathyroidism; (5) On osteoporosis medications and need to assess response to drug therapy  · Last bone density test (DXA Scan): 10/01/2019   5  HIV Screening: covered annually if you're between the age of 15-65  Also covered annually if you are younger than 13 and older than 72 with risk factors for HIV infection   For pregnant patients, it is covered up to 3 times per pregnancy  Immunizations:  Immunization Recommendations   Influenza Vaccine Annual influenza vaccination during flu season is recommended for all persons aged >= 6 months who do not have contraindications   Pneumococcal Vaccine (Prevnar and Pneumovax)  * Prevnar = PCV13  * Pneumovax = PPSV23   Adults 25-60 years old: 1-3 doses may be recommended based on certain risk factors  Adults 72 years old: Prevnar (PCV13) vaccine recommended followed by Pneumovax (PPSV23) vaccine  If already received PPSV23 since turning 65, then PCV13 recommended at least one year after PPSV23 dose  Hepatitis B Vaccine 3 dose series if at intermediate or high risk (ex: diabetes, end stage renal disease, liver disease)   Tetanus (Td) Vaccine - COST NOT COVERED BY MEDICARE PART B Following completion of primary series, a booster dose should be given every 10 years to maintain immunity against tetanus  Td may also be given as tetanus wound prophylaxis  Tdap Vaccine - COST NOT COVERED BY MEDICARE PART B Recommended at least once for all adults  For pregnant patients, recommended with each pregnancy  Shingles Vaccine (Shingrix) - COST NOT COVERED BY MEDICARE PART B  2 shot series recommended in those aged 48 and above     Health Maintenance Due:  There are no preventive care reminders to display for this patient  Immunizations Due:      Topic Date Due    DTaP,Tdap,and Td Vaccines (1 - Tdap) 05/27/1945     Advance Directives   What are advance directives? Advance directives are legal documents that state your wishes and plans for medical care  These plans are made ahead of time in case you lose your ability to make decisions for yourself  Advance directives can apply to any medical decision, such as the treatments you want, and if you want to donate organs  What are the types of advance directives? There are many types of advance directives, and each state has rules about how to use them   You may choose a combination of any of the following:  · Living will: This is a written record of the treatment you want  You can also choose which treatments you do not want, which to limit, and which to stop at a certain time  This includes surgery, medicine, IV fluid, and tube feedings  · Durable power of  for healthcare [place] [facility]): This is a written record that states who you want to make healthcare choices for you when you are unable to make them for yourself  This person, called a proxy, is usually a family member or a friend  You may choose more than 1 proxy  · Do not resuscitate (DNR) order:  A DNR order is used in case your heart stops beating or you stop breathing  It is a request not to have certain forms of treatment, such as CPR  A DNR order may be included in other types of advance directives  · Medical directive: This covers the care that you want if you are in a coma, near death, or unable to make decisions for yourself  You can list the treatments you want for each condition  Treatment may include pain medicine, surgery, blood transfusions, dialysis, IV or tube feedings, and a ventilator (breathing machine)  · Values history: This document has questions about your views, beliefs, and how you feel and think about life  This information can help others choose the care that you would choose  Why are advance directives important? An advance directive helps you control your care  Although spoken wishes may be used, it is better to have your wishes written down  Spoken wishes can be misunderstood, or not followed  Treatments may be given even if you do not want them  An advance directive may make it easier for your family to make difficult choices about your care  Weight Management   Why it is important to manage your weight:  Being overweight increases your risk of health conditions such as heart disease, high blood pressure, type 2 diabetes, and certain types of cancer   It can also increase your risk for osteoarthritis, sleep apnea, and other respiratory problems  Aim for a slow, steady weight loss  Even a small amount of weight loss can lower your risk of health problems  How to lose weight safely:  A safe and healthy way to lose weight is to eat fewer calories and get regular exercise  You can lose up about 1 pound a week by decreasing the number of calories you eat by 500 calories each day  Healthy meal plan for weight management:  A healthy meal plan includes a variety of foods, contains fewer calories, and helps you stay healthy  A healthy meal plan includes the following:  · Eat whole-grain foods more often  A healthy meal plan should contain fiber  Fiber is the part of grains, fruits, and vegetables that is not broken down by your body  Whole-grain foods are healthy and provide extra fiber in your diet  Some examples of whole-grain foods are whole-wheat breads and pastas, oatmeal, brown rice, and bulgur  · Eat a variety of vegetables every day  Include dark, leafy greens such as spinach, kale, ronnie greens, and mustard greens  Eat yellow and orange vegetables such as carrots, sweet potatoes, and winter squash  · Eat a variety of fruits every day  Choose fresh or canned fruit (canned in its own juice or light syrup) instead of juice  Fruit juice has very little or no fiber  · Eat low-fat dairy foods  Drink fat-free (skim) milk or 1% milk  Eat fat-free yogurt and low-fat cottage cheese  Try low-fat cheeses such as mozzarella and other reduced-fat cheeses  · Choose meat and other protein foods that are low in fat  Choose beans or other legumes such as split peas or lentils  Choose fish, skinless poultry (chicken or turkey), or lean cuts of red meat (beef or pork)  Before you cook meat or poultry, cut off any visible fat  · Use less fat and oil  Try baking foods instead of frying them  Add less fat, such as margarine, sour cream, regular salad dressing and mayonnaise to foods   Eat fewer high-fat foods  Some examples of high-fat foods include french fries, doughnuts, ice cream, and cakes  · Eat fewer sweets  Limit foods and drinks that are high in sugar  This includes candy, cookies, regular soda, and sweetened drinks  Exercise:  Exercise at least 30 minutes per day on most days of the week  Some examples of exercise include walking, biking, dancing, and swimming  You can also fit in more physical activity by taking the stairs instead of the elevator or parking farther away from stores  Ask your healthcare provider about the best exercise plan for you  © Copyright [employer] 2018 Information is for End User's use only and may not be sold, redistributed or otherwise used for commercial purposes  All illustrations and images included in CareNotes® are the copyrighted property of [employer]  or [employer]       1 hypertension not at goal will increase carvedilol to from 3 125 to 6 25 twice a day with meals blood pressure goal less than 140/80 will be coming in for Prolia injection for osteoporosis will check blood pressure at that time  2  Type 2 diabetes A1c is at goal will recheck in 4 months do A1c in the office at that time  3  Patient with left lower quadrant pain and diarrhea will get CT scan of the abdomen  4  Patient with hypothyroidism hyperlipidemia will be getting laboratory work soon and also following up with Dr Bere Cochran  5  B12 and vitamin-D deficiency will be getting laboratory work  6   Will be getting Prolia for osteoporosis if calcium is bella

## 2020-06-18 NOTE — PROGRESS NOTES
Assessment and Plan:     Problem List Items Addressed This Visit     None      Visit Diagnoses     Type 2 diabetes mellitus with complication, without long-term current use of insulin ([facility] )    -  Primary           Preventive health issues were discussed with patient, and age appropriate screening tests were ordered as noted in patient's After Visit Summary  Personalized health advice and appropriate referrals for health education or preventive services given if needed, as noted in patient's After Visit Summary       History of Present Illness:     Patient presents for Medicare Annual Wellness visit    Patient Care Team:  Narcisa Fritz DO as PCP - General     Problem List:     Patient Active Problem List   Diagnosis    Arteriosclerosis of coronary artery    Depression    Esophageal reflux    Hyperlipidemia    Hypertension    Hypothyroidism    Insomnia    Lumbar compression fracture (HCC)    Lumbar spinal stenosis    Normochromic normocytic anemia    Osteoarthritis of knee    Osteoporosis    Spinal stenosis    Type 2 diabetes mellitus ([facility] )    Vitamin B12 deficiency    Vitamin D deficiency    Screening for malignant neoplasm of breast    Health care maintenance    Screening mammogram, encounter for    Diabetic peripheral neuropathy ([place] [address] )    Greater trochanteric bursitis of left hip    Left sided sciatica    Triclonal gammopathy      Past Medical and Surgical History:     Past Medical History:   Diagnosis Date    Cellulitis     last assessed - [id number]    Constipation     last assessed - [id number]    Diabetes mellitus ([facility] )     Disease of thyroid gland     Ecchymosis     last assessed - [id number]    Fall     last assessed - [id number]    Hyperlipidemia     Hypertension     Hypokalemia     last assessed - [id number]    Lower extremity weakness     last assessed - [id number]    Vitamin D deficiency     last assessed - [id number]     Past Surgical History:   Procedure Laterality Date    BACK SURGERY      CHOLECYSTECTOMY      COLONOSCOPY      CORONARY ANGIOPLASTY WITH STENT PLACEMENT      CT BONE MARROW BIOPSY AND ASPIRATION  1/15/2020    HYSTERECTOMY      OTHER SURGICAL HISTORY      Previous stent placement      Family History:     Family History   Problem Relation Age of Onset    Heart disease Mother     Other Father         cerebral embolism - with cerebral infarction      Social History:        Social History     Socioeconomic History    Marital status: /Civil Union     Spouse name: None    Number of children: None    Years of education: None    Highest education level: None   Occupational History    None   Social Needs    Financial resource strain: None    Food insecurity:     Worry: None     Inability: None    Transportation needs:     Medical: None     Non-medical: None   Tobacco Use    Smoking status: Former Smoker     Start date: 5/7/1958    Smokeless tobacco: Never Used   Substance and Sexual Activity    Alcohol use: No    Drug use: No    Sexual activity: Never   Lifestyle    Physical activity:     Days per week: None     Minutes per session: None    Stress: None   Relationships    Social connections:     Talks on phone: None     Gets together: None     Attends [employer] service: None     Active member of club or organization: None     Attends meetings of clubs or organizations: None     Relationship status: None    Intimate partner violence:     Fear of current or ex partner: None     Emotionally abused: None     Physically abused: None     Forced sexual activity: None   Other Topics Concern    None   Social History Narrative    Caffeine use      Medications and Allergies:     Current Outpatient Medications   Medication Sig Dispense Refill    aspirin 81 mg chewable tablet Chew 81 mg      atorvastatin (LIPITOR) 40 mg tablet Take 1 tablet (40 mg total) by mouth daily 90 tablet 2    baclofen 10 mg tablet TAKE 1 TABLET BY MOUTH NIGHTLY AT BEDTIME AS NEEDED AS DIRECTED  3    carvedilol (COREG) 6 25 mg tablet TAKE 1 TABLET BY MOUTH TWICE DAILY 180 tablet 2    Cholecalciferol (VITAMIN D3) 2000 units capsule Take 1 capsule (2,000 Units total) by mouth daily 100 capsule 2    clopidogrel (PLAVIX) 75 mg tablet Take 1 tablet (75 mg total) by mouth daily 90 tablet 0    levothyroxine 50 mcg tablet TAKE 1 TABLET BY MOUTH ONCE DAILY 90 tablet 2    losartan (COZAAR) 100 MG tablet Take 1 tablet (100 mg total) by mouth daily 90 tablet 2    pioglitazone (ACTOS) 15 mg tablet TAKE 1 TABLET BY MOUTH DAILY 90 tablet 0    zolpidem (AMBIEN) 5 mg tablet Take 5 mg by mouth      denosumab (PROLIA) 60 mg/mL Inject 1 mL (60 mg total) under the skin once for 1 dose 1 mL 0     Current Facility-Administered Medications   Medication Dose Route Frequency Provider Last Rate Last Dose    cyanocobalamin injection 1,000 mcg  1,000 mcg Intramuscular Q30 Days Rayne Oreilly,    1,000 mcg at 02/27/19 1250     Allergies   Allergen Reactions    Iodine Other (See Comments)     IV CONTRAST DYE    Iv Dye  [Iodinated Diagnostic Agents]     Other       Immunizations:     Immunization History   Administered Date(s) Administered    INFLUENZA 10/14/2016, 09/13/2017, 09/04/2018    Influenza Split High Dose Preservative Free IM 10/15/2014, 09/30/2015, 10/14/2016    Influenza TIV (IM) 11/06/2012, 09/18/2013, 09/13/2017    Influenza, high dose seasonal 0 5 mL 09/04/2018, 09/10/2019    Pneumococcal Conjugate 13-Valent 04/28/2017    Pneumococcal Polysaccharide PPV23 01/01/2006, 11/06/2012      Health Maintenance: There are no preventive care reminders to display for this patient  Topic Date Due    DTaP,Tdap,and Td Vaccines (1 - Tdap) 05/27/1945      Medicare Health Risk Assessment:     Pulse 82   Temp 98 8 °F (37 1 °C) (Tympanic)   Resp 16   Ht 5' 2" (1 575 m)   Wt 64 5 kg (142 lb 3 2 oz)   SpO2 96%   BMI 26 01 kg/m²      Nola Kirby is here for her Subsequent Wellness visit       Health Risk Assessment:   Patient rates overall health as good  Patient feels that their physical health rating is same  Eyesight was rated as slightly worse  Hearing was rated as same  Patient feels that their emotional and mental health rating is slightly better  Pain experienced in the last 7 days has been a lot  Patient's pain rating has been 8/10  Patient states that she has experienced weight loss or gain in last 6 months  Fall Risk Screening: In the past year, patient has experienced: no history of falling in past year      Urinary Incontinence Screening:   Patient has not leaked urine accidently in the last six months  Home Safety:  Patient does not have trouble with stairs inside or outside of their home  Patient has working smoke alarms and has working carbon monoxide detector  Home safety hazards include: none  Nutrition:   Current diet is Regular  Medications:   Patient is currently taking over-the-counter supplements  OTC medications include: see medication list  Patient is able to manage medications  Activities of Daily Living (ADLs)/Instrumental Activities of Daily Living (IADLs):   Walk and transfer into and out of bed and chair?: Yes  Dress and groom yourself?: Yes    Bathe or shower yourself?: Yes    Feed yourself? Yes  Do your laundry/housekeeping?: Yes  Manage your money, pay your bills and track your expenses?: Yes  Make your own meals?: Yes    Do your own shopping?: Yes    Previous Hospitalizations:   Any hospitalizations or ED visits within the last 12 months?: No      Advance Care Planning:   Living will: Yes    Durable POA for healthcare:  Yes    Advanced directive: Yes    Advanced directive counseling given: Yes    Five wishes given: Yes    Patient declined ACP directive: No    End of Life Decisions reviewed with patient: Yes    Provider agrees with end of life decisions: Yes      Cognitive Screening:   Provider or family/friend/caregiver concerned regarding cognition?: No    PREVENTIVE SCREENINGS      Cardiovascular Screening:    General: Screening Not Indicated, History Lipid Disorder and Screening Current      Diabetes Screening:     General: Screening Not Indicated, History Diabetes and Screening Current      Colorectal Cancer Screening:     General: Screening Not Indicated      Breast Cancer Screening:     General: Screening Current      Cervical Cancer Screening:    General: Screening Not Indicated      Osteoporosis Screening:    General: Screening Not Indicated and History Osteoporosis      Abdominal Aortic Aneurysm (AAA) Screening:        General: Screening Not Indicated      Lung Cancer Screening:     General: Screening Not Indicated      Hepatitis C Screening:    General: Screening Not Indicated      Michaela Chawla DO

## 2020-06-20 DIAGNOSIS — E78.5 HYPERLIPIDEMIA, UNSPECIFIED HYPERLIPIDEMIA TYPE: ICD-10-CM

## 2020-06-20 DIAGNOSIS — E11.8 TYPE 2 DIABETES MELLITUS WITH COMPLICATION, WITHOUT LONG-TERM CURRENT USE OF INSULIN (HCC): ICD-10-CM

## 2020-06-23 ENCOUNTER — TELEPHONE (OUTPATIENT)
Dept: INTERNAL MEDICINE CLINIC | Facility: CLINIC | Age: 85
End: 2020-06-23

## 2020-06-23 DIAGNOSIS — I25.10 CORONARY ARTERY DISEASE DUE TO CALCIFIED CORONARY LESION: ICD-10-CM

## 2020-06-23 DIAGNOSIS — I25.84 CORONARY ARTERY DISEASE DUE TO CALCIFIED CORONARY LESION: ICD-10-CM

## 2020-06-23 RX ORDER — CARVEDILOL 12.5 MG/1
TABLET ORAL
Qty: 60 TABLET | Refills: 5 | Status: SHIPPED | OUTPATIENT
Start: 2020-06-23 | End: 2021-01-06

## 2020-06-24 ENCOUNTER — HOSPITAL ENCOUNTER (OUTPATIENT)
Dept: CT IMAGING | Facility: HOSPITAL | Age: 85
Discharge: HOME | End: 2020-06-24
Attending: INTERNAL MEDICINE
Payer: COMMERCIAL

## 2020-06-24 DIAGNOSIS — R10.32 LEFT LOWER QUADRANT PAIN: ICD-10-CM

## 2020-06-24 PROCEDURE — 74176 CT ABD & PELVIS W/O CONTRAST: CPT

## 2020-06-25 DIAGNOSIS — E78.5 HYPERLIPIDEMIA, UNSPECIFIED HYPERLIPIDEMIA TYPE: ICD-10-CM

## 2020-06-25 DIAGNOSIS — I25.10 CORONARY ARTERY DISEASE INVOLVING NATIVE HEART WITHOUT ANGINA PECTORIS, UNSPECIFIED VESSEL OR LESION TYPE: ICD-10-CM

## 2020-06-25 DIAGNOSIS — E11.8 TYPE 2 DIABETES MELLITUS WITH COMPLICATION, WITHOUT LONG-TERM CURRENT USE OF INSULIN (HCC): ICD-10-CM

## 2020-06-25 RX ORDER — PIOGLITAZONEHYDROCHLORIDE 15 MG/1
15 TABLET ORAL DAILY
Qty: 90 TABLET | Refills: 5 | Status: SHIPPED | OUTPATIENT
Start: 2020-06-25 | End: 2020-10-20

## 2020-06-25 RX ORDER — CLOPIDOGREL BISULFATE 75 MG/1
75 TABLET ORAL DAILY
Qty: 90 TABLET | Refills: 0 | Status: SHIPPED | OUTPATIENT
Start: 2020-06-25 | End: 2020-09-28

## 2020-06-29 RX ORDER — PIOGLITAZONEHYDROCHLORIDE 15 MG/1
TABLET ORAL
Qty: 90 TABLET | Refills: 0 | Status: SHIPPED | OUTPATIENT
Start: 2020-06-29 | End: 2020-10-20 | Stop reason: SDUPTHER

## 2020-06-30 ENCOUNTER — TELEPHONE (OUTPATIENT)
Dept: INTERNAL MEDICINE CLINIC | Facility: CLINIC | Age: 85
End: 2020-06-30

## 2020-07-01 DIAGNOSIS — D89.89 LIGHT CHAIN DISEASE, KAPPA TYPE (HCC): Primary | ICD-10-CM

## 2020-07-17 ENCOUNTER — TELEPHONE (OUTPATIENT)
Dept: HEMATOLOGY ONCOLOGY | Facility: CLINIC | Age: 85
End: 2020-07-17

## 2020-07-17 NOTE — TELEPHONE ENCOUNTER
Spoke to patient and informed her that her appt on 7/31/20 needed to be R/S  Her new appt is 8/17/20 at 12:40 pm with Dr Marc Colunga at the [facility] location  Patient was fine with the appt change

## 2020-08-02 DIAGNOSIS — H40.9 GLAUCOMA, UNSPECIFIED GLAUCOMA TYPE, UNSPECIFIED LATERALITY: Primary | ICD-10-CM

## 2020-08-03 RX ORDER — LATANOPROST 50 UG/ML
SOLUTION/ DROPS OPHTHALMIC
Qty: 9 ML | Refills: 0 | Status: SHIPPED | OUTPATIENT
Start: 2020-08-03

## 2020-08-04 DIAGNOSIS — E03.9 HYPOTHYROIDISM, UNSPECIFIED TYPE: ICD-10-CM

## 2020-08-04 RX ORDER — LEVOTHYROXINE SODIUM 0.05 MG/1
50 TABLET ORAL DAILY
Qty: 90 TABLET | Refills: 2 | Status: SHIPPED | OUTPATIENT
Start: 2020-08-04 | End: 2021-05-05 | Stop reason: SDUPTHER

## 2020-08-04 RX ORDER — LEVOTHYROXINE SODIUM 50 UG/1
TABLET ORAL
Qty: 90 TABLET | Refills: 0 | OUTPATIENT
Start: 2020-08-04

## 2020-08-04 RX ORDER — LEVOTHYROXINE SODIUM 0.05 MG/1
50 TABLET ORAL DAILY
Qty: 30 TABLET | Refills: 5 | OUTPATIENT
Start: 2020-08-04

## 2020-08-14 ENCOUNTER — TELEPHONE (OUTPATIENT)
Dept: HEMATOLOGY ONCOLOGY | Facility: CLINIC | Age: 85
End: 2020-08-14

## 2020-08-14 NOTE — TELEPHONE ENCOUNTER
Reschedule Appointment     Who is calling in  [facility]   Doctor Appointment Scheduled with Dr Winsome Lopez date and time 08/17 at 12:40pm    New date and time 10/05 at 14:20pm   Location Mobile   Patient verbalized understanding

## 2020-09-01 LAB — SL AMB POCT HEMOGLOBIN AIC: 7.8 (ref ?–6.5)

## 2020-09-06 DIAGNOSIS — E78.5 HYPERLIPIDEMIA, UNSPECIFIED HYPERLIPIDEMIA TYPE: ICD-10-CM

## 2020-09-06 DIAGNOSIS — E11.9 TYPE 2 DIABETES MELLITUS WITHOUT COMPLICATION, WITHOUT LONG-TERM CURRENT USE OF INSULIN (HCC): ICD-10-CM

## 2020-09-06 DIAGNOSIS — E11.8 TYPE 2 DIABETES MELLITUS WITH COMPLICATION, WITHOUT LONG-TERM CURRENT USE OF INSULIN (HCC): ICD-10-CM

## 2020-09-06 DIAGNOSIS — I10 HYPERTENSION, UNSPECIFIED TYPE: ICD-10-CM

## 2020-09-08 RX ORDER — ATORVASTATIN CALCIUM 40 MG/1
TABLET, FILM COATED ORAL
Qty: 90 TABLET | Refills: 3 | Status: SHIPPED | OUTPATIENT
Start: 2020-09-08 | End: 2021-07-07 | Stop reason: SDUPTHER

## 2020-09-08 RX ORDER — LOSARTAN POTASSIUM 100 MG/1
TABLET ORAL
Qty: 90 TABLET | Refills: 3 | Status: SHIPPED | OUTPATIENT
Start: 2020-09-08 | End: 2021-09-08 | Stop reason: SDUPTHER

## 2020-09-22 ENCOUNTER — TELEPHONE (OUTPATIENT)
Dept: INTERNAL MEDICINE CLINIC | Facility: CLINIC | Age: 85
End: 2020-09-22

## 2020-09-22 ENCOUNTER — OFFICE VISIT (OUTPATIENT)
Dept: INTERNAL MEDICINE CLINIC | Facility: CLINIC | Age: 85
End: 2020-09-22
Payer: COMMERCIAL

## 2020-09-22 VITALS
TEMPERATURE: 98.6 F | OXYGEN SATURATION: 97 % | HEIGHT: 62 IN | RESPIRATION RATE: 16 BRPM | BODY MASS INDEX: 26.46 KG/M2 | WEIGHT: 143.8 LBS | DIASTOLIC BLOOD PRESSURE: 85 MMHG | SYSTOLIC BLOOD PRESSURE: 160 MMHG | HEART RATE: 58 BPM

## 2020-09-22 DIAGNOSIS — E11.42 DIABETIC PERIPHERAL NEUROPATHY (HCC): ICD-10-CM

## 2020-09-22 DIAGNOSIS — K21.9 GASTROESOPHAGEAL REFLUX DISEASE, ESOPHAGITIS PRESENCE NOT SPECIFIED: ICD-10-CM

## 2020-09-22 DIAGNOSIS — E03.9 HYPOTHYROIDISM, UNSPECIFIED TYPE: ICD-10-CM

## 2020-09-22 DIAGNOSIS — M70.62 GREATER TROCHANTERIC BURSITIS OF LEFT HIP: ICD-10-CM

## 2020-09-22 DIAGNOSIS — M80.00XD OSTEOPOROSIS WITH CURRENT PATHOLOGICAL FRACTURE WITH ROUTINE HEALING, UNSPECIFIED OSTEOPOROSIS TYPE, SUBSEQUENT ENCOUNTER: ICD-10-CM

## 2020-09-22 DIAGNOSIS — E11.9 TYPE 2 DIABETES MELLITUS WITHOUT COMPLICATION, WITHOUT LONG-TERM CURRENT USE OF INSULIN (HCC): ICD-10-CM

## 2020-09-22 DIAGNOSIS — I25.10 ARTERIOSCLEROSIS OF CORONARY ARTERY: ICD-10-CM

## 2020-09-22 DIAGNOSIS — Z23 NEED FOR IMMUNIZATION AGAINST INFLUENZA: Primary | ICD-10-CM

## 2020-09-22 DIAGNOSIS — E53.8 VITAMIN B12 DEFICIENCY: ICD-10-CM

## 2020-09-22 DIAGNOSIS — E78.5 HYPERLIPIDEMIA, UNSPECIFIED HYPERLIPIDEMIA TYPE: ICD-10-CM

## 2020-09-22 DIAGNOSIS — I10 HYPERTENSION, UNSPECIFIED TYPE: ICD-10-CM

## 2020-09-22 DIAGNOSIS — D89.89 LIGHT CHAIN DISEASE, KAPPA TYPE (HCC): ICD-10-CM

## 2020-09-22 DIAGNOSIS — E11.42 DIABETIC PERIPHERAL NEUROPATHY (HCC): Primary | ICD-10-CM

## 2020-09-22 PROCEDURE — 99213 OFFICE O/P EST LOW 20 MIN: CPT | Performed by: INTERNAL MEDICINE

## 2020-09-22 PROCEDURE — 90662 IIV NO PRSV INCREASED AG IM: CPT

## 2020-09-22 PROCEDURE — 3077F SYST BP >= 140 MM HG: CPT | Performed by: INTERNAL MEDICINE

## 2020-09-22 PROCEDURE — 3079F DIAST BP 80-89 MM HG: CPT | Performed by: INTERNAL MEDICINE

## 2020-09-22 PROCEDURE — G0008 ADMIN INFLUENZA VIRUS VAC: HCPCS

## 2020-09-22 PROCEDURE — 20610 DRAIN/INJ JOINT/BURSA W/O US: CPT | Performed by: INTERNAL MEDICINE

## 2020-09-22 RX ORDER — LIDOCAINE HYDROCHLORIDE 10 MG/ML
3 INJECTION, SOLUTION INFILTRATION; PERINEURAL
Status: COMPLETED | OUTPATIENT
Start: 2020-09-22 | End: 2020-09-22

## 2020-09-22 RX ORDER — AMLODIPINE BESYLATE 2.5 MG/1
2.5 TABLET ORAL DAILY
Qty: 90 TABLET | Refills: 2 | Status: SHIPPED | OUTPATIENT
Start: 2020-09-22 | End: 2021-09-08 | Stop reason: SDUPTHER

## 2020-09-22 RX ORDER — LIDOCAINE HYDROCHLORIDE 10 MG/ML
1 INJECTION, SOLUTION INFILTRATION; PERINEURAL
Status: COMPLETED | OUTPATIENT
Start: 2020-09-22 | End: 2020-09-22

## 2020-09-22 RX ORDER — METHYLPREDNISOLONE ACETATE 40 MG/ML
2 INJECTION, SUSPENSION INTRA-ARTICULAR; INTRALESIONAL; INTRAMUSCULAR; SOFT TISSUE
Status: COMPLETED | OUTPATIENT
Start: 2020-09-22 | End: 2020-09-22

## 2020-09-22 RX ADMIN — LIDOCAINE HYDROCHLORIDE 1 ML: 10 INJECTION, SOLUTION INFILTRATION; PERINEURAL at 16:10

## 2020-09-22 RX ADMIN — METHYLPREDNISOLONE ACETATE 2 ML: 40 INJECTION, SUSPENSION INTRA-ARTICULAR; INTRALESIONAL; INTRAMUSCULAR; SOFT TISSUE at 16:10

## 2020-09-22 RX ADMIN — LIDOCAINE HYDROCHLORIDE 3 ML: 10 INJECTION, SOLUTION INFILTRATION; PERINEURAL at 16:10

## 2020-09-22 NOTE — PATIENT INSTRUCTIONS
1 hypertension not at goal will add amlodipine to her present regimen should be seen in October  2   Patient with left trochanteric bursitis to get improvement after injection see her back in October as scheduled

## 2020-09-22 NOTE — PROGRESS NOTES
Assessment/Plan:    1 hypertension not at goal will add amlodipine to her present regimen should be seen in October  2  Patient with left trochanteric bursitis to get improvement after injection see her back in October as scheduled     Large joint arthrocentesis: L greater trochanteric bursa  Date/Time: 9/22/2020 4:10 PM  Consent given by: patient  Supporting Documentation  Indications: pain   Procedure Details  Location: hip - L greater trochanteric bursa  Preparation: Patient was prepped and draped in the usual sterile fashion  Needle gauge: Spinal needle  Ultrasound guidance: no  Approach: lateral  Medications administered: 1 mL lidocaine 1 %; 2 mL methylPREDNISolone acetate 40 mg/mL; 3 mL lidocaine 1 %    Patient tolerance: patient tolerated the procedure well with no immediate complications  Dressing:  Sterile dressing applied             Diagnoses and all orders for this visit:    Need for immunization against influenza  -     influenza vaccine, high-dose, PF 0 7 mL (FLUZONE HIGH-DOSE)    Hypothyroidism, unspecified type    Hypertension, unspecified type  -     amLODIPine (NORVASC) 2 5 mg tablet; Take 1 tablet (2 5 mg total) by mouth daily    Gastroesophageal reflux disease, esophagitis presence not specified    Hyperlipidemia, unspecified hyperlipidemia type    Type 2 diabetes mellitus without complication, without long-term current use of insulin (HCC)    Vitamin B12 deficiency    Greater trochanteric bursitis of left hip    Diabetic peripheral neuropathy (HCC)    Light chain disease, 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HCC)    Other orders  -     Large joint arthrocentesis        The patient was counseled regarding instructions for management, risk factor reductions, patient and family education,impressions, risks and benefits of treatment options, side effects of medications, importance of compliance with treatment   The treatment plan was reviewed with the patient/guardian and patient/guardian understands and agrees with the treatment plan  Patient's shoes and socks removed  Right Foot/Ankle   Right Foot Inspection  Skin Exam: skin normal and skin intact no dry skin, no warmth, no callus, no erythema, no maceration, no abnormal color, no pre-ulcer, no ulcer and no callus                          Toe Exam: no swelling, no tenderness, erythema and  no right toe deformity  Sensory   Vibration: intact  Proprioception: intact   Monofilament testing: intact  Vascular    The right DP pulse is 1+  The right PT pulse is 1+  Right Toe  - Comprehensive Exam  Ecchymosis: none  Swelling: none   Tenderness: none         Left Foot/Ankle  Left Foot Inspection  Skin Exam: skin normal and skin intactno dry skin, no warmth, no erythema, no maceration, normal color, no pre-ulcer, no ulcer and no callus                         Toe Exam: no swelling, no tenderness, no erythema and no left toe deformity                   Sensory   Vibration: intact  Proprioception: intact  Monofilament: intact  Vascular    The left DP pulse is 1+  The left PT pulse is 1+     Left Toe  - Comprehensive Exam  Ecchymosis: none  Swelling: none   Tenderness: none       Assign Risk Category:  No deformity present; ;        Risk: 0      Current Outpatient Medications:     amLODIPine (NORVASC) 2 5 mg tablet, Take 1 tablet (2 5 mg total) by mouth daily, Disp: 90 tablet, Rfl: 2    aspirin 81 mg chewable tablet, Chew 81 mg, Disp: , Rfl:     atorvastatin (LIPITOR) 40 mg tablet, Take 1 tablet by mouth once daily, Disp: 90 tablet, Rfl: 3    baclofen 10 mg tablet, TAKE 1 TABLET BY MOUTH NIGHTLY AT BEDTIME AS NEEDED AS DIRECTED, Disp: , Rfl: 3    carvedilol (COREG) 12 5 mg tablet, 1 bid with meals, Disp: 60 tablet, Rfl: 5    Cholecalciferol (VITAMIN D3) 2000 units capsule, Take 1 capsule (2,000 Units total) by mouth daily, Disp: 100 capsule, Rfl: 2    clopidogrel (PLAVIX) 75 mg tablet, Take 1 tablet (75 mg total) by mouth daily, Disp: 90 tablet, Rfl: 0    denosumab (PROLIA) 60 mg/mL, Inject 1 mL (60 mg total) under the skin once for 1 dose, Disp: 1 mL, Rfl: 0    latanoprost (XALATAN) 0 005 % ophthalmic solution, INSTILL 1 DROP INTO EACH EYE AT BEDTIME, Disp: 9 mL, Rfl: 0    levothyroxine 50 mcg tablet, Take 1 tablet (50 mcg total) by mouth daily, Disp: 90 tablet, Rfl: 2    losartan (COZAAR) 100 MG tablet, Take 1 tablet by mouth once daily, Disp: 90 tablet, Rfl: 3    pioglitazone (ACTOS) 15 mg tablet, Take 1 tablet by mouth once daily, Disp: 90 tablet, Rfl: 0    pioglitazone (ACTOS) 15 mg tablet, Take 1 tablet (15 mg total) by mouth daily, Disp: 90 tablet, Rfl: 5    zolpidem (AMBIEN) 5 mg tablet, Take 5 mg by mouth, Disp: , Rfl:     Current Facility-Administered Medications:     cyanocobalamin injection 1,000 mcg, 1,000 mcg, Intramuscular, Q30 Days, Marvin Xie DO, 1,000 mcg at 02/27/19 1250    Subjective:      Patient ID: Hermelindo Hanson is a 80 y o  female  Patient has been having left hip pain going down to her legs worse when going from sitting to standing      The following portions of the patient's history were reviewed and updated as appropriate:   She has a past medical history of Cellulitis, Constipation, Diabetes mellitus ([address] ), Disease of thyroid gland, Ecchymosis, Fall, Hyperlipidemia, Hypertension, Hypokalemia, Lower extremity weakness, and Vitamin D deficiency  ,  does not have any pertinent problems on file  ,   has a past surgical history that includes Back surgery; Other surgical history; Cholecystectomy; Hysterectomy; Colonoscopy; Coronary angioplasty with stent; and CT bone marrow biopsy and aspiration (1/15/2020)  ,  family history includes Heart disease in her mother; Other in her father  ,   reports that she has quit smoking  She started smoking about 62 years ago  She has never used smokeless tobacco  She reports that she does not drink alcohol or use drugs  ,  is allergic to iodine; iv dye  [iodinated diagnostic agents]; and other       Review of Systems Constitutional: Negative for appetite change, chills, fatigue, fever and unexpected weight change  HENT: Negative for congestion, ear pain, facial swelling, hearing loss, mouth sores, nosebleeds, postnasal drip, rhinorrhea, sinus pain, sore throat, trouble swallowing and voice change  Eyes: Negative for pain, discharge, redness and visual disturbance  Respiratory: Negative for apnea, chest tightness, shortness of breath, wheezing and stridor  Cardiovascular: Negative for chest pain, palpitations and leg swelling  Gastrointestinal: Negative for abdominal distention, abdominal pain, blood in stool, constipation, diarrhea and vomiting  Endocrine: Negative for cold intolerance, heat intolerance, polydipsia, polyphagia and polyuria  Genitourinary: Negative for difficulty urinating, dysuria, flank pain, frequency, genital sores, hematuria and urgency  Musculoskeletal: Positive for back pain and gait problem  Negative for arthralgias  Skin: Negative for rash and wound  Allergic/Immunologic: Negative for environmental allergies, food allergies and immunocompromised state  Neurological: Negative for dizziness, tremors, seizures, syncope, facial asymmetry, speech difficulty, weakness, light-headedness, numbness and headaches  Hematological: Negative for adenopathy  Does not bruise/bleed easily  Psychiatric/Behavioral: Negative for agitation, behavioral problems, dysphoric mood, hallucinations, self-injury, sleep disturbance and suicidal ideas  The patient is not hyperactive  Objective:  /85   Pulse 58   Temp 98 6 °F (37 °C) (Tympanic)   Resp 16   Ht 5' 2" (1 575 m)   Wt 65 2 kg (143 lb 12 8 oz)   SpO2 97%   BMI 26 30 kg/m²     Lab Review  Office Visit on 06/18/2020   Component Date Value    Hemoglobin A1C 09/01/2020 7 8*         Imaging  @[id number]@     No orders to display     No results found for this or any previous visit           Physical Exam  Vitals signs and nursing note reviewed  Constitutional:       Appearance: She is well-developed  HENT:      Right Ear: External ear normal       Left Ear: External ear normal    Eyes:      General: No scleral icterus  Right eye: No discharge  Left eye: No discharge  Neck:      Thyroid: No thyroid mass or thyromegaly  Vascular: No carotid bruit  Trachea: No tracheal deviation  Cardiovascular:      Rate and Rhythm: Normal rate and regular rhythm  Pulses:           Dorsalis pedis pulses are 1+ on the right side and 1+ on the left side  Posterior tibial pulses are 1+ on the right side and 1+ on the left side  Heart sounds: Normal heart sounds  No murmur  No friction rub  No gallop  Pulmonary:      Effort: No respiratory distress  Breath sounds: No wheezing or rales  Feet:      Right foot:      Skin integrity: No ulcer, skin breakdown, erythema, warmth, callus or dry skin  Left foot:      Skin integrity: No ulcer, skin breakdown, erythema, warmth, callus or dry skin  Lymphadenopathy:      Cervical: No cervical adenopathy  Neurological:      Mental Status: She is alert and oriented to person, place, and time  Coordination: Coordination normal    Psychiatric:         Behavior: Behavior normal          Thought Content:  Thought content normal          Judgment: Judgment normal

## 2020-09-22 NOTE — TELEPHONE ENCOUNTER
Pt needs labs for her visit with Maritza Villaseñor 10/20  The only active Wyn Sprinkle orders are  in epic for her  Please verify and order what labs she should be doing

## 2020-09-28 DIAGNOSIS — I25.10 CORONARY ARTERY DISEASE INVOLVING NATIVE HEART WITHOUT ANGINA PECTORIS, UNSPECIFIED VESSEL OR LESION TYPE: ICD-10-CM

## 2020-09-28 RX ORDER — CLOPIDOGREL BISULFATE 75 MG/1
TABLET ORAL
Qty: 90 TABLET | Refills: 0 | Status: SHIPPED | OUTPATIENT
Start: 2020-09-28 | End: 2020-12-21

## 2020-10-05 ENCOUNTER — OFFICE VISIT (OUTPATIENT)
Dept: HEMATOLOGY ONCOLOGY | Facility: CLINIC | Age: 85
End: 2020-10-05
Payer: COMMERCIAL

## 2020-10-05 VITALS
DIASTOLIC BLOOD PRESSURE: 62 MMHG | HEIGHT: 62 IN | TEMPERATURE: 99.1 F | BODY MASS INDEX: 26.04 KG/M2 | WEIGHT: 141.5 LBS | RESPIRATION RATE: 18 BRPM | SYSTOLIC BLOOD PRESSURE: 134 MMHG | OXYGEN SATURATION: 98 % | HEART RATE: 56 BPM

## 2020-10-05 DIAGNOSIS — C85.90 NON-HODGKIN'S LYMPHOMA, UNSPECIFIED BODY REGION, UNSPECIFIED NON-HODGKIN LYMPHOMA TYPE (HCC): Primary | ICD-10-CM

## 2020-10-05 DIAGNOSIS — D89.89 LIGHT CHAIN DISEASE, KAPPA TYPE (HCC): ICD-10-CM

## 2020-10-05 PROCEDURE — 99214 OFFICE O/P EST MOD 30 MIN: CPT | Performed by: INTERNAL MEDICINE

## 2020-10-14 LAB — HBA1C MFR BLD HPLC: 8.6 %

## 2020-10-20 ENCOUNTER — OFFICE VISIT (OUTPATIENT)
Dept: INTERNAL MEDICINE CLINIC | Facility: CLINIC | Age: 85
End: 2020-10-20
Payer: COMMERCIAL

## 2020-10-20 ENCOUNTER — TELEPHONE (OUTPATIENT)
Dept: ADMINISTRATIVE | Facility: OTHER | Age: 85
End: 2020-10-20

## 2020-10-20 VITALS
WEIGHT: 142.2 LBS | BODY MASS INDEX: 26.17 KG/M2 | TEMPERATURE: 99 F | DIASTOLIC BLOOD PRESSURE: 75 MMHG | RESPIRATION RATE: 16 BRPM | HEIGHT: 62 IN | HEART RATE: 55 BPM | SYSTOLIC BLOOD PRESSURE: 120 MMHG | OXYGEN SATURATION: 98 %

## 2020-10-20 DIAGNOSIS — I25.10 ARTERIOSCLEROSIS OF CORONARY ARTERY: ICD-10-CM

## 2020-10-20 DIAGNOSIS — E11.8 TYPE 2 DIABETES MELLITUS WITH COMPLICATION, WITHOUT LONG-TERM CURRENT USE OF INSULIN (HCC): ICD-10-CM

## 2020-10-20 DIAGNOSIS — E11.9 TYPE 2 DIABETES MELLITUS WITHOUT COMPLICATION, WITHOUT LONG-TERM CURRENT USE OF INSULIN (HCC): ICD-10-CM

## 2020-10-20 DIAGNOSIS — E55.9 VITAMIN D DEFICIENCY: ICD-10-CM

## 2020-10-20 DIAGNOSIS — E03.9 HYPOTHYROIDISM, UNSPECIFIED TYPE: Primary | ICD-10-CM

## 2020-10-20 DIAGNOSIS — E78.5 HYPERLIPIDEMIA, UNSPECIFIED HYPERLIPIDEMIA TYPE: ICD-10-CM

## 2020-10-20 DIAGNOSIS — I10 HYPERTENSION, UNSPECIFIED TYPE: ICD-10-CM

## 2020-10-20 DIAGNOSIS — E53.8 VITAMIN B12 DEFICIENCY: ICD-10-CM

## 2020-10-20 PROCEDURE — 99214 OFFICE O/P EST MOD 30 MIN: CPT | Performed by: INTERNAL MEDICINE

## 2020-10-20 PROCEDURE — 1160F RVW MEDS BY RX/DR IN RCRD: CPT | Performed by: INTERNAL MEDICINE

## 2020-10-20 PROCEDURE — 3725F SCREEN DEPRESSION PERFORMED: CPT | Performed by: INTERNAL MEDICINE

## 2020-10-20 PROCEDURE — 1036F TOBACCO NON-USER: CPT | Performed by: INTERNAL MEDICINE

## 2020-10-20 RX ORDER — PIOGLITAZONEHYDROCHLORIDE 30 MG/1
30 TABLET ORAL DAILY
Qty: 90 TABLET | Refills: 2 | Status: SHIPPED | OUTPATIENT
Start: 2020-10-20 | End: 2021-07-07 | Stop reason: SDUPTHER

## 2020-10-27 DIAGNOSIS — H40.9 GLAUCOMA, UNSPECIFIED GLAUCOMA TYPE, UNSPECIFIED LATERALITY: ICD-10-CM

## 2020-10-29 RX ORDER — LATANOPROST 50 UG/ML
SOLUTION/ DROPS OPHTHALMIC
Qty: 9 ML | Refills: 0 | OUTPATIENT
Start: 2020-10-29

## 2020-11-09 LAB
LEFT EYE DIABETIC RETINOPATHY: NORMAL
RIGHT EYE DIABETIC RETINOPATHY: NORMAL

## 2020-12-21 DIAGNOSIS — I25.10 CORONARY ARTERY DISEASE INVOLVING NATIVE HEART WITHOUT ANGINA PECTORIS, UNSPECIFIED VESSEL OR LESION TYPE: ICD-10-CM

## 2020-12-21 RX ORDER — CLOPIDOGREL BISULFATE 75 MG/1
TABLET ORAL
Qty: 90 TABLET | Refills: 0 | Status: SHIPPED | OUTPATIENT
Start: 2020-12-21 | End: 2021-03-29

## 2021-01-05 DIAGNOSIS — I25.84 CORONARY ARTERY DISEASE DUE TO CALCIFIED CORONARY LESION: ICD-10-CM

## 2021-01-05 DIAGNOSIS — I25.10 CORONARY ARTERY DISEASE DUE TO CALCIFIED CORONARY LESION: ICD-10-CM

## 2021-01-06 RX ORDER — CARVEDILOL 12.5 MG/1
TABLET ORAL
Qty: 60 TABLET | Refills: 0 | Status: SHIPPED | OUTPATIENT
Start: 2021-01-06 | End: 2021-02-08

## 2021-01-28 ENCOUNTER — IMMUNIZATIONS (OUTPATIENT)
Dept: FAMILY MEDICINE CLINIC | Facility: HOSPITAL | Age: 86
End: 2021-01-28

## 2021-01-28 DIAGNOSIS — Z23 ENCOUNTER FOR IMMUNIZATION: Primary | ICD-10-CM

## 2021-01-28 PROCEDURE — 91301 SARS-COV-2 / COVID-19 MRNA VACCINE (MODERNA) 100 MCG: CPT

## 2021-01-28 PROCEDURE — 0011A SARS-COV-2 / COVID-19 MRNA VACCINE (MODERNA) 100 MCG: CPT

## 2021-02-08 DIAGNOSIS — I25.10 CORONARY ARTERY DISEASE DUE TO CALCIFIED CORONARY LESION: ICD-10-CM

## 2021-02-08 DIAGNOSIS — I25.84 CORONARY ARTERY DISEASE DUE TO CALCIFIED CORONARY LESION: ICD-10-CM

## 2021-02-08 RX ORDER — CARVEDILOL 12.5 MG/1
TABLET ORAL
Qty: 60 TABLET | Refills: 0 | Status: SHIPPED | OUTPATIENT
Start: 2021-02-08 | End: 2021-03-15

## 2021-02-24 ENCOUNTER — IMMUNIZATIONS (OUTPATIENT)
Dept: FAMILY MEDICINE CLINIC | Facility: HOSPITAL | Age: 86
End: 2021-02-24

## 2021-02-24 DIAGNOSIS — Z23 ENCOUNTER FOR IMMUNIZATION: Primary | ICD-10-CM

## 2021-02-24 PROCEDURE — 91301 SARS-COV-2 / COVID-19 MRNA VACCINE (MODERNA) 100 MCG: CPT

## 2021-02-24 PROCEDURE — 0012A SARS-COV-2 / COVID-19 MRNA VACCINE (MODERNA) 100 MCG: CPT

## 2021-03-04 ENCOUNTER — OFFICE VISIT (OUTPATIENT)
Dept: INTERNAL MEDICINE CLINIC | Facility: CLINIC | Age: 86
End: 2021-03-04
Payer: COMMERCIAL

## 2021-03-04 ENCOUNTER — APPOINTMENT (OUTPATIENT)
Dept: LAB | Facility: HOSPITAL | Age: 86
End: 2021-03-04
Attending: INTERNAL MEDICINE
Payer: COMMERCIAL

## 2021-03-04 VITALS
HEIGHT: 62 IN | OXYGEN SATURATION: 98 % | WEIGHT: 146 LBS | TEMPERATURE: 98.2 F | DIASTOLIC BLOOD PRESSURE: 78 MMHG | SYSTOLIC BLOOD PRESSURE: 124 MMHG | BODY MASS INDEX: 26.87 KG/M2 | HEART RATE: 80 BPM

## 2021-03-04 DIAGNOSIS — D89.89 LIGHT CHAIN DISEASE, KAPPA TYPE (HCC): ICD-10-CM

## 2021-03-04 DIAGNOSIS — E11.8 TYPE 2 DIABETES MELLITUS WITH COMPLICATION, WITHOUT LONG-TERM CURRENT USE OF INSULIN (HCC): ICD-10-CM

## 2021-03-04 DIAGNOSIS — E11.9 TYPE 2 DIABETES MELLITUS WITHOUT COMPLICATION, WITHOUT LONG-TERM CURRENT USE OF INSULIN (HCC): Primary | ICD-10-CM

## 2021-03-04 DIAGNOSIS — E03.9 HYPOTHYROIDISM, UNSPECIFIED TYPE: ICD-10-CM

## 2021-03-04 DIAGNOSIS — C85.90 NON-HODGKIN'S LYMPHOMA, UNSPECIFIED BODY REGION, UNSPECIFIED NON-HODGKIN LYMPHOMA TYPE (HCC): ICD-10-CM

## 2021-03-04 DIAGNOSIS — E11.9 TYPE 2 DIABETES MELLITUS WITHOUT COMPLICATION, WITHOUT LONG-TERM CURRENT USE OF INSULIN (HCC): ICD-10-CM

## 2021-03-04 LAB
ALBUMIN SERPL BCP-MCNC: 3.3 G/DL (ref 3.5–5)
ALP SERPL-CCNC: 51 U/L (ref 46–116)
ALT SERPL W P-5'-P-CCNC: 26 U/L (ref 12–78)
ANION GAP SERPL CALCULATED.3IONS-SCNC: 11 MMOL/L (ref 4–13)
AST SERPL W P-5'-P-CCNC: 21 U/L (ref 5–45)
BASOPHILS # BLD AUTO: 0.08 THOUSANDS/ΜL (ref 0–0.1)
BASOPHILS NFR BLD AUTO: 1 % (ref 0–1)
BILIRUB SERPL-MCNC: 0.9 MG/DL (ref 0.2–1)
BUN SERPL-MCNC: 18 MG/DL (ref 5–25)
CALCIUM ALBUM COR SERPL-MCNC: 10 MG/DL (ref 8.3–10.1)
CALCIUM SERPL-MCNC: 9.4 MG/DL (ref 8.3–10.1)
CHLORIDE SERPL-SCNC: 106 MMOL/L (ref 100–108)
CO2 SERPL-SCNC: 27 MMOL/L (ref 21–32)
CREAT SERPL-MCNC: 0.82 MG/DL (ref 0.6–1.3)
EOSINOPHIL # BLD AUTO: 0.3 THOUSAND/ΜL (ref 0–0.61)
EOSINOPHIL NFR BLD AUTO: 4 % (ref 0–6)
ERYTHROCYTE [DISTWIDTH] IN BLOOD BY AUTOMATED COUNT: 15 % (ref 11.6–15.1)
EST. AVERAGE GLUCOSE BLD GHB EST-MCNC: 166 MG/DL
GFR SERPL CREATININE-BSD FRML MDRD: 65 ML/MIN/1.73SQ M
GLUCOSE P FAST SERPL-MCNC: 142 MG/DL (ref 65–99)
HBA1C MFR BLD: 7.4 %
HCT VFR BLD AUTO: 38.4 % (ref 34.8–46.1)
HGB BLD-MCNC: 11.8 G/DL (ref 11.5–15.4)
IMM GRANULOCYTES # BLD AUTO: 0.03 THOUSAND/UL (ref 0–0.2)
IMM GRANULOCYTES NFR BLD AUTO: 0 % (ref 0–2)
LYMPHOCYTES # BLD AUTO: 2.15 THOUSANDS/ΜL (ref 0.6–4.47)
LYMPHOCYTES NFR BLD AUTO: 27 % (ref 14–44)
MCH RBC QN AUTO: 28.9 PG (ref 26.8–34.3)
MCHC RBC AUTO-ENTMCNC: 30.7 G/DL (ref 31.4–37.4)
MCV RBC AUTO: 94 FL (ref 82–98)
MONOCYTES # BLD AUTO: 1.11 THOUSAND/ΜL (ref 0.17–1.22)
MONOCYTES NFR BLD AUTO: 14 % (ref 4–12)
NEUTROPHILS # BLD AUTO: 4.27 THOUSANDS/ΜL (ref 1.85–7.62)
NEUTS SEG NFR BLD AUTO: 54 % (ref 43–75)
NRBC BLD AUTO-RTO: 0 /100 WBCS
PLATELET # BLD AUTO: 205 THOUSANDS/UL (ref 149–390)
PMV BLD AUTO: 9.9 FL (ref 8.9–12.7)
POTASSIUM SERPL-SCNC: 3.8 MMOL/L (ref 3.5–5.3)
PROT SERPL-MCNC: 8.2 G/DL (ref 6.4–8.2)
RBC # BLD AUTO: 4.09 MILLION/UL (ref 3.81–5.12)
SODIUM SERPL-SCNC: 144 MMOL/L (ref 136–145)
TSH SERPL DL<=0.05 MIU/L-ACNC: 2.43 UIU/ML (ref 0.36–3.74)
WBC # BLD AUTO: 7.94 THOUSAND/UL (ref 4.31–10.16)

## 2021-03-04 PROCEDURE — 1036F TOBACCO NON-USER: CPT | Performed by: INTERNAL MEDICINE

## 2021-03-04 PROCEDURE — 1160F RVW MEDS BY RX/DR IN RCRD: CPT | Performed by: INTERNAL MEDICINE

## 2021-03-04 PROCEDURE — 1101F PT FALLS ASSESS-DOCD LE1/YR: CPT | Performed by: INTERNAL MEDICINE

## 2021-03-04 PROCEDURE — 3288F FALL RISK ASSESSMENT DOCD: CPT | Performed by: INTERNAL MEDICINE

## 2021-03-04 PROCEDURE — 99214 OFFICE O/P EST MOD 30 MIN: CPT | Performed by: INTERNAL MEDICINE

## 2021-03-04 PROCEDURE — 85025 COMPLETE CBC W/AUTO DIFF WBC: CPT

## 2021-03-04 PROCEDURE — 84443 ASSAY THYROID STIM HORMONE: CPT

## 2021-03-04 PROCEDURE — 80053 COMPREHEN METABOLIC PANEL: CPT

## 2021-03-04 PROCEDURE — 3725F SCREEN DEPRESSION PERFORMED: CPT | Performed by: INTERNAL MEDICINE

## 2021-03-04 PROCEDURE — 83036 HEMOGLOBIN GLYCOSYLATED A1C: CPT

## 2021-03-04 PROCEDURE — 36415 COLL VENOUS BLD VENIPUNCTURE: CPT

## 2021-03-04 NOTE — PROGRESS NOTES
Assessment/Plan:    Patient presents to the office for a routine visit  She has no complaints  She follows with Hem/Onc for her lymphoma and MM; only on surveillance; no active treatment  No fever/chills  PE unremarkable  Following with cardio for h/o CAD s/p PCI with stent placement- taking asa and Plavix  Admits to eating whatever she wants- a1c ordered  TSH for hypothyroidism  Diagnoses and all orders for this visit:    Type 2 diabetes mellitus without complication, without long-term current use of insulin ([facility])  -     Cancel: POCT hemoglobin A1c  -     CBC and differential; Future  -     Comprehensive metabolic panel; Future  -     HEMOGLOBIN A1C W/ EAG ESTIMATION; Future    Non-Hodgkin's lymphoma, unspecified body region, unspecified non-Hodgkin lymphoma type [facility] )    Light chain disease, 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[facility] )    Type 2 diabetes mellitus with complication, without long-term current use of insulin (HCC)    Hypothyroidism, unspecified type  -     TSH, 3rd generation with Free T4 reflex; Future          Subjective:      Patient ID: Gilles Kan is a 80 y o  female  Patient presents to the office for a routine visit  She has no complaints  She follows with Hem/Onc for her lymphoma and MM; only on surveillance; no active treatment  No fever/chills  Following with cardio for h/o CAD s/p PCI with stent placement- taking asa and Plavix  Admits to eating whatever she wants- a1c ordered  TSH for hypothyroidism  The following portions of the patient's history were reviewed and updated as appropriate:   She  has a past medical history of Cellulitis, Constipation, Diabetes mellitus ([address] ), Disease of thyroid gland, Ecchymosis, Fall, Hyperlipidemia, Hypertension, Hypokalemia, Lower extremity weakness, and Vitamin D deficiency    She   Patient Active Problem List    Diagnosis Date Noted    Non-Hodgkin's lymphoma ([facility] ) 10/05/2020    Light chain disease, 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 07/01/2020    Medicare annual wellness visit, subsequent 06/18/2020    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/18/2020    Neck pain 06/18/2020    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    Greater trochanteric bursitis of left hip 08/13/2019    Left sided sciatica 08/13/2019    Diabetic peripheral neuropathy ([facility] ) 02/27/2019    Screening mammogram, encounter for 07/06/2018    Screening for malignant neoplasm of breast 05/07/2018    Health care maintenance 05/07/2018    [facility] ) 10/11/2017    Vitamin D deficiency 10/11/2017    Vitamin B12 deficiency 02/17/2016    Lumbar spinal stenosis 06/02/2015    Normochromic normocytic anemia 05/21/2015    Depression 04/24/2015    Esophageal reflux 03/12/2013    Osteoarthritis of knee 03/12/2013    Spinal stenosis 03/12/2013    Arteriosclerosis of coronary artery 01/22/2013    Hyperlipidemia 01/22/2013    Hypertension 01/22/2013    Hypothyroidism 01/22/2013    Insomnia 01/22/2013    Osteoporosis 01/22/2013    Type 2 diabetes mellitus ([facility] ) 12/31/2012     She  has a past surgical history that includes Back surgery; Other surgical history; Cholecystectomy; Hysterectomy; Colonoscopy; Coronary angioplasty with stent; and CT bone marrow biopsy and aspiration (1/15/2020)  Her family history includes Heart disease in her mother; Other in her father  She  reports that she has quit smoking  She started smoking about 62 years ago  She has never used smokeless tobacco  She reports that she does not drink alcohol or use drugs    Current Outpatient Medications   Medication Sig Dispense Refill    amLODIPine (NORVASC) 2 5 mg tablet Take 1 tablet (2 5 mg total) by mouth daily 90 tablet 2    aspirin 81 mg chewable tablet Chew 81 mg      atorvastatin (LIPITOR) 40 mg tablet Take 1 tablet by mouth once daily 90 tablet 3    baclofen 10 mg tablet TAKE 1 TABLET BY MOUTH NIGHTLY AT BEDTIME AS NEEDED AS DIRECTED  3    carvedilol (COREG) 12 5 mg tablet TAKE 1 TABLET BY MOUTH TWICE DAILY WITH MEALS 60 tablet 0    Cholecalciferol (VITAMIN D3) 2000 units capsule Take 1 capsule (2,000 Units total) by mouth daily 100 capsule 2    clopidogrel (PLAVIX) 75 mg tablet Take 1 tablet by mouth once daily 90 tablet 0    denosumab (PROLIA) 60 mg/mL Inject 1 mL (60 mg total) under the skin once for 1 dose 1 mL 0    latanoprost (XALATAN) 0 005 % ophthalmic solution INSTILL 1 DROP INTO EACH EYE AT BEDTIME 9 mL 0    levothyroxine 50 mcg tablet Take 1 tablet (50 mcg total) by mouth daily 90 tablet 2    losartan (COZAAR) 100 MG tablet Take 1 tablet by mouth once daily 90 tablet 3    pioglitazone (ACTOS) 30 mg tablet Take 1 tablet (30 mg total) by mouth daily 90 tablet 2    zolpidem (AMBIEN) 5 mg tablet Take 5 mg by mouth       Current Facility-Administered Medications   Medication Dose Route Frequency Provider Last Rate Last Admin    cyanocobalamin injection 1,000 mcg  1,000 mcg Intramuscular Q30 Days Jorge Reyes DO   1,000 mcg at 02/27/19 1250     Current Outpatient Medications on File Prior to Visit   Medication Sig    amLODIPine (NORVASC) 2 5 mg tablet Take 1 tablet (2 5 mg total) by mouth daily    aspirin 81 mg chewable tablet Chew 81 mg    atorvastatin (LIPITOR) 40 mg tablet Take 1 tablet by mouth once daily    baclofen 10 mg tablet TAKE 1 TABLET BY MOUTH NIGHTLY AT BEDTIME AS NEEDED AS DIRECTED    carvedilol (COREG) 12 5 mg tablet TAKE 1 TABLET BY MOUTH TWICE DAILY WITH MEALS    Cholecalciferol (VITAMIN D3) 2000 units capsule Take 1 capsule (2,000 Units total) by mouth daily    clopidogrel (PLAVIX) 75 mg tablet Take 1 tablet by mouth once daily    denosumab (PROLIA) 60 mg/mL Inject 1 mL (60 mg total) under the skin once for 1 dose    latanoprost (XALATAN) 0 005 % ophthalmic solution INSTILL 1 DROP INTO EACH EYE AT BEDTIME    levothyroxine 50 mcg tablet Take 1 tablet (50 mcg total) by mouth daily    losartan (COZAAR) 100 MG tablet Take 1 tablet by mouth once daily    pioglitazone (ACTOS) 30 mg tablet Take 1 tablet (30 mg total) by mouth daily    zolpidem (AMBIEN) 5 mg tablet Take 5 mg by mouth     Current Facility-Administered Medications on File Prior to Visit   Medication    cyanocobalamin injection 1,000 mcg     She is allergic to iodine; iv dye  [iodinated diagnostic agents]; and other       Review of Systems   Constitutional: Negative for activity change, appetite change, fatigue and fever  Respiratory: Negative for cough and shortness of breath  Cardiovascular: Negative for chest pain and palpitations  Gastrointestinal: Negative for abdominal pain and blood in stool  Musculoskeletal: Negative for arthralgias and back pain  Neurological: Negative for dizziness, speech difficulty and weakness  All other systems reviewed and are negative  Objective:      /78 (BP Location: Left arm, Patient Position: Sitting, Cuff Size: Adult)   Pulse 80   Temp 98 2 °F (36 8 °C) (Temporal)   Ht 5' 2" (1 575 m)   Wt 66 2 kg (146 lb)   SpO2 98%   BMI 26 70 kg/m²          Physical Exam  Vitals signs and nursing note reviewed  Constitutional:       Appearance: Normal appearance  She is normal weight  HENT:      Head: Normocephalic and atraumatic  Right Ear: Tympanic membrane normal       Left Ear: Tympanic membrane normal       Nose: Nose normal       Mouth/Throat:      Mouth: Mucous membranes are moist    Eyes:      Pupils: Pupils are equal, round, and reactive to light  Neck:      Musculoskeletal: Normal range of motion and neck supple  No neck rigidity or muscular tenderness  Vascular: No carotid bruit  Cardiovascular:      Rate and Rhythm: Normal rate and regular rhythm  Pulses: Normal pulses  Heart sounds: Normal heart sounds  Pulmonary:      Effort: Pulmonary effort is normal       Breath sounds: Normal breath sounds  Abdominal:      General: Bowel sounds are normal  There is no distension  Palpations: Abdomen is soft  There is no mass        Tenderness: There is no abdominal tenderness  Hernia: No hernia is present  Musculoskeletal: Normal range of motion  Lymphadenopathy:      Cervical: No cervical adenopathy  Skin:     General: Skin is warm and dry  Neurological:      General: No focal deficit present  Mental Status: She is alert and oriented to person, place, and time  Cranial Nerves: No cranial nerve deficit  Sensory: No sensory deficit  Motor: No weakness        Coordination: Coordination normal       Gait: Gait normal    Psychiatric:         Mood and Affect: Mood normal          Behavior: Behavior normal

## 2021-03-15 DIAGNOSIS — I25.84 CORONARY ARTERY DISEASE DUE TO CALCIFIED CORONARY LESION: ICD-10-CM

## 2021-03-15 DIAGNOSIS — I25.10 CORONARY ARTERY DISEASE DUE TO CALCIFIED CORONARY LESION: ICD-10-CM

## 2021-03-15 RX ORDER — CARVEDILOL 12.5 MG/1
TABLET ORAL
Qty: 60 TABLET | Refills: 0 | Status: SHIPPED | OUTPATIENT
Start: 2021-03-15 | End: 2021-04-21 | Stop reason: SDUPTHER

## 2021-03-28 DIAGNOSIS — I25.10 CORONARY ARTERY DISEASE INVOLVING NATIVE HEART WITHOUT ANGINA PECTORIS, UNSPECIFIED VESSEL OR LESION TYPE: ICD-10-CM

## 2021-03-29 RX ORDER — CLOPIDOGREL BISULFATE 75 MG/1
TABLET ORAL
Qty: 90 TABLET | Refills: 0 | Status: SHIPPED | OUTPATIENT
Start: 2021-03-29 | End: 2021-06-25 | Stop reason: SDUPTHER

## 2021-04-05 ENCOUNTER — TELEPHONE (OUTPATIENT)
Dept: HEMATOLOGY ONCOLOGY | Facility: CLINIC | Age: 86
End: 2021-04-05

## 2021-04-05 NOTE — TELEPHONE ENCOUNTER
Spoke to [facility] and informed her that her appt on 4/9/21 needed to be R/S  Her new appt is 4/30/21 at 1:20 pm with Dr Maddi Bejarano at the [facility] location, patient voiced understanding of appt details

## 2021-04-21 DIAGNOSIS — I25.10 CORONARY ARTERY DISEASE DUE TO CALCIFIED CORONARY LESION: ICD-10-CM

## 2021-04-21 DIAGNOSIS — I25.84 CORONARY ARTERY DISEASE DUE TO CALCIFIED CORONARY LESION: ICD-10-CM

## 2021-04-21 RX ORDER — CARVEDILOL 12.5 MG/1
12.5 TABLET ORAL 2 TIMES DAILY WITH MEALS
Qty: 60 TABLET | Refills: 0 | Status: SHIPPED | OUTPATIENT
Start: 2021-04-21 | End: 2021-05-21 | Stop reason: SDUPTHER

## 2021-04-27 ENCOUNTER — TELEPHONE (OUTPATIENT)
Dept: HEMATOLOGY ONCOLOGY | Facility: CLINIC | Age: 86
End: 2021-04-27

## 2021-04-27 NOTE — TELEPHONE ENCOUNTER
Reschedule Appointment     Who is calling in  Patient    Doctor Appointment Scheduled with Dr Dennison   Original date and time 4-30-21 @ 1:20pm   New date and time 6-7-2021 @ 2:40pm   Location [facility]    Patient verbalized understanding

## 2021-05-05 DIAGNOSIS — E03.9 HYPOTHYROIDISM, UNSPECIFIED TYPE: ICD-10-CM

## 2021-05-05 RX ORDER — LEVOTHYROXINE SODIUM 0.05 MG/1
50 TABLET ORAL DAILY
Qty: 90 TABLET | Refills: 2 | Status: SHIPPED | OUTPATIENT
Start: 2021-05-05 | End: 2022-02-01 | Stop reason: SDUPTHER

## 2021-05-21 DIAGNOSIS — I25.84 CORONARY ARTERY DISEASE DUE TO CALCIFIED CORONARY LESION: ICD-10-CM

## 2021-05-21 DIAGNOSIS — I25.10 CORONARY ARTERY DISEASE DUE TO CALCIFIED CORONARY LESION: ICD-10-CM

## 2021-05-21 RX ORDER — CARVEDILOL 12.5 MG/1
12.5 TABLET ORAL 2 TIMES DAILY WITH MEALS
Qty: 60 TABLET | Refills: 0 | Status: SHIPPED | OUTPATIENT
Start: 2021-05-21 | End: 2021-06-25 | Stop reason: SDUPTHER

## 2021-06-03 ENCOUNTER — TELEPHONE (OUTPATIENT)
Dept: HEMATOLOGY ONCOLOGY | Facility: CLINIC | Age: 86
End: 2021-06-03

## 2021-06-03 NOTE — TELEPHONE ENCOUNTER
Left message for patient making her aware she has labwork that has not been completed yet prior to her appt 6/7  Stated if she was unable to have labwork completed today she would need to reschedule her appt due to bloodwork not being ready in time  hopeline number left for questions or rescheduling

## 2021-06-07 ENCOUNTER — TELEPHONE (OUTPATIENT)
Dept: HEMATOLOGY ONCOLOGY | Facility: CLINIC | Age: 86
End: 2021-06-07

## 2021-06-07 ENCOUNTER — OFFICE VISIT (OUTPATIENT)
Dept: HEMATOLOGY ONCOLOGY | Facility: CLINIC | Age: 86
End: 2021-06-07
Payer: COMMERCIAL

## 2021-06-07 VITALS
DIASTOLIC BLOOD PRESSURE: 76 MMHG | RESPIRATION RATE: 16 BRPM | HEART RATE: 66 BPM | BODY MASS INDEX: 26.68 KG/M2 | WEIGHT: 145 LBS | HEIGHT: 62 IN | SYSTOLIC BLOOD PRESSURE: 124 MMHG | OXYGEN SATURATION: 96 % | TEMPERATURE: 98.3 F

## 2021-06-07 DIAGNOSIS — D47.2 MGUS (MONOCLONAL GAMMOPATHY OF UNKNOWN SIGNIFICANCE): ICD-10-CM

## 2021-06-07 DIAGNOSIS — C85.90 NON-HODGKIN'S LYMPHOMA, UNSPECIFIED BODY REGION, UNSPECIFIED NON-HODGKIN LYMPHOMA TYPE (HCC): Primary | ICD-10-CM

## 2021-06-07 PROCEDURE — 99214 OFFICE O/P EST MOD 30 MIN: CPT | Performed by: INTERNAL MEDICINE

## 2021-06-07 PROCEDURE — 1036F TOBACCO NON-USER: CPT | Performed by: INTERNAL MEDICINE

## 2021-06-07 PROCEDURE — 1160F RVW MEDS BY RX/DR IN RCRD: CPT | Performed by: INTERNAL MEDICINE

## 2021-06-07 NOTE — TELEPHONE ENCOUNTER
Appointment Confirmation     Appointment with Dr Abel Mckeon   Appointment date & time  06-07-21 @ 2:40 pm   Location [place]   Patient verbilized Understanding  understood

## 2021-06-08 NOTE — PROGRESS NOTES
HEMATOLOGY / ONCOLOGY CLINIC NOTE    Primary Care Provider: Servando Conklin MD  Referring Provider:    MRN: [id number]  : 1934    Reason for Encounter:    Chief Complaint   Patient presents with    Follow-up         History of Hematology / Oncology Illness:     Tyesha Mosley is a 80 y o  female who came in  to establish care with oncology      1,  MGUS  - previously managed at [facility], no bone marrow biopsy, no treatment  2, B cell lymphoma / low grade   - 2020 : bone marrow biopsy showed lymphoma;no imaging ( no need)  -   appears to be indolent and low grade  No further workup or treatment on surveillance  Assessment / Plan:     1  Non-Hodgkin's lymphoma, unspecified body region, unspecified non-Hodgkin lymphoma type ([facility] [address] )    - clinically patient doing well, no evidence of progression  Continue monitor patient and follow patient in 6 months      - Immunoglobulin free LT chains blood; Future  - Protein electrophoresis, serum; Future  - CBC and differential; Future  - Comprehensive metabolic panel; Future    2  MGUS (monoclonal gammopathy of unknown significance)    - Immunoglobulin free LT chains blood; Future  - Protein electrophoresis, serum; Future  - CBC and differential; Future  - Comprehensive metabolic panel; Future              25        minutes were spent face to face with patient with greater than 50% of the time spent in counseling or coordination of care including discussions of treatment instructions  All of the patient's questions were answered to their satisfactory during this discussion  Advised pt to call if there is any further questions  Interval History:     1/10/2020: His 11year-old female, above summarized MGUS, hypertension, hyperlipidemia, hypothyroidism, transferring care to [facility]  Reported overall at baseline health status body weight stable no bone pain no lumps bumps no bleeding easy bruise      No other hematology oncology history in the past     1/31/2020 :  80-year-old female, came in for follow-up, doing well, no constitutional symptoms  Body weight stable  No new lumps bumps  10/5/2020:  Came for follow-up doing well  Body weight stable, no night sweats  No constitutional symptoms  6/8/2021:  Patient came in follow-up subjectively doing well no bumps bumps no lymphadenopathy no constitutional symptoms        Problem list:       Patient Active Problem List   Diagnosis    Arteriosclerosis of coronary artery    Depression    Esophageal reflux    Hyperlipidemia    Hypertension    Hypothyroidism    Insomnia    Lumbar compression fracture (HCC)    Lumbar spinal stenosis    Normochromic normocytic anemia    Osteoarthritis of knee    Osteoporosis    Spinal stenosis    Type 2 diabetes mellitus (HCC)    Vitamin B12 deficiency    Vitamin D deficiency    Screening for malignant neoplasm of breast    Health care maintenance    Screening mammogram, encounter for    Diabetic peripheral neuropathy (HCC)    Greater trochanteric bursitis of left hip    Left sided sciatica    Triclonal gammopathy    Medicare annual wellness visit, subsequent    Adhesive capsulitis of shoulder    Neck pain    Light chain disease, 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HCC)    Non-Hodgkin's lymphoma ([facility] [address] )         PHYSICIAL EXAMINATION:     Vital Signs:   /76   Pulse 66   Temp 98 3 °F (36 8 °C) (Temporal)   Resp 16   Ht 5' 2" (1 575 m)   Wt 65 8 kg (145 lb)   SpO2 96%   BMI 26 52 kg/m²   Body surface area is 1 67 meters squared     Ht Readings from Last 3 Encounters:   06/07/21 5' 2" (1 575 m)   03/04/21 5' 2" (1 575 m)   10/20/20 5' 2" (1 575 m)       Wt Readings from Last 3 Encounters:   06/07/21 65 8 kg (145 lb)   03/04/21 66 2 kg (146 lb)   10/20/20 64 5 kg (142 lb 3 2 oz)        Temp Readings from Last 3 Encounters:   06/07/21 98 3 °F (36 8 °C) (Temporal)   03/04/21 98 2 °F (36 8 °C) (Temporal)   10/20/20 99 °F (37 2 °C) (Tympanic)        BP Readings from Last 3 Encounters:   06/07/21 124/76   03/04/21 124/78   10/20/20 120/75         Pulse Readings from Last 3 Encounters:   06/07/21 66   03/04/21 80   10/20/20 55       No major findings on physical examination  No palpable lymphadenopathy       GEN: Alert, awake oriented x3, in no acute distress  HEENT- No pallor, icterus, cyanosis, no oral mucosal lesions,   LAD - no palpable cervical, clavicle, axillary, inguinal LAD  Heart- normal S1 S2, regular rate and rhythm, No murmur, rubs  Lungs- decreased breathing sound bilateral    Abdomen- soft, Non tender, bowel sounds present  Extremities- No cyanosis, clubbing, edema  Neuro- No focal neurological deficit           PAST MEDICAL HISTORY:   has a past medical history of Cellulitis, Constipation, Diabetes mellitus ([address] ), Disease of thyroid gland, Ecchymosis, Fall, Hyperlipidemia, Hypertension, Hypokalemia, Lower extremity weakness, and Vitamin D deficiency  PAST SURGICAL HISTORY:   has a past surgical history that includes Back surgery; Other surgical history; Cholecystectomy; Hysterectomy; Colonoscopy; Coronary angioplasty with stent; and CT bone marrow biopsy and aspiration (1/15/2020)      CURRENT MEDICATIONS:     Current Outpatient Medications   Medication Sig Dispense Refill    amLODIPine (NORVASC) 2 5 mg tablet Take 1 tablet (2 5 mg total) by mouth daily 90 tablet 2    aspirin 81 mg chewable tablet Chew 81 mg      atorvastatin (LIPITOR) 40 mg tablet Take 1 tablet by mouth once daily 90 tablet 3    baclofen 10 mg tablet TAKE 1 TABLET BY MOUTH NIGHTLY AT BEDTIME AS NEEDED AS DIRECTED  3    carvedilol (COREG) 12 5 mg tablet Take 1 tablet (12 5 mg total) by mouth 2 (two) times a day with meals 60 tablet 0    Cholecalciferol (VITAMIN D3) 2000 units capsule Take 1 capsule (2,000 Units total) by mouth daily 100 capsule 2    clopidogrel (PLAVIX) 75 mg tablet Take 1 tablet by mouth once daily 90 tablet 0    latanoprost (XALATAN) 0 005 % ophthalmic solution INSTILL 1 DROP INTO EACH EYE AT BEDTIME 9 mL 0    levothyroxine 50 mcg tablet Take 1 tablet (50 mcg total) by mouth daily 90 tablet 2    losartan (COZAAR) 100 MG tablet Take 1 tablet by mouth once daily 90 tablet 3    pioglitazone (ACTOS) 30 mg tablet Take 1 tablet (30 mg total) by mouth daily 90 tablet 2    zolpidem (AMBIEN) 5 mg tablet Take 5 mg by mouth      denosumab (PROLIA) 60 mg/mL Inject 1 mL (60 mg total) under the skin once for 1 dose 1 mL 0     Current Facility-Administered Medications   Medication Dose Route Frequency Provider Last Rate Last Admin    cyanocobalamin injection 1,000 mcg  1,000 mcg Intramuscular Q30 Days Red Eric,    1,000 mcg at 02/27/19 1250     [unfilled]    SOCIAL HISTORY:   reports that she has quit smoking  She started smoking about 63 years ago  She has never used smokeless tobacco  She reports that she does not drink alcohol or use drugs  FAMILY HISTORY:  family history includes Heart disease in her mother; Other in her father  ALLERGIES:  is allergic to iodine - food allergy; iv dye  [iodinated diagnostic agents]; and other  REVIEW OF SYSTEMS:  Please note that a 14-point review of systems was performed to include Constitutional, HEENT, Respiratory, CVS, GI, , Musculoskeletal, Integumentary, Neurologic, Rheumatologic, Endocrinologic, Psychiatric, Lymphatic, and Hematologic/Oncologic systems were reviewed and are negative unless otherwise stated in HPI  Positive and negative findings pertinent to this evaluation are incorporated into the history of present illness            Lab Results   Component Value Date     02/08/2016    SODIUM 144 03/04/2021    K 3 8 03/04/2021     03/04/2021    CO2 27 03/04/2021    AGAP 11 03/04/2021    BUN 18 03/04/2021    CREATININE 0 82 03/04/2021    GLUC 205 (H) 01/10/2020    GLUF 142 (H) 03/04/2021    CALCIUM 9 4 03/04/2021    AST 21 03/04/2021    ALT 26 03/04/2021    ALKPHOS 51 03/04/2021    PROT 7 1 02/08/2016    TP 8 2 03/04/2021    BILITOT 0 4 02/08/2016    TBILI 0 90 03/04/2021    EGFR 65 03/04/2021       CBC with diff:       Invalid input(s):  RBC, TOTALCELLSCOUNTED, SEGS%, GRANS%, LYMPHS%, EOS%, BASO%, ABNEUT, ABGRANS, ABLYMPHS, ABMOMOS, ABEOS, ABBASO    CMP:      Invalid input(s): ALBUMIN    IMAGING:    No orders to display     No results found

## 2021-06-25 DIAGNOSIS — I25.10 CORONARY ARTERY DISEASE DUE TO CALCIFIED CORONARY LESION: ICD-10-CM

## 2021-06-25 DIAGNOSIS — I25.84 CORONARY ARTERY DISEASE DUE TO CALCIFIED CORONARY LESION: ICD-10-CM

## 2021-06-25 DIAGNOSIS — I25.10 CORONARY ARTERY DISEASE INVOLVING NATIVE HEART WITHOUT ANGINA PECTORIS, UNSPECIFIED VESSEL OR LESION TYPE: ICD-10-CM

## 2021-06-25 RX ORDER — CLOPIDOGREL BISULFATE 75 MG/1
75 TABLET ORAL DAILY
Qty: 90 TABLET | Refills: 0 | Status: SHIPPED | OUTPATIENT
Start: 2021-06-25 | End: 2021-09-03 | Stop reason: SDUPTHER

## 2021-06-25 RX ORDER — CARVEDILOL 12.5 MG/1
12.5 TABLET ORAL 2 TIMES DAILY WITH MEALS
Qty: 180 TABLET | Refills: 1 | Status: SHIPPED | OUTPATIENT
Start: 2021-06-25 | End: 2022-01-03 | Stop reason: SDUPTHER

## 2021-07-07 DIAGNOSIS — E78.5 HYPERLIPIDEMIA, UNSPECIFIED HYPERLIPIDEMIA TYPE: ICD-10-CM

## 2021-07-07 DIAGNOSIS — E11.8 TYPE 2 DIABETES MELLITUS WITH COMPLICATION, WITHOUT LONG-TERM CURRENT USE OF INSULIN (HCC): ICD-10-CM

## 2021-07-07 RX ORDER — PIOGLITAZONEHYDROCHLORIDE 30 MG/1
30 TABLET ORAL DAILY
Qty: 90 TABLET | Refills: 2 | Status: SHIPPED | OUTPATIENT
Start: 2021-07-07 | End: 2022-05-09 | Stop reason: SDUPTHER

## 2021-07-07 RX ORDER — ATORVASTATIN CALCIUM 40 MG/1
40 TABLET, FILM COATED ORAL DAILY
Qty: 90 TABLET | Refills: 3 | Status: SHIPPED | OUTPATIENT
Start: 2021-07-07 | End: 2022-08-04 | Stop reason: SDUPTHER

## 2021-09-03 DIAGNOSIS — I25.10 CORONARY ARTERY DISEASE INVOLVING NATIVE HEART WITHOUT ANGINA PECTORIS, UNSPECIFIED VESSEL OR LESION TYPE: ICD-10-CM

## 2021-09-03 RX ORDER — CLOPIDOGREL BISULFATE 75 MG/1
75 TABLET ORAL DAILY
Qty: 90 TABLET | Refills: 0 | Status: SHIPPED | OUTPATIENT
Start: 2021-09-03 | End: 2021-12-28 | Stop reason: SDUPTHER

## 2021-09-08 ENCOUNTER — RA CDI HCC (OUTPATIENT)
Dept: OTHER | Facility: HOSPITAL | Age: 86
End: 2021-09-08

## 2021-09-08 DIAGNOSIS — I10 HYPERTENSION, UNSPECIFIED TYPE: ICD-10-CM

## 2021-09-08 DIAGNOSIS — E11.9 TYPE 2 DIABETES MELLITUS WITHOUT COMPLICATION, WITHOUT LONG-TERM CURRENT USE OF INSULIN (HCC): ICD-10-CM

## 2021-09-08 RX ORDER — LOSARTAN POTASSIUM 100 MG/1
100 TABLET ORAL DAILY
Qty: 90 TABLET | Refills: 3 | Status: SHIPPED | OUTPATIENT
Start: 2021-09-08

## 2021-09-08 RX ORDER — AMLODIPINE BESYLATE 2.5 MG/1
2.5 TABLET ORAL DAILY
Qty: 90 TABLET | Refills: 2 | Status: SHIPPED | OUTPATIENT
Start: 2021-09-08 | End: 2022-06-15 | Stop reason: SDUPTHER

## 2021-09-08 NOTE — PROGRESS NOTES
[facility] [place] [facility]  coding opportunities       Chart reviewed, no opportunity found: CHART REVIEWED, NO OPPORTUNITY FOUND                        Patients insurance company: [facility] [address]  (Medicare Advantage and Commercial)

## 2021-09-14 ENCOUNTER — OFFICE VISIT (OUTPATIENT)
Dept: INTERNAL MEDICINE CLINIC | Facility: CLINIC | Age: 86
End: 2021-09-14
Payer: COMMERCIAL

## 2021-09-14 VITALS
TEMPERATURE: 98.3 F | RESPIRATION RATE: 16 BRPM | HEART RATE: 71 BPM | HEIGHT: 62 IN | WEIGHT: 150.4 LBS | OXYGEN SATURATION: 96 % | SYSTOLIC BLOOD PRESSURE: 128 MMHG | BODY MASS INDEX: 27.68 KG/M2 | DIASTOLIC BLOOD PRESSURE: 78 MMHG

## 2021-09-14 DIAGNOSIS — E03.9 HYPOTHYROIDISM, UNSPECIFIED TYPE: ICD-10-CM

## 2021-09-14 DIAGNOSIS — C85.90 NON-HODGKIN'S LYMPHOMA, UNSPECIFIED BODY REGION, UNSPECIFIED NON-HODGKIN LYMPHOMA TYPE (HCC): ICD-10-CM

## 2021-09-14 DIAGNOSIS — E11.9 TYPE 2 DIABETES MELLITUS WITHOUT COMPLICATION, WITHOUT LONG-TERM CURRENT USE OF INSULIN (HCC): ICD-10-CM

## 2021-09-14 DIAGNOSIS — Z23 NEED FOR INFLUENZA VACCINATION: ICD-10-CM

## 2021-09-14 DIAGNOSIS — E78.5 HYPERLIPIDEMIA, UNSPECIFIED HYPERLIPIDEMIA TYPE: Primary | ICD-10-CM

## 2021-09-14 DIAGNOSIS — I25.118 CORONARY ARTERY DISEASE OF NATIVE ARTERY OF NATIVE HEART WITH STABLE ANGINA PECTORIS (HCC): ICD-10-CM

## 2021-09-14 DIAGNOSIS — I10 HYPERTENSION, UNSPECIFIED TYPE: ICD-10-CM

## 2021-09-14 LAB — SL AMB POCT HEMOGLOBIN AIC: 7.1 (ref ?–6.5)

## 2021-09-14 PROCEDURE — 1160F RVW MEDS BY RX/DR IN RCRD: CPT | Performed by: INTERNAL MEDICINE

## 2021-09-14 PROCEDURE — 90662 IIV NO PRSV INCREASED AG IM: CPT | Performed by: INTERNAL MEDICINE

## 2021-09-14 PROCEDURE — 99214 OFFICE O/P EST MOD 30 MIN: CPT | Performed by: INTERNAL MEDICINE

## 2021-09-14 PROCEDURE — 83036 HEMOGLOBIN GLYCOSYLATED A1C: CPT | Performed by: INTERNAL MEDICINE

## 2021-09-14 PROCEDURE — 1036F TOBACCO NON-USER: CPT | Performed by: INTERNAL MEDICINE

## 2021-09-14 PROCEDURE — G0008 ADMIN INFLUENZA VIRUS VAC: HCPCS | Performed by: INTERNAL MEDICINE

## 2021-09-14 RX ORDER — ERGOCALCIFEROL (VITAMIN D2) 1250 MCG
CAPSULE ORAL
COMMUNITY
End: 2022-03-23

## 2021-09-14 RX ORDER — ASPIRIN 81 MG/1
TABLET ORAL DAILY
COMMUNITY
End: 2022-03-23

## 2021-09-14 RX ORDER — FLUOXETINE 10 MG/1
CAPSULE ORAL
COMMUNITY

## 2021-09-14 RX ORDER — BACLOFEN 10 MG/1
TABLET ORAL
COMMUNITY
Start: 2021-04-24 | End: 2022-03-23

## 2021-09-14 NOTE — PROGRESS NOTES
BMI Counseling: Body mass index is 27 51 kg/m²  The BMI is above normal  Nutrition recommendations include encouraging healthy choices of fruits and vegetables  Rationale for BMI follow-up plan is due to patient being overweight or obese  Depression Screening and Follow-up Plan:   Clincally patient does not have depression  No treatment is required  Assessment/Plan:    F/u in 6 months with labs prior  We are trying to obtain labs that were recently done at [address]     No problem-specific Assessment & Plan notes found for this encounter  Diagnoses and all orders for this visit:    Hyperlipidemia, unspecified hyperlipidemia type  -     Lipid panel; Future    Hypertension, unspecified type  -     CBC and differential; Future  -     Comprehensive metabolic panel; Future    Hypothyroidism, unspecified type  -     TSH, 3rd generation with Free T4 reflex; Future    Type 2 diabetes mellitus without complication, without long-term current use of insulin ([facility])  -     POCT hemoglobin A1c  -     HEMOGLOBIN A1C W/ EAG ESTIMATION; Future  -     A 1 c 7 1 today      Non-Hodgkin's lymphoma, unspecified body region, unspecified non-Hodgkin lymphoma type ([facility] )    Coronary artery disease of native artery of native heart with stable angina pectoris [facility]    Need for influenza vaccination  -     influenza vaccine, high-dose, PF 0 7 mL (FLUZONE HIGH-DOSE)    Other orders  -     ergocalciferol (ERGOCALCIFEROL) 1 25 MG (00950 UT) capsule; Vitamin D2 1,250 mcg (50,000 unit) capsule (Patient not taking: Reported on 9/14/2021)  -     FLUoxetine (PROzac) 10 mg capsule; fluoxetine 10 mg capsule  -     aspirin (ECOTRIN LOW STRENGTH) 81 mg EC tablet; Daily (Patient not taking: Reported on 9/14/2021)  -     baclofen 10 mg tablet; TAKE 1 TABLET BY MOUTH EVERY DAY AT BEDTIME NIGHTLY AS NEEDED AS DIRECTED FOR 90 DAYS (TAKE 1 HOUR PRIOR TO SLEEPING) (Patient not taking: Reported on 9/14/2021)          Subjective:      Patient ID: Dena Alatorre is a 80 y o  female  Patient is here for 6 month f/u  She reports doing very well  Denies any complaints  No cp, sob, fever, chills  She would like to get her flu shot today  She denies any falls  Taking her med's as prescribed  No weight loss  The following portions of the patient's history were reviewed and updated as appropriate:   She  has a past medical history of Cellulitis, Constipation, Diabetes mellitus ([address] ), Disease of thyroid gland, Ecchymosis, Fall, Hyperlipidemia, Hypertension, Hypokalemia, Lower extremity weakness, and Vitamin D deficiency    She   Patient Active Problem List    Diagnosis Date Noted    Non-Hodgkin's lymphoma ([facility] [address] ) 10/05/2020    Light chain disease, 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[address] ) 07/01/2020    Medicare annual wellness visit, subsequent 06/18/2020    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/18/2020    Neck pain 06/18/2020    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    Greater trochanteric bursitis of left hip 08/13/2019    Left sided sciatica 08/13/2019    Diabetic peripheral neuropathy ([address] ) 02/27/2019    Screening mammogram, encounter for 07/06/2018    Screening for malignant neoplasm of breast 05/07/2018    Health care maintenance 05/07/2018    Lumbar compression fracture ([address] ) 10/11/2017    Vitamin D deficiency 10/11/2017    Vitamin B12 deficiency 02/17/2016    Lumbar spinal stenosis 06/02/2015    Normochromic normocytic anemia 05/21/2015    Depression 04/24/2015    Esophageal reflux 03/12/2013    Osteoarthritis of knee 03/12/2013    Spinal stenosis 03/12/2013    Arteriosclerosis of coronary artery 01/22/2013    Hyperlipidemia 01/22/2013    Hypertension 01/22/2013    Hypothyroidism 01/22/2013    Insomnia 01/22/2013    Osteoporosis 01/22/2013    Type 2 diabetes mellitus ([address] ) 12/31/2012     Current Outpatient Medications   Medication Sig Dispense Refill    amLODIPine (NORVASC) 2 5 mg tablet Take 1 tablet (2 5 mg total) by mouth daily 90 tablet 2    aspirin 81 mg chewable tablet Chew 81 mg      atorvastatin (LIPITOR) 40 mg tablet Take 1 tablet (40 mg total) by mouth daily 90 tablet 3    baclofen 10 mg tablet TAKE 1 TABLET BY MOUTH NIGHTLY AT BEDTIME AS NEEDED AS DIRECTED  3    carvedilol (COREG) 12 5 mg tablet Take 1 tablet (12 5 mg total) by mouth 2 (two) times a day with meals 180 tablet 1    Cholecalciferol (VITAMIN D3) 2000 units capsule Take 1 capsule (2,000 Units total) by mouth daily 100 capsule 2    clopidogrel (PLAVIX) 75 mg tablet Take 1 tablet (75 mg total) by mouth daily 90 tablet 0    FLUoxetine (PROzac) 10 mg capsule fluoxetine 10 mg capsule      latanoprost (XALATAN) 0 005 % ophthalmic solution INSTILL 1 DROP INTO EACH EYE AT BEDTIME 9 mL 0    levothyroxine 50 mcg tablet Take 1 tablet (50 mcg total) by mouth daily 90 tablet 2    losartan (COZAAR) 100 MG tablet Take 1 tablet (100 mg total) by mouth daily 90 tablet 3    pioglitazone (ACTOS) 30 mg tablet Take 1 tablet (30 mg total) by mouth daily 90 tablet 2    zolpidem (AMBIEN) 5 mg tablet Take 5 mg by mouth      aspirin (ECOTRIN LOW STRENGTH) 81 mg EC tablet Daily (Patient not taking: Reported on 9/14/2021)      baclofen 10 mg tablet TAKE 1 TABLET BY MOUTH EVERY DAY AT BEDTIME NIGHTLY AS NEEDED AS DIRECTED FOR 90 DAYS (TAKE 1 HOUR PRIOR TO SLEEPING) (Patient not taking: Reported on 9/14/2021)      denosumab (PROLIA) 60 mg/mL Inject 1 mL (60 mg total) under the skin once for 1 dose (Patient not taking: Reported on 9/14/2021) 1 mL 0    ergocalciferol (ERGOCALCIFEROL) 1 25 MG (01803 UT) capsule Vitamin D2 1,250 mcg (50,000 unit) capsule (Patient not taking: Reported on 9/14/2021)       Current Facility-Administered Medications   Medication Dose Route Frequency Provider Last Rate Last Admin    cyanocobalamin injection 1,000 mcg  1,000 mcg Intramuscular Q30 Days Brian Kruger DO   1,000 mcg at 02/27/19 1250     Review of Systems   Constitutional: Negative for unexpected weight change  Cardiovascular: Negative for chest pain and palpitations  Musculoskeletal: Negative for arthralgias  All other systems reviewed and are negative  Objective:      /78 (BP Location: Right arm, Patient Position: Sitting, Cuff Size: Adult)   Pulse 71   Temp 98 3 °F (36 8 °C) (Tympanic)   Resp 16   Ht 5' 2" (1 575 m)   Wt 68 2 kg (150 lb 6 4 oz)   SpO2 96%   BMI 27 51 kg/m²          Physical Exam  Vitals and nursing note reviewed  Constitutional:       Appearance: Normal appearance  HENT:      Head: Normocephalic and atraumatic  Cardiovascular:      Rate and Rhythm: Normal rate and regular rhythm  Pulmonary:      Effort: Pulmonary effort is normal       Breath sounds: Normal breath sounds  Abdominal:      Palpations: Abdomen is soft  Musculoskeletal:         General: Normal range of motion  Cervical back: Normal range of motion  Right lower leg: No edema  Left lower leg: No edema  Skin:     General: Skin is warm and dry  Neurological:      General: No focal deficit present  Mental Status: She is alert and oriented to person, place, and time  Mental status is at baseline  Psychiatric:         Mood and Affect: Mood normal          Behavior: Behavior normal          Thought Content:  Thought content normal          Judgment: Judgment normal

## 2021-11-03 ENCOUNTER — IMMUNIZATIONS (OUTPATIENT)
Dept: FAMILY MEDICINE CLINIC | Facility: HOSPITAL | Age: 86
End: 2021-11-03

## 2021-11-03 DIAGNOSIS — Z23 ENCOUNTER FOR IMMUNIZATION: Primary | ICD-10-CM

## 2021-11-03 PROCEDURE — 0013A COVID-19 MODERNA VACC 0.25 ML BOOSTER: CPT

## 2021-11-03 PROCEDURE — 91306 COVID-19 MODERNA VACC 0.25 ML BOOSTER: CPT

## 2021-11-15 ENCOUNTER — TELEPHONE (OUTPATIENT)
Dept: INTERNAL MEDICINE CLINIC | Facility: CLINIC | Age: 86
End: 2021-11-15

## 2021-11-16 ENCOUNTER — TELEPHONE (OUTPATIENT)
Dept: INTERNAL MEDICINE CLINIC | Facility: CLINIC | Age: 86
End: 2021-11-16

## 2021-11-17 ENCOUNTER — TELEPHONE (OUTPATIENT)
Dept: OBGYN CLINIC | Facility: OTHER | Age: 86
End: 2021-11-17

## 2021-11-18 ENCOUNTER — TELEPHONE (OUTPATIENT)
Dept: INTERNAL MEDICINE CLINIC | Facility: CLINIC | Age: 86
End: 2021-11-18

## 2021-11-30 ENCOUNTER — CONSULT (OUTPATIENT)
Dept: PAIN MEDICINE | Facility: CLINIC | Age: 86
End: 2021-11-30
Payer: COMMERCIAL

## 2021-11-30 VITALS
WEIGHT: 154 LBS | SYSTOLIC BLOOD PRESSURE: 143 MMHG | HEART RATE: 67 BPM | BODY MASS INDEX: 28.34 KG/M2 | DIASTOLIC BLOOD PRESSURE: 82 MMHG | HEIGHT: 62 IN

## 2021-11-30 DIAGNOSIS — M70.61 TROCHANTERIC BURSITIS OF RIGHT HIP: ICD-10-CM

## 2021-11-30 DIAGNOSIS — M25.551 RIGHT HIP PAIN: ICD-10-CM

## 2021-11-30 DIAGNOSIS — G89.4 CHRONIC PAIN SYNDROME: Primary | ICD-10-CM

## 2021-11-30 PROCEDURE — 99204 OFFICE O/P NEW MOD 45 MIN: CPT | Performed by: ANESTHESIOLOGY

## 2021-12-01 ENCOUNTER — HOSPITAL ENCOUNTER (OUTPATIENT)
Dept: RADIOLOGY | Facility: CLINIC | Age: 86
Discharge: HOME | End: 2021-12-01
Attending: ANESTHESIOLOGY
Payer: COMMERCIAL

## 2021-12-01 ENCOUNTER — TELEPHONE (OUTPATIENT)
Dept: PAIN MEDICINE | Facility: CLINIC | Age: 86
End: 2021-12-01

## 2021-12-01 VITALS
OXYGEN SATURATION: 95 % | SYSTOLIC BLOOD PRESSURE: 165 MMHG | RESPIRATION RATE: 20 BRPM | DIASTOLIC BLOOD PRESSURE: 80 MMHG | HEART RATE: 66 BPM | TEMPERATURE: 97.8 F

## 2021-12-01 DIAGNOSIS — M25.551 RIGHT HIP PAIN: ICD-10-CM

## 2021-12-01 DIAGNOSIS — M70.61 TROCHANTERIC BURSITIS OF RIGHT HIP: ICD-10-CM

## 2021-12-01 DIAGNOSIS — G89.4 CHRONIC PAIN SYNDROME: ICD-10-CM

## 2021-12-01 PROCEDURE — 77002 NEEDLE LOCALIZATION BY XRAY: CPT

## 2021-12-01 PROCEDURE — 20610 DRAIN/INJ JOINT/BURSA W/O US: CPT | Performed by: ANESTHESIOLOGY

## 2021-12-01 PROCEDURE — 77002 NEEDLE LOCALIZATION BY XRAY: CPT | Performed by: ANESTHESIOLOGY

## 2021-12-01 RX ORDER — METHYLPREDNISOLONE ACETATE 40 MG/ML
40 INJECTION, SUSPENSION INTRA-ARTICULAR; INTRALESIONAL; INTRAMUSCULAR; PARENTERAL; SOFT TISSUE ONCE
Status: COMPLETED | OUTPATIENT
Start: 2021-12-01 | End: 2021-12-01

## 2021-12-01 RX ORDER — BUPIVACAINE HCL/PF 2.5 MG/ML
5 VIAL (ML) INJECTION ONCE
Status: COMPLETED | OUTPATIENT
Start: 2021-12-01 | End: 2021-12-01

## 2021-12-01 RX ORDER — LIDOCAINE HYDROCHLORIDE 10 MG/ML
5 INJECTION, SOLUTION EPIDURAL; INFILTRATION; INTRACAUDAL; PERINEURAL ONCE
Status: DISCONTINUED | OUTPATIENT
Start: 2021-12-01 | End: 2021-12-05 | Stop reason: HOSPADM

## 2021-12-01 RX ADMIN — METHYLPREDNISOLONE ACETATE 40 MG: 40 INJECTION, SUSPENSION INTRA-ARTICULAR; INTRALESIONAL; INTRAMUSCULAR; PARENTERAL; SOFT TISSUE at 14:47

## 2021-12-01 RX ADMIN — Medication 4 ML: at 14:47

## 2021-12-08 ENCOUNTER — TELEPHONE (OUTPATIENT)
Dept: PAIN MEDICINE | Facility: CLINIC | Age: 86
End: 2021-12-08

## 2021-12-21 ENCOUNTER — TELEPHONE (OUTPATIENT)
Dept: PAIN MEDICINE | Facility: CLINIC | Age: 86
End: 2021-12-21

## 2021-12-21 ENCOUNTER — OFFICE VISIT (OUTPATIENT)
Dept: PAIN MEDICINE | Facility: CLINIC | Age: 86
End: 2021-12-21
Payer: COMMERCIAL

## 2021-12-21 VITALS
BODY MASS INDEX: 28.16 KG/M2 | DIASTOLIC BLOOD PRESSURE: 77 MMHG | WEIGHT: 153 LBS | SYSTOLIC BLOOD PRESSURE: 140 MMHG | HEART RATE: 75 BPM | HEIGHT: 62 IN

## 2021-12-21 DIAGNOSIS — G89.29 CHRONIC RIGHT-SIDED LOW BACK PAIN WITHOUT SCIATICA: ICD-10-CM

## 2021-12-21 DIAGNOSIS — M70.61 TROCHANTERIC BURSITIS OF RIGHT HIP: Primary | ICD-10-CM

## 2021-12-21 DIAGNOSIS — G89.4 CHRONIC PAIN SYNDROME: Primary | ICD-10-CM

## 2021-12-21 DIAGNOSIS — M54.50 CHRONIC RIGHT-SIDED LOW BACK PAIN WITHOUT SCIATICA: ICD-10-CM

## 2021-12-21 DIAGNOSIS — M46.1 SACROILIITIS (HCC): ICD-10-CM

## 2021-12-21 PROCEDURE — 1160F RVW MEDS BY RX/DR IN RCRD: CPT | Performed by: ANESTHESIOLOGY

## 2021-12-21 PROCEDURE — 99214 OFFICE O/P EST MOD 30 MIN: CPT | Performed by: ANESTHESIOLOGY

## 2021-12-21 PROCEDURE — 1036F TOBACCO NON-USER: CPT | Performed by: ANESTHESIOLOGY

## 2021-12-21 RX ORDER — LIDOCAINE 50 MG/G
1 PATCH TOPICAL DAILY
Qty: 30 PATCH | Refills: 0 | Status: SHIPPED | OUTPATIENT
Start: 2021-12-21

## 2021-12-21 NOTE — H&P (VIEW-ONLY)
Pain Medicine Follow-Up Note    Assessment:  1  Chronic pain syndrome    2  Chronic right-sided low back pain without sciatica    3  Sacroiliitis ([facility] [address] )        Plan:  Orders Placed This Encounter   Procedures    FL spine and pain procedure     4 week follow up after injection     Standing Status:   Future     Standing Expiration Date:   12/21/2025     Order Specific Question:   Reason for Exam:     Answer:   Right sacroiliac joint injection     Order Specific Question:   Anticoagulant hold needed? Answer:   unknown     My impressions and treatment recommendations were discussed in detail with the patient who verbalized understanding and had no further questions  Given that the patient has signs and symptoms consistent with right-sided sacroiliitis, I discussed the rationale of undergoing a right sacroiliac joint injection since this could be potentially therapeutic  The procedures, its risks, and benefits were explained in detail to the patient  Risks include but are not limited to bleeding, infection, hematoma formation, abscess formation, weakness, headache, failure the pain to improve, nerve irritation or damage, and potential worsening of the pain  The patient verbalized understanding and wished to proceed with the procedure  Follow-up is planned in 4 weeks time or sooner as warranted  Discharge instructions were provided  I personally saw and examined the patient and I agree with the above discussed plan of care  History of Present Illness:    Crystal Moss is a 80 y o  female who presents to [facility] for interval re-evaluation of the above stated pain complaints  The patient has a past medical and chronic pain history as outlined in the assessment section  She was last seen on December 1, 2021 at which time she underwent a right hip trochanteric bursa injection      At today's office visit, the patient's pain score is 10/10 on the verbal numerical pain rating scale   The patient states that her symptoms are primarily over the right sacroiliac joint  She describes her pain as intermittent in nature and reports the quality of her pain as dull/aching  She reports that her pain is bothering her considerably over the right sacroiliac joint  She reports considerable improvement since undergoing the right hip trochanteric bursa injection on December 1, 2021, but notes that her pain is primarily over the right sacroiliac joint at today's visit  Other than as stated above, the patient denies any interval changes in medications, medical condition, mental condition, symptoms, or allergies since the last office visit  Review of Systems:    Review of Systems   Respiratory: Negative for shortness of breath  Cardiovascular: Negative for chest pain  Gastrointestinal: Negative for constipation, diarrhea, nausea and vomiting  Musculoskeletal: Positive for gait problem  Negative for arthralgias, joint swelling and myalgias  Skin: Negative for rash  Neurological: Negative for dizziness, seizures and weakness  All other systems reviewed and are negative          Patient Active Problem List   Diagnosis    Arteriosclerosis of coronary artery    Depression    Esophageal reflux    Hyperlipidemia    Hypertension    Hypothyroidism    Insomnia    Lumbar compression fracture (HCC)    Lumbar spinal stenosis    Normochromic normocytic anemia    Osteoarthritis of knee    Osteoporosis    Spinal stenosis    Type 2 diabetes mellitus (HCC)    Vitamin B12 deficiency    Vitamin D deficiency    Screening for malignant neoplasm of breast    Health care maintenance    Screening mammogram, encounter for    Diabetic peripheral neuropathy ([facility] [address] )    Greater trochanteric bursitis of left hip    Left sided sciatica    Triclonal gammopathy    Medicare annual wellness visit, subsequent    Adhesive capsulitis of shoulder    Neck pain    Light chain disease, 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 )    Non-Hodgkin's lymphoma ([facility] )    Right hip pain    Trochanteric bursitis of right hip       Past Medical History:   Diagnosis Date    Cellulitis     last assessed - [id number]    Constipation     last assessed - [id number]    Diabetes mellitus ([facility] )     Disease of thyroid gland     Ecchymosis     last assessed - [id number]    Fall     last assessed - [id number]    Hyperlipidemia     Hypertension     Hypokalemia     last assessed - [id number]    Lower extremity weakness     last assessed - [id number]    Vitamin D deficiency     last assessed - [id number]       Past Surgical History:   Procedure Laterality Date    BACK SURGERY      CHOLECYSTECTOMY      COLONOSCOPY      CORONARY ANGIOPLASTY WITH STENT PLACEMENT      CT BONE MARROW BIOPSY AND ASPIRATION  1/15/2020    HYSTERECTOMY      OTHER SURGICAL HISTORY      Previous stent placement       Family History   Problem Relation Age of Onset    Heart disease Mother     Other Father         cerebral embolism - with cerebral infarction       Social History     Occupational History    Not on file   Tobacco Use    Smoking status: Former Smoker     Start date: 5/7/1958    Smokeless tobacco: Never Used   Vaping Use    Vaping Use: Never used   Substance and Sexual Activity    Alcohol use: No    Drug use: No    Sexual activity: Never     Partners: Male         Current Outpatient Medications:     amLODIPine (NORVASC) 2 5 mg tablet, Take 1 tablet (2 5 mg total) by mouth daily, Disp: 90 tablet, Rfl: 2    aspirin 81 mg chewable tablet, Chew 81 mg, Disp: , Rfl:     atorvastatin (LIPITOR) 40 mg tablet, Take 1 tablet (40 mg total) by mouth daily, Disp: 90 tablet, Rfl: 3    baclofen 10 mg tablet, TAKE 1 TABLET BY MOUTH NIGHTLY AT BEDTIME AS NEEDED AS DIRECTED, Disp: , Rfl: 3    carvedilol (COREG) 12 5 mg tablet, Take 1 tablet (12 5 mg total) by mouth 2 (two) times a day with meals, Disp: 180 tablet, Rfl: 1    Cholecalciferol (VITAMIN D3) 2000 units capsule, Take 1 capsule (2,000 Units total) by mouth daily, Disp: 100 capsule, Rfl: 2    clopidogrel (PLAVIX) 75 mg tablet, Take 1 tablet (75 mg total) by mouth daily, Disp: 90 tablet, Rfl: 0    FLUoxetine (PROzac) 10 mg capsule, fluoxetine 10 mg capsule, Disp: , Rfl:     latanoprost (XALATAN) 0 005 % ophthalmic solution, INSTILL 1 DROP INTO EACH EYE AT BEDTIME, Disp: 9 mL, Rfl: 0    levothyroxine 50 mcg tablet, Take 1 tablet (50 mcg total) by mouth daily, Disp: 90 tablet, Rfl: 2    losartan (COZAAR) 100 MG tablet, Take 1 tablet (100 mg total) by mouth daily, Disp: 90 tablet, Rfl: 3    pioglitazone (ACTOS) 30 mg tablet, Take 1 tablet (30 mg total) by mouth daily, Disp: 90 tablet, Rfl: 2    zolpidem (AMBIEN) 5 mg tablet, Take 5 mg by mouth, Disp: , Rfl:     aspirin (ECOTRIN LOW STRENGTH) 81 mg EC tablet, Daily (Patient not taking: Reported on 9/14/2021 ), Disp: , Rfl:     baclofen 10 mg tablet, TAKE 1 TABLET BY MOUTH EVERY DAY AT BEDTIME NIGHTLY AS NEEDED AS DIRECTED FOR 90 DAYS (TAKE 1 HOUR PRIOR TO SLEEPING) (Patient not taking: Reported on 9/14/2021), Disp: , Rfl:     denosumab (PROLIA) 60 mg/mL, Inject 1 mL (60 mg total) under the skin once for 1 dose (Patient not taking: Reported on 9/14/2021), Disp: 1 mL, Rfl: 0    ergocalciferol (ERGOCALCIFEROL) 1 25 MG (87439 UT) capsule, Vitamin D2 1,250 mcg (50,000 unit) capsule (Patient not taking: Reported on 9/14/2021), Disp: , Rfl:     Current Facility-Administered Medications:     cyanocobalamin injection 1,000 mcg, 1,000 mcg, Intramuscular, Q30 Days, Girard DO Gopi, 1,000 mcg at 02/27/19 1250    Allergies   Allergen Reactions    Iodine - Food Allergy Other (See Comments)     IV CONTRAST DYE    Iv Dye  [Iodinated Diagnostic Agents]     Other        Physical Exam:    /77   Pulse 75   Ht 5' 2" (1 575 m)   Wt 69 4 kg (153 lb)   Breastfeeding No   BMI 27 98 kg/m²     Constitutional:normal, well developed, well nourished, alert, in no distress and non-toxic and no overt pain behavior  Eyes:anicteric  HEENT:grossly intact  Neck:supple, symmetric, trachea midline and no masses   Pulmonary:even and unlabored  Cardiovascular:No edema or pitting edema present  Skin:Normal without rashes or lesions and well hydrated  Psychiatric:Mood and affect appropriate  Neurologic:Cranial Nerves II-XII grossly intact  Musculoskeletal:antalgic, tenderness noted over the right sacroiliac joint  ELMER testing is positive on the right  Sacroiliac compression testing is positive on the right  Sacroiliac distraction testing is positive on the right  Thigh thrust testing is negative on the right        Imaging  FL spine and pain procedure    (Results Pending)         Orders Placed This Encounter   Procedures    FL spine and pain procedure

## 2021-12-28 DIAGNOSIS — I25.10 CORONARY ARTERY DISEASE INVOLVING NATIVE HEART WITHOUT ANGINA PECTORIS, UNSPECIFIED VESSEL OR LESION TYPE: ICD-10-CM

## 2021-12-28 RX ORDER — CLOPIDOGREL BISULFATE 75 MG/1
75 TABLET ORAL DAILY
Qty: 90 TABLET | Refills: 0 | Status: SHIPPED | OUTPATIENT
Start: 2021-12-28 | End: 2022-03-23 | Stop reason: SDUPTHER

## 2022-01-03 ENCOUNTER — TELEPHONE (OUTPATIENT)
Dept: RADIOLOGY | Facility: CLINIC | Age: 87
End: 2022-01-03

## 2022-01-03 DIAGNOSIS — I25.84 CORONARY ARTERY DISEASE DUE TO CALCIFIED CORONARY LESION: ICD-10-CM

## 2022-01-03 DIAGNOSIS — I25.10 CORONARY ARTERY DISEASE DUE TO CALCIFIED CORONARY LESION: ICD-10-CM

## 2022-01-03 RX ORDER — CARVEDILOL 12.5 MG/1
12.5 TABLET ORAL 2 TIMES DAILY WITH MEALS
Qty: 180 TABLET | Refills: 1 | Status: SHIPPED | OUTPATIENT
Start: 2022-01-03 | End: 2022-07-18 | Stop reason: SDUPTHER

## 2022-01-03 NOTE — TELEPHONE ENCOUNTER
SI Joint injections are considered medically necessary when the member has failed to improve after 6 or more weeks of conservative treatment including pharmacotherapy, activity modification, and active therapy  The clinical information provided does not meet this criterion and , therefore, the requested injection is not indicated at the time  You may request peer to peer by calling [phone], selecting option #4 and providing reference number [id number]  Would you like to do peer to peer for Right SI Joint injection?

## 2022-01-07 NOTE — TELEPHONE ENCOUNTER
S/w pt and scheduled Right SI Joint injection for 1/26/22 11 am  Gave pre procedure instructions and masking//vaccine policy

## 2022-01-12 ENCOUNTER — HOSPITAL ENCOUNTER (OUTPATIENT)
Dept: RADIOLOGY | Facility: CLINIC | Age: 87
Discharge: HOME | End: 2022-01-12
Attending: ANESTHESIOLOGY | Admitting: ANESTHESIOLOGY
Payer: COMMERCIAL

## 2022-01-12 VITALS
SYSTOLIC BLOOD PRESSURE: 151 MMHG | HEART RATE: 80 BPM | DIASTOLIC BLOOD PRESSURE: 79 MMHG | TEMPERATURE: 98.9 F | OXYGEN SATURATION: 98 % | RESPIRATION RATE: 20 BRPM

## 2022-01-12 DIAGNOSIS — M46.1 SACROILIITIS (HCC): ICD-10-CM

## 2022-01-12 DIAGNOSIS — M54.50 CHRONIC RIGHT-SIDED LOW BACK PAIN WITHOUT SCIATICA: ICD-10-CM

## 2022-01-12 DIAGNOSIS — G89.4 CHRONIC PAIN SYNDROME: ICD-10-CM

## 2022-01-12 DIAGNOSIS — G89.29 CHRONIC RIGHT-SIDED LOW BACK PAIN WITHOUT SCIATICA: ICD-10-CM

## 2022-01-12 PROCEDURE — 27096 INJECT SACROILIAC JOINT: CPT | Performed by: ANESTHESIOLOGY

## 2022-01-12 PROCEDURE — A9585 GADOBUTROL INJECTION: HCPCS | Performed by: ANESTHESIOLOGY

## 2022-01-12 RX ORDER — LIDOCAINE HYDROCHLORIDE 10 MG/ML
5 INJECTION, SOLUTION EPIDURAL; INFILTRATION; INTRACAUDAL; PERINEURAL ONCE
Status: DISCONTINUED | OUTPATIENT
Start: 2022-01-12 | End: 2022-01-16 | Stop reason: HOSPADM

## 2022-01-12 RX ORDER — BUPIVACAINE HCL/PF 2.5 MG/ML
5 VIAL (ML) INJECTION ONCE
Status: COMPLETED | OUTPATIENT
Start: 2022-01-12 | End: 2022-01-12

## 2022-01-12 RX ORDER — METHYLPREDNISOLONE ACETATE 80 MG/ML
80 INJECTION, SUSPENSION INTRA-ARTICULAR; INTRALESIONAL; INTRAMUSCULAR; PARENTERAL; SOFT TISSUE ONCE
Status: COMPLETED | OUTPATIENT
Start: 2022-01-12 | End: 2022-01-12

## 2022-01-12 RX ADMIN — Medication 2 ML: at 14:30

## 2022-01-12 RX ADMIN — METHYLPREDNISOLONE ACETATE 80 MG: 80 INJECTION, SUSPENSION INTRA-ARTICULAR; INTRALESIONAL; INTRAMUSCULAR; PARENTERAL; SOFT TISSUE at 14:30

## 2022-01-12 RX ADMIN — GADOBUTROL 0.2 ML: 604.72 INJECTION INTRAVENOUS at 14:29

## 2022-01-12 NOTE — INTERVAL H&P NOTE
Update: (This section must be completed if the H&P was completed greater than 24 hrs to procedure or admission)    H&P reviewed  After examining the patient, I find no changed to the H&P since it had been written  Patient re-evaluated   Accept as history and physical     Nathanael Bae MD/January 12, 2022/2:33 PM

## 2022-01-12 NOTE — DISCHARGE INSTR - LAB
Steroid Joint Injection   WHAT YOU NEED TO KNOW:   A steroid joint injection is a procedure to inject steroid medicine into a joint  Steroid medicine decreases pain and inflammation  The injection may also contain an anesthetic (numbing medicine) to decrease pain  It may be done to treat conditions such as arthritis, gout, or carpal tunnel syndrome  The injections may be given in your knee, ankle, shoulder, elbow, wrist, ankle or sacroiliac joint  Do not apply heat to any area that is numb  If you have discomfort or soreness at the injection site, you may apply ice today, 20 minutes on and 20 minutes off  Tomorrow you may use ice or warm, moist heat  Do not apply ice or heat directly to the skin  You may have an increase or change in the discomfort for 36-48 hours after your treatment  Apply ice and continue with any pain medicine you have been prescribed  Do not do anything strenuous today  You may shower, but no tub baths or hot tubs today  You may resume your normal activities tomorrow, but do not overdo it  Resume normal activities slowly when you are feeling better  If you experience redness, drainage or swelling at the injection site, or if you develop a fever above 100 degrees, please call [facility] at [phone] or go to the Emergency Room  Continue to take all routine medicines prescribed by your primary care physician unless otherwise instructed by our staff  Most blood thinners should be started again according to your regularly scheduled dosing  If you have any questions, please give our office a call  As no general anesthesia was used in today's procedure, you should not experience any side effects related to anesthesia  If you have a problem specifically related to your procedure, please call our office at [phone]  Problems not related to your procedure should be directed to your primary care physician

## 2022-01-19 ENCOUNTER — TELEPHONE (OUTPATIENT)
Dept: PAIN MEDICINE | Facility: CLINIC | Age: 87
End: 2022-01-19

## 2022-02-01 DIAGNOSIS — E03.9 HYPOTHYROIDISM, UNSPECIFIED TYPE: ICD-10-CM

## 2022-02-01 RX ORDER — LEVOTHYROXINE SODIUM 0.05 MG/1
50 TABLET ORAL DAILY
Qty: 90 TABLET | Refills: 2 | Status: SHIPPED | OUTPATIENT
Start: 2022-02-01

## 2022-03-23 ENCOUNTER — OFFICE VISIT (OUTPATIENT)
Dept: INTERNAL MEDICINE CLINIC | Facility: CLINIC | Age: 87
End: 2022-03-23
Payer: COMMERCIAL

## 2022-03-23 VITALS
SYSTOLIC BLOOD PRESSURE: 122 MMHG | WEIGHT: 157 LBS | OXYGEN SATURATION: 96 % | HEART RATE: 75 BPM | BODY MASS INDEX: 28.89 KG/M2 | TEMPERATURE: 98 F | DIASTOLIC BLOOD PRESSURE: 76 MMHG | HEIGHT: 62 IN

## 2022-03-23 DIAGNOSIS — C85.90 NON-HODGKIN'S LYMPHOMA, UNSPECIFIED BODY REGION, UNSPECIFIED NON-HODGKIN LYMPHOMA TYPE (HCC): ICD-10-CM

## 2022-03-23 DIAGNOSIS — Z00.00 MEDICARE ANNUAL WELLNESS VISIT, SUBSEQUENT: ICD-10-CM

## 2022-03-23 DIAGNOSIS — D89.89 LIGHT CHAIN DISEASE, KAPPA TYPE (HCC): ICD-10-CM

## 2022-03-23 DIAGNOSIS — E11.8 TYPE 2 DIABETES MELLITUS WITH COMPLICATION, WITHOUT LONG-TERM CURRENT USE OF INSULIN (HCC): ICD-10-CM

## 2022-03-23 DIAGNOSIS — I25.10 CORONARY ARTERY DISEASE INVOLVING NATIVE HEART WITHOUT ANGINA PECTORIS, UNSPECIFIED VESSEL OR LESION TYPE: ICD-10-CM

## 2022-03-23 DIAGNOSIS — I25.118 CORONARY ARTERY DISEASE OF NATIVE ARTERY OF NATIVE HEART WITH STABLE ANGINA PECTORIS (HCC): ICD-10-CM

## 2022-03-23 DIAGNOSIS — M25.521 RIGHT ELBOW PAIN: Primary | ICD-10-CM

## 2022-03-23 PROCEDURE — G0439 PPPS, SUBSEQ VISIT: HCPCS | Performed by: INTERNAL MEDICINE

## 2022-03-23 PROCEDURE — 1125F AMNT PAIN NOTED PAIN PRSNT: CPT | Performed by: INTERNAL MEDICINE

## 2022-03-23 PROCEDURE — 1036F TOBACCO NON-USER: CPT | Performed by: INTERNAL MEDICINE

## 2022-03-23 PROCEDURE — 1090F PRES/ABSN URINE INCON ASSESS: CPT | Performed by: INTERNAL MEDICINE

## 2022-03-23 PROCEDURE — 99214 OFFICE O/P EST MOD 30 MIN: CPT | Performed by: INTERNAL MEDICINE

## 2022-03-23 PROCEDURE — 3288F FALL RISK ASSESSMENT DOCD: CPT | Performed by: INTERNAL MEDICINE

## 2022-03-23 PROCEDURE — 1160F RVW MEDS BY RX/DR IN RCRD: CPT | Performed by: INTERNAL MEDICINE

## 2022-03-23 PROCEDURE — 1003F LEVEL OF ACTIVITY ASSESS: CPT | Performed by: INTERNAL MEDICINE

## 2022-03-23 PROCEDURE — 3725F SCREEN DEPRESSION PERFORMED: CPT | Performed by: INTERNAL MEDICINE

## 2022-03-23 PROCEDURE — 1170F FXNL STATUS ASSESSED: CPT | Performed by: INTERNAL MEDICINE

## 2022-03-23 RX ORDER — CLOPIDOGREL BISULFATE 75 MG/1
75 TABLET ORAL DAILY
Qty: 90 TABLET | Refills: 5 | Status: SHIPPED | OUTPATIENT
Start: 2022-03-23

## 2022-03-23 NOTE — PROGRESS NOTES
Assessment and Plan:     Problem List Items Addressed This Visit        Cardiovascular and Mediastinum    Coronary artery disease of native artery of native heart with stable angina pectoris (HCC)       Other    Light chain disease, 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[facility] [address] )    Non-Hodgkin's lymphoma ([facility] [address] )      Other Visit Diagnoses     Type 2 diabetes mellitus with complication, without long-term current use of insulin ([facility] [address] )               Preventive health issues were discussed with patient, and age appropriate screening tests were ordered as noted in patient's After Visit Summary  Personalized health advice and appropriate referrals for health education or preventive services given if needed, as noted in patient's After Visit Summary       History of Present Illness:     Patient presents for Medicare Annual Wellness visit    Patient Care Team:  Shreya Resendez MD as PCP - General (Internal Medicine)     Problem List:     Patient Active Problem List   Diagnosis    Coronary artery disease of native artery of native heart with stable angina pectoris ([facility] [address] )    Depression    Esophageal reflux    Hyperlipidemia    Hypertension    Hypothyroidism    Insomnia    Lumbar compression fracture (HCC)    Lumbar spinal stenosis    Normochromic normocytic anemia    Osteoarthritis of knee    Osteoporosis    Spinal stenosis    Type 2 diabetes mellitus ([facility] [address] )    Vitamin B12 deficiency    Vitamin D deficiency    Screening for malignant neoplasm of breast    Health care maintenance    Screening mammogram, encounter for    Diabetic peripheral neuropathy ([facility] [address] )    Greater trochanteric bursitis of left hip    Left sided sciatica    Triclonal gammopathy    Medicare annual wellness visit, subsequent    Adhesive capsulitis of shoulder    Neck pain    Light chain disease, 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[facility] [address] )    Non-Hodgkin's lymphoma ([facility] [address] )    Right hip pain    Trochanteric bursitis of right hip      Past Medical and Surgical History:     Past Medical History:   Diagnosis Date    Cellulitis     last assessed - [id number]    Constipation     last assessed - [id number]    Diabetes mellitus ([facility] [address] )     Disease of thyroid gland     Ecchymosis     last assessed - [id number]    Fall     last assessed - [id number]    Hyperlipidemia     Hypertension     Hypokalemia     last assessed - [id number]    Lower extremity weakness     last assessed - [id number]    Vitamin D deficiency     last assessed - [id number]     Past Surgical History:   Procedure Laterality Date    BACK SURGERY      CHOLECYSTECTOMY      COLONOSCOPY      CORONARY ANGIOPLASTY WITH STENT PLACEMENT      CT BONE MARROW BIOPSY AND ASPIRATION  1/15/2020    HYSTERECTOMY      OTHER SURGICAL HISTORY      Previous stent placement      Family History:     Family History   Problem Relation Age of Onset    Heart disease Mother     Other Father         cerebral embolism - with cerebral infarction      Social History:     Social History     Socioeconomic History    Marital status: /Civil Union     Spouse name: Not on file    Number of children: Not on file    Years of education: Not on file    Highest education level: Not on file   Occupational History    Not on file   Tobacco Use    Smoking status: Former Smoker     Start date: 5/7/1958    Smokeless tobacco: Never Used   Vaping Use    Vaping Use: Never used   Substance and Sexual Activity    Alcohol use: No    Drug use: No    Sexual activity: Never     Partners: Male   Other Topics Concern    Not on file   Social History Narrative    Caffeine use     Social Determinants of Health     Financial Resource Strain: Not on file   Food Insecurity: Not on file   Transportation Needs: Not on file   Physical Activity: Not on file   Stress: Not on file   Social Connections: Not on file   Intimate Partner Violence: Not on file   Housing Stability: Not on file      Medications and Allergies:     Current Outpatient Medications   Medication Sig Dispense Refill    amLODIPine (NORVASC) 2 5 mg tablet Take 1 tablet (2 5 mg total) by mouth daily 90 tablet 2    aspirin 81 mg chewable tablet Chew 81 mg      atorvastatin (LIPITOR) 40 mg tablet Take 1 tablet (40 mg total) by mouth daily 90 tablet 3    baclofen 10 mg tablet TAKE 1 TABLET BY MOUTH NIGHTLY AT BEDTIME AS NEEDED AS DIRECTED  3    carvedilol (COREG) 12 5 mg tablet Take 1 tablet (12 5 mg total) by mouth 2 (two) times a day with meals 180 tablet 1    Cholecalciferol (VITAMIN D3) 2000 units capsule Take 1 capsule (2,000 Units total) by mouth daily 100 capsule 2    clopidogrel (PLAVIX) 75 mg tablet Take 1 tablet (75 mg total) by mouth daily 90 tablet 0    FLUoxetine (PROzac) 10 mg capsule fluoxetine 10 mg capsule      latanoprost (XALATAN) 0 005 % ophthalmic solution INSTILL 1 DROP INTO EACH EYE AT BEDTIME 9 mL 0    levothyroxine 50 mcg tablet Take 1 tablet (50 mcg total) by mouth daily 90 tablet 2    lidocaine (Lidoderm) 5 % Apply 1 patch topically daily Remove & Discard patch within 12 hours or as directed by MD 30 patch 0    losartan (COZAAR) 100 MG tablet Take 1 tablet (100 mg total) by mouth daily 90 tablet 3    Multiple Vitamins-Minerals (PRESERVISION AREDS 2 PO) Take by mouth      pioglitazone (ACTOS) 30 mg tablet Take 1 tablet (30 mg total) by mouth daily 90 tablet 2    zolpidem (AMBIEN) 5 mg tablet Take 5 mg by mouth      aspirin (ECOTRIN LOW STRENGTH) 81 mg EC tablet Daily (Patient not taking: Reported on 9/14/2021 )      baclofen 10 mg tablet TAKE 1 TABLET BY MOUTH EVERY DAY AT BEDTIME NIGHTLY AS NEEDED AS DIRECTED FOR 90 DAYS (TAKE 1 HOUR PRIOR TO SLEEPING) (Patient not taking: Reported on 9/14/2021)      denosumab (PROLIA) 60 mg/mL Inject 1 mL (60 mg total) under the skin once for 1 dose (Patient not taking: Reported on 9/14/2021) 1 mL 0    ergocalciferol (ERGOCALCIFEROL) 1 25 MG (60734 UT) capsule Vitamin D2 1,250 mcg (50,000 unit) capsule (Patient not taking: Reported on 9/14/2021) Current Facility-Administered Medications   Medication Dose Route Frequency Provider Last Rate Last Admin    cyanocobalamin injection 1,000 mcg  1,000 mcg Intramuscular Q30 Days Daija Monroe DO   1,000 mcg at 02/27/19 1250     Allergies   Allergen Reactions    Iodine - Food Allergy Other (See Comments)     IV CONTRAST DYE    Iv Dye  [Iodinated Diagnostic Agents]     Other       Immunizations:     Immunization History   Administered Date(s) Administered    COVID-19 MODERNA VACC 0 25 ML IM BOOSTER 11/03/2021    COVID-19 MODERNA VACC 0 5 ML IM 01/28/2021, 02/24/2021    INFLUENZA 10/14/2016, 09/13/2017, 09/04/2018    Influenza Split High Dose Preservative Free IM 10/15/2014, 09/30/2015, 10/14/2016    Influenza, high dose seasonal 0 7 mL 09/04/2018, 09/10/2019, 09/22/2020, 09/14/2021    Influenza, seasonal, injectable 11/06/2012, 09/18/2013, 09/13/2017    Pneumococcal Conjugate 13-Valent 04/28/2017    Pneumococcal Polysaccharide PPV23 01/01/2006, 11/06/2012      Health Maintenance: There are no preventive care reminders to display for this patient  Topic Date Due    DTaP,Tdap,and Td Vaccines (1 - Tdap) Never done      Medicare Health Risk Assessment:     /76 (BP Location: Left arm, Patient Position: Sitting, Cuff Size: Large)   Pulse 75   Temp 98 °F (36 7 °C) (Temporal)   Ht 5' 2" (1 575 m)   Wt 71 2 kg (157 lb)   SpO2 96%   BMI 28 72 kg/m²      Chrissy Stamp is here for her Subsequent Wellness visit  Last Medicare Wellness visit information reviewed, patient interviewed and updates made to the record today  Health Risk Assessment:   Patient rates overall health as good  Patient feels that their physical health rating is same  Patient is very satisfied with their life  Eyesight was rated as same  Hearing was rated as same  Patient feels that their emotional and mental health rating is same  Patients states they are never, rarely angry   Patient states they are sometimes unusually tired/fatigued  Pain experienced in the last 7 days has been some  Patient's pain rating has been 10/10  Patient states that she has experienced no weight loss or gain in last 6 months  Depression Screening:   PHQ-9 Score: 0      Fall Risk Screening: In the past year, patient has experienced: no history of falling in past year      Urinary Incontinence Screening:   Patient has not leaked urine accidently in the last six months  Home Safety:  Patient has trouble with stairs inside or outside of their home  Patient has working smoke alarms and has working carbon monoxide detector  Home safety hazards include: none  Nutrition:   Current diet is Regular  Medications:   Patient is currently taking over-the-counter supplements  OTC medications include: see medication list  Patient is able to manage medications  Activities of Daily Living (ADLs)/Instrumental Activities of Daily Living (IADLs):   Walk and transfer into and out of bed and chair?: Yes  Dress and groom yourself?: Yes    Bathe or shower yourself?: Yes    Feed yourself? Yes  Do your laundry/housekeeping?: No  Manage your money, pay your bills and track your expenses?: Yes  Make your own meals?: Yes    Do your own shopping?: Yes    Previous Hospitalizations:   Any hospitalizations or ED visits within the last 12 months?: No      Advance Care Planning:   Living will: Yes    Durable POA for healthcare:  Yes    Advanced directive: Yes      Cognitive Screening:   Provider or family/friend/caregiver concerned regarding cognition?: No    PREVENTIVE SCREENINGS      Cardiovascular Screening:    General: History Lipid Disorder    Due for: Lipid Panel      Diabetes Screening:     General: History Diabetes    Due for: Blood Glucose      Colorectal Cancer Screening:     General: Screening Not Indicated      Breast Cancer Screening:     General: Screening Not Indicated      Cervical Cancer Screening:    General: Screening Not Indicated      Osteoporosis Screening:    General: Screening Not Indicated and History Osteoporosis      Abdominal Aortic Aneurysm (AAA) Screening:        General: Screening Not Indicated      Lung Cancer Screening:     General: Screening Not Indicated      Hepatitis C Screening:    General: Screening Not Indicated    Screening, Brief Intervention, and Referral to Treatment (SBIRT)    Screening  Typical number of drinks in a day: 0  Typical number of drinks in a week: 0  Interpretation: Low risk drinking behavior  Single Item Drug Screening:  How often have you used an illegal drug (including marijuana) or a prescription medication for non-medical reasons in the past year? never    Single Item Drug Screen Score: 0  Interpretation: Negative screen for possible drug use disorder    Other Counseling Topics:   Car/seat belt/driving safety, skin self-exam, sunscreen and calcium and vitamin D intake and regular weightbearing exercise         Tal Jernigan MD

## 2022-03-23 NOTE — PATIENT INSTRUCTIONS
Medicare Preventive Visit Patient Instructions  Thank you for completing your Welcome to Medicare Visit or Medicare Annual Wellness Visit today  Your next wellness visit will be due in one year (3/24/2023)  The screening/preventive services that you may require over the next 5-10 years are detailed below  Some tests may not apply to you based off risk factors and/or age  Screening tests ordered at today's visit but not completed yet may show as past due  Also, please note that scanned in results may not display below  Preventive Screenings:  Service Recommendations Previous Testing/Comments   Colorectal Cancer Screening  * Colonoscopy    * Fecal Occult Blood Test (FOBT)/Fecal Immunochemical Test (FIT)  * Fecal DNA/Cologuard Test  * Flexible Sigmoidoscopy Age: 54-65 years old   Colonoscopy: every 10 years (may be performed more frequently if at higher risk)  OR  FOBT/FIT: every 1 year  OR  Cologuard: every 3 years  OR  Sigmoidoscopy: every 5 years  Screening may be recommended earlier than age 48 if at higher risk for colorectal cancer  Also, an individualized decision between you and your healthcare provider will decide whether screening between the ages of 74-80 would be appropriate  Colonoscopy: Not on file  FOBT/FIT: Not on file  Cologuard: Not on file  Sigmoidoscopy: Not on file    Screening Not Indicated     Breast Cancer Screening Age: 36 years old  Frequency: every 1-2 years  Not required if history of left and right mastectomy Mammogram: 07/06/2018        Cervical Cancer Screening Between the ages of 21-29, pap smear recommended once every 3 years  Between the ages of 33-67, can perform pap smear with HPV co-testing every 5 years     Recommendations may differ for women with a history of total hysterectomy, cervical cancer, or abnormal pap smears in past  Pap Smear: Not on file    Screening Not Indicated   Hepatitis C Screening Once for adults born between 1945 and 1965  More frequently in patients at high risk for Hepatitis C Hep C Antibody: Not on file        Diabetes Screening 1-2 times per year if you're at risk for diabetes or have pre-diabetes Fasting glucose: 142 mg/dL   A1C: 7 1    Screening Not Indicated  History Diabetes   Cholesterol Screening Once every 5 years if you don't have a lipid disorder  May order more often based on risk factors  Lipid panel: Not on file    Screening Not Indicated  History Lipid Disorder     Other Preventive Screenings Covered by Medicare:  1  Abdominal Aortic Aneurysm (AAA) Screening: covered once if your at risk  You're considered to be at risk if you have a family history of AAA  2  Lung Cancer Screening: covers low dose CT scan once per year if you meet all of the following conditions: (1) Age 50-69; (2) No signs or symptoms of lung cancer; (3) Current smoker or have quit smoking within the last 15 years; (4) You have a tobacco smoking history of at least 30 pack years (packs per day multiplied by number of years you smoked); (5) You get a written order from a healthcare provider  3  Glaucoma Screening: covered annually if you're considered high risk: (1) You have diabetes OR (2) Family history of glaucoma OR (3)  aged 48 and older OR (3)  American aged 72 and older  3  Osteoporosis Screening: covered every 2 years if you meet one of the following conditions: (1) You're estrogen deficient and at risk for osteoporosis based off medical history and other findings; (2) Have a vertebral abnormality; (3) On glucocorticoid therapy for more than 3 months; (4) Have primary hyperparathyroidism; (5) On osteoporosis medications and need to assess response to drug therapy  · Last bone density test (DXA Scan): 10/01/2019   5  HIV Screening: covered annually if you're between the age of 15-65  Also covered annually if you are younger than 13 and older than 72 with risk factors for HIV infection   For pregnant patients, it is covered up to 3 times per pregnancy  Immunizations:  Immunization Recommendations   Influenza Vaccine Annual influenza vaccination during flu season is recommended for all persons aged >= 6 months who do not have contraindications   Pneumococcal Vaccine (Prevnar and Pneumovax)  * Prevnar = PCV13  * Pneumovax = PPSV23   Adults 25-60 years old: 1-3 doses may be recommended based on certain risk factors  Adults 72 years old: Prevnar (PCV13) vaccine recommended followed by Pneumovax (PPSV23) vaccine  If already received PPSV23 since turning 65, then PCV13 recommended at least one year after PPSV23 dose  Hepatitis B Vaccine 3 dose series if at intermediate or high risk (ex: diabetes, end stage renal disease, liver disease)   Tetanus (Td) Vaccine - COST NOT COVERED BY MEDICARE PART B Following completion of primary series, a booster dose should be given every 10 years to maintain immunity against tetanus  Td may also be given as tetanus wound prophylaxis  Tdap Vaccine - COST NOT COVERED BY MEDICARE PART B Recommended at least once for all adults  For pregnant patients, recommended with each pregnancy  Shingles Vaccine (Shingrix) - COST NOT COVERED BY MEDICARE PART B  2 shot series recommended in those aged 48 and above     Health Maintenance Due:  There are no preventive care reminders to display for this patient  Immunizations Due:      Topic Date Due    DTaP,Tdap,and Td Vaccines (1 - Tdap) Never done     Advance Directives   What are advance directives? Advance directives are legal documents that state your wishes and plans for medical care  These plans are made ahead of time in case you lose your ability to make decisions for yourself  Advance directives can apply to any medical decision, such as the treatments you want, and if you want to donate organs  What are the types of advance directives? There are many types of advance directives, and each state has rules about how to use them   You may choose a combination of any of the following:  · Living will: This is a written record of the treatment you want  You can also choose which treatments you do not want, which to limit, and which to stop at a certain time  This includes surgery, medicine, IV fluid, and tube feedings  · Durable power of  for healthcare [facility]): This is a written record that states who you want to make healthcare choices for you when you are unable to make them for yourself  This person, called a proxy, is usually a family member or a friend  You may choose more than 1 proxy  · Do not resuscitate (DNR) order:  A DNR order is used in case your heart stops beating or you stop breathing  It is a request not to have certain forms of treatment, such as CPR  A DNR order may be included in other types of advance directives  · Medical directive: This covers the care that you want if you are in a coma, near death, or unable to make decisions for yourself  You can list the treatments you want for each condition  Treatment may include pain medicine, surgery, blood transfusions, dialysis, IV or tube feedings, and a ventilator (breathing machine)  · Values history: This document has questions about your views, beliefs, and how you feel and think about life  This information can help others choose the care that you would choose  Why are advance directives important? An advance directive helps you control your care  Although spoken wishes may be used, it is better to have your wishes written down  Spoken wishes can be misunderstood, or not followed  Treatments may be given even if you do not want them  An advance directive may make it easier for your family to make difficult choices about your care  Weight Management   Why it is important to manage your weight:  Being overweight increases your risk of health conditions such as heart disease, high blood pressure, type 2 diabetes, and certain types of cancer   It can also increase your risk for osteoarthritis, sleep apnea, and other respiratory problems  Aim for a slow, steady weight loss  Even a small amount of weight loss can lower your risk of health problems  How to lose weight safely:  A safe and healthy way to lose weight is to eat fewer calories and get regular exercise  You can lose up about 1 pound a week by decreasing the number of calories you eat by 500 calories each day  Healthy meal plan for weight management:  A healthy meal plan includes a variety of foods, contains fewer calories, and helps you stay healthy  A healthy meal plan includes the following:  · Eat whole-grain foods more often  A healthy meal plan should contain fiber  Fiber is the part of grains, fruits, and vegetables that is not broken down by your body  Whole-grain foods are healthy and provide extra fiber in your diet  Some examples of whole-grain foods are whole-wheat breads and pastas, oatmeal, brown rice, and bulgur  · Eat a variety of vegetables every day  Include dark, leafy greens such as spinach, kale, ronnie greens, and mustard greens  Eat yellow and orange vegetables such as carrots, sweet potatoes, and winter squash  · Eat a variety of fruits every day  Choose fresh or canned fruit (canned in its own juice or light syrup) instead of juice  Fruit juice has very little or no fiber  · Eat low-fat dairy foods  Drink fat-free (skim) milk or 1% milk  Eat fat-free yogurt and low-fat cottage cheese  Try low-fat cheeses such as mozzarella and other reduced-fat cheeses  · Choose meat and other protein foods that are low in fat  Choose beans or other legumes such as split peas or lentils  Choose fish, skinless poultry (chicken or turkey), or lean cuts of red meat (beef or pork)  Before you cook meat or poultry, cut off any visible fat  · Use less fat and oil  Try baking foods instead of frying them  Add less fat, such as margarine, sour cream, regular salad dressing and mayonnaise to foods  Eat fewer high-fat foods   Some examples of high-fat foods include french fries, doughnuts, ice cream, and cakes  · Eat fewer sweets  Limit foods and drinks that are high in sugar  This includes candy, cookies, regular soda, and sweetened drinks  Exercise:  Exercise at least 30 minutes per day on most days of the week  Some examples of exercise include walking, biking, dancing, and swimming  You can also fit in more physical activity by taking the stairs instead of the elevator or parking farther away from stores  Ask your healthcare provider about the best exercise plan for you  © Copyright [employer] 2018 Information is for End User's use only and may not be sold, redistributed or otherwise used for commercial purposes   All illustrations and images included in CareNotes® are the copyrighted property of A D A M , Inc  or [address] [place], [place] [address]juve isamar

## 2022-03-23 NOTE — PROGRESS NOTES
Assessment/Plan:      Quality Measures:     BMI Counseling: Body mass index is 28 72 kg/m²  The BMI is above normal  Nutrition recommendations include decreasing portion sizes and encouraging healthy choices of fruits and vegetables  Exercise recommendations include moderate physical activity 150 minutes/week  Rationale for BMI follow-up plan is due to patient being overweight or obese  Depression Screening and Follow-up Plan: Clincally patient does not have depression  No treatment is required  Return in about 6 months (around 9/23/2022)  No problem-specific Assessment & Plan notes found for this encounter  Diagnoses and all orders for this visit:    Right elbow pain    Non-Hodgkin's lymphoma, unspecified body region, unspecified non-Hodgkin lymphoma type ([facility] )    Light chain disease, 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[facility] )    Type 2 diabetes mellitus with complication, without long-term current use of insulin ([facility])  -     CBC and differential; Future  -     Comprehensive metabolic panel; Future  -     Lipid Panel with Direct LDL reflex; Future  -     Hemoglobin A1C; Future    Coronary artery disease of native artery of native heart with stable angina pectoris ([facility] )  -     Lipid Panel with Direct LDL reflex; Future    Coronary artery disease involving native heart without angina pectoris, unspecified vessel or lesion type  -     clopidogrel (PLAVIX) 75 mg tablet; Take 1 tablet (75 mg total) by mouth daily    Medicare annual wellness visit, subsequent    Other orders  -     Multiple Vitamins-Minerals (PRESERVISION AREDS 2 PO); Take by mouth          Subjective:      Patient ID: Demian Reed is a 80 y o  female  Leann Soriano is here for medicare well visit  Reports elbow pain she had had a few days ago  She has been knitting and croqueting so she is contributing to that  Discussed maybe taking off a few days of these activities if it comes back  She can use a splint  I suspect a tendonitis      She has macular degeneration and is refuisng injections and understands this could lead to vision loss  Refilled a few medications  Labs are still pending        ALLERGIES:  Allergies   Allergen Reactions    Iodine - Food Allergy Other (See Comments)     IV CONTRAST DYE    Iv Dye  [Iodinated Diagnostic Agents]     Other        CURRENT MEDICATIONS:    Current Outpatient Medications:     amLODIPine (NORVASC) 2 5 mg tablet, Take 1 tablet (2 5 mg total) by mouth daily, Disp: 90 tablet, Rfl: 2    aspirin 81 mg chewable tablet, Chew 81 mg, Disp: , Rfl:     atorvastatin (LIPITOR) 40 mg tablet, Take 1 tablet (40 mg total) by mouth daily, Disp: 90 tablet, Rfl: 3    baclofen 10 mg tablet, TAKE 1 TABLET BY MOUTH NIGHTLY AT BEDTIME AS NEEDED AS DIRECTED, Disp: , Rfl: 3    carvedilol (COREG) 12 5 mg tablet, Take 1 tablet (12 5 mg total) by mouth 2 (two) times a day with meals, Disp: 180 tablet, Rfl: 1    Cholecalciferol (VITAMIN D3) 2000 units capsule, Take 1 capsule (2,000 Units total) by mouth daily, Disp: 100 capsule, Rfl: 2    clopidogrel (PLAVIX) 75 mg tablet, Take 1 tablet (75 mg total) by mouth daily, Disp: 90 tablet, Rfl: 5    FLUoxetine (PROzac) 10 mg capsule, fluoxetine 10 mg capsule, Disp: , Rfl:     latanoprost (XALATAN) 0 005 % ophthalmic solution, INSTILL 1 DROP INTO EACH EYE AT BEDTIME, Disp: 9 mL, Rfl: 0    levothyroxine 50 mcg tablet, Take 1 tablet (50 mcg total) by mouth daily, Disp: 90 tablet, Rfl: 2    lidocaine (Lidoderm) 5 %, Apply 1 patch topically daily Remove & Discard patch within 12 hours or as directed by MD, Disp: 30 patch, Rfl: 0    losartan (COZAAR) 100 MG tablet, Take 1 tablet (100 mg total) by mouth daily, Disp: 90 tablet, Rfl: 3    Multiple Vitamins-Minerals (PRESERVISION AREDS 2 PO), Take by mouth, Disp: , Rfl:     pioglitazone (ACTOS) 30 mg tablet, Take 1 tablet (30 mg total) by mouth daily, Disp: 90 tablet, Rfl: 2    zolpidem (AMBIEN) 5 mg tablet, Take 5 mg by mouth, Disp: , Rfl:    denosumab (PROLIA) 60 mg/mL, Inject 1 mL (60 mg total) under the skin once for 1 dose (Patient not taking: Reported on 9/14/2021), Disp: 1 mL, Rfl: 0    Current Facility-Administered Medications:     cyanocobalamin injection 1,000 mcg, 1,000 mcg, Intramuscular, Q30 Days, Jaqui Gomez DO, 1,000 mcg at 02/27/19 1250    ACTIVE PROBLEM LIST:  Patient Active Problem List   Diagnosis    Coronary artery disease of native artery of native heart with stable angina pectoris [facility]    Depression    Esophageal reflux    Hyperlipidemia    Hypertension    Hypothyroidism    Insomnia    Lumbar compression fracture (HCC)    Lumbar spinal stenosis    Normochromic normocytic anemia    Osteoarthritis of knee    Osteoporosis    Spinal stenosis    Type 2 diabetes mellitus ([facility] )    Vitamin B12 deficiency    Vitamin D deficiency    Screening for malignant neoplasm of breast    Health care maintenance    Screening mammogram, encounter for    Diabetic peripheral neuropathy ([facility] )    Greater trochanteric bursitis of left hip    Left sided sciatica    Triclonal gammopathy    Medicare annual wellness visit, subsequent    Adhesive capsulitis of shoulder    Neck pain    Light chain disease, 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[place] [address] )    Non-Hodgkin's lymphoma ([place] [address] )    Right hip pain    Trochanteric bursitis of right hip       PAST MEDICAL HISTORY:  Past Medical History:   Diagnosis Date    Cellulitis     last assessed - [id number]    Constipation     last assessed - [id number]    Diabetes mellitus [facility]     Disease of thyroid gland     Ecchymosis     last assessed - [id number]    Fall     last assessed - [id number]    Hyperlipidemia     Hypertension     Hypokalemia     last assessed - [id number]    Lower extremity weakness     last assessed - [id number]    Vitamin D deficiency     last assessed - [id number]       PAST SURGICAL HISTORY:  Past Surgical History:   Procedure Laterality Date    BACK SURGERY      CHOLECYSTECTOMY      COLONOSCOPY      CORONARY ANGIOPLASTY WITH STENT PLACEMENT      CT BONE MARROW BIOPSY AND ASPIRATION  1/15/2020    HYSTERECTOMY      OTHER SURGICAL HISTORY      Previous stent placement       FAMILY HISTORY:  Family History   Problem Relation Age of Onset    Heart disease Mother     Other Father         cerebral embolism - with cerebral infarction       SOCIAL HISTORY:  Social History     Socioeconomic History    Marital status: /Civil Union     Spouse name: Not on file    Number of children: Not on file    Years of education: Not on file    Highest education level: Not on file   Occupational History    Not on file   Tobacco Use    Smoking status: Former Smoker     Start date: 5/7/1958    Smokeless tobacco: Never Used   Vaping Use    Vaping Use: Never used   Substance and Sexual Activity    Alcohol use: No    Drug use: No    Sexual activity: Never     Partners: Male   Other Topics Concern    Not on file   Social History Narrative    Caffeine use     Social Determinants of Health     Financial Resource Strain: Not on file   Food Insecurity: Not on file   Transportation Needs: Not on file   Physical Activity: Not on file   Stress: Not on file   Social Connections: Not on file   Intimate Partner Violence: Not on file   Housing Stability: Not on file       Review of Systems   Eyes: Positive for visual disturbance  Musculoskeletal: Positive for myalgias  All other systems reviewed and are negative  Objective:  Vitals:    03/23/22 1311   BP: 122/76   BP Location: Left arm   Patient Position: Sitting   Cuff Size: Large   Pulse: 75   Temp: 98 °F (36 7 °C)   TempSrc: Temporal   SpO2: 96%   Weight: 71 2 kg (157 lb)   Height: 5' 2" (1 575 m)     Body mass index is 28 72 kg/m²  Physical Exam  Vitals and nursing note reviewed  Constitutional:       Appearance: Normal appearance  HENT:      Head: Normocephalic and atraumatic        Right Ear: Tympanic membrane normal       Left Ear: Tympanic membrane normal       Mouth/Throat:      Mouth: Mucous membranes are moist       Pharynx: Oropharynx is clear  Cardiovascular:      Rate and Rhythm: Normal rate and regular rhythm  Heart sounds: Normal heart sounds  Pulmonary:      Effort: Pulmonary effort is normal       Breath sounds: Normal breath sounds  Abdominal:      Palpations: Abdomen is soft  Musculoskeletal:         General: Normal range of motion  Cervical back: Normal range of motion  Right lower leg: No edema  Left lower leg: No edema  Lymphadenopathy:      Cervical: No cervical adenopathy  Skin:     General: Skin is warm and dry  Neurological:      General: No focal deficit present  Mental Status: She is alert and oriented to person, place, and time  Mental status is at baseline  Psychiatric:         Mood and Affect: Mood normal          Behavior: Behavior normal            RESULTS:    No results found for this or any previous visit (from the past 1008 hour(s))  This note was created with voice recognition software  Phonic, grammatical and spelling errors may be present within the note as a result

## 2022-05-09 DIAGNOSIS — E78.5 HYPERLIPIDEMIA, UNSPECIFIED HYPERLIPIDEMIA TYPE: ICD-10-CM

## 2022-05-09 DIAGNOSIS — E11.8 TYPE 2 DIABETES MELLITUS WITH COMPLICATION, WITHOUT LONG-TERM CURRENT USE OF INSULIN (HCC): ICD-10-CM

## 2022-05-09 RX ORDER — PIOGLITAZONEHYDROCHLORIDE 30 MG/1
30 TABLET ORAL DAILY
Qty: 90 TABLET | Refills: 1 | Status: SHIPPED | OUTPATIENT
Start: 2022-05-09

## 2022-06-15 DIAGNOSIS — I10 HYPERTENSION, UNSPECIFIED TYPE: ICD-10-CM

## 2022-06-15 RX ORDER — AMLODIPINE BESYLATE 2.5 MG/1
2.5 TABLET ORAL DAILY
Qty: 90 TABLET | Refills: 1 | Status: SHIPPED | OUTPATIENT
Start: 2022-06-15 | End: 2022-06-15 | Stop reason: SDUPTHER

## 2022-06-15 RX ORDER — AMLODIPINE BESYLATE 2.5 MG/1
2.5 TABLET ORAL DAILY
Qty: 90 TABLET | Refills: 1 | Status: SHIPPED | OUTPATIENT
Start: 2022-06-15

## 2022-06-16 ENCOUNTER — OFFICE VISIT (OUTPATIENT)
Dept: PAIN MEDICINE | Facility: CLINIC | Age: 87
End: 2022-06-16
Payer: COMMERCIAL

## 2022-06-16 VITALS
BODY MASS INDEX: 28.74 KG/M2 | HEART RATE: 60 BPM | WEIGHT: 156.2 LBS | DIASTOLIC BLOOD PRESSURE: 78 MMHG | HEIGHT: 62 IN | SYSTOLIC BLOOD PRESSURE: 173 MMHG

## 2022-06-16 DIAGNOSIS — M54.50 CHRONIC BILATERAL LOW BACK PAIN WITHOUT SCIATICA: ICD-10-CM

## 2022-06-16 DIAGNOSIS — G89.4 CHRONIC PAIN SYNDROME: Primary | ICD-10-CM

## 2022-06-16 DIAGNOSIS — M46.1 SACROILIITIS (HCC): ICD-10-CM

## 2022-06-16 DIAGNOSIS — G89.29 CHRONIC BILATERAL LOW BACK PAIN WITHOUT SCIATICA: ICD-10-CM

## 2022-06-16 PROCEDURE — 99214 OFFICE O/P EST MOD 30 MIN: CPT

## 2022-06-16 NOTE — PROGRESS NOTES
Pain Medicine Follow-Up Note    Assessment:  1  Chronic pain syndrome    2  Sacroiliitis ([address] )    3  Chronic bilateral low back pain without sciatica        Plan:  No orders of the defined types were placed in this encounter  No orders of the defined types were placed in this encounter  My impressions and treatment recommendations were discussed in detail with the patient who verbalized understanding and had no further questions  The patient is an 75-year-old female with a history of chronic pain secondary to bilateral low back pain and sacroiliitis who presents to the office with worsening bilateral low back pain  On exam, the patient has positive provocative maneuvers for sacroiliitis, therefore I will order bilateral SI joint injections to be done as she receives significant relief of her right-sided low back pain when she had the right SI joint injection done on 01/12/2022  I instructed our  will call to schedule her  Complete risks and benefits including bleeding, infection, tissue reaction, nerve injury and allergic reaction were discussed  The approach was demonstrated using models and literature was provided  Verbal and written consent was obtained  Instructed patient she can continue with her current pain medication regimen of Tylenol and Salonpas as that does provide her mild relief  Follow-up is planned postprocedure or sooner as warranted  Discharge instructions were provided  I personally saw and examined the patient and I agree with the above discussed plan of care  History of Present Illness:    Fletcher Deras is a 80 y o  female with a history of chronic pain secondary to bilateral low back pain and sacroiliitis who presents to [facility] for interval re-evaluation of the above stated pain complaints   She was last seen on 01/12/2022 where she underwent a right-sided SI joint injection which provided her relief of her right-sided low back pain for approximately 6 months before her pain started to return  She presents to the office with worsening bilateral low back pain  She states her pain is worse in the last office visit and intermittent but worse in the morning  She rates the quality of her pain as sharp and is currently reading at a 9/10 on a numeric scale  She is currently taking Tylenol as needed along with using Salonpas which is providing her mild relief  Other than as stated above, the patient denies any interval changes in medications, medical condition, mental condition, symptoms, or allergies since the last office visit  Review of Systems:    Review of Systems   Respiratory: Negative for shortness of breath  Cardiovascular: Negative for chest pain  Gastrointestinal: Negative for constipation, diarrhea, nausea and vomiting  Musculoskeletal: Positive for gait problem  Negative for arthralgias, joint swelling and myalgias  Skin: Negative for rash  Neurological: Negative for dizziness, seizures and weakness  All other systems reviewed and are negative          Patient Active Problem List   Diagnosis    Coronary artery disease of native artery of native heart with stable angina pectoris ([facility] )    Depression    Esophageal reflux    Hyperlipidemia    Hypertension    Hypothyroidism    Insomnia    Lumbar compression fracture (HCC)    Lumbar spinal stenosis    Normochromic normocytic anemia    Osteoarthritis of knee    Osteoporosis    Spinal stenosis    Type 2 diabetes mellitus ([facility] )    Vitamin B12 deficiency    Vitamin D deficiency    Screening for malignant neoplasm of breast    Health care maintenance    Screening mammogram, encounter for    Diabetic peripheral neuropathy ([facility] )    Greater trochanteric bursitis of left hip    Left sided sciatica    Triclonal gammopathy    Medicare annual wellness visit, subsequent    Adhesive capsulitis of shoulder    Neck pain    Light chain disease, 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[facility] )    Non-Hodgkin's lymphoma ([facility] )    Right hip pain    Trochanteric bursitis of right hip       Past Medical History:   Diagnosis Date    Cellulitis     last assessed - [id number]    Constipation     last assessed - [id number]    Diabetes mellitus ([facility] )     Disease of thyroid gland     Ecchymosis     last assessed - [id number]    Fall     last assessed - [id number]    Hyperlipidemia     Hypertension     Hypokalemia     last assessed - [id number]    Lower extremity weakness     last assessed - [id number]    Vitamin D deficiency     last assessed - [id number]       Past Surgical History:   Procedure Laterality Date    BACK SURGERY      CHOLECYSTECTOMY      COLONOSCOPY      CORONARY ANGIOPLASTY WITH STENT PLACEMENT      CT BONE MARROW BIOPSY AND ASPIRATION  1/15/2020    HYSTERECTOMY      OTHER SURGICAL HISTORY      Previous stent placement       Family History   Problem Relation Age of Onset    Heart disease Mother     Other Father         cerebral embolism - with cerebral infarction       Social History     Occupational History    Not on file   Tobacco Use    Smoking status: Former Smoker     Start date: 5/7/1958    Smokeless tobacco: Never Used   Vaping Use    Vaping Use: Never used   Substance and Sexual Activity    Alcohol use: No    Drug use: No    Sexual activity: Never     Partners: Male         Current Outpatient Medications:     amLODIPine (NORVASC) 2 5 mg tablet, Take 1 tablet (2 5 mg total) by mouth daily, Disp: 90 tablet, Rfl: 1    aspirin 81 mg chewable tablet, Chew 81 mg, Disp: , Rfl:     atorvastatin (LIPITOR) 40 mg tablet, Take 1 tablet (40 mg total) by mouth daily, Disp: 90 tablet, Rfl: 3    baclofen 10 mg tablet, TAKE 1 TABLET BY MOUTH NIGHTLY AT BEDTIME AS NEEDED AS DIRECTED, Disp: , Rfl: 3    carvedilol (COREG) 12 5 mg tablet, Take 1 tablet (12 5 mg total) by mouth 2 (two) times a day with meals, Disp: 180 tablet, Rfl: 1    Cholecalciferol (VITAMIN D3) 2000 units capsule, Take 1 capsule (2,000 Units total) by mouth daily, Disp: 100 capsule, Rfl: 2    clopidogrel (PLAVIX) 75 mg tablet, Take 1 tablet (75 mg total) by mouth daily, Disp: 90 tablet, Rfl: 5    latanoprost (XALATAN) 0 005 % ophthalmic solution, INSTILL 1 DROP INTO EACH EYE AT BEDTIME, Disp: 9 mL, Rfl: 0    levothyroxine 50 mcg tablet, Take 1 tablet (50 mcg total) by mouth daily, Disp: 90 tablet, Rfl: 2    lidocaine (Lidoderm) 5 %, Apply 1 patch topically daily Remove & Discard patch within 12 hours or as directed by MD, Disp: 30 patch, Rfl: 0    losartan (COZAAR) 100 MG tablet, Take 1 tablet (100 mg total) by mouth daily, Disp: 90 tablet, Rfl: 3    Multiple Vitamins-Minerals (PRESERVISION AREDS 2 PO), Take by mouth, Disp: , Rfl:     pioglitazone (ACTOS) 30 mg tablet, Take 1 tablet (30 mg total) by mouth daily, Disp: 90 tablet, Rfl: 1    zolpidem (AMBIEN) 5 mg tablet, Take 5 mg by mouth, Disp: , Rfl:     denosumab (PROLIA) 60 mg/mL, Inject 1 mL (60 mg total) under the skin once for 1 dose (Patient not taking: Reported on 9/14/2021), Disp: 1 mL, Rfl: 0    FLUoxetine (PROzac) 10 mg capsule, fluoxetine 10 mg capsule (Patient not taking: Reported on 6/16/2022), Disp: , Rfl:     Current Facility-Administered Medications:     cyanocobalamin injection 1,000 mcg, 1,000 mcg, Intramuscular, Q30 Days, Sahile Rekha, DO, 1,000 mcg at 02/27/19 1250    Allergies   Allergen Reactions    Iodine - Food Allergy Other (See Comments)     IV CONTRAST DYE    Iv Dye  [Iodinated Diagnostic Agents]     Other        Physical Exam:    BP (!) 173/78 (BP Location: Left arm, Patient Position: Sitting, Cuff Size: Standard)   Pulse 60   Ht 5' 2" (1 575 m)   Wt 70 9 kg (156 lb 3 2 oz)   BMI 28 57 kg/m²     Constitutional:normal, well developed, well nourished, alert, in no distress and non-toxic and no overt pain behavior    Eyes:anicteric  HEENT:grossly intact  Neck:supple, symmetric, trachea midline and no masses Pulmonary:even and unlabored  Cardiovascular:No edema or pitting edema present  Skin:Normal without rashes or lesions and well hydrated  Psychiatric:Mood and affect appropriate  Neurologic:Cranial Nerves II-XII grossly intact  Musculoskeletal:antalgic     Lumbar Spine Exam    Appearance:  Normal lordosis  Palpation/Tenderness:  left sacroiliac joint tenderness  right sacroiliac joint tenderness  Sensory:  no sensory deficits noted  Range of Motion:  Flexion:  Minimally limited  with pain  Extension:  Minimally limited  with pain  Motor Strength:  Left hip flexion:  5/5  Right hip flexion:  5/5  Left knee flexion:  5/5  Left knee extension:  5/5  Right knee flexion:  5/5  Right knee extension:  5/5  Left foot dorsiflexion:  5/5  Left foot plantar flexion:  5/5  Right foot dorsiflexion:  5/5  Right foot plantar flexion:  5/5  Special Tests:  Left Straight Leg Test:  positive  Right Straight Leg Test:  positive  Left Ravi's Maneuver:  positive  Right Ravi's Maneuver:  positive  Left Gaenslen's Test:  positive  Right Gaenslen's Test:  positive        Imaging  FL spine and pain procedure    (Results Pending)         No orders of the defined types were placed in this encounter

## 2022-06-22 ENCOUNTER — TELEPHONE (OUTPATIENT)
Dept: PAIN MEDICINE | Facility: CLINIC | Age: 87
End: 2022-06-22

## 2022-06-22 NOTE — TELEPHONE ENCOUNTER
Pt called in to schedule a procedure  Please be  advised thank you    Pt can be reached @ [phone]

## 2022-06-23 NOTE — TELEPHONE ENCOUNTER
Per staff message from LAURIE Deng I didn't assess any of the others  If she needs another, she would need to come back and see me so I can assess them  S/w pt and advised will need to bring her back to office to do additional SIJ exams, scheduled for 6/28/22 730 am w/Dr Dl Chapin  Any recommendations for pain until she can have SI Joint injection?

## 2022-06-23 NOTE — TELEPHONE ENCOUNTER
Patient is requesting to schedule her procedure  She's experiencing a lot of lower back & leg pain & would like to speak to a nurse asap   Please reach out to her at # [phone]

## 2022-06-23 NOTE — TELEPHONE ENCOUNTER
S/W pt and advised of same  Pt on Plavix so does not want to take NSAID because her Dr told her not to take any    Will discuss further with Dr Melecio Gómez on Tues

## 2022-06-23 NOTE — TELEPHONE ENCOUNTER
She can continue taking Tylenol and Salonpas since they were providing her mild relief    She can also take an anti-inflammatory such as ibuprofen, Advil or Aleve to provide her more relief

## 2022-06-24 DIAGNOSIS — M54.16 LUMBAR RADICULOPATHY: Primary | ICD-10-CM

## 2022-06-24 RX ORDER — METHYLPREDNISOLONE 4 MG/1
TABLET ORAL
Qty: 1 EACH | Refills: 0 | Status: SHIPPED | OUTPATIENT
Start: 2022-06-24 | End: 2022-08-15

## 2022-06-24 NOTE — TELEPHONE ENCOUNTER
S/w pt's daughter Edie per OSWALDO  States pt lives alone and is in a lot of pain  Edie does not want medication due to fearful of side effects and pt being alone  Would be agreeable to steroid mango but aware that would delay injection  Injection does not meet requirement for insurance auth as per below  Pt did schedule soonest ov 6/28 at 730AM with Dr Doug Heard but daughter states she travels a great distance and it would be hard to make this appt at that time  There is nothing else available  States pt is taking tylenol 1000mg tid and using lidocaine patches  States can not take NSAIDs due to blood thinner  Should procedure  submit request and go to peer to peer,  or keep ov with Dr Doug Heard 6/28 for further exam?  Any further recs at this time?  First available injection appt is 7/6

## 2022-06-24 NOTE — TELEPHONE ENCOUNTER
Ashtyn Lewis daughter of PT  is on the phone requesting to speak with a nurse  In regard to the procedure  Please be advised thank you    Pt can be reached @ [phone]

## 2022-06-24 NOTE — TELEPHONE ENCOUNTER
She should follow-up with Dr Anya Adams so he can assess for the 3rd provocative remove her that insurance requires  I will place an order for a steroid pack in hopes to decrease inflammation and provide her some relief of her pain

## 2022-06-24 NOTE — TELEPHONE ENCOUNTER
Advised daughter of same  She will discuss with pt  An appt became available with Klebereran Cline on 6/30 at 1130  Offered this to daughter as well  Daughter unsure if pt would want to change appt    Daughter to cb

## 2022-06-27 NOTE — H&P (VIEW-ONLY)
Pain Medicine Follow-Up Note  Scribe Attestation    I,:  LAURIE Fernandes am acting as a scribe while in the presence of the attending physician :       I,:  Rashida Amaral MD personally performed the services described in this documentation    as scribed in my presence :         Assessment:  1  Chronic pain syndrome    2  Chronic bilateral low back pain without sciatica    3  Sacroiliitis ([address] )        Plan:  My impressions and treatment recommendations were discussed in detail with the patient who verbalized understanding and had no further questions  Patient presents back in office due to insurance requiring additional positive maneuvers in order to get SI joint injection approval   Patient now has 5 positive provocative maneuvers that cause pain that cause pain in the sacroiliac joint bilaterally  The patient has bilateral sacroiliac compression testing positive on my examination today  She also has bilateral sacroiliac distraction testing positive on my examination today  Thigh thrust is positive bilaterally on my examination today  ELMER testing is positive at today's visit  At previous visit 1 week ago, she had positive Gaenslen bilaterally  She is unequivocally positive for bilateral sacroiliitis and we will proceed with bilateral sacroiliac joint injections at this time  Complete risks and benefits including bleeding, infection, tissue reaction, nerve injury and allergic reaction were discussed  The approach was demonstrated using models and literature was provided  Verbal and written consent was obtained  Follow-up is planned in 4 weeks post joint injection or sooner as warranted  Discharge instructions were provided  I personally saw and examined the patient and I agree with the above discussed plan of care      History of Present Illness:    Lucille Faust is a 80 y o  female who presents to [facility] for interval re-evaluation of the above stated pain complaints  The patient has a past medical and chronic pain history as outlined in the assessment section  She was last seen on 06/16/2022 at that time the patient had 3 provocative maneuvers positive including bilateral straight leg (above 60 degrees), bilateral ELMER maneuver, and bilateral Gaenslen's test   Patient was then scheduled for bilateral SI joint injections at which time her insurance company requested that she return back to our office for additional examination  At today's visit patient's pain is 9/10 on the verbal numerical pain score located in her lower back  Her pain is worse in the morning  The pain is intermittent in nature, and the quality of her pain is dull aching and sharp  Patient had pain with bilateral SI joint distraction, bilateral SI joint compression, positive bilateral ELMER, and positive bilateral thigh thrust   Patient now has had 5 provocative maneuvers eliciting pain in the sacral iliac joint  Other than as stated above, the patient denies any interval changes in medications, medical condition, mental condition, symptoms, or allergies since the last office visit  Review of Systems:    Review of Systems   Respiratory: Negative for shortness of breath  Cardiovascular: Negative for chest pain  Gastrointestinal: Negative for constipation, diarrhea, nausea and vomiting  Musculoskeletal: Positive for gait problem  Negative for arthralgias, joint swelling and myalgias  Decreased ROM   Skin: Negative for rash  Neurological: Negative for dizziness, seizures and weakness  All other systems reviewed and are negative          Patient Active Problem List   Diagnosis    Coronary artery disease of native artery of native heart with stable angina pectoris ([facility] )    Depression    Esophageal reflux    Hyperlipidemia    Hypertension    Hypothyroidism    Insomnia    Lumbar compression fracture (HCC)    Lumbar spinal stenosis    Normochromic normocytic anemia    Osteoarthritis of knee    Osteoporosis    Spinal stenosis    Type 2 diabetes mellitus ([place] [address] )    Vitamin B12 deficiency    Vitamin D deficiency    Screening for malignant neoplasm of breast    Health care maintenance    Screening mammogram, encounter for    Diabetic peripheral neuropathy (HCC)    Greater trochanteric bursitis of left hip    Left sided sciatica    Triclonal gammopathy    Medicare annual wellness visit, subsequent    Adhesive capsulitis of shoulder    Neck pain    Light chain disease, 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[place] [address] )    Non-Hodgkin's lymphoma ([place] [address] )    Right hip pain    Trochanteric bursitis of right hip    Chronic pain syndrome    Chronic bilateral low back pain without sciatica    Sacroiliitis ([place] [address] )       Past Medical History:   Diagnosis Date    Cellulitis     last assessed - [id number]    Constipation     last assessed - [id number]    Diabetes mellitus ([place] [address] )     Disease of thyroid gland     Ecchymosis     last assessed - 03Jan2017    Fall     last assessed - [id number]    Hyperlipidemia     Hypertension     Hypokalemia     last assessed - 02Jun2015    Lower extremity weakness     last assessed - [id number]    Vitamin D deficiency     last assessed - [id number]       Past Surgical History:   Procedure Laterality Date    BACK SURGERY      CHOLECYSTECTOMY      COLONOSCOPY      CORONARY ANGIOPLASTY WITH STENT PLACEMENT      CT BONE MARROW BIOPSY AND ASPIRATION  1/15/2020    HYSTERECTOMY      OTHER SURGICAL HISTORY      Previous stent placement       Family History   Problem Relation Age of Onset    Heart disease Mother     Other Father         cerebral embolism - with cerebral infarction       Social History     Occupational History    Not on file   Tobacco Use    Smoking status: Former Smoker     Start date: 5/7/1958    Smokeless tobacco: Never Used   Vaping Use    Vaping Use: Never used   Substance and Sexual Activity    Alcohol use: No    Drug use: No    Sexual activity: Never     Partners: Male         Current Outpatient Medications:     amLODIPine (NORVASC) 2 5 mg tablet, Take 1 tablet (2 5 mg total) by mouth daily, Disp: 90 tablet, Rfl: 1    aspirin 81 mg chewable tablet, Chew 81 mg, Disp: , Rfl:     atorvastatin (LIPITOR) 40 mg tablet, Take 1 tablet (40 mg total) by mouth daily, Disp: 90 tablet, Rfl: 3    baclofen 10 mg tablet, TAKE 1 TABLET BY MOUTH NIGHTLY AT BEDTIME AS NEEDED AS DIRECTED, Disp: , Rfl: 3    carvedilol (COREG) 12 5 mg tablet, Take 1 tablet (12 5 mg total) by mouth 2 (two) times a day with meals, Disp: 180 tablet, Rfl: 1    Cholecalciferol (VITAMIN D3) 2000 units capsule, Take 1 capsule (2,000 Units total) by mouth daily, Disp: 100 capsule, Rfl: 2    clopidogrel (PLAVIX) 75 mg tablet, Take 1 tablet (75 mg total) by mouth daily, Disp: 90 tablet, Rfl: 5    latanoprost (XALATAN) 0 005 % ophthalmic solution, INSTILL 1 DROP INTO EACH EYE AT BEDTIME, Disp: 9 mL, Rfl: 0    levothyroxine 50 mcg tablet, Take 1 tablet (50 mcg total) by mouth daily, Disp: 90 tablet, Rfl: 2    lidocaine (Lidoderm) 5 %, Apply 1 patch topically daily Remove & Discard patch within 12 hours or as directed by MD, Disp: 30 patch, Rfl: 0    losartan (COZAAR) 100 MG tablet, Take 1 tablet (100 mg total) by mouth daily, Disp: 90 tablet, Rfl: 3    methylPREDNISolone 4 MG tablet therapy pack, Use as directed on package, Disp: 1 each, Rfl: 0    Multiple Vitamins-Minerals (PRESERVISION AREDS 2 PO), Take by mouth, Disp: , Rfl:     pioglitazone (ACTOS) 30 mg tablet, Take 1 tablet (30 mg total) by mouth daily, Disp: 90 tablet, Rfl: 1    zolpidem (AMBIEN) 5 mg tablet, Take 5 mg by mouth, Disp: , Rfl:     denosumab (PROLIA) 60 mg/mL, Inject 1 mL (60 mg total) under the skin once for 1 dose (Patient not taking: Reported on 9/14/2021), Disp: 1 mL, Rfl: 0    FLUoxetine (PROzac) 10 mg capsule, fluoxetine 10 mg capsule (Patient not taking: No sig reported), Disp: , Rfl:     Current Facility-Administered Medications:     cyanocobalamin injection 1,000 mcg, 1,000 mcg, Intramuscular, Q30 Days, Alo Morrison , 1,000 mcg at 02/27/19 1250    Allergies   Allergen Reactions    Iodine - Food Allergy Other (See Comments)     IV CONTRAST DYE    Iv Dye  [Iodinated Diagnostic Agents]     Other        Physical Exam:    BP (!) 189/83 (BP Location: Left arm, Patient Position: Sitting, Cuff Size: Standard)   Pulse 63   Ht 5' 2" (1 575 m)   Wt 69 9 kg (154 lb 3 2 oz)   BMI 28 20 kg/m²     Constitutional:normal, well developed, well nourished, alert, in no distress and non-toxic and no overt pain behavior  Eyes:anicteric  HEENT:grossly intact  Neck:supple, symmetric, trachea midline and no masses   Pulmonary:even and unlabored  Cardiovascular:No edema or pitting edema present  Skin:Normal without rashes or lesions and well hydrated  Psychiatric:Mood and affect appropriate and upset with tears in eyes    Neurologic:Cranial Nerves II-XII grossly intact  Musculoskeletal:antalgic     Lumbar Spine Exam    Appearance:  Normal lordosis  Palpation/Tenderness:  left sacroiliac joint tenderness  right sacroiliac joint tenderness  Sensory:  no sensory deficits noted  Special Tests:  Left Pelvic Distraction Test:  positive  Right Pelvic Distraction Test:  positive   Left Pelvic Compression Test: positive  Right Pelvic Compression Test: positive  Left ELMER Test: positive  Right ELMER Test: positive  Left Thigh Thrust: positive  Right Thigh Thrust: positive

## 2022-06-27 NOTE — PROGRESS NOTES
Pain Medicine Follow-Up Note  Scribe Attestation    I,:  LAURIE Mora am acting as a scribe while in the presence of the attending physician :       I,:  Ibis Olivares MD personally performed the services described in this documentation    as scribed in my presence :         Assessment:  1  Chronic pain syndrome    2  Chronic bilateral low back pain without sciatica    3  Sacroiliitis ([address] )        Plan:  My impressions and treatment recommendations were discussed in detail with the patient who verbalized understanding and had no further questions  Patient presents back in office due to insurance requiring additional positive maneuvers in order to get SI joint injection approval   Patient now has 5 positive provocative maneuvers that cause pain that cause pain in the sacroiliac joint bilaterally  The patient has bilateral sacroiliac compression testing positive on my examination today  She also has bilateral sacroiliac distraction testing positive on my examination today  Thigh thrust is positive bilaterally on my examination today  ELMER testing is positive at today's visit  At previous visit 1 week ago, she had positive Gaenslen bilaterally  She is unequivocally positive for bilateral sacroiliitis and we will proceed with bilateral sacroiliac joint injections at this time  Complete risks and benefits including bleeding, infection, tissue reaction, nerve injury and allergic reaction were discussed  The approach was demonstrated using models and literature was provided  Verbal and written consent was obtained  Follow-up is planned in 4 weeks post joint injection or sooner as warranted  Discharge instructions were provided  I personally saw and examined the patient and I agree with the above discussed plan of care      History of Present Illness:    Walter Ochoa is a 80 y o  female who presents to [facility] for interval re-evaluation of the above stated pain complaints  The patient has a past medical and chronic pain history as outlined in the assessment section  She was last seen on 06/16/2022 at that time the patient had 3 provocative maneuvers positive including bilateral straight leg (above 60 degrees), bilateral ELMER maneuver, and bilateral Gaenslen's test   Patient was then scheduled for bilateral SI joint injections at which time her insurance company requested that she return back to our office for additional examination  At today's visit patient's pain is 9/10 on the verbal numerical pain score located in her lower back  Her pain is worse in the morning  The pain is intermittent in nature, and the quality of her pain is dull aching and sharp  Patient had pain with bilateral SI joint distraction, bilateral SI joint compression, positive bilateral ELMER, and positive bilateral thigh thrust   Patient now has had 5 provocative maneuvers eliciting pain in the sacral iliac joint  Other than as stated above, the patient denies any interval changes in medications, medical condition, mental condition, symptoms, or allergies since the last office visit  Review of Systems:    Review of Systems   Respiratory: Negative for shortness of breath  Cardiovascular: Negative for chest pain  Gastrointestinal: Negative for constipation, diarrhea, nausea and vomiting  Musculoskeletal: Positive for gait problem  Negative for arthralgias, joint swelling and myalgias  Decreased ROM   Skin: Negative for rash  Neurological: Negative for dizziness, seizures and weakness  All other systems reviewed and are negative          Patient Active Problem List   Diagnosis    Coronary artery disease of native artery of native heart with stable angina pectoris ([facility] [address] )    Depression    Esophageal reflux    Hyperlipidemia    Hypertension    Hypothyroidism    Insomnia    Lumbar compression fracture (HCC)    Lumbar spinal stenosis    Normochromic normocytic anemia    Osteoarthritis of knee    Osteoporosis    Spinal stenosis    Type 2 diabetes mellitus ([place] [address] )    Vitamin B12 deficiency    Vitamin D deficiency    Screening for malignant neoplasm of breast    Health care maintenance    Screening mammogram, encounter for    Diabetic peripheral neuropathy (HCC)    Greater trochanteric bursitis of left hip    Left sided sciatica    Triclonal gammopathy    Medicare annual wellness visit, subsequent    Adhesive capsulitis of shoulder    Neck pain    Light chain disease, 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[place] [address] )    Non-Hodgkin's lymphoma ([place] [address] )    Right hip pain    Trochanteric bursitis of right hip    Chronic pain syndrome    Chronic bilateral low back pain without sciatica    Sacroiliitis ([place] [address] )       Past Medical History:   Diagnosis Date    Cellulitis     last assessed - [id number]    Constipation     last assessed - [id number]    Diabetes mellitus ([place] [address] )     Disease of thyroid gland     Ecchymosis     last assessed - 03Jan2017    Fall     last assessed - [id number]    Hyperlipidemia     Hypertension     Hypokalemia     last assessed - 02Jun2015    Lower extremity weakness     last assessed - [id number]    Vitamin D deficiency     last assessed - [id number]       Past Surgical History:   Procedure Laterality Date    BACK SURGERY      CHOLECYSTECTOMY      COLONOSCOPY      CORONARY ANGIOPLASTY WITH STENT PLACEMENT      CT BONE MARROW BIOPSY AND ASPIRATION  1/15/2020    HYSTERECTOMY      OTHER SURGICAL HISTORY      Previous stent placement       Family History   Problem Relation Age of Onset    Heart disease Mother     Other Father         cerebral embolism - with cerebral infarction       Social History     Occupational History    Not on file   Tobacco Use    Smoking status: Former Smoker     Start date: 5/7/1958    Smokeless tobacco: Never Used   Vaping Use    Vaping Use: Never used   Substance and Sexual Activity    Alcohol use: No    Drug use: No    Sexual activity: Never     Partners: Male         Current Outpatient Medications:     amLODIPine (NORVASC) 2 5 mg tablet, Take 1 tablet (2 5 mg total) by mouth daily, Disp: 90 tablet, Rfl: 1    aspirin 81 mg chewable tablet, Chew 81 mg, Disp: , Rfl:     atorvastatin (LIPITOR) 40 mg tablet, Take 1 tablet (40 mg total) by mouth daily, Disp: 90 tablet, Rfl: 3    baclofen 10 mg tablet, TAKE 1 TABLET BY MOUTH NIGHTLY AT BEDTIME AS NEEDED AS DIRECTED, Disp: , Rfl: 3    carvedilol (COREG) 12 5 mg tablet, Take 1 tablet (12 5 mg total) by mouth 2 (two) times a day with meals, Disp: 180 tablet, Rfl: 1    Cholecalciferol (VITAMIN D3) 2000 units capsule, Take 1 capsule (2,000 Units total) by mouth daily, Disp: 100 capsule, Rfl: 2    clopidogrel (PLAVIX) 75 mg tablet, Take 1 tablet (75 mg total) by mouth daily, Disp: 90 tablet, Rfl: 5    latanoprost (XALATAN) 0 005 % ophthalmic solution, INSTILL 1 DROP INTO EACH EYE AT BEDTIME, Disp: 9 mL, Rfl: 0    levothyroxine 50 mcg tablet, Take 1 tablet (50 mcg total) by mouth daily, Disp: 90 tablet, Rfl: 2    lidocaine (Lidoderm) 5 %, Apply 1 patch topically daily Remove & Discard patch within 12 hours or as directed by MD, Disp: 30 patch, Rfl: 0    losartan (COZAAR) 100 MG tablet, Take 1 tablet (100 mg total) by mouth daily, Disp: 90 tablet, Rfl: 3    methylPREDNISolone 4 MG tablet therapy pack, Use as directed on package, Disp: 1 each, Rfl: 0    Multiple Vitamins-Minerals (PRESERVISION AREDS 2 PO), Take by mouth, Disp: , Rfl:     pioglitazone (ACTOS) 30 mg tablet, Take 1 tablet (30 mg total) by mouth daily, Disp: 90 tablet, Rfl: 1    zolpidem (AMBIEN) 5 mg tablet, Take 5 mg by mouth, Disp: , Rfl:     denosumab (PROLIA) 60 mg/mL, Inject 1 mL (60 mg total) under the skin once for 1 dose (Patient not taking: Reported on 9/14/2021), Disp: 1 mL, Rfl: 0    FLUoxetine (PROzac) 10 mg capsule, fluoxetine 10 mg capsule (Patient not taking: No sig reported), Disp: , Rfl:     Current Facility-Administered Medications:     cyanocobalamin injection 1,000 mcg, 1,000 mcg, Intramuscular, Q30 Days, Kyle Keith DO, 1,000 mcg at 02/27/19 1250    Allergies   Allergen Reactions    Iodine - Food Allergy Other (See Comments)     IV CONTRAST DYE    Iv Dye  [Iodinated Diagnostic Agents]     Other        Physical Exam:    BP (!) 189/83 (BP Location: Left arm, Patient Position: Sitting, Cuff Size: Standard)   Pulse 63   Ht 5' 2" (1 575 m)   Wt 69 9 kg (154 lb 3 2 oz)   BMI 28 20 kg/m²     Constitutional:normal, well developed, well nourished, alert, in no distress and non-toxic and no overt pain behavior  Eyes:anicteric  HEENT:grossly intact  Neck:supple, symmetric, trachea midline and no masses   Pulmonary:even and unlabored  Cardiovascular:No edema or pitting edema present  Skin:Normal without rashes or lesions and well hydrated  Psychiatric:Mood and affect appropriate and upset with tears in eyes    Neurologic:Cranial Nerves II-XII grossly intact  Musculoskeletal:antalgic     Lumbar Spine Exam    Appearance:  Normal lordosis  Palpation/Tenderness:  left sacroiliac joint tenderness  right sacroiliac joint tenderness  Sensory:  no sensory deficits noted  Special Tests:  Left Pelvic Distraction Test:  positive  Right Pelvic Distraction Test:  positive   Left Pelvic Compression Test: positive  Right Pelvic Compression Test: positive  Left ELMER Test: positive  Right ELMER Test: positive  Left Thigh Thrust: positive  Right Thigh Thrust: positive

## 2022-06-28 ENCOUNTER — OFFICE VISIT (OUTPATIENT)
Dept: PAIN MEDICINE | Facility: CLINIC | Age: 87
End: 2022-06-28
Payer: COMMERCIAL

## 2022-06-28 VITALS
BODY MASS INDEX: 28.37 KG/M2 | WEIGHT: 154.2 LBS | HEIGHT: 62 IN | DIASTOLIC BLOOD PRESSURE: 83 MMHG | HEART RATE: 63 BPM | SYSTOLIC BLOOD PRESSURE: 189 MMHG

## 2022-06-28 DIAGNOSIS — M46.1 SACROILIITIS (HCC): ICD-10-CM

## 2022-06-28 DIAGNOSIS — G89.4 CHRONIC PAIN SYNDROME: Primary | ICD-10-CM

## 2022-06-28 DIAGNOSIS — M54.50 CHRONIC BILATERAL LOW BACK PAIN WITHOUT SCIATICA: ICD-10-CM

## 2022-06-28 DIAGNOSIS — G89.29 CHRONIC BILATERAL LOW BACK PAIN WITHOUT SCIATICA: ICD-10-CM

## 2022-06-28 PROCEDURE — 1160F RVW MEDS BY RX/DR IN RCRD: CPT | Performed by: ANESTHESIOLOGY

## 2022-06-28 PROCEDURE — 99214 OFFICE O/P EST MOD 30 MIN: CPT | Performed by: ANESTHESIOLOGY

## 2022-06-28 PROCEDURE — 1036F TOBACCO NON-USER: CPT | Performed by: ANESTHESIOLOGY

## 2022-06-28 NOTE — TELEPHONE ENCOUNTER
Have auth for SI Joint injection  S/w pt and scheduled for 7/6/22 1015 am arrival  Gave pre procedure instructions

## 2022-06-28 NOTE — PATIENT INSTRUCTIONS
Sacroiliac Joint Injection      What is the sacroiliac joint and why is a sacroiliac joint injection helpful? The sacroiliac joint is a large joint in the region of your low back and buttocks  When the joint becomes painful it can cause pain in its immediate region or it can refer pain into your groin, abdomen or leg  A sacroiliac joint injection serves several purposes  First, by placing numbing medicine into the joint, the amount of immediate pain relief you experience will help confirm or deny the joint as a source of your pain  Additionally, the temporary pain relief of the numbing medicine may better allow a physical therapist to treat the joint  Also, steroid will serve to reduce any presumed inflammation within your joint and further assist the physical therapist or chiropractor, if necessary  The procedure serves both diagnostic and therapeutic purposes  What happens during the procedure? Lying on your stomach, the skin on your buttocks will be well cleaned  The physician will numb a small area of skin which stings for a few seconds  Next, the physician will use X-ray guidance to direct a very small needle into the joint, and then he will inject several drops of contrast dye to confirm that the medicine goes into the joint  Then, a small amount of numbing medicine and anti-inflammatory steroid will be slowly injected  What happens after the procedure? A dressing may be applied to the injection site  You will remain in the office for about 15-20 minutes and the nurse will monitor your blood pressure and pulse  The nurse will review your discharge instructions and you will be able to go home  You may experience numbness or weakness to the affected limb for a few hours after the procedure  If this happens do not walk without assistance  Your physician may refer you to a physical therapist while the anti-inflammatory steroid is still working      General Pre/Post Instructions  Eating  You may eat a light, but not full meal at least one hour before the procedure, unless receiving intravenous sedation  If you are an insulin dependent diabetic do not alter your normal food intake  Medications  Take your routine medications before the procedure (such as high blood pressure and diabetes medications) except for those that need to be discontinued five days before the procedure such as aspirin and all anti-inflammatory medications (e g  Motrin/Ibuprofen, Aleve, Relafen, Daypro)  These medicines may be re-started the day after the procedure  You may take your regular pain medicine as needed before/after the procedure  If you are taking Coumadin, Heparin, Lovenox, Plavix or Ticlid you must notify the office so that the timing of stopping these medications can be explained  Exercise  You must bring a  with you  You may return to your current level of activities the next day including return to work  Things that may Delay the Procedure  If you are on antibiotics please notify our office; we may delay the procedure  If you have an active infection or fever we will not do the procedure

## 2022-07-06 ENCOUNTER — HOSPITAL ENCOUNTER (OUTPATIENT)
Dept: RADIOLOGY | Facility: CLINIC | Age: 87
Discharge: HOME | End: 2022-07-06
Admitting: ANESTHESIOLOGY
Payer: COMMERCIAL

## 2022-07-06 VITALS
OXYGEN SATURATION: 94 % | TEMPERATURE: 97.3 F | RESPIRATION RATE: 18 BRPM | HEART RATE: 63 BPM | DIASTOLIC BLOOD PRESSURE: 96 MMHG | SYSTOLIC BLOOD PRESSURE: 162 MMHG

## 2022-07-06 DIAGNOSIS — M46.1 SACROILIITIS (HCC): ICD-10-CM

## 2022-07-06 PROCEDURE — 27096 INJECT SACROILIAC JOINT: CPT | Performed by: ANESTHESIOLOGY

## 2022-07-06 PROCEDURE — A9585 GADOBUTROL INJECTION: HCPCS | Performed by: ANESTHESIOLOGY

## 2022-07-06 RX ORDER — LIDOCAINE HYDROCHLORIDE 10 MG/ML
5 INJECTION, SOLUTION EPIDURAL; INFILTRATION; INTRACAUDAL; PERINEURAL ONCE
Status: DISCONTINUED | OUTPATIENT
Start: 2022-07-06 | End: 2022-07-10 | Stop reason: HOSPADM

## 2022-07-06 RX ORDER — METHYLPREDNISOLONE ACETATE 80 MG/ML
80 INJECTION, SUSPENSION INTRA-ARTICULAR; INTRALESIONAL; INTRAMUSCULAR; PARENTERAL; SOFT TISSUE ONCE
Status: COMPLETED | OUTPATIENT
Start: 2022-07-06 | End: 2022-07-06

## 2022-07-06 RX ORDER — BUPIVACAINE HCL/PF 2.5 MG/ML
5 VIAL (ML) INJECTION ONCE
Status: COMPLETED | OUTPATIENT
Start: 2022-07-06 | End: 2022-07-06

## 2022-07-06 RX ADMIN — Medication 5 ML: at 10:10

## 2022-07-06 RX ADMIN — METHYLPREDNISOLONE ACETATE 80 MG: 80 INJECTION, SUSPENSION INTRA-ARTICULAR; INTRALESIONAL; INTRAMUSCULAR; PARENTERAL; SOFT TISSUE at 10:10

## 2022-07-06 RX ADMIN — GADOBUTROL 1 ML: 604.72 INJECTION INTRAVENOUS at 10:10

## 2022-07-06 NOTE — INTERVAL H&P NOTE
Update: (This section must be completed if the H&P was completed greater than 24 hrs to procedure or admission)    H&P reviewed  After examining the patient, I find no changed to the H&P since it had been written  Patient re-evaluated   Accept as history and physical     Taqueria Sevilla MD/July 6, 2022/10:02 AM

## 2022-07-06 NOTE — DISCHARGE INSTR - LAB
Steroid Joint Injection   WHAT YOU NEED TO KNOW:   A steroid joint injection is a procedure to inject steroid medicine into a joint  Steroid medicine decreases pain and inflammation  The injection may also contain an anesthetic (numbing medicine) to decrease pain  It may be done to treat conditions such as arthritis, gout, or carpal tunnel syndrome  The injections may be given in your knee, ankle, shoulder, elbow, wrist, ankle or sacroiliac joint  Do not apply heat to any area that is numb  If you have discomfort or soreness at the injection site, you may apply ice today, 20 minutes on and 20 minutes off  Tomorrow you may use ice or warm, moist heat  Do not apply ice or heat directly to the skin  You may have an increase or change in the discomfort for 36-48 hours after your treatment  Apply ice and continue with any pain medicine you have been prescribed  Do not do anything strenuous today  You may shower, but no tub baths or hot tubs today  You may resume your normal activities tomorrow, but do not overdo it  Resume normal activities slowly when you are feeling better  If you experience redness, drainage or swelling at the injection site, or if you develop a fever above 100 degrees, please call [facility] at [phone] or go to the Emergency Room  Continue to take all routine medicines prescribed by your primary care physician unless otherwise instructed by our staff  Most blood thinners should be started again according to your regularly scheduled dosing  If you have any questions, please give our office a call  As no general anesthesia was used in today's procedure, you should not experience any side effects related to anesthesia  If you have a problem specifically related to your procedure, please call our office at [phone]  Problems not related to your procedure should be directed to your primary care physician

## 2022-07-13 ENCOUNTER — TELEPHONE (OUTPATIENT)
Dept: PAIN MEDICINE | Facility: CLINIC | Age: 87
End: 2022-07-13

## 2022-07-18 DIAGNOSIS — I25.10 CORONARY ARTERY DISEASE DUE TO CALCIFIED CORONARY LESION: ICD-10-CM

## 2022-07-18 DIAGNOSIS — I25.84 CORONARY ARTERY DISEASE DUE TO CALCIFIED CORONARY LESION: ICD-10-CM

## 2022-07-18 RX ORDER — CARVEDILOL 12.5 MG/1
12.5 TABLET ORAL 2 TIMES DAILY WITH MEALS
Qty: 180 TABLET | Refills: 1 | Status: SHIPPED | OUTPATIENT
Start: 2022-07-18

## 2022-08-04 DIAGNOSIS — E78.5 HYPERLIPIDEMIA, UNSPECIFIED HYPERLIPIDEMIA TYPE: ICD-10-CM

## 2022-08-04 DIAGNOSIS — E11.8 TYPE 2 DIABETES MELLITUS WITH COMPLICATION, WITHOUT LONG-TERM CURRENT USE OF INSULIN (HCC): ICD-10-CM

## 2022-08-04 RX ORDER — ATORVASTATIN CALCIUM 40 MG/1
40 TABLET, FILM COATED ORAL DAILY
Qty: 90 TABLET | Refills: 3 | Status: SHIPPED | OUTPATIENT
Start: 2022-08-04

## 2022-08-08 DIAGNOSIS — M46.1 SACROILIITIS (HCC): Primary | ICD-10-CM

## 2022-08-08 NOTE — TELEPHONE ENCOUNTER
S/w pt  7/6 pt had bilateral sacroiliac injection, pt reported pain is back again, been using salonpas and tylenol with little relief  Pt is open to get  another injection, pls advise

## 2022-08-08 NOTE — TELEPHONE ENCOUNTER
Patient states she is currently experiencing lumbosacral pain the same level 10/10 as before she got her last injection  She is requesting to speak to a nurse about her status & what she can do for pain relief   Please reach out to her at # [phone], thx

## 2022-08-08 NOTE — TELEPHONE ENCOUNTER
S/w pt  Pt is agreeable to schedule SI jt inj for October  Pls order  Advised patient to take tylenol up to 3,000 mg/24hrs  Unable to take Ibuprofen, pt on bld thinner

## 2022-08-08 NOTE — TELEPHONE ENCOUNTER
Unfortunately we can only repeat SI joint injections every 3 months, so she will need to wait until October

## 2022-08-10 ENCOUNTER — TELEPHONE (OUTPATIENT)
Dept: PAIN MEDICINE | Facility: CLINIC | Age: 87
End: 2022-08-10

## 2022-08-10 NOTE — TELEPHONE ENCOUNTER
What she currently taking for pain? She can take Tylenol up to 3000 mg per day  If she able to take anti-inflammatories?   We can try a Medrol Dosepak to decrease inflammation and provide her relief

## 2022-08-10 NOTE — TELEPHONE ENCOUNTER
Patient   [phone]  RODY Bonilla    Patient is calling in asking for medication for her pain  Since she isn't able to get another injection? Pain level is a 10 most of the time      [facility] [address]

## 2022-08-10 NOTE — TELEPHONE ENCOUNTER
S/w pt  Pt stated she took 3,000 mg /day tylenol already with no relief  She's agreeable to Medrol dose pack but is concerned of GI bleed  She said she takes tramadol before with relief, informed pt she needs ov for that  Pls advise

## 2022-08-10 NOTE — TELEPHONE ENCOUNTER
Yes, she would need an OV for any kind of opiate medication   I believe Kayla Gaviria has openings if she wants to be seen

## 2022-08-12 ENCOUNTER — OFFICE VISIT (OUTPATIENT)
Dept: PAIN MEDICINE | Facility: CLINIC | Age: 87
End: 2022-08-12
Payer: COMMERCIAL

## 2022-08-12 VITALS
WEIGHT: 154 LBS | HEART RATE: 56 BPM | SYSTOLIC BLOOD PRESSURE: 175 MMHG | RESPIRATION RATE: 18 BRPM | HEIGHT: 62 IN | BODY MASS INDEX: 28.34 KG/M2 | DIASTOLIC BLOOD PRESSURE: 76 MMHG

## 2022-08-12 DIAGNOSIS — M46.1 SACROILIITIS (HCC): ICD-10-CM

## 2022-08-12 DIAGNOSIS — G89.4 CHRONIC PAIN SYNDROME: Primary | ICD-10-CM

## 2022-08-12 DIAGNOSIS — M48.062 SPINAL STENOSIS OF LUMBAR REGION WITH NEUROGENIC CLAUDICATION: ICD-10-CM

## 2022-08-12 PROCEDURE — 99214 OFFICE O/P EST MOD 30 MIN: CPT

## 2022-08-12 PROCEDURE — 80305 DRUG TEST PRSMV DIR OPT OBS: CPT

## 2022-08-12 RX ORDER — TRAMADOL HYDROCHLORIDE 50 MG/1
50 TABLET ORAL DAILY
Qty: 30 TABLET | Refills: 1 | Status: SHIPPED | OUTPATIENT
Start: 2022-08-12

## 2022-08-12 NOTE — PROGRESS NOTES
Pain Medicine Follow-Up Note    Assessment:  1  Chronic pain syndrome    2  Sacroiliitis ([address] )    3  Spinal stenosis of lumbar region with neurogenic claudication        Plan:  Orders Placed This Encounter   Procedures    MM ALL_Prescribed Meds and Special Instructions     Order Specific Question:   [facility] Is BACLOFEN prescribed? Answer:   Yes     Order Specific Question:   [facility] Is FLUOXETINE Prescribed? Answer:   Yes     Order Specific Question:   [facility] Is LIDODERM prescribed? Answer:   Yes     Order Specific Question:   [facility] Is ATORVASTATIN prescribed? Answer:   Yes     Order Specific Question:   [place] Is TRAMADOL prescribed? Answer:   Yes     Order Specific Question:   [place] Is ZOLPIDEM Prescribed? Answer:    Yes    MM DT_Alprazolam Definitive Test    MM DT_Amphetamine Definitive Test    MM DT_Aripiprazole Definitive Test    MM DT_Bath Salts Definitive Test    MM DT_Buprenorphine Definitive Test    MM DT_Butalbital Definitive Test    MM DT_Clonazepam Definitive Test    MM DT_Clozapine Definitive Test    MM DT_Cocaine Definitive Test    MM DT_Codeine Definitive Test    MM DT_Desipramine Definitive Test    MM DT_Dextromethorphan Definitive Test    MM Diazepam Definitive Test    MM DT_Ethyl Glucuronide/Ethyl Sulfate Definitive Test    MM DT_Fentanyl Definitive Test    MM DT_Heroin Definitive Test    MM DT_Hydrocodone Definitive Test    MM DT_Hydromorphone Definitive Test    MM DT_Kratom Definitive Test    MM DT_Levorphanol Definitive Test    MM Lorazepam Definitive Test    MM DT_MDMA Definitive Test    MM DT_Meperidine Definitive Test    MM DT_Methadone Definitive Test    MM DT_Methamphetamine Definitive Test    MM DT_Morphine Definitive Test    MM DT_Olanzapine Definitive Test    MM DT_Oxazepam Definitive Test    MM DT_Oxycodone Definitive Test    MM DT_Oxymorphone Definitive Test    MM DT_Phenobarbital Definitive Test    MM DT_Phentermine Definitive Test    MM DT_Secobarbital Definitive Test    MM DT_Spice Definitive Test    MM DT_Tapentadol Definitive Test    MM DT_Temazapam Definitive Test    MM DT_THC Definitive Test    MM DT_Tramadol Definitive Test       New Medications Ordered This Visit   Medications    traMADol (Ultram) 50 mg tablet     Sig: Take 1 tablet (50 mg total) by mouth in the morning Do not refill prior to 9/11/2022     Dispense:  30 tablet     Refill:  1       My impressions and treatment recommendations were discussed in detail with the patient who verbalized understanding and had no further questions  Patient is an 59-year-old female that presents to the office stating that her pain is worse with a pain score of 9/10 on the verbal numeric pain scale  Patient follows up today from a bilateral joint injection that she had on 07/06/2022  Patient states that initially she felt great however after 1 month the pain returned again  Patient would like another sacroiliac joint injection however she can not have one until 3 months have passed since the previous one  Patient understands however she is been using Tylenol 3000 mg a day, over-the-counter Salonpas patches without much relief  In the past the patient has used tramadol for pain  At this time I find it appropriate to prescribe the patient tramadol 50 mg tablet take 1 tablet daily as needed for pain this prescription was sent to the patient's pharmacy with a do not refill date of 09/11/2022  Patient was educated on our opioid agreement  Patient is in agreement with this plan  [place] Prescription Drug Monitoring Program report was reviewed and was appropriate     A urine drug screen was collected at today's office visit as part of our medication management protocol  The point of care testing results were appropriate for what was being prescribed  The specimen will be sent for confirmatory testing   The drug screen is medically necessary because the patient is either dependent on opioid medication or is being considered for opioid medication therapy and the results could impact ongoing or future treatment  The drug screen is to evaluate for the presences or absence of prescribed, non-prescribed, and/or illicit drugs/substances  There are risks associated with opioid medications, including dependence, addiction and tolerance  The patient understands and agrees to use these medications only as prescribed  Potential side effects of the medications include, but are not limited to, constipation, drowsiness, addiction, impaired judgment and risk of fatal overdose if not taken as prescribed  The patient was warned against driving while taking sedation medications  Sharing medications is a felony  At this point in time, the patient is showing no signs of addiction, abuse, diversion or suicidal ideation  Follow-up is planned in 8 weeks time or sooner as warranted  Discharge instructions were provided  I personally saw and examined the patient and I agree with the above discussed plan of care  History of Present Illness:    Cheryle Sawant is a 80 y o  female who presents to [facility] for interval re-evaluation of the above stated pain complaints  The patient has a past medical and chronic pain history as outlined in the assessment section  She was last seen on 07/06/2022 for a bilateral sacroiliac joint injections  At today's visit the patient states that her pain is worse with a pain score of 9/10 on the verbal numeric pain scale  Patient states that her pain is intermittent in nature  And she describes the quality of her pain as burning  Patient indicated that her pain is in her bilateral low back buttock area  Patient stated that she has difficulty after sitting for long periods of time when she is transitioning from sitting to standing and with ambulation for any significant amount of time      At this time I find it appropriate to prescribe the patient tramadol 50 mg tablet take 1 tablet daily as needed for pain this prescription was sent to the patient's pharmacy with a do not refill date of 09/11/2022  Patient was educated on our opioid agreement  Patient is in agreement with this plan  Pain Contract Signed:  8/12/2022  Last Urine Drug Screen:  8/12/2022    Other than as stated above, the patient denies any interval changes in medications, medical condition, mental condition, symptoms, or allergies since the last office visit  Review of Systems:    Review of Systems   Respiratory: Negative for shortness of breath  Cardiovascular: Negative for chest pain  Gastrointestinal: Negative for constipation, diarrhea, nausea and vomiting  Musculoskeletal: Negative for arthralgias, gait problem, joint swelling and myalgias  Skin: Negative for rash  Neurological: Negative for dizziness, seizures and weakness  All other systems reviewed and are negative          Patient Active Problem List   Diagnosis    Coronary artery disease of native artery of native heart with stable angina pectoris ([address] )    Depression    Esophageal reflux    Hyperlipidemia    Hypertension    Hypothyroidism    Insomnia    Lumbar compression fracture (HCC)    Lumbar spinal stenosis    Normochromic normocytic anemia    Osteoarthritis of knee    Osteoporosis    Spinal stenosis    Type 2 diabetes mellitus ([address] )    Vitamin B12 deficiency    Vitamin D deficiency    Screening for malignant neoplasm of breast    Health care maintenance    Screening mammogram, encounter for    Diabetic peripheral neuropathy ([address] )    Greater trochanteric bursitis of left hip    Left sided sciatica    Triclonal gammopathy    Medicare annual wellness visit, subsequent    Adhesive capsulitis of shoulder    Neck pain    Light chain disease, 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HCC)    Non-Hodgkin's lymphoma ([address] )    Right hip pain    Trochanteric bursitis of right hip    Chronic pain syndrome    Chronic bilateral low back pain without sciatica    Sacroiliitis ([facility] )       Past Medical History:   Diagnosis Date    Cellulitis     last assessed - [id number]    Constipation     last assessed - [id number]    Diabetes mellitus ([facility] )     Disease of thyroid gland     Ecchymosis     last assessed - [id number]    Fall     last assessed - [id number]    Hyperlipidemia     Hypertension     Hypokalemia     last assessed - [id number]    Lower extremity weakness     last assessed - [id number]    Vitamin D deficiency     last assessed - [id number]       Past Surgical History:   Procedure Laterality Date    BACK SURGERY      CHOLECYSTECTOMY      COLONOSCOPY      CORONARY ANGIOPLASTY WITH STENT PLACEMENT      CT BONE MARROW BIOPSY AND ASPIRATION  1/15/2020    HYSTERECTOMY      OTHER SURGICAL HISTORY      Previous stent placement       Family History   Problem Relation Age of Onset    Heart disease Mother     Other Father         cerebral embolism - with cerebral infarction       Social History     Occupational History    Not on file   Tobacco Use    Smoking status: Former Smoker     Start date: 5/7/1958    Smokeless tobacco: Never Used   Vaping Use    Vaping Use: Never used   Substance and Sexual Activity    Alcohol use: No    Drug use: No    Sexual activity: Never     Partners: Male         Current Outpatient Medications:     amLODIPine (NORVASC) 2 5 mg tablet, Take 1 tablet (2 5 mg total) by mouth daily, Disp: 90 tablet, Rfl: 1    aspirin 81 mg chewable tablet, Chew 81 mg, Disp: , Rfl:     atorvastatin (LIPITOR) 40 mg tablet, Take 1 tablet (40 mg total) by mouth daily, Disp: 90 tablet, Rfl: 3    baclofen 10 mg tablet, TAKE 1 TABLET BY MOUTH NIGHTLY AT BEDTIME AS NEEDED AS DIRECTED, Disp: , Rfl: 3    carvedilol (COREG) 12 5 mg tablet, Take 1 tablet (12 5 mg total) by mouth 2 (two) times a day with meals, Disp: 180 tablet, Rfl: 1    Cholecalciferol (VITAMIN D3) 2000 units capsule, Take 1 capsule (2,000 Units total) by mouth daily, Disp: 100 capsule, Rfl: 2    clopidogrel (PLAVIX) 75 mg tablet, Take 1 tablet (75 mg total) by mouth daily, Disp: 90 tablet, Rfl: 5    FLUoxetine (PROzac) 10 mg capsule, fluoxetine 10 mg capsule, Disp: , Rfl:     latanoprost (XALATAN) 0 005 % ophthalmic solution, INSTILL 1 DROP INTO EACH EYE AT BEDTIME, Disp: 9 mL, Rfl: 0    levothyroxine 50 mcg tablet, Take 1 tablet (50 mcg total) by mouth daily, Disp: 90 tablet, Rfl: 2    lidocaine (Lidoderm) 5 %, Apply 1 patch topically daily Remove & Discard patch within 12 hours or as directed by MD, Disp: 30 patch, Rfl: 0    losartan (COZAAR) 100 MG tablet, Take 1 tablet (100 mg total) by mouth daily, Disp: 90 tablet, Rfl: 3    Multiple Vitamins-Minerals (PRESERVISION AREDS 2 PO), Take by mouth, Disp: , Rfl:     pioglitazone (ACTOS) 30 mg tablet, Take 1 tablet (30 mg total) by mouth daily, Disp: 90 tablet, Rfl: 1    traMADol (Ultram) 50 mg tablet, Take 1 tablet (50 mg total) by mouth in the morning Do not refill prior to 9/11/2022, Disp: 30 tablet, Rfl: 1    zolpidem (AMBIEN) 5 mg tablet, Take 5 mg by mouth, Disp: , Rfl:     denosumab (PROLIA) 60 mg/mL, Inject 1 mL (60 mg total) under the skin once for 1 dose (Patient not taking: Reported on 9/14/2021), Disp: 1 mL, Rfl: 0    methylPREDNISolone 4 MG tablet therapy pack, Use as directed on package (Patient not taking: Reported on 8/12/2022), Disp: 1 each, Rfl: 0    Current Facility-Administered Medications:     cyanocobalamin injection 1,000 mcg, 1,000 mcg, Intramuscular, Q30 Days, Kalee Chung DO, 1,000 mcg at 02/27/19 1250    Allergies   Allergen Reactions    Iodine - Food Allergy Other (See Comments)     IV CONTRAST DYE    Iv Dye  [Iodinated Diagnostic Agents]     Other        Physical Exam:    BP (!) 175/76   Pulse 56   Resp 18   Ht 5' 2" (1 575 m)   Wt 69 9 kg (154 lb)   BMI 28 17 kg/m²     Constitutional:normal, well developed, well nourished, alert, in no distress and non-toxic and no overt pain behavior    Eyes:anicteric  HEENT:grossly intact  Neck:supple, symmetric, trachea midline and no masses   Pulmonary:even and unlabored  Cardiovascular:No edema or pitting edema present  Skin:Normal without rashes or lesions and well hydrated  Psychiatric:Mood and affect appropriate  Neurologic:Cranial Nerves II-XII grossly intact  Musculoskeletal:antalgic and stooped posture     Lumbar Spine Exam    Appearance:  Normal lordosis  Palpation/Tenderness:  left sacroiliac joint tenderness  right sacroiliac joint tenderness  Special Tests:  Right slump straight leg: negative  Left slump straight leg: negative  Right modified amy: positive  Left modified amy: positive        Orders Placed This Encounter   Procedures    MM ALL_Prescribed Meds and Special Instructions    MM DT_Alprazolam Definitive Test    MM DT_Amphetamine Definitive Test    MM DT_Aripiprazole Definitive Test    MM DT_Bath Salts Definitive Test    MM DT_Buprenorphine Definitive Test    MM DT_Butalbital Definitive Test    MM DT_Clonazepam Definitive Test    MM DT_Clozapine Definitive Test    MM DT_Cocaine Definitive Test    MM DT_Codeine Definitive Test    MM DT_Desipramine Definitive Test    MM DT_Dextromethorphan Definitive Test    MM Diazepam Definitive Test    MM DT_Ethyl Glucuronide/Ethyl Sulfate Definitive Test    MM DT_Fentanyl Definitive Test    MM DT_Heroin Definitive Test    MM DT_Hydrocodone Definitive Test    MM DT_Hydromorphone Definitive Test    MM DT_Kratom Definitive Test    MM DT_Levorphanol Definitive Test    MM Lorazepam Definitive Test    MM DT_MDMA Definitive Test    MM DT_Meperidine Definitive Test    MM DT_Methadone Definitive Test    MM DT_Methamphetamine Definitive Test    MM DT_Morphine Definitive Test    MM DT_Olanzapine Definitive Test    MM DT_Oxazepam Definitive Test    MM DT_Oxycodone Definitive Test    MM DT_Oxymorphone Definitive Test    MM DT_Phenobarbital Definitive Test    MM DT_Phentermine Definitive Test    MM DT_Secobarbital Definitive Test    MM DT_Spice Definitive Test    MM DT_Tapentadol Definitive Test    MM DT_Temazapam Definitive Test    MM DT_THC Definitive Test    MM DT_Tramadol Definitive Test

## 2022-08-12 NOTE — PATIENT INSTRUCTIONS

## 2022-08-15 ENCOUNTER — TELEMEDICINE (OUTPATIENT)
Dept: INTERNAL MEDICINE CLINIC | Facility: CLINIC | Age: 87
End: 2022-08-15
Payer: COMMERCIAL

## 2022-08-15 VITALS — BODY MASS INDEX: 28.34 KG/M2 | HEIGHT: 62 IN | WEIGHT: 154 LBS

## 2022-08-15 DIAGNOSIS — Z20.822 CLOSE EXPOSURE TO 2019 NOVEL CORONAVIRUS: Primary | ICD-10-CM

## 2022-08-15 DIAGNOSIS — E78.5 HYPERLIPIDEMIA, UNSPECIFIED HYPERLIPIDEMIA TYPE: ICD-10-CM

## 2022-08-15 PROCEDURE — 1101F PT FALLS ASSESS-DOCD LE1/YR: CPT | Performed by: INTERNAL MEDICINE

## 2022-08-15 PROCEDURE — 1160F RVW MEDS BY RX/DR IN RCRD: CPT | Performed by: INTERNAL MEDICINE

## 2022-08-15 PROCEDURE — 3288F FALL RISK ASSESSMENT DOCD: CPT | Performed by: INTERNAL MEDICINE

## 2022-08-15 PROCEDURE — 99214 OFFICE O/P EST MOD 30 MIN: CPT | Performed by: INTERNAL MEDICINE

## 2022-08-15 PROCEDURE — 3725F SCREEN DEPRESSION PERFORMED: CPT | Performed by: INTERNAL MEDICINE

## 2022-08-15 NOTE — PROGRESS NOTES
COVID-19 Outpatient Progress Note    Assessment/Plan:      Patient is presumed COVID positive  COVID test is pending it would take her in our to get here to the office  Reviewed her medications and she has to hold the statin for the duration of taking Paxlovid  She will started 3-5 days after she is done with Paxlovid  Okay to take it with her Plavix  She had her stents placed over 10 years ago  Watch her blood pressure with amlodipine  Problem List Items Addressed This Visit        Other    Hyperlipidemia      Other Visit Diagnoses     Close exposure to 2019 novel coronavirus    -  Primary    Relevant Medications    nirmatrelvir & ritonavir (Paxlovid) tablet therapy pack         Disposition:     Recommended patient to come to the office to test for COVID-19  I have spent 10 minutes directly with the patient  Greater than 50% of this time was spent in counseling/coordination of care regarding: instructions for management  Encounter provider Bud Bar MD    Provider located at [address] [place], [place] [address]  [address] [place] [id number] [phone]    Recent Visits  No visits were found meeting these conditions  Showing recent visits within past 7 days and meeting all other requirements  Today's Visits  Date Type Provider Dept   08/15/22 Luisana Razo MD [facility] [address] today's visits and meeting all other requirements  Future Appointments  No visits were found meeting these conditions  Showing future appointments within next 150 days and meeting all other requirements       The patient was identified by name and date of birth  Maryjo Dietz was informed that this is a telemedicine visit and that the visit is being conducted through [facility] and patient was informed this is a secure, HIPAA-complaint platform  She agrees to proceed     My office door was closed   No one else was in the room   She acknowledged consent and understanding of privacy and security of the video platform  The patient has agreed to participate and understands they can discontinue the visit at any time  Patient is aware this is a billable service  This virtual check-in was done via [facility] and patient was informed that this is a secure, HIPAA-compliant platform  She agrees to proceed  Patient agrees to participate in a virtual check in via telephone or video visit instead of presenting to the office to address urgent/immediate medical needs  Patient is aware this is a billable service  After connecting through [facility], the patient was identified by name and date of birth  Ana Lilia Lopez was informed that this was a telemedicine visit and that the exam was being conducted confidentially over secure lines  My office door was closed  No one else was in the room  Ana Lilia Lopez acknowledged consent and understanding of privacy and security of the telemedicine visit  I informed the patient that I have reviewed her record in Epic and presented the opportunity for her to ask any questions regarding the visit today  The patient agreed to participate  Verification of patient location:  Patient is located in the following state in which I hold an active license: [place]    Subjective:   Ana Lilia Lopez is a 80 y o  female who is concerned about COVID-19  Patient's symptoms include nasal congestion and cough       - Date of symptom onset: 8/15/2022      COVID-19 vaccination status: Fully vaccinated with booster    Exposure:   Contact with a person who is under investigation (PUI) for or who is positive for COVID-19 within the last 14 days?: Yes    Hospitalized recently for fever and/or lower respiratory symptoms?: No      Currently a healthcare worker that is involved in direct patient care?: No      Works in a special setting where the risk of COVID-19 transmission may be high? (this may include long-term care, correctional and group home facilities; homeless shelters; assisted-living facilities and group homes ): No      Resident in a special setting where the risk of COVID-19 transmission may be high? (this may include long-term care, correctional and group home facilities; homeless shelters; assisted-living facilities and group homes ): No      No results found for: Julia Brandt, [address] [facility], [address] [facility], [facility], [address] [facility], [address] [facility]  Past Medical History:   Diagnosis Date    Cellulitis     last assessed - [id number]    Constipation     last assessed - [id number]    Diabetes mellitus ([address] )     Disease of thyroid gland     Ecchymosis     last assessed - [id number]    Fall     last assessed - [id number]    Hyperlipidemia     Hypertension     Hypokalemia     last assessed - [id number]    Lower extremity weakness     last assessed - [id number]    Vitamin D deficiency     last assessed - [id number]     Past Surgical History:   Procedure Laterality Date    BACK SURGERY      CHOLECYSTECTOMY      COLONOSCOPY      CORONARY ANGIOPLASTY WITH STENT PLACEMENT      CT BONE MARROW BIOPSY AND ASPIRATION  1/15/2020    HYSTERECTOMY      OTHER SURGICAL HISTORY      Previous stent placement     Current Outpatient Medications   Medication Sig Dispense Refill    amLODIPine (NORVASC) 2 5 mg tablet Take 1 tablet (2 5 mg total) by mouth daily 90 tablet 1    aspirin 81 mg chewable tablet Chew 81 mg      atorvastatin (LIPITOR) 40 mg tablet Take 1 tablet (40 mg total) by mouth daily 90 tablet 3    baclofen 10 mg tablet TAKE 1 TABLET BY MOUTH NIGHTLY AT BEDTIME AS NEEDED AS DIRECTED  3    carvedilol (COREG) 12 5 mg tablet Take 1 tablet (12 5 mg total) by mouth 2 (two) times a day with meals 180 tablet 1    Cholecalciferol (VITAMIN D3) 2000 units capsule Take 1 capsule (2,000 Units total) by mouth daily 100 capsule 2    clopidogrel (PLAVIX) 75 mg tablet Take 1 tablet (75 mg total) by mouth daily 90 tablet 5    FLUoxetine (PROzac) 10 mg capsule fluoxetine 10 mg capsule      latanoprost (XALATAN) 0 005 % ophthalmic solution INSTILL 1 DROP INTO EACH EYE AT BEDTIME 9 mL 0    levothyroxine 50 mcg tablet Take 1 tablet (50 mcg total) by mouth daily 90 tablet 2    lidocaine (Lidoderm) 5 % Apply 1 patch topically daily Remove & Discard patch within 12 hours or as directed by MD 30 patch 0    losartan (COZAAR) 100 MG tablet Take 1 tablet (100 mg total) by mouth daily 90 tablet 3    Multiple Vitamins-Minerals (PRESERVISION AREDS 2 PO) Take by mouth      nirmatrelvir & ritonavir (Paxlovid) tablet therapy pack Take 3 tablets by mouth 2 (two) times a day for 5 days Take 2 nirmatrelvir tablets + 1 ritonavir tablet together per dose 30 tablet 0    pioglitazone (ACTOS) 30 mg tablet Take 1 tablet (30 mg total) by mouth daily 90 tablet 1    traMADol (Ultram) 50 mg tablet Take 1 tablet (50 mg total) by mouth in the morning Do not refill prior to 9/11/2022 30 tablet 1    zolpidem (AMBIEN) 5 mg tablet Take 5 mg by mouth       Current Facility-Administered Medications   Medication Dose Route Frequency Provider Last Rate Last Admin    cyanocobalamin injection 1,000 mcg  1,000 mcg Intramuscular Q30 Days Kalee Chung, DO   1,000 mcg at 02/27/19 1250     Allergies   Allergen Reactions    Iodine - Food Allergy Other (See Comments)     IV CONTRAST DYE    Iv Dye  [Iodinated Diagnostic Agents]     Other        Review of Systems   HENT: Positive for congestion  Respiratory: Positive for cough  All other systems reviewed and are negative  Objective:    Vitals:    08/15/22 1303   Weight: 69 9 kg (154 lb)   Height: 5' 2" (1 575 m)       Physical Exam  Vitals and nursing note reviewed  Constitutional:       Appearance: Normal appearance  She is ill-appearing  Pulmonary:      Effort: Pulmonary effort is normal    Neurological:      Mental Status: She is alert and oriented to person, place, and time     Psychiatric:         Mood and Affect: Mood normal

## 2022-08-16 LAB
6MAM UR QL CFM: NEGATIVE NG/ML
7AMINOCLONAZEPAM UR QL CFM: NEGATIVE NG/ML
A-OH ALPRAZ UR QL CFM: NEGATIVE NG/ML
AMPHET UR QL CFM: NEGATIVE NG/ML
AMPHET UR QL CFM: NEGATIVE NG/ML
BUPRENORPHINE UR QL CFM: NEGATIVE NG/ML
BUTALBITAL UR QL CFM: NEGATIVE NG/ML
BZE UR QL CFM: NEGATIVE NG/ML
CODEINE UR QL CFM: NEGATIVE NG/ML
DESIPRAMINE UR QL CFM: NEGATIVE NG/ML
EDDP UR QL CFM: NEGATIVE NG/ML
ETHYL GLUCURONIDE UR QL CFM: NEGATIVE NG/ML
ETHYL SULFATE UR QL SCN: NEGATIVE NG/ML
EUTYLONE UR QL: NEGATIVE NG/ML
FENTANYL UR QL CFM: NEGATIVE NG/ML
GLIADIN IGG SER IA-ACNC: NEGATIVE NG/ML
GLUCOSE 30M P 50 G LAC PO SERPL-MCNC: NEGATIVE NG/ML
HYDROCODONE UR QL CFM: NEGATIVE NG/ML
HYDROCODONE UR QL CFM: NEGATIVE NG/ML
HYDROMORPHONE UR QL CFM: NEGATIVE NG/ML
LORAZEPAM UR QL CFM: NEGATIVE NG/ML
MDMA UR QL CFM: NEGATIVE NG/ML
MEPERIDINE UR QL CFM: NEGATIVE NG/ML
METHADONE UR QL CFM: NEGATIVE NG/ML
METHAMPHET UR QL CFM: NEGATIVE NG/ML
MORPHINE UR QL CFM: NEGATIVE NG/ML
MORPHINE UR QL CFM: NEGATIVE NG/ML
NORBUPRENORPHINE UR QL CFM: NEGATIVE NG/ML
NORDIAZEPAM UR QL CFM: NEGATIVE NG/ML
NORFENTANYL UR QL CFM: NEGATIVE NG/ML
NORHYDROCODONE UR QL CFM: NEGATIVE NG/ML
NORHYDROCODONE UR QL CFM: NEGATIVE NG/ML
NORMEPERIDINE UR QL CFM: NEGATIVE NG/ML
NOROXYCODONE UR QL CFM: NEGATIVE NG/ML
OLANZAPINE QUANTIFICATION: NEGATIVE NG/ML
OPC-3373 QUANTIFICATION: NEGATIVE
OXAZEPAM UR QL CFM: NEGATIVE NG/ML
OXYCODONE UR QL CFM: NEGATIVE NG/ML
OXYMORPHONE UR QL CFM: NEGATIVE NG/ML
OXYMORPHONE UR QL CFM: NEGATIVE NG/ML
PARA-FLUOROFENTANYL QUANTIFICATION: NORMAL NG/ML
PHENOBARB UR QL CFM: NEGATIVE NG/ML
RESULT ALL_PRESCRIBED MEDS AND SPECIAL INSTRUCTIONS: NORMAL
SECOBARBITAL UR QL CFM: NEGATIVE NG/ML
SL AMB 4-ANPP QUANTIFICATION: NORMAL NG/ML
SL AMB 5F-ADB-M7 METABOLITE QUANTIFICATION: NEGATIVE NG/ML
SL AMB 7-OH-MITRAGYNINE (KRATOM ALKALOID) QUANTIFICATION: NEGATIVE NG/ML
SL AMB AB-FUBINACA-M3 METABOLITE QUANTIFICATION: NEGATIVE NG/ML
SL AMB ACETYL FENTANYL QUANTIFICATION: NORMAL NG/ML
SL AMB ACETYL NORFENTANYL QUANTIFICATION: NORMAL NG/ML
SL AMB ACRYL FENTANYL QUANTIFICATION: NORMAL NG/ML
SL AMB CARFENTANIL QUANTIFICATION: NORMAL NG/ML
SL AMB CLOZAPINE QUANTIFICATION: NEGATIVE NG/ML
SL AMB CTHC (MARIJUANA METABOLITE) QUANTIFICATION: NEGATIVE NG/ML
SL AMB DEXTROMETHORPHAN QUANTIFICATION: NEGATIVE NG/ML
SL AMB DEXTRORPHAN (DEXTROMETHORPHAN METABOLITE) QUANT: NEGATIVE NG/ML
SL AMB DEXTRORPHAN (DEXTROMETHORPHAN METABOLITE) QUANT: NEGATIVE NG/ML
SL AMB JWH018 METABOLITE QUANTIFICATION: NEGATIVE NG/ML
SL AMB JWH073 METABOLITE QUANTIFICATION: NEGATIVE NG/ML
SL AMB MDMB-FUBINACA-M1 METABOLITE QUANTIFICATION: NEGATIVE NG/ML
SL AMB METHYLONE QUANTIFICATION: NEGATIVE NG/ML
SL AMB N-DESMETHYL-TRAMADOL QUANTIFICATION: ABNORMAL NG/ML
SL AMB N-DESMETHYLCLOZAPINE QUANTIFICATION: NEGATIVE NG/ML
SL AMB PHENTERMINE QUANTIFICATION: NEGATIVE NG/ML
SL AMB RCS4 METABOLITE QUANTIFICATION: NEGATIVE NG/ML
SPECIMEN DRAWN SERPL: NEGATIVE NG/ML
TAPENTADOL UR QL CFM: NEGATIVE NG/ML
TEMAZEPAM UR QL CFM: NEGATIVE NG/ML
TEMAZEPAM UR QL CFM: NEGATIVE NG/ML
TRAMADOL UR QL CFM: ABNORMAL NG/ML
URATE/CREAT 24H UR: ABNORMAL NG/ML

## 2022-09-06 DIAGNOSIS — E11.9 TYPE 2 DIABETES MELLITUS WITHOUT COMPLICATION, WITHOUT LONG-TERM CURRENT USE OF INSULIN (HCC): ICD-10-CM

## 2022-09-06 DIAGNOSIS — I10 HYPERTENSION, UNSPECIFIED TYPE: ICD-10-CM

## 2022-09-06 RX ORDER — LOSARTAN POTASSIUM 100 MG/1
100 TABLET ORAL DAILY
Qty: 90 TABLET | Refills: 3 | Status: SHIPPED | OUTPATIENT
Start: 2022-09-06

## 2022-09-19 ENCOUNTER — APPOINTMENT (OUTPATIENT)
Dept: LAB | Facility: CLINIC | Age: 87
End: 2022-09-19
Payer: COMMERCIAL

## 2022-09-19 DIAGNOSIS — E11.8 TYPE 2 DIABETES MELLITUS WITH COMPLICATION, WITHOUT LONG-TERM CURRENT USE OF INSULIN (HCC): ICD-10-CM

## 2022-09-19 DIAGNOSIS — I25.118 CORONARY ARTERY DISEASE OF NATIVE ARTERY OF NATIVE HEART WITH STABLE ANGINA PECTORIS (HCC): ICD-10-CM

## 2022-09-19 LAB
ALBUMIN SERPL BCP-MCNC: 2.9 G/DL (ref 3.5–5)
ALP SERPL-CCNC: 50 U/L (ref 46–116)
ALT SERPL W P-5'-P-CCNC: 21 U/L (ref 12–78)
ANION GAP SERPL CALCULATED.3IONS-SCNC: 4 MMOL/L (ref 4–13)
AST SERPL W P-5'-P-CCNC: 14 U/L (ref 5–45)
BASOPHILS # BLD AUTO: 0.07 THOUSANDS/ΜL (ref 0–0.1)
BASOPHILS NFR BLD AUTO: 1 % (ref 0–1)
BILIRUB SERPL-MCNC: 0.69 MG/DL (ref 0.2–1)
BUN SERPL-MCNC: 18 MG/DL (ref 5–25)
CALCIUM ALBUM COR SERPL-MCNC: 10.2 MG/DL (ref 8.3–10.1)
CALCIUM SERPL-MCNC: 9.3 MG/DL (ref 8.3–10.1)
CHLORIDE SERPL-SCNC: 108 MMOL/L (ref 96–108)
CHOLEST SERPL-MCNC: 116 MG/DL
CO2 SERPL-SCNC: 27 MMOL/L (ref 21–32)
CREAT SERPL-MCNC: 0.98 MG/DL (ref 0.6–1.3)
EOSINOPHIL # BLD AUTO: 0.25 THOUSAND/ΜL (ref 0–0.61)
EOSINOPHIL NFR BLD AUTO: 4 % (ref 0–6)
ERYTHROCYTE [DISTWIDTH] IN BLOOD BY AUTOMATED COUNT: 15.9 % (ref 11.6–15.1)
EST. AVERAGE GLUCOSE BLD GHB EST-MCNC: 166 MG/DL
GFR SERPL CREATININE-BSD FRML MDRD: 51 ML/MIN/1.73SQ M
GLUCOSE P FAST SERPL-MCNC: 141 MG/DL (ref 65–99)
HBA1C MFR BLD: 7.4 %
HCT VFR BLD AUTO: 37.8 % (ref 34.8–46.1)
HDLC SERPL-MCNC: 41 MG/DL
HGB BLD-MCNC: 11.6 G/DL (ref 11.5–15.4)
IMM GRANULOCYTES # BLD AUTO: 0.04 THOUSAND/UL (ref 0–0.2)
IMM GRANULOCYTES NFR BLD AUTO: 1 % (ref 0–2)
LDLC SERPL CALC-MCNC: 46 MG/DL (ref 0–100)
LYMPHOCYTES # BLD AUTO: 2.04 THOUSANDS/ΜL (ref 0.6–4.47)
LYMPHOCYTES NFR BLD AUTO: 29 % (ref 14–44)
MCH RBC QN AUTO: 29.2 PG (ref 26.8–34.3)
MCHC RBC AUTO-ENTMCNC: 30.7 G/DL (ref 31.4–37.4)
MCV RBC AUTO: 95 FL (ref 82–98)
MONOCYTES # BLD AUTO: 1.11 THOUSAND/ΜL (ref 0.17–1.22)
MONOCYTES NFR BLD AUTO: 16 % (ref 4–12)
NEUTROPHILS # BLD AUTO: 3.63 THOUSANDS/ΜL (ref 1.85–7.62)
NEUTS SEG NFR BLD AUTO: 49 % (ref 43–75)
NRBC BLD AUTO-RTO: 0 /100 WBCS
PLATELET # BLD AUTO: 267 THOUSANDS/UL (ref 149–390)
PMV BLD AUTO: 10.4 FL (ref 8.9–12.7)
POTASSIUM SERPL-SCNC: 4.4 MMOL/L (ref 3.5–5.3)
PROT SERPL-MCNC: 8.1 G/DL (ref 6.4–8.4)
RBC # BLD AUTO: 3.97 MILLION/UL (ref 3.81–5.12)
SODIUM SERPL-SCNC: 139 MMOL/L (ref 135–147)
TRIGL SERPL-MCNC: 144 MG/DL
WBC # BLD AUTO: 7.14 THOUSAND/UL (ref 4.31–10.16)

## 2022-09-19 PROCEDURE — 83036 HEMOGLOBIN GLYCOSYLATED A1C: CPT

## 2022-09-19 PROCEDURE — 80061 LIPID PANEL: CPT

## 2022-09-19 PROCEDURE — 85025 COMPLETE CBC W/AUTO DIFF WBC: CPT

## 2022-09-19 PROCEDURE — 80053 COMPREHEN METABOLIC PANEL: CPT

## 2022-09-19 PROCEDURE — 36415 COLL VENOUS BLD VENIPUNCTURE: CPT

## 2022-09-27 ENCOUNTER — OFFICE VISIT (OUTPATIENT)
Dept: INTERNAL MEDICINE CLINIC | Facility: CLINIC | Age: 87
End: 2022-09-27
Payer: COMMERCIAL

## 2022-09-27 ENCOUNTER — APPOINTMENT (OUTPATIENT)
Dept: LAB | Facility: CLINIC | Age: 87
End: 2022-09-27
Payer: COMMERCIAL

## 2022-09-27 VITALS
HEIGHT: 62 IN | WEIGHT: 150.4 LBS | DIASTOLIC BLOOD PRESSURE: 80 MMHG | BODY MASS INDEX: 27.68 KG/M2 | SYSTOLIC BLOOD PRESSURE: 130 MMHG | RESPIRATION RATE: 16 BRPM | TEMPERATURE: 99.6 F | HEART RATE: 71 BPM | OXYGEN SATURATION: 94 %

## 2022-09-27 DIAGNOSIS — E11.9 TYPE 2 DIABETES MELLITUS WITHOUT COMPLICATION, WITHOUT LONG-TERM CURRENT USE OF INSULIN (HCC): ICD-10-CM

## 2022-09-27 DIAGNOSIS — E03.9 HYPOTHYROIDISM, UNSPECIFIED TYPE: ICD-10-CM

## 2022-09-27 DIAGNOSIS — Z23 NEED FOR INFLUENZA VACCINATION: ICD-10-CM

## 2022-09-27 DIAGNOSIS — I10 HYPERTENSION, UNSPECIFIED TYPE: ICD-10-CM

## 2022-09-27 DIAGNOSIS — C85.90 NON-HODGKIN'S LYMPHOMA, UNSPECIFIED BODY REGION, UNSPECIFIED NON-HODGKIN LYMPHOMA TYPE (HCC): ICD-10-CM

## 2022-09-27 DIAGNOSIS — I10 HYPERTENSION, UNSPECIFIED TYPE: Primary | ICD-10-CM

## 2022-09-27 DIAGNOSIS — I25.10 CORONARY ARTERY DISEASE DUE TO CALCIFIED CORONARY LESION: ICD-10-CM

## 2022-09-27 DIAGNOSIS — M25.511 ACUTE PAIN OF RIGHT SHOULDER: ICD-10-CM

## 2022-09-27 DIAGNOSIS — I25.84 CORONARY ARTERY DISEASE DUE TO CALCIFIED CORONARY LESION: ICD-10-CM

## 2022-09-27 DIAGNOSIS — E78.5 HYPERLIPIDEMIA, UNSPECIFIED HYPERLIPIDEMIA TYPE: ICD-10-CM

## 2022-09-27 LAB
ALBUMIN SERPL BCP-MCNC: 3.2 G/DL (ref 3.5–5)
ALP SERPL-CCNC: 54 U/L (ref 46–116)
ALT SERPL W P-5'-P-CCNC: 21 U/L (ref 12–78)
ANION GAP SERPL CALCULATED.3IONS-SCNC: 3 MMOL/L (ref 4–13)
AST SERPL W P-5'-P-CCNC: 13 U/L (ref 5–45)
BILIRUB SERPL-MCNC: 0.65 MG/DL (ref 0.2–1)
BUN SERPL-MCNC: 17 MG/DL (ref 5–25)
CALCIUM ALBUM COR SERPL-MCNC: 9.8 MG/DL (ref 8.3–10.1)
CALCIUM SERPL-MCNC: 9.2 MG/DL (ref 8.3–10.1)
CHLORIDE SERPL-SCNC: 110 MMOL/L (ref 96–108)
CO2 SERPL-SCNC: 28 MMOL/L (ref 21–32)
CREAT SERPL-MCNC: 0.99 MG/DL (ref 0.6–1.3)
GFR SERPL CREATININE-BSD FRML MDRD: 51 ML/MIN/1.73SQ M
GLUCOSE P FAST SERPL-MCNC: 143 MG/DL (ref 65–99)
POTASSIUM SERPL-SCNC: 3.9 MMOL/L (ref 3.5–5.3)
PROT SERPL-MCNC: 8.1 G/DL (ref 6.4–8.4)
SODIUM SERPL-SCNC: 141 MMOL/L (ref 135–147)
TSH SERPL DL<=0.05 MIU/L-ACNC: 1.42 UIU/ML (ref 0.45–4.5)

## 2022-09-27 PROCEDURE — 80053 COMPREHEN METABOLIC PANEL: CPT

## 2022-09-27 PROCEDURE — 90662 IIV NO PRSV INCREASED AG IM: CPT | Performed by: INTERNAL MEDICINE

## 2022-09-27 PROCEDURE — 84443 ASSAY THYROID STIM HORMONE: CPT

## 2022-09-27 PROCEDURE — G0008 ADMIN INFLUENZA VIRUS VAC: HCPCS | Performed by: INTERNAL MEDICINE

## 2022-09-27 PROCEDURE — 99214 OFFICE O/P EST MOD 30 MIN: CPT | Performed by: INTERNAL MEDICINE

## 2022-09-27 PROCEDURE — 1003F LEVEL OF ACTIVITY ASSESS: CPT | Performed by: INTERNAL MEDICINE

## 2022-09-27 PROCEDURE — 1160F RVW MEDS BY RX/DR IN RCRD: CPT | Performed by: INTERNAL MEDICINE

## 2022-09-27 PROCEDURE — 36415 COLL VENOUS BLD VENIPUNCTURE: CPT

## 2022-09-27 NOTE — PROGRESS NOTES
Assessment/Plan:     Maritza Vernon is here for routine follow up  Reports doing well  Denies any major complaints  Discussed recent labs  We need to recheck her calcium- it was slightly high  Also check TSH; appetite is stable  Weight is stable  Sports medicine consul for some shoulder pain limiting ROM  Denies any falls  Quality Measures:     BMI Counseling: Body mass index is 28 17 kg/m²  The BMI is above normal  Nutrition recommendations include decreasing portion sizes and encouraging healthy choices of fruits and vegetables  Exercise recommendations include moderate physical activity 150 minutes/week  Rationale for BMI follow-up plan is due to patient being overweight or obese  Return in about 6 months (around 3/27/2023) for regular and medicare  No problem-specific Assessment & Plan notes found for this encounter  Diagnoses and all orders for this visit:    Hypertension, unspecified type  -     CBC and differential; Future  -     Comprehensive metabolic panel; Future  -     TSH, 3rd generation with Free T4 reflex; Future  -     Comprehensive metabolic panel; Future    Type 2 diabetes mellitus without complication, without long-term current use of insulin (HCC)  -     CBC and differential; Future  -     Comprehensive metabolic panel; Future  -     Lipid Panel with Direct LDL reflex; Future  -     Hemoglobin A1C; Future    Coronary artery disease due to calcified coronary lesion    Non-Hodgkin's lymphoma, unspecified body region, unspecified non-Hodgkin lymphoma type ([facility] [address] )    Hyperlipidemia, unspecified hyperlipidemia type  -     Lipid Panel with Direct LDL reflex; Future    Need for influenza vaccination  -     influenza vaccine, high-dose, PF 0 7 mL (FLUZONE HIGH-DOSE)    Acute pain of right shoulder  -     Ambulatory Referral to Sports Medicine; Future    Hypothyroidism, unspecified type  -     TSH, 3rd generation with Free T4 reflex;  Future          Subjective:      Patient ID: Ana Lilia Lopez is a 80 y o  female  Here for routine follow up        ALLERGIES:  Allergies   Allergen Reactions    Iodine - Food Allergy Other (See Comments)     IV CONTRAST DYE    Iv Dye  [Iodinated Diagnostic Agents]     Other        CURRENT MEDICATIONS:    Current Outpatient Medications:     amLODIPine (NORVASC) 2 5 mg tablet, Take 1 tablet (2 5 mg total) by mouth daily, Disp: 90 tablet, Rfl: 1    aspirin 81 mg chewable tablet, Chew 81 mg, Disp: , Rfl:     atorvastatin (LIPITOR) 40 mg tablet, Take 1 tablet (40 mg total) by mouth daily, Disp: 90 tablet, Rfl: 3    baclofen 10 mg tablet, TAKE 1 TABLET BY MOUTH NIGHTLY AT BEDTIME AS NEEDED AS DIRECTED, Disp: , Rfl: 3    carvedilol (COREG) 12 5 mg tablet, Take 1 tablet (12 5 mg total) by mouth 2 (two) times a day with meals, Disp: 180 tablet, Rfl: 1    Cholecalciferol (VITAMIN D3) 2000 units capsule, Take 1 capsule (2,000 Units total) by mouth daily, Disp: 100 capsule, Rfl: 2    clopidogrel (PLAVIX) 75 mg tablet, Take 1 tablet (75 mg total) by mouth daily, Disp: 90 tablet, Rfl: 5    FLUoxetine (PROzac) 10 mg capsule, fluoxetine 10 mg capsule, Disp: , Rfl:     latanoprost (XALATAN) 0 005 % ophthalmic solution, INSTILL 1 DROP INTO EACH EYE AT BEDTIME, Disp: 9 mL, Rfl: 0    levothyroxine 50 mcg tablet, Take 1 tablet (50 mcg total) by mouth daily, Disp: 90 tablet, Rfl: 2    lidocaine (Lidoderm) 5 %, Apply 1 patch topically daily Remove & Discard patch within 12 hours or as directed by MD, Disp: 30 patch, Rfl: 0    losartan (COZAAR) 100 MG tablet, Take 1 tablet (100 mg total) by mouth daily, Disp: 90 tablet, Rfl: 3    Multiple Vitamins-Minerals (PRESERVISION AREDS 2 PO), Take by mouth, Disp: , Rfl:     pioglitazone (ACTOS) 30 mg tablet, Take 1 tablet (30 mg total) by mouth daily, Disp: 90 tablet, Rfl: 1    traMADol (Ultram) 50 mg tablet, Take 1 tablet (50 mg total) by mouth in the morning Do not refill prior to 9/11/2022, Disp: 30 tablet, Rfl: 1    zolpidem (AMBIEN) 5 mg tablet, Take 5 mg by mouth, Disp: , Rfl:     Current Facility-Administered Medications:     cyanocobalamin injection 1,000 mcg, 1,000 mcg, Intramuscular, Q30 Days, Daija Monroe DO, 1,000 mcg at 02/27/19 1250    ACTIVE PROBLEM LIST:  Patient Active Problem List   Diagnosis    Coronary artery disease of native artery of native heart with stable angina pectoris [facility]    Depression    Esophageal reflux    Hyperlipidemia    Hypertension    Hypothyroidism    Insomnia    Lumbar compression fracture (HCC)    Lumbar spinal stenosis    Normochromic normocytic anemia    Osteoarthritis of knee    Osteoporosis    Spinal stenosis    Type 2 diabetes mellitus ([facility]    Vitamin B12 deficiency    Vitamin D deficiency    Screening for malignant neoplasm of breast    Health care maintenance    Screening mammogram, encounter for    Diabetic peripheral neuropathy ([facility] )    Greater trochanteric bursitis of left hip    Left sided sciatica    Triclonal gammopathy    Medicare annual wellness visit, subsequent    Adhesive capsulitis of shoulder    Neck pain    Light chain disease, 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[facility] )    Non-Hodgkin's lymphoma ([place] [address] )    Right hip pain    Trochanteric bursitis of right hip    Chronic pain syndrome    Chronic bilateral low back pain without sciatica    Sacroiliitis ([facility] )       PAST MEDICAL HISTORY:  Past Medical History:   Diagnosis Date    Cellulitis     last assessed - [id number]    Constipation     last assessed - [id number]    COVID-19 08/15/2022    Diabetes mellitus ([facility] [address] )     Disease of thyroid gland     Ecchymosis     last assessed - [id number]    Fall     last assessed - [id number]    Hyperlipidemia     Hypertension     Hypokalemia     last assessed - [id number]    Lower extremity weakness     last assessed - [id number]    Vitamin D deficiency     last assessed - [id number]       PAST SURGICAL HISTORY:  Past Surgical History:   Procedure Laterality Date    BACK SURGERY      CHOLECYSTECTOMY      COLONOSCOPY      CORONARY ANGIOPLASTY WITH STENT PLACEMENT      CT BONE MARROW BIOPSY AND ASPIRATION  1/15/2020    HYSTERECTOMY      OTHER SURGICAL HISTORY      Previous stent placement       FAMILY HISTORY:  Family History   Problem Relation Age of Onset    Heart disease Mother     Other Father         cerebral embolism - with cerebral infarction       SOCIAL HISTORY:  Social History     Socioeconomic History    Marital status: /Civil Union     Spouse name: Not on file    Number of children: Not on file    Years of education: Not on file    Highest education level: Not on file   Occupational History    Not on file   Tobacco Use    Smoking status: Former Smoker     Start date: 5/7/1958    Smokeless tobacco: Never Used   Vaping Use    Vaping Use: Never used   Substance and Sexual Activity    Alcohol use: No    Drug use: No    Sexual activity: Never     Partners: Male   Other Topics Concern    Not on file   Social History Narrative    Caffeine use     Social Determinants of Health     Financial Resource Strain: Not on file   Food Insecurity: Not on file   Transportation Needs: Not on file   Physical Activity: Not on file   Stress: Not on file   Social Connections: Not on file   Intimate Partner Violence: Not on file   Housing Stability: Not on file       Review of Systems   Musculoskeletal: Positive for arthralgias  All other systems reviewed and are negative  Objective:  Vitals:    09/27/22 1310   BP: 130/80   BP Location: Left arm   Patient Position: Sitting   Cuff Size: Adult   Pulse: 71   Resp: 16   Temp: 99 6 °F (37 6 °C)   TempSrc: Tympanic   SpO2: 94%   Weight: 68 2 kg (150 lb 6 4 oz)   Height: 5' 2" (1 575 m)     Body mass index is 27 51 kg/m²  Physical Exam  Vitals and nursing note reviewed  Constitutional:       Appearance: Normal appearance  HENT:      Head: Normocephalic and atraumatic     Cardiovascular:      Rate and Rhythm: Normal rate and regular rhythm  Heart sounds: Normal heart sounds  Pulmonary:      Effort: Pulmonary effort is normal       Breath sounds: Normal breath sounds  Abdominal:      General: Bowel sounds are normal       Palpations: Abdomen is soft  Musculoskeletal:         General: Tenderness and signs of injury present  Normal range of motion  Cervical back: Normal range of motion  Right lower leg: No edema  Left lower leg: No edema  Lymphadenopathy:      Cervical: No cervical adenopathy  Skin:     General: Skin is warm and dry  Neurological:      General: No focal deficit present  Mental Status: She is alert and oriented to person, place, and time  Mental status is at baseline     Psychiatric:         Mood and Affect: Mood normal          Behavior: Behavior normal            RESULTS:    Recent Results (from the past 1008 hour(s))   CBC and differential    Collection Time: 09/19/22  9:17 AM   Result Value Ref Range    WBC 7 14 4 31 - 10 16 Thousand/uL    RBC 3 97 3 81 - 5 12 Million/uL    Hemoglobin 11 6 11 5 - 15 4 g/dL    Hematocrit 37 8 34 8 - 46 1 %    MCV 95 82 - 98 fL    MCH 29 2 26 8 - 34 3 pg    MCHC 30 7 (L) 31 4 - 37 4 g/dL    RDW 15 9 (H) 11 6 - 15 1 %    MPV 10 4 8 9 - 12 7 fL    Platelets 275 009 - 732 Thousands/uL    nRBC 0 /100 WBCs    Neutrophils Relative 49 43 - 75 %    Immat GRANS % 1 0 - 2 %    Lymphocytes Relative 29 14 - 44 %    Monocytes Relative 16 (H) 4 - 12 %    Eosinophils Relative 4 0 - 6 %    Basophils Relative 1 0 - 1 %    Neutrophils Absolute 3 63 1 85 - 7 62 Thousands/µL    Immature Grans Absolute 0 04 0 00 - 0 20 Thousand/uL    Lymphocytes Absolute 2 04 0 60 - 4 47 Thousands/µL    Monocytes Absolute 1 11 0 17 - 1 22 Thousand/µL    Eosinophils Absolute 0 25 0 00 - 0 61 Thousand/µL    Basophils Absolute 0 07 0 00 - 0 10 Thousands/µL   Comprehensive metabolic panel    Collection Time: 09/19/22  9:17 AM   Result Value Ref Range    Sodium 139 135 - 147 mmol/L    Potassium 4 4 3 5 - 5 3 mmol/L    Chloride 108 96 - 108 mmol/L    CO2 27 21 - 32 mmol/L    ANION GAP 4 4 - 13 mmol/L    BUN 18 5 - 25 mg/dL    Creatinine 0 98 0 60 - 1 30 mg/dL    Glucose, Fasting 141 (H) 65 - 99 mg/dL    Calcium 9 3 8 3 - 10 1 mg/dL    Corrected Calcium 10 2 (H) 8 3 - 10 1 mg/dL    AST 14 5 - 45 U/L    ALT 21 12 - 78 U/L    Alkaline Phosphatase 50 46 - 116 U/L    Total Protein 8 1 6 4 - 8 4 g/dL    Albumin 2 9 (L) 3 5 - 5 0 g/dL    Total Bilirubin 0 69 0 20 - 1 00 mg/dL    eGFR 51 ml/min/1 73sq m   Lipid Panel with Direct LDL reflex    Collection Time: 09/19/22  9:17 AM   Result Value Ref Range    Cholesterol 116 See Comment mg/dL    Triglycerides 144 See Comment mg/dL    HDL, Direct 41 (L) >=50 mg/dL    LDL Calculated 46 0 - 100 mg/dL   Hemoglobin A1C    Collection Time: 09/19/22  9:17 AM   Result Value Ref Range    Hemoglobin A1C 7 4 (H) Normal 3 8-5 6%; PreDiabetic 5 7-6 4%; Diabetic >=6 5%; Glycemic control for adults with diabetes <7 0% %     mg/dl       This note was created with voice recognition software  Phonic, grammatical and spelling errors may be present within the note as a result

## 2022-09-30 ENCOUNTER — TELEPHONE (OUTPATIENT)
Dept: PAIN MEDICINE | Facility: CLINIC | Age: 87
End: 2022-09-30

## 2022-10-03 NOTE — TELEPHONE ENCOUNTER
Quinten pt - S/w pt, pt was scheduled incorrectly w/Dr Beatriz Rockwell on 10/4/22   Offered to reschedule for 10/5/22 but pt doesn't think she will have a , pt scheduled for 10/19/22 230 pm

## 2022-10-11 ENCOUNTER — APPOINTMENT (OUTPATIENT)
Dept: RADIOLOGY | Facility: CLINIC | Age: 87
End: 2022-10-11
Payer: COMMERCIAL

## 2022-10-11 ENCOUNTER — OFFICE VISIT (OUTPATIENT)
Dept: OBGYN CLINIC | Facility: CLINIC | Age: 87
End: 2022-10-11
Payer: COMMERCIAL

## 2022-10-11 VITALS
SYSTOLIC BLOOD PRESSURE: 155 MMHG | HEART RATE: 60 BPM | DIASTOLIC BLOOD PRESSURE: 84 MMHG | BODY MASS INDEX: 27.42 KG/M2 | HEIGHT: 62 IN | WEIGHT: 149 LBS

## 2022-10-11 DIAGNOSIS — S46.211A BICEPS STRAIN, RIGHT, INITIAL ENCOUNTER: ICD-10-CM

## 2022-10-11 DIAGNOSIS — M62.838 TRAPEZIUS MUSCLE SPASM: ICD-10-CM

## 2022-10-11 DIAGNOSIS — M75.41 SUBACROMIAL IMPINGEMENT OF RIGHT SHOULDER: ICD-10-CM

## 2022-10-11 DIAGNOSIS — M75.81 RIGHT ROTATOR CUFF TENDINITIS: ICD-10-CM

## 2022-10-11 DIAGNOSIS — M19.011 ARTHRITIS OF RIGHT GLENOHUMERAL JOINT: Primary | ICD-10-CM

## 2022-10-11 DIAGNOSIS — M25.511 ACUTE PAIN OF RIGHT SHOULDER: ICD-10-CM

## 2022-10-11 PROCEDURE — 73030 X-RAY EXAM OF SHOULDER: CPT

## 2022-10-11 PROCEDURE — 99203 OFFICE O/P NEW LOW 30 MIN: CPT | Performed by: FAMILY MEDICINE

## 2022-10-11 NOTE — PROGRESS NOTES
Assessment/Plan:  Assessment/Plan   Diagnoses and all orders for this visit:    Arthritis of right glenohumeral joint  -     Ambulatory Referral to Sports Medicine  -     XR shoulder 2+ vw right; Future  -     Ambulatory Referral to Physical Therapy; Future    Subacromial impingement of right shoulder  -     Ambulatory Referral to Physical Therapy; Future    Right rotator cuff tendinitis  -     Ambulatory Referral to Physical Therapy; Future    Biceps strain, right, initial encounter  -     Ambulatory Referral to Physical Therapy; Future    Trapezius muscle spasm  -     Ambulatory Referral to Physical Therapy; Future    Other orders  -     Cancel: Large joint arthrocentesis      80-year-old right-hand-dominant female with right shoulder pain more than 2 months duration  Discussed with patient physical exam, radiographs, impression and plan  X-rays right shoulder noted for mild degenerative change glenohumeral joint with rounded osseous body tip of the acromion  Physical exam cervical spine unremarkable for midline or paraspinal tenderness  She has limited range of motion with side bending to both sides  Right shoulder tenderness at the anterior lateral aspect  She has range of motion limited to forward flexion of 110°, abduction 130°, and internal rotation lumbar spine  She has 4+/5 external rotation and supraspinatus  Empty can testing unremarkable  There is pain with Taveras maneuver  She has normal sensation, biceps reflex, and radial pulse both upper extremities  Clinical impression is that she may be symptomatic from subacromial impingement  Topical anesthetic, supplements, and formal therapy  She declines steroid injection at this time  Invasive management/surgery is not warranted at this time  She is to start taking tumeric at least 1000 mg daily and tart cherry at least 1000 mg daily  She may consider topical anesthetics such as Blue emu, Tiger balm, topical CBD    She is to start physical therapy as soon as possible and do home exercises as directed  She will follow up with me as needed  Subjective:   Patient ID: Kurt Orozco is a 80 y o  female  Chief Complaint   Patient presents with   • Right Shoulder - Pain       80year-old right-hand-dominant female presents evaluation right shoulder pain more than 2 months duration  She denies any particular trauma or inciting event  Pain described as gradual in onset, generalized to the shoulder worse at the anterior lateral aspect, radiating distally along the anterior aspect the upper arm, worse with moving the arm particularly elevating above shoulder level, associated with limited range of motion, and improved with resting  She tried applying topical diclofenac afterward experienced chest pain/tightness  She saw her primary care physician and was referred to sports Medicine  Shoulder Pain  This is a new problem  The current episode started more than 1 month ago  The problem occurs daily  The problem has been unchanged  Associated symptoms include arthralgias  Pertinent negatives include no abdominal pain, chest pain, chills, fever, joint swelling, numbness, rash, sore throat or weakness  Exacerbated by: Arm movement  She has tried rest and position changes (Topical diclofenac) for the symptoms  The treatment provided mild relief  The following portions of the patient's history were reviewed and updated as appropriate: She  has a past medical history of Cellulitis, Constipation, COVID-19 (08/15/2022), Diabetes mellitus ([address] ), Disease of thyroid gland, Ecchymosis, Fall, Hyperlipidemia, Hypertension, Hypokalemia, Lower extremity weakness, and Vitamin D deficiency  She  has a past surgical history that includes Back surgery; Other surgical history; Cholecystectomy; Hysterectomy; Colonoscopy; Coronary angioplasty with stent; and CT bone marrow biopsy and aspiration (1/15/2020)    Her family history includes Heart disease in her mother; Other in her father  She  reports that she has quit smoking  She started smoking about 64 years ago  She has never used smokeless tobacco  She reports that she does not drink alcohol and does not use drugs  She is allergic to iodine - food allergy, iv dye  [iodinated diagnostic agents], and other       Review of Systems   Constitutional: Negative for chills and fever  HENT: Negative for sore throat  Eyes: Negative for visual disturbance  Respiratory: Negative for shortness of breath  Cardiovascular: Negative for chest pain  Gastrointestinal: Negative for abdominal pain  Genitourinary: Negative for flank pain  Musculoskeletal: Positive for arthralgias  Negative for joint swelling  Skin: Negative for rash and wound  Neurological: Negative for weakness and numbness  Hematological: Bruises/bleeds easily  Psychiatric/Behavioral: Negative for self-injury  Objective:  Vitals:    10/11/22 1324   BP: 155/84   Pulse: 60   Weight: 67 6 kg (149 lb)   Height: 5' 2" (1 575 m)     Back Exam     Comments:    Cervical spine  -no tenderness  -limited range of motion with side bending to both sides      Right Hand Exam     Muscle Strength   The patient has normal right wrist strength  Other   Sensation: normal  Pulse: present      Left Hand Exam     Muscle Strength   The patient has normal left wrist strength  Other   Sensation: normal  Pulse: present      Right Elbow Exam     Tenderness   The patient is experiencing tenderness in the lateral epicondyle  Range of Motion   The patient has normal right elbow ROM  Muscle Strength   The patient has normal right elbow strength (5/5 flexion and extension)      Other   Sensation: normal      Left Elbow Exam     Other   Sensation: normal      Right Shoulder Exam     Tenderness   Right shoulder tenderness location: Anterior, lateral     Range of Motion   Active abduction: 130   Forward flexion: 110   Internal rotation 0 degrees: Lumbar     Muscle Strength   Right shoulder normal muscle strength: 4+/5 external rotation and supraspinatus  Abduction: 5/5   Internal rotation: 5/5     Tests   Taveras test: positive    Other   Sensation: normal    Comments:  Negative empty can test  Negative belly press      Left Shoulder Exam     Other   Sensation: normal           Strength/Myotome Testing     Left Wrist/Hand   Normal wrist strength    Right Wrist/Hand   Normal wrist strength      Physical Exam  Vitals and nursing note reviewed  Constitutional:       General: She is not in acute distress  Appearance: She is well-developed  She is not ill-appearing or diaphoretic  HENT:      Head: Normocephalic  Right Ear: External ear normal       Left Ear: External ear normal    Eyes:      Conjunctiva/sclera: Conjunctivae normal    Neck:      Trachea: No tracheal deviation  Cardiovascular:      Rate and Rhythm: Normal rate  Pulmonary:      Effort: Pulmonary effort is normal  No respiratory distress  Abdominal:      General: There is no distension  Musculoskeletal:         General: Tenderness present  No swelling, deformity or signs of injury  Skin:     General: Skin is warm and dry  Coloration: Skin is not jaundiced or pale  Neurological:      Mental Status: She is alert and oriented to person, place, and time  Psychiatric:         Mood and Affect: Mood normal          Behavior: Behavior normal          Thought Content: Thought content normal          Judgment: Judgment normal          I have personally reviewed pertinent films in PACS and my interpretation is  X-rays right shoulder noted for mild degenerative change glenohumeral joint with rounded osseous body tip of the acromion  Kalyani Browning

## 2022-10-11 NOTE — LETTER
October 11, 2022     Hector Silva, [address] [facility]  [address]  [facility]    Patient: Adrian Cuellar   YOB: 1934   Date of Visit: 10/11/2022       Dear Dr Dash Barrientos: Thank you for referring Adrian Cuellar to me for evaluation  Below are my notes for this consultation  If you have questions, please do not hesitate to call me  I look forward to following your patient along with you  Sincerely,        [employer], DO        CC: No Recipients  [employer], DO  10/11/2022  4:52 PM  Sign when Signing Visit  Assessment/Plan:  Assessment/Plan   Diagnoses and all orders for this visit:    Arthritis of right glenohumeral joint  -     Ambulatory Referral to Sports Medicine  -     XR shoulder 2+ vw right; Future  -     Ambulatory Referral to Physical Therapy; Future    Subacromial impingement of right shoulder  -     Ambulatory Referral to Physical Therapy; Future    Right rotator cuff tendinitis  -     Ambulatory Referral to Physical Therapy; Future    Biceps strain, right, initial encounter  -     Ambulatory Referral to Physical Therapy; Future    Trapezius muscle spasm  -     Ambulatory Referral to Physical Therapy; Future    Other orders  -     Cancel: Large joint arthrocentesis      71-year-old right-hand-dominant female with right shoulder pain more than 2 months duration  Discussed with patient physical exam, radiographs, impression and plan  X-rays right shoulder noted for mild degenerative change glenohumeral joint with rounded osseous body tip of the acromion  Physical exam cervical spine unremarkable for midline or paraspinal tenderness  She has limited range of motion with side bending to both sides  Right shoulder tenderness at the anterior lateral aspect  She has range of motion limited to forward flexion of 110°, abduction 130°, and internal rotation lumbar spine  She has 4+/5 external rotation and supraspinatus  Empty can testing unremarkable    There is pain with Taveras maneuver  She has normal sensation, biceps reflex, and radial pulse both upper extremities  Clinical impression is that she may be symptomatic from subacromial impingement  Topical anesthetic, supplements, and formal therapy  She declines steroid injection at this time  Invasive management/surgery is not warranted at this time  She is to start taking tumeric at least 1000 mg daily and tart cherry at least 1000 mg daily  She may consider topical anesthetics such as Blue emu, Tiger balm, topical CBD  She is to start physical therapy as soon as possible and do home exercises as directed  She will follow up with me as needed  Subjective:   Patient ID: Alonzo Chatman is a 80 y o  female  Chief Complaint   Patient presents with   • Right Shoulder - Pain       80year-old right-hand-dominant female presents evaluation right shoulder pain more than 2 months duration  She denies any particular trauma or inciting event  Pain described as gradual in onset, generalized to the shoulder worse at the anterior lateral aspect, radiating distally along the anterior aspect the upper arm, worse with moving the arm particularly elevating above shoulder level, associated with limited range of motion, and improved with resting  She tried applying topical diclofenac afterward experienced chest pain/tightness  She saw her primary care physician and was referred to sports Medicine  Shoulder Pain  This is a new problem  The current episode started more than 1 month ago  The problem occurs daily  The problem has been unchanged  Associated symptoms include arthralgias  Pertinent negatives include no abdominal pain, chest pain, chills, fever, joint swelling, numbness, rash, sore throat or weakness  Exacerbated by: Arm movement  She has tried rest and position changes (Topical diclofenac) for the symptoms  The treatment provided mild relief             The following portions of the patient's history were reviewed and updated as appropriate: She  has a past medical history of Cellulitis, Constipation, COVID-19 (08/15/2022), Diabetes mellitus ([address] ), Disease of thyroid gland, Ecchymosis, Fall, Hyperlipidemia, Hypertension, Hypokalemia, Lower extremity weakness, and Vitamin D deficiency  She  has a past surgical history that includes Back surgery; Other surgical history; Cholecystectomy; Hysterectomy; Colonoscopy; Coronary angioplasty with stent; and CT bone marrow biopsy and aspiration (1/15/2020)  Her family history includes Heart disease in her mother; Other in her father  She  reports that she has quit smoking  She started smoking about 64 years ago  She has never used smokeless tobacco  She reports that she does not drink alcohol and does not use drugs  She is allergic to iodine - food allergy, iv dye  [iodinated diagnostic agents], and other       Review of Systems   Constitutional: Negative for chills and fever  HENT: Negative for sore throat  Eyes: Negative for visual disturbance  Respiratory: Negative for shortness of breath  Cardiovascular: Negative for chest pain  Gastrointestinal: Negative for abdominal pain  Genitourinary: Negative for flank pain  Musculoskeletal: Positive for arthralgias  Negative for joint swelling  Skin: Negative for rash and wound  Neurological: Negative for weakness and numbness  Hematological: Bruises/bleeds easily  Psychiatric/Behavioral: Negative for self-injury  Objective:  Vitals:    10/11/22 1324   BP: 155/84   Pulse: 60   Weight: 67 6 kg (149 lb)   Height: 5' 2" (1 575 m)     Back Exam     Comments:    Cervical spine  -no tenderness  -limited range of motion with side bending to both sides      Right Hand Exam     Muscle Strength   The patient has normal right wrist strength  Other   Sensation: normal  Pulse: present      Left Hand Exam     Muscle Strength   The patient has normal left wrist strength      Other   Sensation: normal  Pulse: present      Right Elbow Exam     Tenderness   The patient is experiencing tenderness in the lateral epicondyle  Range of Motion   The patient has normal right elbow ROM  Muscle Strength   The patient has normal right elbow strength (5/5 flexion and extension)  Other   Sensation: normal      Left Elbow Exam     Other   Sensation: normal      Right Shoulder Exam     Tenderness   Right shoulder tenderness location: Anterior, lateral     Range of Motion   Active abduction: 130   Forward flexion: 110   Internal rotation 0 degrees: Lumbar     Muscle Strength   Right shoulder normal muscle strength: 4+/5 external rotation and supraspinatus  Abduction: 5/5   Internal rotation: 5/5     Tests   Taveras test: positive    Other   Sensation: normal    Comments:  Negative empty can test  Negative belly press      Left Shoulder Exam     Other   Sensation: normal           Strength/Myotome Testing     Left Wrist/Hand   Normal wrist strength    Right Wrist/Hand   Normal wrist strength      Physical Exam  Vitals and nursing note reviewed  Constitutional:       General: She is not in acute distress  Appearance: She is well-developed  She is not ill-appearing or diaphoretic  HENT:      Head: Normocephalic  Right Ear: External ear normal       Left Ear: External ear normal    Eyes:      Conjunctiva/sclera: Conjunctivae normal    Neck:      Trachea: No tracheal deviation  Cardiovascular:      Rate and Rhythm: Normal rate  Pulmonary:      Effort: Pulmonary effort is normal  No respiratory distress  Abdominal:      General: There is no distension  Musculoskeletal:         General: Tenderness present  No swelling, deformity or signs of injury  Skin:     General: Skin is warm and dry  Coloration: Skin is not jaundiced or pale  Neurological:      Mental Status: She is alert and oriented to person, place, and time     Psychiatric:         Mood and Affect: Mood normal          Behavior: Behavior normal  Thought Content: Thought content normal          Judgment: Judgment normal          I have personally reviewed pertinent films in PACS and my interpretation is  X-rays right shoulder noted for mild degenerative change glenohumeral joint with rounded osseous body tip of the acromion  Santiago Brunson

## 2022-10-11 NOTE — LETTER
October 11, 2022     Randy Gonzalez, [address] [facility]  [address]  [facility]    Patient: Alonzo Chatman   YOB: 1934   Date of Visit: 10/11/2022       Dear Dr Suzi Lemus: Thank you for referring Alonzo Chatman to me for evaluation  Below are my notes for this consultation  If you have questions, please do not hesitate to call me  I look forward to following your patient along with you  Sincerely,        [employer], DO        CC: No Recipients  [employer], DO  10/11/2022  4:49 PM  Incomplete  Assessment/Plan:  Assessment/Plan   Diagnoses and all orders for this visit:    Arthritis of right glenohumeral joint  -     Ambulatory Referral to Physical Therapy; Future    Acute pain of right shoulder  -     Ambulatory Referral to Sports Medicine  -     XR shoulder 2+ vw right; Future    Subacromial impingement of right shoulder  -     Ambulatory Referral to Physical Therapy; Future    Right rotator cuff tendinitis  -     Ambulatory Referral to Physical Therapy; Future    Biceps strain, right, initial encounter  -     Ambulatory Referral to Physical Therapy; Future    Trapezius muscle spasm  -     Ambulatory Referral to Physical Therapy; Future      29-year-old right-hand-dominant female with right shoulder pain more than 2 months duration  Discussed with patient physical exam, radiographs, impression and plan  X-rays right shoulder noted for mild degenerative change glenohumeral joint with rounded osseous body tip of the acromion  Physical exam cervical spine unremarkable for midline or paraspinal tenderness  She has limited range of motion with side bending to both sides  Right shoulder tenderness at the anterior lateral aspect  She has range of motion limited to forward flexion of 110°, abduction 130°, and internal rotation lumbar spine  She has 4+/5 external rotation and supraspinatus  Empty can testing unremarkable  There is pain with Taveras maneuver    She has normal sensation, biceps reflex, and radial pulse both upper extremities  Clinical impression is that she may be symptomatic from subacromial impingement  I discussed treatment regimen of corticosteroid injection and formal therapy  Invasive management/surgery is not warranted at this time  I administered mixture of 2 cc 1% lidocaine, 2 cc 0 5% bupivacaine, and 1 cc Kenalog to right shoulder subacromial space without complication  She is to start taking tumeric at least 1000 mg daily and tart cherry at least 1000 mg daily  She may consider topical anesthetics such as Blue emu, Tiger balm, topical CBD  She is to start physical therapy as soon as possible and do home exercises as directed  She will follow up with me as needed  Subjective:   Patient ID: Abdullahi Angel is a 80 y o  female  Chief Complaint   Patient presents with   • Right Shoulder - Pain       80year-old right-hand-dominant female presents evaluation right shoulder pain more than 2 months duration  She denies any particular trauma or inciting event  Pain described as gradual in onset, generalized to the shoulder worse at the anterior lateral aspect, radiating distally along the anterior aspect the upper arm, worse with moving the arm particularly elevating above shoulder level, associated with limited range of motion, and improved with resting  She tried applying topical diclofenac afterward experienced chest pain/tightness  She saw her primary care physician and was referred to sports Medicine  Shoulder Pain  This is a new problem  The current episode started more than 1 month ago  The problem occurs daily  The problem has been unchanged  Associated symptoms include arthralgias  Pertinent negatives include no abdominal pain, chest pain, chills, fever, joint swelling, numbness, rash, sore throat or weakness  Exacerbated by: Arm movement  She has tried rest and position changes (Topical diclofenac) for the symptoms   The treatment provided mild relief  The following portions of the patient's history were reviewed and updated as appropriate: She  has a past medical history of Cellulitis, Constipation, COVID-19 (08/15/2022), Diabetes mellitus ([facility] [address] ), Disease of thyroid gland, Ecchymosis, Fall, Hyperlipidemia, Hypertension, Hypokalemia, Lower extremity weakness, and Vitamin D deficiency  She  has a past surgical history that includes Back surgery; Other surgical history; Cholecystectomy; Hysterectomy; Colonoscopy; Coronary angioplasty with stent; and CT bone marrow biopsy and aspiration (1/15/2020)  Her family history includes Heart disease in her mother; Other in her father  She  reports that she has quit smoking  She started smoking about 64 years ago  She has never used smokeless tobacco  She reports that she does not drink alcohol and does not use drugs  She is allergic to iodine - food allergy, iv dye  [iodinated diagnostic agents], and other       Review of Systems   Constitutional: Negative for chills and fever  HENT: Negative for sore throat  Eyes: Negative for visual disturbance  Respiratory: Negative for shortness of breath  Cardiovascular: Negative for chest pain  Gastrointestinal: Negative for abdominal pain  Genitourinary: Negative for flank pain  Musculoskeletal: Positive for arthralgias  Negative for joint swelling  Skin: Negative for rash and wound  Neurological: Negative for weakness and numbness  Hematological: Does not bruise/bleed easily  Psychiatric/Behavioral: Negative for self-injury  Objective:  Vitals:    10/11/22 1324   BP: 155/84   Pulse: 60   Weight: 67 6 kg (149 lb)   Height: 5' 2" (1 575 m)     Back Exam     Comments:    Cervical spine  -no tenderness  -limited range of motion with side bending to both sides      Right Hand Exam     Muscle Strength   The patient has normal right wrist strength      Other   Sensation: normal  Pulse: present      Left Hand Exam     Muscle Strength   The patient has normal left wrist strength  Other   Sensation: normal  Pulse: present      Right Elbow Exam     Tenderness   The patient is experiencing tenderness in the lateral epicondyle  Range of Motion   The patient has normal right elbow ROM  Muscle Strength   The patient has normal right elbow strength (5/5 flexion and extension)  Other   Sensation: normal      Left Elbow Exam     Other   Sensation: normal      Right Shoulder Exam     Tenderness   Right shoulder tenderness location: Anterior, lateral     Range of Motion   Active abduction: 130   Forward flexion: 110   Internal rotation 0 degrees: Lumbar     Muscle Strength   Right shoulder normal muscle strength: 4+/5 external rotation and supraspinatus  Abduction: 5/5   Internal rotation: 5/5     Tests   Taveras test: positive    Other   Sensation: normal    Comments:  Negative empty can test  Negative belly press      Left Shoulder Exam     Other   Sensation: normal           Strength/Myotome Testing     Left Wrist/Hand   Normal wrist strength    Right Wrist/Hand   Normal wrist strength      Physical Exam  Vitals and nursing note reviewed  Constitutional:       General: She is not in acute distress  Appearance: She is well-developed  She is not ill-appearing or diaphoretic  HENT:      Head: Normocephalic  Right Ear: External ear normal       Left Ear: External ear normal    Eyes:      Conjunctiva/sclera: Conjunctivae normal    Neck:      Trachea: No tracheal deviation  Cardiovascular:      Rate and Rhythm: Normal rate  Pulmonary:      Effort: Pulmonary effort is normal  No respiratory distress  Abdominal:      General: There is no distension  Musculoskeletal:         General: Tenderness present  No swelling, deformity or signs of injury  Skin:     General: Skin is warm and dry  Coloration: Skin is not jaundiced or pale  Neurological:      Mental Status: She is alert and oriented to person, place, and time  Psychiatric:         Mood and Affect: Mood normal          Behavior: Behavior normal          Thought Content: Thought content normal          Judgment: Judgment normal          I have personally reviewed pertinent films in PACS and my interpretation is  X-rays right shoulder noted for mild degenerative change glenohumeral joint with rounded osseous body tip of the acromion  Junie Russo KevinDO  10/11/2022  2:07 PM  Sign when Signing Visit  Assessment/Plan:  Assessment/Plan   Diagnoses and all orders for this visit:    Arthritis of right glenohumeral joint  -     Ambulatory Referral to Physical Therapy; Future    Acute pain of right shoulder  -     Ambulatory Referral to Sports Medicine  -     XR shoulder 2+ vw right; Future    Subacromial impingement of right shoulder  -     Ambulatory Referral to Physical Therapy; Future    Right rotator cuff tendinitis  -     Ambulatory Referral to Physical Therapy; Future    Biceps strain, right, initial encounter  -     Ambulatory Referral to Physical Therapy; Future    Trapezius muscle spasm  -     Ambulatory Referral to Physical Therapy; Future      80-year-old right-dominant female with right shoulder pain more than 2 months duration  Discussed with patient physical exam, radiographs, impression and plan  Physical exam cervical spine unremarkable for midline or paraspinal tenderness  She has limited range of motion with side bending to both sides  Right shoulder tenderness at the anterior lateral aspect  She has range of motion limited to forward flexion of 110°, abduction 130°, and internal rotation lumbar spine  She has 4+/5 external rotation and supraspinatus  Empty can testing unremarkable  There is pain with Taveras maneuver  She has normal sensation, biceps reflex, and radial pulse both upper extremities  Subjective:   Patient ID: Kurt Orozco is a 80 y o  female    Chief Complaint   Patient presents with   • Right Shoulder - Pain RHD    Shoulder Pain            The following portions of the patient's history were reviewed and updated as appropriate: She  has a past medical history of Cellulitis, Constipation, COVID-19 (08/15/2022), Diabetes mellitus ([facility] [address] ), Disease of thyroid gland, Ecchymosis, Fall, Hyperlipidemia, Hypertension, Hypokalemia, Lower extremity weakness, and Vitamin D deficiency  She  has a past surgical history that includes Back surgery; Other surgical history; Cholecystectomy; Hysterectomy; Colonoscopy; Coronary angioplasty with stent; and CT bone marrow biopsy and aspiration (1/15/2020)  Her family history includes Heart disease in her mother; Other in her father  She  reports that she has quit smoking  She started smoking about 64 years ago  She has never used smokeless tobacco  She reports that she does not drink alcohol and does not use drugs  She is allergic to iodine - food allergy, iv dye  [iodinated diagnostic agents], and other       Review of Systems    Objective:  Vitals:    10/11/22 1324   BP: 155/84   Pulse: 60   Weight: 67 6 kg (149 lb)   Height: 5' 2" (1 575 m)     Ortho Exam    Physical Exam  Vitals and nursing note reviewed  Constitutional:       General: She is not in acute distress  Appearance: She is well-developed  She is not ill-appearing or diaphoretic  HENT:      Head: Normocephalic  Right Ear: External ear normal       Left Ear: External ear normal    Eyes:      Conjunctiva/sclera: Conjunctivae normal    Neck:      Trachea: No tracheal deviation  Cardiovascular:      Rate and Rhythm: Normal rate  Pulmonary:      Effort: Pulmonary effort is normal  No respiratory distress  Abdominal:      General: There is no distension  Skin:     General: Skin is warm and dry  Coloration: Skin is not jaundiced or pale  Neurological:      Mental Status: She is alert and oriented to person, place, and time     Psychiatric:         Mood and Affect: Mood normal          Behavior: Behavior normal          Thought Content:  Thought content normal          Judgment: Judgment normal          {Imaging Review Statement:[id number]}

## 2022-10-19 ENCOUNTER — HOSPITAL ENCOUNTER (OUTPATIENT)
Dept: RADIOLOGY | Facility: CLINIC | Age: 87
Discharge: HOME | End: 2022-10-19
Payer: COMMERCIAL

## 2022-10-19 VITALS
RESPIRATION RATE: 20 BRPM | DIASTOLIC BLOOD PRESSURE: 93 MMHG | HEART RATE: 70 BPM | SYSTOLIC BLOOD PRESSURE: 156 MMHG | TEMPERATURE: 98.4 F | OXYGEN SATURATION: 96 %

## 2022-10-19 DIAGNOSIS — M46.1 SACROILIITIS (HCC): ICD-10-CM

## 2022-10-19 PROCEDURE — 27096 INJECT SACROILIAC JOINT: CPT | Performed by: ANESTHESIOLOGY

## 2022-10-19 PROCEDURE — A9585 GADOBUTROL INJECTION: HCPCS | Performed by: ANESTHESIOLOGY

## 2022-10-19 RX ORDER — LIDOCAINE HYDROCHLORIDE 10 MG/ML
5 INJECTION, SOLUTION EPIDURAL; INFILTRATION; INTRACAUDAL; PERINEURAL ONCE
Status: DISCONTINUED | OUTPATIENT
Start: 2022-10-19 | End: 2022-10-23 | Stop reason: HOSPADM

## 2022-10-19 RX ORDER — METHYLPREDNISOLONE ACETATE 80 MG/ML
80 INJECTION, SUSPENSION INTRA-ARTICULAR; INTRALESIONAL; INTRAMUSCULAR; PARENTERAL; SOFT TISSUE ONCE
Status: COMPLETED | OUTPATIENT
Start: 2022-10-19 | End: 2022-10-19

## 2022-10-19 RX ORDER — BUPIVACAINE HCL/PF 2.5 MG/ML
5 VIAL (ML) INJECTION ONCE
Status: COMPLETED | OUTPATIENT
Start: 2022-10-19 | End: 2022-10-19

## 2022-10-19 RX ADMIN — GADOBUTROL 0.75 ML: 604.72 INJECTION INTRAVENOUS at 14:42

## 2022-10-19 RX ADMIN — Medication 5 ML: at 14:42

## 2022-10-19 RX ADMIN — METHYLPREDNISOLONE ACETATE 80 MG: 80 INJECTION, SUSPENSION INTRA-ARTICULAR; INTRALESIONAL; INTRAMUSCULAR; PARENTERAL; SOFT TISSUE at 14:42

## 2022-10-19 NOTE — DISCHARGE INSTR - LAB
Steroid Joint Injection   WHAT YOU NEED TO KNOW:   A steroid joint injection is a procedure to inject steroid medicine into a joint  Steroid medicine decreases pain and inflammation  The injection may also contain an anesthetic (numbing medicine) to decrease pain  It may be done to treat conditions such as arthritis, gout, or carpal tunnel syndrome  The injections may be given in your knee, ankle, shoulder, elbow, wrist, ankle or sacroiliac joint  Do not apply heat to any area that is numb  If you have discomfort or soreness at the injection site, you may apply ice today, 20 minutes on and 20 minutes off  Tomorrow you may use ice or warm, moist heat  Do not apply ice or heat directly to the skin  You may have an increase or change in the discomfort for 36-48 hours after your treatment  Apply ice and continue with any pain medicine you have been prescribed  Do not do anything strenuous today  You may shower, but no tub baths or hot tubs today  You may resume your normal activities tomorrow, but do not “overdo it”  Resume normal activities slowly when you are feeling better  If you experience redness, drainage or swelling at the injection site, or if you develop a fever above 100 degrees, please call [facility] at [phone] or go to the Emergency Room  Continue to take all routine medicines prescribed by your primary care physician unless otherwise instructed by our staff  Most blood thinners should be started again according to your regularly scheduled dosing  If you have any questions, please give our office a call  As no general anesthesia was used in today's procedure, you should not experience any side effects related to anesthesia  If you are diabetic, the steroids used in today's injection may temporarily increase your blood sugar levels after the first few days after your injection   Please keep a close eye on your sugars and alert the doctor who manages your diabetes if your sugars are significantly high from your baseline or you are symptomatic  If you have a problem specifically related to your procedure, please call our office at [phone]  Problems not related to your procedure should be directed to your primary care physician

## 2022-10-19 NOTE — H&P
History of Present Illness: The patient is a 80 y o  female who presents with complaints of tenderness noted over bilateral sacroiliac joints      Past Medical History:   Diagnosis Date   • Cellulitis     last assessed - [id number]   • Constipation     last assessed - [id number]   • COVID-19 08/15/2022   • Diabetes mellitus ([facility] [address] )    • Disease of thyroid gland    • Ecchymosis     last assessed - [id number]   • Fall     last assessed - 03Jan2017   • Hyperlipidemia    • Hypertension    • Hypokalemia     last assessed - 02Jun2015   • Lower extremity weakness     last assessed - [id number]   • Vitamin D deficiency     last assessed - [id number]       Past Surgical History:   Procedure Laterality Date   • BACK SURGERY     • CHOLECYSTECTOMY     • COLONOSCOPY     • CORONARY ANGIOPLASTY WITH STENT PLACEMENT     • CT BONE MARROW BIOPSY AND ASPIRATION  1/15/2020   • HYSTERECTOMY     • OTHER SURGICAL HISTORY      Previous stent placement         Current Outpatient Medications:   •  amLODIPine (NORVASC) 2 5 mg tablet, Take 1 tablet (2 5 mg total) by mouth daily, Disp: 90 tablet, Rfl: 1  •  aspirin 81 mg chewable tablet, Chew 81 mg, Disp: , Rfl:   •  atorvastatin (LIPITOR) 40 mg tablet, Take 1 tablet (40 mg total) by mouth daily, Disp: 90 tablet, Rfl: 3  •  baclofen 10 mg tablet, TAKE 1 TABLET BY MOUTH NIGHTLY AT BEDTIME AS NEEDED AS DIRECTED, Disp: , Rfl: 3  •  carvedilol (COREG) 12 5 mg tablet, Take 1 tablet (12 5 mg total) by mouth 2 (two) times a day with meals, Disp: 180 tablet, Rfl: 1  •  Cholecalciferol (VITAMIN D3) 2000 units capsule, Take 1 capsule (2,000 Units total) by mouth daily, Disp: 100 capsule, Rfl: 2  •  clopidogrel (PLAVIX) 75 mg tablet, Take 1 tablet (75 mg total) by mouth daily, Disp: 90 tablet, Rfl: 5  •  FLUoxetine (PROzac) 10 mg capsule, fluoxetine 10 mg capsule, Disp: , Rfl:   •  latanoprost (XALATAN) 0 005 % ophthalmic solution, INSTILL 1 DROP INTO EACH EYE AT BEDTIME, Disp: 9 mL, Rfl: 0  •  levothyroxine 50 mcg tablet, Take 1 tablet (50 mcg total) by mouth daily, Disp: 90 tablet, Rfl: 2  •  lidocaine (Lidoderm) 5 %, Apply 1 patch topically daily Remove & Discard patch within 12 hours or as directed by MD, Disp: 30 patch, Rfl: 0  •  losartan (COZAAR) 100 MG tablet, Take 1 tablet (100 mg total) by mouth daily, Disp: 90 tablet, Rfl: 3  •  Multiple Vitamins-Minerals (PRESERVISION AREDS 2 PO), Take by mouth, Disp: , Rfl:   •  pioglitazone (ACTOS) 30 mg tablet, Take 1 tablet (30 mg total) by mouth daily, Disp: 90 tablet, Rfl: 1  •  traMADol (Ultram) 50 mg tablet, Take 1 tablet (50 mg total) by mouth in the morning Do not refill prior to 9/11/2022, Disp: 30 tablet, Rfl: 1  •  zolpidem (AMBIEN) 5 mg tablet, Take 5 mg by mouth, Disp: , Rfl:     Current Facility-Administered Medications:   •  bupivacaine (PF) (MARCAINE) 0 25 % injection 5 mL, 5 mL, Intra-articular, Once, Cande Morris MD  •  cyanocobalamin injection 1,000 mcg, 1,000 mcg, Intramuscular, Q30 Days, Juana Reynolds DO, 1,000 mcg at 02/27/19 1250  •  iohexol (OMNIPAQUE) 300 mg/mL injection 50 mL, 50 mL, Intra-articular, Once, Cande Morris MD  •  lidocaine (PF) (XYLOCAINE-MPF) 1 % injection 5 mL, 5 mL, Intra-articular, Once, Cande Morris MD  •  methylPREDNISolone acetate (DEPO-MEDROL) injection 80 mg, 80 mg, Intra-articular, Once, Cande Morris MD    Allergies   Allergen Reactions   • Iodine - Food Allergy Other (See Comments)     IV CONTRAST DYE   • Iv Dye  [Iodinated Diagnostic Agents]    • Other        Physical Exam:   Vitals:    10/19/22 1428   Pulse: 70   Resp: 20   Temp: 98 4 °F (36 9 °C)   SpO2: 94%     General: Awake, Alert, Oriented x 3, Mood and affect appropriate  Respiratory: Respirations even and unlabored  Cardiovascular: Peripheral pulses intact; no edema  Musculoskeletal Exam:  Tenderness noted over bilateral sacroiliac joints  ASA Score: 2    Patient/Chart Verification  Patient ID Verified: Verbal  ID Band Applied: No  Consents Confirmed:  To be obtained in the Pre-Procedure area  H&P( within 30 days) Verified: To be obtained in the Pre-Procedure area  Interval H&P(within 24 hr) Complete (required for Outpatients and Surgery Admit only): To be obtained in the Pre-Procedure area  Allergies Reviewed: Yes  Anticoag/NSAID held?: No  Currently on antibiotics?: No  Pregnancy denied?: NA    Assessment:   1   Sacroiliitis ([facility] [address] )        Plan: Bilateral SI joint injections

## 2022-10-26 ENCOUNTER — TELEPHONE (OUTPATIENT)
Dept: PAIN MEDICINE | Facility: CLINIC | Age: 87
End: 2022-10-26

## 2022-10-31 ENCOUNTER — OFFICE VISIT (OUTPATIENT)
Dept: INTERNAL MEDICINE CLINIC | Facility: CLINIC | Age: 87
End: 2022-10-31

## 2022-10-31 VITALS
SYSTOLIC BLOOD PRESSURE: 144 MMHG | OXYGEN SATURATION: 99 % | DIASTOLIC BLOOD PRESSURE: 74 MMHG | WEIGHT: 146.2 LBS | HEIGHT: 62 IN | RESPIRATION RATE: 16 BRPM | BODY MASS INDEX: 26.91 KG/M2 | HEART RATE: 63 BPM

## 2022-10-31 DIAGNOSIS — E03.9 HYPOTHYROIDISM, UNSPECIFIED TYPE: ICD-10-CM

## 2022-10-31 DIAGNOSIS — R22.2 LUMP IN CHEST: Primary | ICD-10-CM

## 2022-10-31 RX ORDER — LEVOTHYROXINE SODIUM 0.05 MG/1
50 TABLET ORAL DAILY
Qty: 90 TABLET | Refills: 2 | Status: SHIPPED | OUTPATIENT
Start: 2022-10-31

## 2022-10-31 NOTE — PROGRESS NOTES
Assessment/Plan:     Hermelindo Peña is here with c/o right chest wall lump; there is a bruise over the palpable area; she was recently at her chiropractor office; thinks it may be from manipulation from the treatment; will obtain US of chest wall  Quality Measures:     BMI Counseling: Body mass index is 26 74 kg/m²  The BMI is above normal  Nutrition recommendations include decreasing portion sizes and encouraging healthy choices of fruits and vegetables  Exercise recommendations include moderate physical activity 150 minutes/week  Rationale for BMI follow-up plan is due to patient being overweight or obese  Return for Next scheduled follow up  No problem-specific Assessment & Plan notes found for this encounter  Diagnoses and all orders for this visit:    Lump in chest  -     US superficial lump (non extremity); Future    Hypothyroidism, unspecified type  -     levothyroxine 50 mcg tablet; Take 1 tablet (50 mcg total) by mouth daily          Subjective:      Patient ID: Vania Gregorio is a 80 y o  female  Here with chest wall lump        ALLERGIES:  Allergies   Allergen Reactions   • Iodine - Food Allergy Other (See Comments)     IV CONTRAST DYE   • Iv Dye  [Iodinated Diagnostic Agents]    • Other        CURRENT MEDICATIONS:    Current Outpatient Medications:   •  amLODIPine (NORVASC) 2 5 mg tablet, Take 1 tablet (2 5 mg total) by mouth daily, Disp: 90 tablet, Rfl: 1  •  aspirin 81 mg chewable tablet, Chew 81 mg, Disp: , Rfl:   •  atorvastatin (LIPITOR) 40 mg tablet, Take 1 tablet (40 mg total) by mouth daily, Disp: 90 tablet, Rfl: 3  •  baclofen 10 mg tablet, TAKE 1 TABLET BY MOUTH NIGHTLY AT BEDTIME AS NEEDED AS DIRECTED, Disp: , Rfl: 3  •  carvedilol (COREG) 12 5 mg tablet, Take 1 tablet (12 5 mg total) by mouth 2 (two) times a day with meals, Disp: 180 tablet, Rfl: 1  •  Cholecalciferol (VITAMIN D3) 2000 units capsule, Take 1 capsule (2,000 Units total) by mouth daily, Disp: 100 capsule, Rfl: 2  •  clopidogrel (PLAVIX) 75 mg tablet, Take 1 tablet (75 mg total) by mouth daily, Disp: 90 tablet, Rfl: 5  •  FLUoxetine (PROzac) 10 mg capsule, fluoxetine 10 mg capsule, Disp: , Rfl:   •  latanoprost (XALATAN) 0 005 % ophthalmic solution, INSTILL 1 DROP INTO EACH EYE AT BEDTIME, Disp: 9 mL, Rfl: 0  •  levothyroxine 50 mcg tablet, Take 1 tablet (50 mcg total) by mouth daily, Disp: 90 tablet, Rfl: 2  •  lidocaine (Lidoderm) 5 %, Apply 1 patch topically daily Remove & Discard patch within 12 hours or as directed by MD, Disp: 30 patch, Rfl: 0  •  losartan (COZAAR) 100 MG tablet, Take 1 tablet (100 mg total) by mouth daily, Disp: 90 tablet, Rfl: 3  •  Multiple Vitamins-Minerals (PRESERVISION AREDS 2 PO), Take by mouth, Disp: , Rfl:   •  pioglitazone (ACTOS) 30 mg tablet, Take 1 tablet (30 mg total) by mouth daily, Disp: 90 tablet, Rfl: 1  •  traMADol (Ultram) 50 mg tablet, Take 1 tablet (50 mg total) by mouth in the morning Do not refill prior to 9/11/2022, Disp: 30 tablet, Rfl: 1  •  zolpidem (AMBIEN) 5 mg tablet, Take 5 mg by mouth, Disp: , Rfl:     Current Facility-Administered Medications:   •  cyanocobalamin injection 1,000 mcg, 1,000 mcg, Intramuscular, Q30 Days, Caridad Allan DO, 1,000 mcg at 02/27/19 1250    ACTIVE PROBLEM LIST:  Patient Active Problem List   Diagnosis   • Coronary artery disease of native artery of native heart with stable angina pectoris (HCC)   • Depression   • Esophageal reflux   • Hyperlipidemia   • Hypertension   • Hypothyroidism   • Insomnia   • Lumbar compression fracture (HCC)   • Lumbar spinal stenosis   • Normochromic normocytic anemia   • Osteoarthritis of knee   • Osteoporosis   • Spinal stenosis   • Type 2 diabetes mellitus (HCC)   • Vitamin B12 deficiency   • Vitamin D deficiency   • Screening for malignant neoplasm of breast   • Health care maintenance   • Screening mammogram, encounter for   • Diabetic peripheral neuropathy ([facility] [address] )   • Greater trochanteric bursitis of left hip   • Left sided sciatica   • Triclonal gammopathy   • Medicare annual wellness visit, subsequent   • Adhesive capsulitis of shoulder   • Neck pain   • Light chain disease, 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HCC)   • Non-Hodgkin's lymphoma (HCC)   • Right hip pain   • Trochanteric bursitis of right hip   • Chronic pain syndrome   • Chronic bilateral low back pain without sciatica   • Sacroiliitis ([facility] )       PAST MEDICAL HISTORY:  Past Medical History:   Diagnosis Date   • Cellulitis     last assessed - [id number]   • Constipation     last assessed - [id number]   • COVID-19 08/15/2022   • Diabetes mellitus ([facility] )    • Disease of thyroid gland    • Ecchymosis     last assessed - [id number]   • Fall     last assessed - [id number]   • Hyperlipidemia    • Hypertension    • Hypokalemia     last assessed - [id number]   • Lower extremity weakness     last assessed - [id number]   • Vitamin D deficiency     last assessed - [id number]       PAST SURGICAL HISTORY:  Past Surgical History:   Procedure Laterality Date   • BACK SURGERY     • CHOLECYSTECTOMY     • COLONOSCOPY     • CORONARY ANGIOPLASTY WITH STENT PLACEMENT     • CT BONE MARROW BIOPSY AND ASPIRATION  1/15/2020   • HYSTERECTOMY     • OTHER SURGICAL HISTORY      Previous stent placement       FAMILY HISTORY:  Family History   Problem Relation Age of Onset   • Heart disease Mother    • Other Father         cerebral embolism - with cerebral infarction       SOCIAL HISTORY:  Social History     Socioeconomic History   • Marital status: /Civil Union     Spouse name: Not on file   • Number of children: Not on file   • Years of education: Not on file   • Highest education level: Not on file   Occupational History   • Not on file   Tobacco Use   • Smoking status: Former Smoker     Start date: 5/7/1958   • Smokeless tobacco: Never Used   Vaping Use   • Vaping Use: Never used   Substance and Sexual Activity   • Alcohol use: No   • Drug use: No   • Sexual activity: Never     Partners: Male   Other Topics Concern   • Not on file   Social History Narrative    Caffeine use     Social Determinants of Health     Financial Resource Strain: Not on file   Food Insecurity: Not on file   Transportation Needs: Not on file   Physical Activity: Not on file   Stress: Not on file   Social Connections: Not on file   Intimate Partner Violence: Not on file   Housing Stability: Not on file       Review of Systems   Musculoskeletal:        Right upper chest wall lump    All other systems reviewed and are negative  Objective:  Vitals:    10/31/22 1533   BP: 144/74   BP Location: Left arm   Patient Position: Sitting   Cuff Size: Adult   Pulse: 63   Resp: 16   SpO2: 99%   Weight: 66 3 kg (146 lb 3 2 oz)   Height: 5' 2" (1 575 m)     Body mass index is 26 74 kg/m²  Physical Exam  Vitals and nursing note reviewed  Constitutional:       Appearance: Normal appearance  HENT:      Head: Normocephalic and atraumatic  Mouth/Throat:      Mouth: Mucous membranes are moist    Cardiovascular:      Rate and Rhythm: Normal rate  Pulmonary:      Effort: Pulmonary effort is normal    Musculoskeletal:         General: Normal range of motion  Cervical back: Normal range of motion  Right lower leg: No edema  Left lower leg: No edema  Comments: Right chest wall lump   Skin:     General: Skin is warm and dry  Neurological:      General: No focal deficit present  Mental Status: She is alert and oriented to person, place, and time  Mental status is at baseline     Psychiatric:         Mood and Affect: Mood normal            RESULTS:    Recent Results (from the past 1008 hour(s))   TSH, 3rd generation with Free T4 reflex    Collection Time: 09/27/22  2:16 PM   Result Value Ref Range    TSH 3RD GENERATON 1 420 0 450 - 4 500 uIU/mL   Comprehensive metabolic panel    Collection Time: 09/27/22  2:16 PM   Result Value Ref Range    Sodium 141 135 - 147 mmol/L    Potassium 3 9 3 5 - 5 3 mmol/L    Chloride 110 (H) 96 - 108 mmol/L    CO2 28 21 - 32 mmol/L    ANION GAP 3 (L) 4 - 13 mmol/L    BUN 17 5 - 25 mg/dL    Creatinine 0 99 0 60 - 1 30 mg/dL    Glucose, Fasting 143 (H) 65 - 99 mg/dL    Calcium 9 2 8 3 - 10 1 mg/dL    Corrected Calcium 9 8 8 3 - 10 1 mg/dL    AST 13 5 - 45 U/L    ALT 21 12 - 78 U/L    Alkaline Phosphatase 54 46 - 116 U/L    Total Protein 8 1 6 4 - 8 4 g/dL    Albumin 3 2 (L) 3 5 - 5 0 g/dL    Total Bilirubin 0 65 0 20 - 1 00 mg/dL    eGFR 51 ml/min/1 73sq m       This note was created with voice recognition software  Phonic, grammatical and spelling errors may be present within the note as a result

## 2022-11-04 ENCOUNTER — HOSPITAL ENCOUNTER (OUTPATIENT)
Dept: ULTRASOUND IMAGING | Facility: HOSPITAL | Age: 87
End: 2022-11-04
Attending: INTERNAL MEDICINE

## 2022-11-04 DIAGNOSIS — E78.5 HYPERLIPIDEMIA, UNSPECIFIED HYPERLIPIDEMIA TYPE: ICD-10-CM

## 2022-11-04 DIAGNOSIS — R22.2 LUMP IN CHEST: ICD-10-CM

## 2022-11-04 DIAGNOSIS — E11.8 TYPE 2 DIABETES MELLITUS WITH COMPLICATION, WITHOUT LONG-TERM CURRENT USE OF INSULIN (HCC): ICD-10-CM

## 2022-11-04 RX ORDER — PIOGLITAZONEHYDROCHLORIDE 30 MG/1
30 TABLET ORAL DAILY
Qty: 90 TABLET | Refills: 1 | Status: SHIPPED | OUTPATIENT
Start: 2022-11-04

## 2022-11-09 ENCOUNTER — TELEPHONE (OUTPATIENT)
Dept: MAMMOGRAPHY | Facility: CLINIC | Age: 87
End: 2022-11-09

## 2022-11-09 NOTE — TELEPHONE ENCOUNTER
Outreach call made to patient to discuss need for diagnostic mammogram and poss R breast US, discussed with patient recommendation for follow up mammogram after her R breast US, pt states that she doubts that we can get this mass in mammogram imaging d/t the location being so far into her armpit, adv patient that radiologist wants to evaluate her breast tissue further d/t the formation of the mass and we will see if the mass shows up on mammogram imaging, pt states it would be hard for her to get to our facility and she is refusing additional imaging    Will adv pt MD of her decision        Dr Tone Valladares,    Can you reach out to the patient and see if you can speak to her about getting this testing done      Thank you,  Neli Alexandre, MSN, RN, CN-BN  Breast Nurse Navigator

## 2022-12-08 ENCOUNTER — HOSPITAL ENCOUNTER (OUTPATIENT)
Dept: MAMMOGRAPHY | Facility: CLINIC | Age: 87
End: 2022-12-08

## 2022-12-08 ENCOUNTER — HOSPITAL ENCOUNTER (OUTPATIENT)
Dept: ULTRASOUND IMAGING | Facility: CLINIC | Age: 87
Discharge: HOME | End: 2022-12-08

## 2022-12-08 VITALS — WEIGHT: 146 LBS | BODY MASS INDEX: 26.87 KG/M2 | HEIGHT: 62 IN

## 2022-12-08 DIAGNOSIS — N63.10 MASS OF RIGHT BREAST, UNSPECIFIED QUADRANT: ICD-10-CM

## 2022-12-27 DIAGNOSIS — I10 HYPERTENSION, UNSPECIFIED TYPE: ICD-10-CM

## 2022-12-27 RX ORDER — AMLODIPINE BESYLATE 2.5 MG/1
2.5 TABLET ORAL DAILY
Qty: 90 TABLET | Refills: 1 | Status: SHIPPED | OUTPATIENT
Start: 2022-12-27

## 2023-01-31 DIAGNOSIS — I25.84 CORONARY ARTERY DISEASE DUE TO CALCIFIED CORONARY LESION: ICD-10-CM

## 2023-01-31 DIAGNOSIS — I25.10 CORONARY ARTERY DISEASE DUE TO CALCIFIED CORONARY LESION: ICD-10-CM

## 2023-01-31 RX ORDER — CARVEDILOL 12.5 MG/1
12.5 TABLET ORAL 2 TIMES DAILY WITH MEALS
Qty: 180 TABLET | Refills: 1 | Status: SHIPPED | OUTPATIENT
Start: 2023-01-31

## 2023-03-16 LAB
LEFT EYE DIABETIC RETINOPATHY: POSITIVE
RIGHT EYE DIABETIC RETINOPATHY: POSITIVE

## 2023-03-17 ENCOUNTER — RA CDI HCC (OUTPATIENT)
Dept: OTHER | Facility: HOSPITAL | Age: 88
End: 2023-03-17

## 2023-03-17 LAB — HBA1C MFR BLD HPLC: 7.4 %

## 2023-03-24 ENCOUNTER — OFFICE VISIT (OUTPATIENT)
Dept: INTERNAL MEDICINE CLINIC | Facility: CLINIC | Age: 88
End: 2023-03-24

## 2023-03-24 VITALS
HEIGHT: 62 IN | SYSTOLIC BLOOD PRESSURE: 138 MMHG | BODY MASS INDEX: 27.05 KG/M2 | HEART RATE: 65 BPM | WEIGHT: 147 LBS | RESPIRATION RATE: 16 BRPM | OXYGEN SATURATION: 97 % | DIASTOLIC BLOOD PRESSURE: 76 MMHG

## 2023-03-24 DIAGNOSIS — Z00.00 MEDICARE ANNUAL WELLNESS VISIT, SUBSEQUENT: Primary | ICD-10-CM

## 2023-03-24 DIAGNOSIS — E78.5 HYPERLIPIDEMIA, UNSPECIFIED HYPERLIPIDEMIA TYPE: ICD-10-CM

## 2023-03-24 DIAGNOSIS — M46.1 SACROILIITIS (HCC): ICD-10-CM

## 2023-03-24 DIAGNOSIS — F11.20 OPIOID DEPENDENCE, UNCOMPLICATED (HCC): ICD-10-CM

## 2023-03-24 DIAGNOSIS — D89.89 LIGHT CHAIN DISEASE, KAPPA TYPE (HCC): ICD-10-CM

## 2023-03-24 DIAGNOSIS — E11.8 TYPE 2 DIABETES MELLITUS WITH COMPLICATION, WITHOUT LONG-TERM CURRENT USE OF INSULIN (HCC): ICD-10-CM

## 2023-03-24 DIAGNOSIS — I25.118 CORONARY ARTERY DISEASE OF NATIVE ARTERY OF NATIVE HEART WITH STABLE ANGINA PECTORIS (HCC): ICD-10-CM

## 2023-03-24 DIAGNOSIS — C85.90 NON-HODGKIN'S LYMPHOMA, UNSPECIFIED BODY REGION, UNSPECIFIED NON-HODGKIN LYMPHOMA TYPE (HCC): ICD-10-CM

## 2023-03-24 DIAGNOSIS — N18.30 STAGE 3 CHRONIC KIDNEY DISEASE, UNSPECIFIED WHETHER STAGE 3A OR 3B CKD (HCC): ICD-10-CM

## 2023-03-24 PROBLEM — M54.2 NECK PAIN: Status: RESOLVED | Noted: 2020-06-18 | Resolved: 2023-03-24

## 2023-03-24 PROBLEM — M25.551 RIGHT HIP PAIN: Status: RESOLVED | Noted: 2021-11-30 | Resolved: 2023-03-24

## 2023-03-24 NOTE — PROGRESS NOTES
Assessment and Plan:     Problem List Items Addressed This Visit        Cardiovascular and Mediastinum    Coronary artery disease of native artery of native heart with stable angina pectoris (HCC)       Musculoskeletal and Integument    Sacroiliitis (HCC)       Genitourinary    Stage 3 chronic kidney disease, unspecified whether stage 3a or 3b CKD ([place] [address] )       Other    Hyperlipidemia    Relevant Orders    Lipid Panel with Direct LDL reflex    Medicare annual wellness visit, subsequent - Primary    Light chain disease, 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[place] [address] )    Non-Hodgkin's lymphoma ([place] [address] )    Opioid dependence, uncomplicated ([place] [address] )   Other Visit Diagnoses     Type 2 diabetes mellitus with complication, without long-term current use of insulin ([place] [address] )        Relevant Orders    CBC and differential    Comprehensive metabolic panel    TSH, 3rd generation with Free T4 reflex    Hemoglobin A1C        BMI Counseling: Body mass index is 26 89 kg/m²  The BMI is above normal  Nutrition recommendations include decreasing portion sizes and encouraging healthy choices of fruits and vegetables  Exercise recommendations include moderate physical activity 150 minutes/week  Rationale for BMI follow-up plan is due to patient being overweight or obese  Preventive health issues were discussed with patient, and age appropriate screening tests were ordered as noted in patient's After Visit Summary  Personalized health advice and appropriate referrals for health education or preventive services given if needed, as noted in patient's After Visit Summary  History of Present Illness:     Patient presents for a Medicare Wellness Visit    Here for AWV and to discuss labs  Labs reviewed and a1c is about the same; labs look good; she feels well but is having worsening right shoulder pain; she will reach back out to orthopedics; xray revealed mild OA  She has tramadol  Has limited ROM  BP looks good; denies cp, sob;      No falls; appetite has slowed down slightly BC she cooks and gets tired when she is done to eat it; denies diarrhea;     Mammogram to be done next December  Patient Care Team:  Lucius Robbins MD as PCP - General (Internal Medicine)     Review of Systems:     Review of Systems   Eyes: Positive for visual disturbance  Musculoskeletal: Positive for arthralgias, back pain and gait problem  All other systems reviewed and are negative         Problem List:     Patient Active Problem List   Diagnosis   • Coronary artery disease of native artery of native heart with stable angina pectoris ([place] [address] )   • Depression   • Esophageal reflux   • Hyperlipidemia   • Hypertension   • Hypothyroidism   • Insomnia   • Lumbar compression fracture (HCC)   • Lumbar spinal stenosis   • Normochromic normocytic anemia   • Osteoarthritis of knee   • Osteoporosis   • Spinal stenosis   • Type 2 diabetes mellitus ([place] [address] )   • Vitamin B12 deficiency   • Vitamin D deficiency   • Screening for malignant neoplasm of breast   • Health care maintenance   • Screening mammogram, encounter for   • Diabetic peripheral neuropathy ([place] [address] )   • Greater trochanteric bursitis of left hip   • Left sided sciatica   • Triclonal gammopathy   • Medicare annual wellness visit, subsequent   • Adhesive capsulitis of shoulder   • Light chain disease, 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[place] [address] )   • Non-Hodgkin's lymphoma ([place] [address] )   • Trochanteric bursitis of right hip   • Chronic pain syndrome   • Chronic bilateral low back pain without sciatica   • Sacroiliitis ([facility] )   • Opioid dependence, uncomplicated ([facility])   • Stage 3 chronic kidney disease, unspecified whether stage 3a or 3b CKD ([facility] )      Past Medical and Surgical History:     Past Medical History:   Diagnosis Date   • Cellulitis     last assessed - [id number]   • Constipation     last assessed - [id number]   • COVID-19 08/15/2022   • Diabetes mellitus ([facility] )    • Disease of thyroid gland    • Ecchymosis     last assessed - [id number]   • Fall     last assessed - [id number]   • Hyperlipidemia    • Hypertension    • Hypokalemia     last assessed - [id number]   • Lower extremity weakness     last assessed - [id number]   • Vitamin D deficiency     last assessed - [id number]     Past Surgical History:   Procedure Laterality Date   • BACK SURGERY     • CHOLECYSTECTOMY     • COLONOSCOPY     • CORONARY ANGIOPLASTY WITH STENT PLACEMENT     • CT BONE MARROW BIOPSY AND ASPIRATION  1/15/2020   • HYSTERECTOMY     • OTHER SURGICAL HISTORY      Previous stent placement      Family History:     Family History   Problem Relation Age of Onset   • Heart disease Mother    • Other Father         cerebral embolism - with cerebral infarction      Social History:     Social History     Socioeconomic History   • Marital status: /Civil Union     Spouse name: None   • Number of children: None   • Years of education: None   • Highest education level: None   Occupational History   • None   Tobacco Use   • Smoking status: Former     Types: Cigarettes     Start date: 5/7/1958   • Smokeless tobacco: Never   Vaping Use   • Vaping Use: Never used   Substance and Sexual Activity   • Alcohol use: No   • Drug use: No   • Sexual activity: Never     Partners: Male   Other Topics Concern   • None   Social History Narrative    Caffeine use     Social Determinants of Health     Financial Resource Strain: Low Risk    • Difficulty of Paying Living Expenses: Not hard at all   Food Insecurity: Not on file   Transportation Needs: No Transportation Needs   • Lack of Transportation (Medical): No   • Lack of Transportation (Non-Medical):  No   Physical Activity: Not on file   Stress: Not on file   Social Connections: Not on file   Intimate Partner Violence: Not on file   Housing Stability: Not on file      Medications and Allergies:     Current Outpatient Medications   Medication Sig Dispense Refill   • amLODIPine (NORVASC) 2 5 mg tablet Take 1 tablet (2 5 mg total) by mouth daily 90 tablet 1   • aspirin 81 mg chewable tablet Chew 81 mg     • atorvastatin (LIPITOR) 40 mg tablet Take 1 tablet (40 mg total) by mouth daily 90 tablet 3   • baclofen 10 mg tablet TAKE 1 TABLET BY MOUTH NIGHTLY AT BEDTIME AS NEEDED AS DIRECTED  3   • carvedilol (COREG) 12 5 mg tablet Take 1 tablet (12 5 mg total) by mouth 2 (two) times a day with meals 180 tablet 1   • Cholecalciferol (VITAMIN D3) 2000 units capsule Take 1 capsule (2,000 Units total) by mouth daily 100 capsule 2   • FLUoxetine (PROzac) 10 mg capsule fluoxetine 10 mg capsule     • latanoprost (XALATAN) 0 005 % ophthalmic solution INSTILL 1 DROP INTO EACH EYE AT BEDTIME 9 mL 0   • levothyroxine 50 mcg tablet Take 1 tablet (50 mcg total) by mouth daily 90 tablet 2   • lidocaine (Lidoderm) 5 % Apply 1 patch topically daily Remove & Discard patch within 12 hours or as directed by MD 30 patch 0   • losartan (COZAAR) 100 MG tablet Take 1 tablet (100 mg total) by mouth daily 90 tablet 3   • Multiple Vitamins-Minerals (PRESERVISION AREDS 2 PO) Take by mouth     • pioglitazone (ACTOS) 30 mg tablet Take 1 tablet (30 mg total) by mouth daily 90 tablet 1   • traMADol (Ultram) 50 mg tablet Take 1 tablet (50 mg total) by mouth in the morning Do not refill prior to 9/11/2022 30 tablet 1   • zolpidem (AMBIEN) 5 mg tablet Take 5 mg by mouth       Current Facility-Administered Medications   Medication Dose Route Frequency Provider Last Rate Last Admin   • cyanocobalamin injection 1,000 mcg  1,000 mcg Intramuscular Q30 Days Nicolas Mooney, DO   1,000 mcg at 02/27/19 1250     Allergies   Allergen Reactions   • Iodine - Food Allergy Other (See Comments)     IV CONTRAST DYE   • Iv Dye  [Iodinated Contrast Media]    • Other       Immunizations:     Immunization History   Administered Date(s) Administered   • COVID-19 MODERNA VACC 0 25 ML IM BOOSTER 11/03/2021   • COVID-19 MODERNA VACC 0 5 ML IM 01/28/2021, 02/24/2021   • INFLUENZA 10/14/2016, 09/13/2017, 09/04/2018, 09/27/2022   • Influenza Split High Dose Preservative Free IM 10/15/2014, 09/30/2015, 10/14/2016   • Influenza, high dose seasonal 0 7 mL 09/04/2018, 09/10/2019, 09/22/2020, 09/14/2021, 09/27/2022   • Influenza, seasonal, injectable 11/06/2012, 09/18/2013, 09/13/2017   • Pneumococcal Conjugate 13-Valent 04/28/2017   • Pneumococcal Polysaccharide PPV23 01/01/2006, 11/06/2012      Health Maintenance: There are no preventive care reminders to display for this patient  Topic Date Due   • COVID-19 Vaccine (4 - Booster for Moderna series) 12/29/2021      Medicare Screening Tests and Risk Assessments:     Last Medicare Wellness visit information reviewed, patient interviewed and updates made to the record today  Health Risk Assessment:   Patient rates overall health as good  Patient feels that their physical health rating is same  Patient is very satisfied with their life  Eyesight was rated as slightly worse  Hearing was rated as same  Patient feels that their emotional and mental health rating is same  Patients states they are never, rarely angry  Patient states they are sometimes unusually tired/fatigued  Pain experienced in the last 7 days has been some  Patient's pain rating has been 6/10  Patient states that she has experienced no weight loss or gain in last 6 months  H/o right eye macular degeneration    Depression Screening:   PHQ-9 Score: 0      Fall Risk Screening: In the past year, patient has experienced: no history of falling in past year      Urinary Incontinence Screening:   Patient has leaked urine accidently in the last six months  Few times; if she holds it too long; tried Kegel exercises  Home Safety:  Patient has trouble with stairs inside or outside of their home  Patient has working smoke alarms and has working carbon monoxide detector  Home safety hazards include: medications that cause fatigue  Has stair lift    Nutrition:   Current diet is Regular  Medications:   Patient is currently taking over-the-counter supplements   OTC medications include: see medication list  Patient is able to manage medications  Activities of Daily Living (ADLs)/Instrumental Activities of Daily Living (IADLs):   Walk and transfer into and out of bed and chair?: Yes  Dress and groom yourself?: Yes    Bathe or shower yourself?: Yes    Feed yourself? Yes  Do your laundry/housekeeping?: Yes  Manage your money, pay your bills and track your expenses?: Yes  Make your own meals?: Yes    Do your own shopping?: Yes    ADL comments: Right shoulder pain can cause her issues with dressing  Aide helps with vacuuming, changing sheets  Previous Hospitalizations:   Any hospitalizations or ED visits within the last 12 months?: No      Advance Care Planning:   Living will: Yes    Advanced directive: Yes    End of Life Decisions reviewed with patient: Yes    Provider agrees with end of life decisions: Yes      Cognitive Screening:   Provider or family/friend/caregiver concerned regarding cognition?: No    PREVENTIVE SCREENINGS      Cardiovascular Screening:    General: History Lipid Disorder and Screening Current      Diabetes Screening:     General: History Diabetes and Screening Current      Colorectal Cancer Screening:     General: Screening Not Indicated      Breast Cancer Screening:     General: Screening Current      Cervical Cancer Screening:    General: Screening Not Indicated      Osteoporosis Screening:    General: Screening Not Indicated and History Osteoporosis      Abdominal Aortic Aneurysm (AAA) Screening:        General: Screening Not Indicated      Lung Cancer Screening:     General: Screening Not Indicated      Hepatitis C Screening:    General: Screening Not Indicated    Screening, Brief Intervention, and Referral to Treatment (SBIRT)    Screening  Typical number of drinks in a day: 0    Single Item Drug Screening:  How often have you used an illegal drug (including marijuana) or a prescription medication for non-medical reasons in the past year? never    Single Item Drug Screen Score: 0  Interpretation: Negative screen for possible drug use disorder    Brief Intervention  Alcohol & drug use screenings were reviewed  No concerns regarding substance use disorder identified  Review of Current Opioid Use    Opioid Risk Tool (ORT) Interpretation: Complete Opioid Risk Tool (ORT)    [place] or [place]  reviewed  No red flags were identified    Other Counseling Topics:   Car/seat belt/driving safety, skin self-exam, sunscreen and calcium and vitamin D intake and regular weightbearing exercise  No results found  Physical Exam:     /76 (BP Location: Left arm, Patient Position: Sitting, Cuff Size: Adult)   Pulse 65   Resp 16   Ht 5' 2" (1 575 m)   Wt 66 7 kg (147 lb)   SpO2 97%   BMI 26 89 kg/m²     Physical Exam  Vitals and nursing note reviewed  Constitutional:       Appearance: Normal appearance  HENT:      Head: Normocephalic and atraumatic  Cardiovascular:      Rate and Rhythm: Normal rate and regular rhythm  Heart sounds: Normal heart sounds  Pulmonary:      Effort: Pulmonary effort is normal       Breath sounds: Normal breath sounds  Musculoskeletal:         General: Normal range of motion  Cervical back: Normal range of motion  Right lower leg: No edema  Skin:     General: Skin is warm and dry  Neurological:      General: No focal deficit present  Mental Status: She is alert and oriented to person, place, and time  Mental status is at baseline        Gait: Gait abnormal    Psychiatric:         Mood and Affect: Mood normal          Behavior: Behavior normal           Juan A Oviedo MD

## 2023-03-24 NOTE — PATIENT INSTRUCTIONS
Medicare Preventive Visit Patient Instructions  Thank you for completing your Welcome to Medicare Visit or Medicare Annual Wellness Visit today  Your next wellness visit will be due in one year (3/24/2024)  The screening/preventive services that you may require over the next 5-10 years are detailed below  Some tests may not apply to you based off risk factors and/or age  Screening tests ordered at today's visit but not completed yet may show as past due  Also, please note that scanned in results may not display below  Preventive Screenings:  Service Recommendations Previous Testing/Comments   Colorectal Cancer Screening  * Colonoscopy    * Fecal Occult Blood Test (FOBT)/Fecal Immunochemical Test (FIT)  * Fecal DNA/Cologuard Test  * Flexible Sigmoidoscopy Age: 39-70 years old   Colonoscopy: every 10 years (may be performed more frequently if at higher risk)  OR  FOBT/FIT: every 1 year  OR  Cologuard: every 3 years  OR  Sigmoidoscopy: every 5 years  Screening may be recommended earlier than age 39 if at higher risk for colorectal cancer  Also, an individualized decision between you and your healthcare provider will decide whether screening between the ages of 74-80 would be appropriate  Colonoscopy: Not on file  FOBT/FIT: Not on file  Cologuard: Not on file  Sigmoidoscopy: Not on file          Breast Cancer Screening Age: 36 years old  Frequency: every 1-2 years  Not required if history of left and right mastectomy Mammogram: 12/08/2022        Cervical Cancer Screening Between the ages of 21-29, pap smear recommended once every 3 years  Between the ages of 33-67, can perform pap smear with HPV co-testing every 5 years     Recommendations may differ for women with a history of total hysterectomy, cervical cancer, or abnormal pap smears in past  Pap Smear: Not on file        Hepatitis C Screening Once for adults born between [facility]  More frequently in patients at high risk for Hepatitis C Hep C Antibody: Not on file        Diabetes Screening 1-2 times per year if you're at risk for diabetes or have pre-diabetes Fasting glucose: 143 mg/dL (9/27/2022)  A1C: 7 4 (3/17/2023)      Cholesterol Screening Once every 5 years if you don't have a lipid disorder  May order more often based on risk factors  Lipid panel: 09/19/2022          Other Preventive Screenings Covered by Medicare:  1  Abdominal Aortic Aneurysm (AAA) Screening: covered once if your at risk  You're considered to be at risk if you have a family history of AAA  2  Lung Cancer Screening: covers low dose CT scan once per year if you meet all of the following conditions: (1) Age 50-69; (2) No signs or symptoms of lung cancer; (3) Current smoker or have quit smoking within the last 15 years; (4) You have a tobacco smoking history of at least 20 pack years (packs per day multiplied by number of years you smoked); (5) You get a written order from a healthcare provider  3  Glaucoma Screening: covered annually if you're considered high risk: (1) You have diabetes OR (2) Family history of glaucoma OR (3)  aged 48 and older OR (3)  American aged 72 and older  3  Osteoporosis Screening: covered every 2 years if you meet one of the following conditions: (1) You're estrogen deficient and at risk for osteoporosis based off medical history and other findings; (2) Have a vertebral abnormality; (3) On glucocorticoid therapy for more than 3 months; (4) Have primary hyperparathyroidism; (5) On osteoporosis medications and need to assess response to drug therapy  · Last bone density test (DXA Scan): 10/01/2019   5  HIV Screening: covered annually if you're between the age of 15-65  Also covered annually if you are younger than 13 and older than 72 with risk factors for HIV infection  For pregnant patients, it is covered up to 3 times per pregnancy      Immunizations:  Immunization Recommendations   Influenza Vaccine Annual influenza vaccination during flu season is recommended for all persons aged >= 6 months who do not have contraindications   Pneumococcal Vaccine   * Pneumococcal conjugate vaccine = PCV13 (Prevnar 13), PCV15 (Vaxneuvance), PCV20 (Prevnar 20)  * Pneumococcal polysaccharide vaccine = PPSV23 (Pneumovax) Adults 25-60 years old: 1-3 doses may be recommended based on certain risk factors  Adults 72 years old: 1-2 doses may be recommended based off what pneumonia vaccine you previously received   Hepatitis B Vaccine 3 dose series if at intermediate or high risk (ex: diabetes, end stage renal disease, liver disease)   Tetanus (Td) Vaccine - COST NOT COVERED BY MEDICARE PART B Following completion of primary series, a booster dose should be given every 10 years to maintain immunity against tetanus  Td may also be given as tetanus wound prophylaxis  Tdap Vaccine - COST NOT COVERED BY MEDICARE PART B Recommended at least once for all adults  For pregnant patients, recommended with each pregnancy  Shingles Vaccine (Shingrix) - COST NOT COVERED BY MEDICARE PART B  2 shot series recommended in those aged 48 and above     Health Maintenance Due:  There are no preventive care reminders to display for this patient  Immunizations Due:      Topic Date Due   • COVID-19 Vaccine (4 - Booster for Moderna series) 12/29/2021     Advance Directives   What are advance directives? Advance directives are legal documents that state your wishes and plans for medical care  These plans are made ahead of time in case you lose your ability to make decisions for yourself  Advance directives can apply to any medical decision, such as the treatments you want, and if you want to donate organs  What are the types of advance directives? There are many types of advance directives, and each state has rules about how to use them  You may choose a combination of any of the following:  · Living will: This is a written record of the treatment you want   You can also choose which treatments you do not want, which to limit, and which to stop at a certain time  This includes surgery, medicine, IV fluid, and tube feedings  · Durable power of  for healthcare [facility]): This is a written record that states who you want to make healthcare choices for you when you are unable to make them for yourself  This person, called a proxy, is usually a family member or a friend  You may choose more than 1 proxy  · Do not resuscitate (DNR) order:  A DNR order is used in case your heart stops beating or you stop breathing  It is a request not to have certain forms of treatment, such as CPR  A DNR order may be included in other types of advance directives  · Medical directive: This covers the care that you want if you are in a coma, near death, or unable to make decisions for yourself  You can list the treatments you want for each condition  Treatment may include pain medicine, surgery, blood transfusions, dialysis, IV or tube feedings, and a ventilator (breathing machine)  · Values history: This document has questions about your views, beliefs, and how you feel and think about life  This information can help others choose the care that you would choose  Why are advance directives important? An advance directive helps you control your care  Although spoken wishes may be used, it is better to have your wishes written down  Spoken wishes can be misunderstood, or not followed  Treatments may be given even if you do not want them  An advance directive may make it easier for your family to make difficult choices about your care  Weight Management   Why it is important to manage your weight:  Being overweight increases your risk of health conditions such as heart disease, high blood pressure, type 2 diabetes, and certain types of cancer  It can also increase your risk for osteoarthritis, sleep apnea, and other respiratory problems  Aim for a slow, steady weight loss   Even a small amount of weight loss can lower your risk of health problems  How to lose weight safely:  A safe and healthy way to lose weight is to eat fewer calories and get regular exercise  You can lose up about 1 pound a week by decreasing the number of calories you eat by 500 calories each day  Healthy meal plan for weight management:  A healthy meal plan includes a variety of foods, contains fewer calories, and helps you stay healthy  A healthy meal plan includes the following:  · Eat whole-grain foods more often  A healthy meal plan should contain fiber  Fiber is the part of grains, fruits, and vegetables that is not broken down by your body  Whole-grain foods are healthy and provide extra fiber in your diet  Some examples of whole-grain foods are whole-wheat breads and pastas, oatmeal, brown rice, and bulgur  · Eat a variety of vegetables every day  Include dark, leafy greens such as spinach, kale, ronnie greens, and mustard greens  Eat yellow and orange vegetables such as carrots, sweet potatoes, and winter squash  · Eat a variety of fruits every day  Choose fresh or canned fruit (canned in its own juice or light syrup) instead of juice  Fruit juice has very little or no fiber  · Eat low-fat dairy foods  Drink fat-free (skim) milk or 1% milk  Eat fat-free yogurt and low-fat cottage cheese  Try low-fat cheeses such as mozzarella and other reduced-fat cheeses  · Choose meat and other protein foods that are low in fat  Choose beans or other legumes such as split peas or lentils  Choose fish, skinless poultry (chicken or turkey), or lean cuts of red meat (beef or pork)  Before you cook meat or poultry, cut off any visible fat  · Use less fat and oil  Try baking foods instead of frying them  Add less fat, such as margarine, sour cream, regular salad dressing and mayonnaise to foods  Eat fewer high-fat foods  Some examples of high-fat foods include french fries, doughnuts, ice cream, and cakes  · Eat fewer sweets    Limit foods and drinks that are high in sugar  This includes candy, cookies, regular soda, and sweetened drinks  Exercise:  Exercise at least 30 minutes per day on most days of the week  Some examples of exercise include walking, biking, dancing, and swimming  You can also fit in more physical activity by taking the stairs instead of the elevator or parking farther away from stores  Ask your healthcare provider about the best exercise plan for you  © Copyright [employer] 2018 Information is for End User's use only and may not be sold, redistributed or otherwise used for commercial purposes   All illustrations and images included in CareNotes® are the copyrighted property of [employer]  or [address] [place] [address]

## 2023-03-27 LAB
LEFT EYE DIABETIC RETINOPATHY: POSITIVE
RIGHT EYE DIABETIC RETINOPATHY: POSITIVE

## 2023-03-30 ENCOUNTER — TELEPHONE (OUTPATIENT)
Dept: INTERNAL MEDICINE CLINIC | Facility: CLINIC | Age: 88
End: 2023-03-30

## 2023-03-30 NOTE — TELEPHONE ENCOUNTER
Spoke with Ly Myers  She informed me that at the patient's vision appointment, they picked up on worsening hypertensive retinopathy in the R eye  Her BP at their office today was 152/77  Ly Myers will be faxing the report over to us also  XUAN

## 2023-03-30 NOTE — TELEPHONE ENCOUNTER
Message left    Hi, this is Alvin Alvarez from [address] [facility] [place]'s office AIDA JEFFRIES calling  In regard to mutual patient Adarsh Ramirez  Date of birth is [phone]  If you could please give me a call back, it's [phone]  My extension is [phone]  We just wanted to inform you of the hypertensive retinopathy  If you could please give us a call back  Thank you

## 2023-03-31 NOTE — TELEPHONE ENCOUNTER
"As per Dr Isela Pratt, \"Lets increase amlodipine to 5mg\"    Spoke with the patient and advised her of this  She understood and will do so    "

## 2023-05-11 ENCOUNTER — APPOINTMENT (OUTPATIENT)
Dept: RADIOLOGY | Facility: CLINIC | Age: 88
End: 2023-05-11

## 2023-05-11 ENCOUNTER — OFFICE VISIT (OUTPATIENT)
Dept: URGENT CARE | Facility: CLINIC | Age: 88
End: 2023-05-11

## 2023-05-11 VITALS
SYSTOLIC BLOOD PRESSURE: 170 MMHG | TEMPERATURE: 98.1 F | OXYGEN SATURATION: 98 % | HEART RATE: 60 BPM | DIASTOLIC BLOOD PRESSURE: 82 MMHG | RESPIRATION RATE: 18 BRPM

## 2023-05-11 DIAGNOSIS — S99.912A LEFT ANKLE INJURY, INITIAL ENCOUNTER: ICD-10-CM

## 2023-05-11 DIAGNOSIS — S99.912A LEFT ANKLE INJURY, INITIAL ENCOUNTER: Primary | ICD-10-CM

## 2023-05-11 DIAGNOSIS — M25.572 ACUTE LEFT ANKLE PAIN: ICD-10-CM

## 2023-05-11 NOTE — PROGRESS NOTES
[facility]        NAME: John Arriola is a 80 y o  female  : 1934    MRN: [id number]  DATE: May 11, 2023  TIME: 3:27 PM    Assessment and Plan   Left ankle injury, initial encounter [S99 912A]  1  Left ankle injury, initial encounter  XR foot 3+ vw left            Patient Instructions       ACE Rap as administered in office  Awaiting official XR report  Elevate  Tylenol 650 mg every 6 hours  Ice for 20 min  Three times daily with skin barrier    Follow up with PCP in 3-5 days  Proceed to  ER if symptoms worsen  Chief Complaint     Chief Complaint   Patient presents with   • Ankle Pain     Patient reported  she turned her LT ankle sideways on uneven ground  Patient reported positive injury  Patient reported it didn't hurt right away, however she noticed in the evening some discomfort  Patient reported she has been uncomfortable and happened to look at her foot with a mirror yesterday  Pt she noticed that there was some bruising laterally on the bottom of her foot  Patient reported pain has been so bad she has been taking NSAIDS daily and using  words  History of Present Illness       81 yo female twisted her left ankle yesterday at home, inwardly  Has been using OTC analgesic patches, Ibuprofen  Review of Systems   Review of Systems   Constitutional: Negative for activity change, appetite change and fever  Cardiovascular: Negative for chest pain and palpitations  Gastrointestinal: Negative for abdominal pain and nausea  Musculoskeletal: Positive for joint swelling  Skin: Positive for color change  Neurological: Negative for headaches           Current Medications       Current Outpatient Medications:   •  amLODIPine (NORVASC) 2 5 mg tablet, Take 1 tablet (2 5 mg total) by mouth daily, Disp: 90 tablet, Rfl: 1  •  aspirin 81 mg chewable tablet, Chew 81 mg, Disp: , Rfl:   •  atorvastatin (LIPITOR) 40 mg tablet, Take 1 tablet (40 mg total) by mouth daily, Disp: 90 tablet, Rfl: 3  •  baclofen 10 mg tablet, TAKE 1 TABLET BY MOUTH NIGHTLY AT BEDTIME AS NEEDED AS DIRECTED, Disp: , Rfl: 3  •  carvedilol (COREG) 12 5 mg tablet, Take 1 tablet (12 5 mg total) by mouth 2 (two) times a day with meals, Disp: 180 tablet, Rfl: 1  •  Cholecalciferol (VITAMIN D3) 2000 units capsule, Take 1 capsule (2,000 Units total) by mouth daily, Disp: 100 capsule, Rfl: 2  •  latanoprost (XALATAN) 0 005 % ophthalmic solution, INSTILL 1 DROP INTO EACH EYE AT BEDTIME, Disp: 9 mL, Rfl: 0  •  levothyroxine 50 mcg tablet, Take 1 tablet (50 mcg total) by mouth daily, Disp: 90 tablet, Rfl: 2  •  lidocaine (Lidoderm) 5 %, Apply 1 patch topically daily Remove & Discard patch within 12 hours or as directed by MD, Disp: 30 patch, Rfl: 0  •  losartan (COZAAR) 100 MG tablet, Take 1 tablet (100 mg total) by mouth daily, Disp: 90 tablet, Rfl: 3  •  Multiple Vitamins-Minerals (PRESERVISION AREDS 2 PO), Take by mouth, Disp: , Rfl:   •  pioglitazone (ACTOS) 30 mg tablet, Take 1 tablet (30 mg total) by mouth daily, Disp: 90 tablet, Rfl: 1  •  traMADol (Ultram) 50 mg tablet, Take 1 tablet (50 mg total) by mouth in the morning Do not refill prior to 9/11/2022, Disp: 30 tablet, Rfl: 1  •  zolpidem (AMBIEN) 5 mg tablet, Take 5 mg by mouth, Disp: , Rfl:     Current Facility-Administered Medications:   •  cyanocobalamin injection 1,000 mcg, 1,000 mcg, Intramuscular, Q30 Days, Tamara Corporal, DO, 1,000 mcg at 02/27/19 1250    Current Allergies     Allergies as of 05/11/2023 - Reviewed 05/11/2023   Allergen Reaction Noted   • Iodine - food allergy Other (See Comments)    • Iv dye  [iodinated contrast media]  03/12/2013   • Other              The following portions of the patient's history were reviewed and updated as appropriate: allergies, current medications, past family history, past medical history, past social history, past surgical history and problem list      Past Medical History:   Diagnosis Date   • Cardiac disorder 01/01/2001    x2 stents after a failed stress test   • Cellulitis     last assessed - [id number]   • Constipation     last assessed - [id number]   • COVID-19 08/15/2022   • Diabetes mellitus ([facility] [address] )    • Disease of thyroid gland    • Ecchymosis     last assessed - [id number]   • Fall     last assessed - [id number]   • Hyperlipidemia    • Hypertension    • Hypokalemia     last assessed - [id number]   • Lower extremity weakness     last assessed - [id number]   • Vitamin D deficiency     last assessed - [id number]       Past Surgical History:   Procedure Laterality Date   • BACK SURGERY     • CHOLECYSTECTOMY     • COLONOSCOPY     • CORONARY ANGIOPLASTY WITH STENT PLACEMENT     • CT BONE MARROW BIOPSY AND ASPIRATION  1/15/2020   • HYSTERECTOMY     • OTHER SURGICAL HISTORY      Previous stent placement       Family History   Problem Relation Age of Onset   • Heart disease Mother    • Other Father         cerebral embolism - with cerebral infarction         Medications have been verified  Objective   /82   Pulse 60   Temp 98 1 °F (36 7 °C) (Tympanic)   Resp 18   SpO2 98%        Physical Exam     Physical Exam  Vitals and nursing note reviewed  Constitutional:       General: She is not in acute distress  Appearance: Normal appearance  She is not ill-appearing  Eyes:      Extraocular Movements: Extraocular movements intact  Conjunctiva/sclera: Conjunctivae normal       Pupils: Pupils are equal, round, and reactive to light  Cardiovascular:      Rate and Rhythm: Normal rate and regular rhythm  Pulses: Normal pulses  Pulmonary:      Effort: Pulmonary effort is normal  No respiratory distress  Musculoskeletal:      Cervical back: Normal range of motion and neck supple  Right foot: Normal       Left foot: Decreased range of motion  Normal capillary refill  Swelling and tenderness present   No deformity, bunion, Charcot foot, foot drop, prominent metatarsal heads, laceration, bony tenderness or crepitus  Normal pulse  Neurological:      Mental Status: She is alert

## 2023-05-22 DIAGNOSIS — I10 HYPERTENSION, UNSPECIFIED TYPE: Primary | ICD-10-CM

## 2023-05-22 RX ORDER — AMLODIPINE BESYLATE 5 MG/1
5 TABLET ORAL DAILY
Qty: 90 TABLET | Refills: 1 | Status: SHIPPED | OUTPATIENT
Start: 2023-05-22

## 2023-06-02 DIAGNOSIS — E11.8 TYPE 2 DIABETES MELLITUS WITH COMPLICATION, WITHOUT LONG-TERM CURRENT USE OF INSULIN (HCC): ICD-10-CM

## 2023-06-02 DIAGNOSIS — E78.5 HYPERLIPIDEMIA, UNSPECIFIED HYPERLIPIDEMIA TYPE: ICD-10-CM

## 2023-06-02 RX ORDER — PIOGLITAZONEHYDROCHLORIDE 30 MG/1
30 TABLET ORAL DAILY
Qty: 90 TABLET | Refills: 1 | Status: SHIPPED | OUTPATIENT
Start: 2023-06-02

## 2023-06-14 ENCOUNTER — OFFICE VISIT (OUTPATIENT)
Dept: OBGYN CLINIC | Facility: CLINIC | Age: 88
End: 2023-06-14
Payer: COMMERCIAL

## 2023-06-14 ENCOUNTER — APPOINTMENT (OUTPATIENT)
Dept: RADIOLOGY | Facility: CLINIC | Age: 88
End: 2023-06-14
Payer: COMMERCIAL

## 2023-06-14 VITALS
HEART RATE: 68 BPM | HEIGHT: 62 IN | DIASTOLIC BLOOD PRESSURE: 86 MMHG | SYSTOLIC BLOOD PRESSURE: 130 MMHG | OXYGEN SATURATION: 99 % | WEIGHT: 146 LBS | BODY MASS INDEX: 26.87 KG/M2

## 2023-06-14 DIAGNOSIS — M25.572 PAIN, JOINT, ANKLE AND FOOT, LEFT: Primary | ICD-10-CM

## 2023-06-14 DIAGNOSIS — M25.572 PAIN, JOINT, ANKLE AND FOOT, LEFT: ICD-10-CM

## 2023-06-14 PROCEDURE — 99213 OFFICE O/P EST LOW 20 MIN: CPT | Performed by: ORTHOPAEDIC SURGERY

## 2023-06-14 PROCEDURE — 73610 X-RAY EXAM OF ANKLE: CPT

## 2023-06-14 NOTE — PATIENT INSTRUCTIONS
Ankle Exercises   AMBULATORY CARE:   What you need to know about ankle exercises: Ankle exercises help strengthen your ankle and improve its function after injury  These are beginning exercises  Ask your healthcare provider if you need to see a physical therapist for more advanced exercises  General guidelines for ankle exercises:   Do these exercises 3 to 5 days a week, or as directed by your healthcare provider  Ask if you should do the exercises on each ankle  Do the exercises in the order that your healthcare provider recommends  This will help prevent swelling, chronic pain, and reinjury  Start with range of motion exercises  Then move to strengthening exercises, and finally to balancing exercises  Warm up before you do ankle exercises  Walk or ride a stationary bike for 5 to 10 minutes to prepare your ankle for movement  Stop if you feel pain  It is normal to feel some discomfort at first but you should not feel pain  Tell your doctor or physical therapist if you have pain while you exercise  Regular exercise will help decrease your discomfort over time  How to perform range of motion exercises safely:  Begin with range of motion exercises to improve flexibility  Ask your healthcare provider when you can progress to strengthening exercises  Ankle alphabet:  Sit on a chair so that your feet do not touch the floor  Use your big toe to write each letter of the alphabet  Use only your foot and ankle, and keep your movements small  Do 2 sets  Calf stretches:      Sitting calf stretches with a towel:  Sit on the floor with both legs out straight in front of you  Loop a towel around the ball of your injured foot  Grasp the ends of the towel and pull it toward you  Keep your leg and back straight  Do not lean forward as you pull the towel  Hold for 30 seconds  Then relax for 30 seconds  Do 2 sets of 10           Standing calf stretches:  Stand facing a wall with the foot that is not injured forward and your knee slightly bent  Keep the leg with the injured foot straight and behind you with your toes pointed in slightly  With both heels flat on the floor, press your hips forward  Do not arch your back  Hold for 30 seconds, and then relax for 30 seconds  Do 2 sets of 10  Repeat with your leg bent  Do 2 sets of 10  How to perform strengthening exercises safely:  After you can perform range of motion exercises without pain, you may begin strengthening exercises  Ask your healthcare provider when you can progress to balancing exercises  Ankle movement in 4 directions:  Sit on the floor with your legs straight in front of you  Keep your heels on the floor for support  Dorsiflexion:  Begin with your toes pointing straight up  Pull your toes toward your body  Slowly return to the starting position  Do 3 sets of 5  Plantar flexion:  Begin with your toes pointing straight up  Push your toes away from your body  Slowly return to the starting position  Do 3 sets of 5  Inversion:  Begin with your toes pointing straight up  Push your toes inward, toward each other  Slowly return to the starting position  Do 3 sets of 5  Eversion:  Begin with your toes pointing straight up  Push your toes outward, away from each other  Slowly return to the starting position  Do 3 sets of 5  Toe curls with a towel:  Sit on a chair so that both of your feet are flat on the floor  Place a small towel on the floor in front of your injured foot  Grab the center of the towel with your toes and curl the towel toward you  Relax and repeat  Do 1 set of 5  Buchanan pick-ups:  Sit on a chair so that both of your feet are flat on the floor  Place 20 marbles on the floor in front of your injured foot  Use your toes to  one marble at a time and place it into a bowl  Repeat until you have picked up all the marbles  Do 1 set       Heel raises:      Single leg heel raises:  Stand with your weight evenly on both feet  Hold on to a chair or a wall for balance  Lift the foot that is not injured off the floor so all your weight is placed on your injured foot  Raise the heel of your injured foot as high as you can  Slowly lower your heel to the floor  Do 1 set of 10  Double leg heel raises:  Stand with your weight evenly on both feet  Hold on to a chair or a wall for balance  Raise both of your heels as high as you can  Slowly lower your heels to the floor  Do 1 set of 10  Heel and toe walks:      Heel walks:  Begin in a standing position  Lift your toes off the floor and walk on your heels  Keep your toes lifted as high as possible  Do 2 sets of 10  Toe walks:  Begin in a standing position  Lift your heels off the floor and walk on the balls and toes of your feet  Keep your heels lifted as high as possible  Do 2 sets of 10  How to perform a balance exercise safely:  After you can perform strengthening exercises without pain, you may do this beginning balancing exercise  Ask your healthcare provider for more advanced balance exercises  Single leg stance:  Stand with your weight evenly on both feet, or hold on to a chair or a wall  Do not lean to the side  Lift the foot that is not injured off the floor so all your weight is placed on your injured foot  Balance on your injured foot  Ask your healthcare provider how long to hold this position  Call your doctor or physical therapist if:   You have new pain, or your pain becomes worse  You have questions or concerns about your condition, care, or exercise program     © Copyright [facility] 2022 Information is for End User's use only and may not be sold, redistributed or otherwise used for commercial purposes  The above information is an  only  It is not intended as medical advice for individual conditions or treatments   Talk to your doctor, nurse or pharmacist before following any medical regimen to see if it is safe and effective for you

## 2023-06-14 NOTE — PROGRESS NOTES
HPI:  Patient is a 80y o  year old  female  who presents with chief complaint of left foot pain  Pain is rated a 3/10  She was treated at urgent care on 5/11/23 for an ankle injury after rolling it on 5/7/23  She has pain on the medial aspect of her foot  She notes the swelling has decreased  She can walk rather well but is still experiencing pain  She has taken ibuprofen, use blue emu, ice for symptom management  She has not attended physical therapy  She has no prior injuries to her foot          ROS:   General: No fever, no chills, no weight loss, no weight gain  HEENT:  No loss of hearing, no nose bleeds, no sore throat  Eyes:  No eye pain, no red eyes, no visual disturbance  Respiratory:  No cough, no shortness of breath, no wheezing  Cardiovascular:  No chest pain, no palpitations, no edema  GI: No abdominal pain, no nausea, no vomiting  Endocrine: No frequent urination, no excessive thirst  Urinary:  No dysuria, no hematuria, no incontinence  Musculoskeletal: see HPI and PE  Skin:  No rash, no wounds  Neurological:  No dizziness, no headache, no numbness  Psychiatric:  No difficulty concentrating, no depression, no suicide thoughts, no anxiety  Review of all other systems is negative    PMH:  Past Medical History:   Diagnosis Date   • Cardiac disorder 01/01/2001    x2 stents after a failed stress test   • Cellulitis     last assessed - [id number]   • Constipation     last assessed - [id number]   • COVID-19 08/15/2022   • Diabetes mellitus ([facility] [address] )    • Disease of thyroid gland    • Ecchymosis     last assessed - [id number]   • Fall     last assessed - [id number]   • Hyperlipidemia    • Hypertension    • Hypokalemia     last assessed - [id number]   • Lower extremity weakness     last assessed - [id number]   • Vitamin D deficiency     last assessed - [id number]       PSH:  Past Surgical History:   Procedure Laterality Date   • BACK SURGERY     • CHOLECYSTECTOMY     • COLONOSCOPY     • CORONARY ANGIOPLASTY WITH STENT PLACEMENT     • CT BONE MARROW BIOPSY AND ASPIRATION  1/15/2020   • HYSTERECTOMY     • OTHER SURGICAL HISTORY      Previous stent placement       Medications:  Current Outpatient Medications   Medication Sig Dispense Refill   • amLODIPine (NORVASC) 5 mg tablet Take 1 tablet (5 mg total) by mouth daily 90 tablet 1   • aspirin 81 mg chewable tablet Chew 81 mg     • atorvastatin (LIPITOR) 40 mg tablet Take 1 tablet (40 mg total) by mouth daily 90 tablet 3   • baclofen 10 mg tablet TAKE 1 TABLET BY MOUTH NIGHTLY AT BEDTIME AS NEEDED AS DIRECTED  3   • carvedilol (COREG) 12 5 mg tablet Take 1 tablet (12 5 mg total) by mouth 2 (two) times a day with meals 180 tablet 1   • Cholecalciferol (VITAMIN D3) 2000 units capsule Take 1 capsule (2,000 Units total) by mouth daily 100 capsule 2   • latanoprost (XALATAN) 0 005 % ophthalmic solution INSTILL 1 DROP INTO EACH EYE AT BEDTIME 9 mL 0   • levothyroxine 50 mcg tablet Take 1 tablet (50 mcg total) by mouth daily 90 tablet 2   • lidocaine (Lidoderm) 5 % Apply 1 patch topically daily Remove & Discard patch within 12 hours or as directed by MD 30 patch 0   • losartan (COZAAR) 100 MG tablet Take 1 tablet (100 mg total) by mouth daily 90 tablet 3   • Multiple Vitamins-Minerals (PRESERVISION AREDS 2 PO) Take by mouth     • pioglitazone (ACTOS) 30 mg tablet Take 1 tablet (30 mg total) by mouth daily 90 tablet 1   • zolpidem (AMBIEN) 5 mg tablet Take 5 mg by mouth     • traMADol (Ultram) 50 mg tablet Take 1 tablet (50 mg total) by mouth in the morning Do not refill prior to 9/11/2022 (Patient not taking: Reported on 6/14/2023) 30 tablet 1     Current Facility-Administered Medications   Medication Dose Route Frequency Provider Last Rate Last Admin   • cyanocobalamin injection 1,000 mcg  1,000 mcg Intramuscular Q30 Days Michael Tejada DO   1,000 mcg at 02/27/19 1250       Allergies:   Allergies   Allergen Reactions   • Iodine - Food Allergy Other (See Comments)     IV CONTRAST DYE   • Iv "Dye  [Iodinated Contrast Media]    • Other        Family History:  Family History   Problem Relation Age of Onset   • Heart disease Mother    • Other Father         cerebral embolism - with cerebral infarction       Social History:  Social History     Occupational History   • Not on file   Tobacco Use   • Smoking status: Former     Types: Cigarettes     Start date: 5/7/1958   • Smokeless tobacco: Never   Vaping Use   • Vaping Use: Never used   Substance and Sexual Activity   • Alcohol use: No   • Drug use: No   • Sexual activity: Never     Partners: Male       Physical Exam:  General :  Alert, cooperative, no distress, appears stated age  Blood pressure 130/86, pulse 68, height 5' 2\" (1 575 m), weight 66 2 kg (146 lb), SpO2 99 %, not currently breastfeeding  Head:  Normocephalic, without obvious abnormality, atraumatic   Eyes:  Conjunctiva/corneas clear, EOM's intact,   Ears: Both ears normal appearance, no hearing deficits  Nose: Nares normal, septum midline, no drainage    Neck: Supple,  trachea midline, no adenopathy, no tenderness, no mass   Back:   Symmetric, no curvature, ROM normal, no tenderness   Lungs:   Respirations unlabored   Chest Wall:  No tenderness or deformity   Extremities: Extremities normal, atraumatic, no cyanosis or edema      Pulses: 2+ and symmetric   Skin: Skin color, texture, turgor normal, no rashes or lesions      Neurologic: Normal           Ortho Exam  Left Ankle  Active range of motion: dorsiflexes to neutral, planter flexion to 25 degrees   There is normal range of motion of toes in plantar flexion and dorsiflexion  There is swelling present throughout the dorsum of the foot  There is tenderness present over the ATFL  No calcaneal or fibular tenderness  Negative squeeze test at syndesmosis      There is no pain with resisted range of motion  Anterior drawer test is negative  Talar tilt test is negative     Sensation is intact to light touch superficial peroneal, deep " peroneal, tibial, saphenous, and sural nerve distributions  2+ DP pulse present  Imaging Studies: The following imaging studies were reviewed in office today  My findings are noted  Assessment  Encounter Diagnosis   Name Primary? • Pain, joint, ankle and foot, left - sprain Yes         Plan:  Alexis Maxwell is a 80 y o  female with left ankle sprain, DOI 5/7/23  · The patient's x-rays were reviewed today  · The patient was provided home exercises to perform  · She was offered an ankle brace but declined at this time  · She was also offered a referral to physical therapy but declined  · Follow-up as needed        Scribe Attestation    I,:  Tello Christian am acting as a scribe while in the presence of the attending physician :       I,:  Jelani Verdugo MD personally performed the services described in this documentation    as scribed in my presence :

## 2023-08-02 DIAGNOSIS — E03.9 HYPOTHYROIDISM, UNSPECIFIED TYPE: ICD-10-CM

## 2023-08-02 RX ORDER — LEVOTHYROXINE SODIUM 0.05 MG/1
50 TABLET ORAL DAILY
Qty: 90 TABLET | Refills: 1 | Status: SHIPPED | OUTPATIENT
Start: 2023-08-02

## 2023-08-14 DIAGNOSIS — E11.8 TYPE 2 DIABETES MELLITUS WITH COMPLICATION, WITHOUT LONG-TERM CURRENT USE OF INSULIN (HCC): ICD-10-CM

## 2023-08-14 DIAGNOSIS — E78.5 HYPERLIPIDEMIA, UNSPECIFIED HYPERLIPIDEMIA TYPE: ICD-10-CM

## 2023-08-14 RX ORDER — ATORVASTATIN CALCIUM 40 MG/1
40 TABLET, FILM COATED ORAL DAILY
Qty: 90 TABLET | Refills: 3 | Status: SHIPPED | OUTPATIENT
Start: 2023-08-14

## 2023-08-23 DIAGNOSIS — I25.10 CORONARY ARTERY DISEASE DUE TO CALCIFIED CORONARY LESION: ICD-10-CM

## 2023-08-23 DIAGNOSIS — I25.84 CORONARY ARTERY DISEASE DUE TO CALCIFIED CORONARY LESION: ICD-10-CM

## 2023-08-23 RX ORDER — CARVEDILOL 12.5 MG/1
12.5 TABLET ORAL 2 TIMES DAILY WITH MEALS
Qty: 180 TABLET | Refills: 1 | Status: SHIPPED | OUTPATIENT
Start: 2023-08-23

## 2023-08-30 DIAGNOSIS — E11.9 TYPE 2 DIABETES MELLITUS WITHOUT COMPLICATION, WITHOUT LONG-TERM CURRENT USE OF INSULIN (HCC): ICD-10-CM

## 2023-08-30 DIAGNOSIS — I10 HYPERTENSION, UNSPECIFIED TYPE: ICD-10-CM

## 2023-08-30 RX ORDER — LOSARTAN POTASSIUM 100 MG/1
100 TABLET ORAL DAILY
Qty: 90 TABLET | Refills: 1 | Status: SHIPPED | OUTPATIENT
Start: 2023-08-30

## 2023-08-31 ENCOUNTER — TELEPHONE (OUTPATIENT)
Dept: INTERNAL MEDICINE CLINIC | Facility: CLINIC | Age: 88
End: 2023-08-31

## 2023-08-31 PROBLEM — F11.20 OPIOID DEPENDENCE, UNCOMPLICATED (HCC): Status: RESOLVED | Noted: 2023-03-24 | Resolved: 2023-08-31

## 2023-08-31 NOTE — TELEPHONE ENCOUNTER
pts daughter in law Edie asked us to correct the opioid dependence dx on problem list.       Says she hasn't ever taken any opioids so it shouldnt be on the chart as such please

## 2023-09-18 LAB — HBA1C MFR BLD HPLC: 6.7 %

## 2023-09-27 ENCOUNTER — OFFICE VISIT (OUTPATIENT)
Dept: INTERNAL MEDICINE CLINIC | Facility: CLINIC | Age: 88
End: 2023-09-27
Payer: COMMERCIAL

## 2023-09-27 VITALS
DIASTOLIC BLOOD PRESSURE: 80 MMHG | RESPIRATION RATE: 14 BRPM | SYSTOLIC BLOOD PRESSURE: 114 MMHG | HEIGHT: 62 IN | TEMPERATURE: 98.1 F | WEIGHT: 144 LBS | HEART RATE: 64 BPM | BODY MASS INDEX: 26.5 KG/M2 | OXYGEN SATURATION: 98 %

## 2023-09-27 DIAGNOSIS — I25.118 CORONARY ARTERY DISEASE OF NATIVE ARTERY OF NATIVE HEART WITH STABLE ANGINA PECTORIS (HCC): ICD-10-CM

## 2023-09-27 DIAGNOSIS — M80.00XD OSTEOPOROSIS WITH CURRENT PATHOLOGICAL FRACTURE WITH ROUTINE HEALING, UNSPECIFIED OSTEOPOROSIS TYPE, SUBSEQUENT ENCOUNTER: ICD-10-CM

## 2023-09-27 DIAGNOSIS — M48.062 SPINAL STENOSIS OF LUMBAR REGION WITH NEUROGENIC CLAUDICATION: ICD-10-CM

## 2023-09-27 DIAGNOSIS — C85.90 NON-HODGKIN'S LYMPHOMA, UNSPECIFIED BODY REGION, UNSPECIFIED NON-HODGKIN LYMPHOMA TYPE (HCC): ICD-10-CM

## 2023-09-27 DIAGNOSIS — E03.9 HYPOTHYROIDISM, UNSPECIFIED TYPE: ICD-10-CM

## 2023-09-27 DIAGNOSIS — E11.9 TYPE 2 DIABETES MELLITUS WITHOUT COMPLICATION, WITHOUT LONG-TERM CURRENT USE OF INSULIN (HCC): Primary | ICD-10-CM

## 2023-09-27 DIAGNOSIS — M54.50 CHRONIC BILATERAL LOW BACK PAIN WITHOUT SCIATICA: ICD-10-CM

## 2023-09-27 DIAGNOSIS — Z23 FLU VACCINE NEED: ICD-10-CM

## 2023-09-27 DIAGNOSIS — E78.5 HYPERLIPIDEMIA, UNSPECIFIED HYPERLIPIDEMIA TYPE: ICD-10-CM

## 2023-09-27 DIAGNOSIS — G89.29 CHRONIC BILATERAL LOW BACK PAIN WITHOUT SCIATICA: ICD-10-CM

## 2023-09-27 DIAGNOSIS — F32.A DEPRESSION, UNSPECIFIED DEPRESSION TYPE: ICD-10-CM

## 2023-09-27 PROBLEM — Z00.00 HEALTH CARE MAINTENANCE: Status: RESOLVED | Noted: 2018-05-07 | Resolved: 2023-09-27

## 2023-09-27 PROBLEM — Z12.39 SCREENING FOR MALIGNANT NEOPLASM OF BREAST: Status: RESOLVED | Noted: 2018-05-07 | Resolved: 2023-09-27

## 2023-09-27 PROCEDURE — 90662 IIV NO PRSV INCREASED AG IM: CPT | Performed by: INTERNAL MEDICINE

## 2023-09-27 PROCEDURE — 99214 OFFICE O/P EST MOD 30 MIN: CPT | Performed by: INTERNAL MEDICINE

## 2023-09-27 PROCEDURE — G0008 ADMIN INFLUENZA VIRUS VAC: HCPCS | Performed by: INTERNAL MEDICINE

## 2023-09-27 NOTE — PROGRESS NOTES
Assessment/Plan:     Patient is here for 6-month follow-up. Reports she is doing well. She had a flu shot today. We reviewed her blood work. I ordered CBC, CMP, TSH, A1c, lipid for 6-month. For her heart disease, continue aspirin, blocker, continue statin. She has good follow-up with cardiology. HTN is at goal, continue amlodipine and losartan. Type 2 diabetes is controlled and much better than last visit, A1c came down, continue Actos. Continue statin and ARB. Check A1c in 6 months. Check lipid in 6 months. For her pain, continue baclofen. Also use Lidoderm patch. Quality Measures:     BMI Counseling: There is no height or weight on file to calculate BMI. The BMI is above normal. Nutrition recommendations include decreasing portion sizes and encouraging healthy choices of fruits and vegetables. Exercise recommendations include moderate physical activity 150 minutes/week. Rationale for BMI follow-up plan is due to patient being overweight or obese. Return in about 6 months (around 3/27/2024) for regular and medicare. No problem-specific Assessment & Plan notes found for this encounter. Diagnoses and all orders for this visit:    Type 2 diabetes mellitus without complication, without long-term current use of insulin (HCC)  -     CBC and differential; Future  -     TSH, 3rd generation with Free T4 reflex; Future  -     Comprehensive metabolic panel; Future  -     Hemoglobin A1C; Future  -     Lipid Panel with Direct LDL reflex; Future    Flu vaccine need  -     FLUZONE HIGH-DOSE: influenza vaccine, high-dose, preservative-free 0.7 mL    Osteoporosis with current pathological fracture with routine healing, unspecified osteoporosis type, subsequent encounter    Hypothyroidism, unspecified type  -     TSH, 3rd generation with Free T4 reflex;  Future    Coronary artery disease of native artery of native heart with stable angina pectoris ([address])    Spinal stenosis of lumbar region with neurogenic claudication    Depression, unspecified depression type    Hyperlipidemia, unspecified hyperlipidemia type    Non-Hodgkin's lymphoma, unspecified body region, unspecified non-Hodgkin lymphoma type ([address])    Chronic bilateral low back pain without sciatica          Subjective:      Patient ID: Corina Chung is a 80 y.o. female. Here for routine follow-up.       ALLERGIES:  Allergies   Allergen Reactions   • Iodine - Food Allergy Other (See Comments)     IV CONTRAST DYE   • Iv Dye  [Iodinated Contrast Media]    • Other        CURRENT MEDICATIONS:    Current Outpatient Medications:   •  amLODIPine (NORVASC) 5 mg tablet, Take 1 tablet (5 mg total) by mouth daily, Disp: 90 tablet, Rfl: 1  •  aspirin 81 mg chewable tablet, Chew 81 mg, Disp: , Rfl:   •  atorvastatin (LIPITOR) 40 mg tablet, Take 1 tablet (40 mg total) by mouth daily, Disp: 90 tablet, Rfl: 3  •  baclofen 10 mg tablet, TAKE 1 TABLET BY MOUTH NIGHTLY AT BEDTIME AS NEEDED AS DIRECTED, Disp: , Rfl: 3  •  carvedilol (COREG) 12.5 mg tablet, Take 1 tablet (12.5 mg total) by mouth 2 (two) times a day with meals, Disp: 180 tablet, Rfl: 1  •  Cholecalciferol (VITAMIN D3) 2000 units capsule, Take 1 capsule (2,000 Units total) by mouth daily, Disp: 100 capsule, Rfl: 2  •  latanoprost (XALATAN) 0.005 % ophthalmic solution, INSTILL 1 DROP INTO EACH EYE AT BEDTIME, Disp: 9 mL, Rfl: 0  •  levothyroxine 50 mcg tablet, Take 1 tablet (50 mcg total) by mouth daily, Disp: 90 tablet, Rfl: 1  •  lidocaine (Lidoderm) 5 %, Apply 1 patch topically daily Remove & Discard patch within 12 hours or as directed by MD, Disp: 30 patch, Rfl: 0  •  losartan (COZAAR) 100 MG tablet, Take 1 tablet (100 mg total) by mouth daily, Disp: 90 tablet, Rfl: 1  •  Multiple Vitamins-Minerals (PRESERVISION AREDS 2 PO), Take by mouth, Disp: , Rfl:   •  pioglitazone (ACTOS) 30 mg tablet, Take 1 tablet (30 mg total) by mouth daily, Disp: 90 tablet, Rfl: 1  •  zolpidem (AMBIEN) 5 mg tablet, Take 5 mg by mouth, Disp: , Rfl:     Current Facility-Administered Medications:   •  cyanocobalamin injection 1,000 mcg, 1,000 mcg, Intramuscular, Q30 Days, Francisco Javier Elias DO, 1,000 mcg at 02/27/19 1250    ACTIVE PROBLEM LIST:  Patient Active Problem List   Diagnosis   • Coronary artery disease of native artery of native heart with stable angina pectoris ([address])   • Depression   • Esophageal reflux   • Hyperlipidemia   • Hypertension   • Hypothyroidism   • Insomnia   • Lumbar compression fracture (HCC)   • Lumbar spinal stenosis   • Normochromic normocytic anemia   • Osteoarthritis of knee   • Osteoporosis   • Spinal stenosis   • Type 2 diabetes mellitus ([address])   • Vitamin B12 deficiency   • Vitamin D deficiency   • Screening mammogram, encounter for   • Diabetic peripheral neuropathy ([address])   • Greater trochanteric bursitis of left hip   • Left sided sciatica   • Triclonal gammopathy   • Medicare annual wellness visit, subsequent   • Adhesive capsulitis of shoulder   • Light chain disease, 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[address])   • Non-Hodgkin's lymphoma ([address])   • Trochanteric bursitis of right hip   • Chronic pain syndrome   • Chronic bilateral low back pain without sciatica   • Sacroiliitis ([address])   • Stage 3 chronic kidney disease, unspecified whether stage 3a or 3b CKD ([address])       PAST MEDICAL HISTORY:  Past Medical History:   Diagnosis Date   • Cardiac disorder 01/01/2001    x2 stents after a failed stress test   • Cellulitis     last assessed - [id number]   • Constipation     last assessed - [id number]   • COVID-19 08/15/2022   • Diabetes mellitus ([address])    • Disease of thyroid gland    • Ecchymosis     last assessed - [id number]   • Fall     last assessed - [id number]   • Hyperlipidemia    • Hypertension    • Hypokalemia     last assessed - [id number]   • Lower extremity weakness     last assessed - [id number]   • Vitamin D deficiency     last assessed - [id number]       PAST SURGICAL HISTORY:  Past Surgical History:   Procedure Laterality Date   • BACK SURGERY     • CHOLECYSTECTOMY     • COLONOSCOPY     • CORONARY ANGIOPLASTY WITH STENT PLACEMENT     • CT BONE MARROW BIOPSY AND ASPIRATION  1/15/2020   • HYSTERECTOMY     • OTHER SURGICAL HISTORY      Previous stent placement       FAMILY HISTORY:  Family History   Problem Relation Age of Onset   • Heart disease Mother    • Other Father         cerebral embolism - with cerebral infarction       SOCIAL HISTORY:  Social History     Socioeconomic History   • Marital status: /Civil Union     Spouse name: Not on file   • Number of children: Not on file   • Years of education: Not on file   • Highest education level: Not on file   Occupational History   • Not on file   Tobacco Use   • Smoking status: Former     Types: Cigarettes     Start date: 5/7/1958   • Smokeless tobacco: Never   Vaping Use   • Vaping Use: Never used   Substance and Sexual Activity   • Alcohol use: No   • Drug use: No   • Sexual activity: Never     Partners: Male   Other Topics Concern   • Not on file   Social History Narrative    Caffeine use     Social Determinants of Health     Financial Resource Strain: Low Risk  (3/24/2023)    Overall Financial Resource Strain (CARDIA)    • Difficulty of Paying Living Expenses: Not hard at all   Food Insecurity: Not on file   Transportation Needs: No Transportation Needs (3/24/2023)    PRAPARE - Transportation    • Lack of Transportation (Medical): No    • Lack of Transportation (Non-Medical): No   Physical Activity: Not on file   Stress: Not on file   Social Connections: Not on file   Intimate Partner Violence: Not on file   Housing Stability: Not on file       Review of Systems   Constitutional: Negative for chills and fever. HENT: Negative for ear pain and sore throat. Eyes: Negative for pain and visual disturbance. Respiratory: Negative for cough and shortness of breath. Cardiovascular: Negative for chest pain and palpitations.    Gastrointestinal: Negative for abdominal pain and vomiting. Genitourinary: Negative for dysuria and hematuria. Musculoskeletal: Positive for arthralgias, back pain and gait problem. Skin: Negative for color change and rash. Neurological: Negative for seizures and syncope. All other systems reviewed and are negative. Objective:  Vitals:    09/27/23 1318   BP: 114/80   BP Location: Left arm   Patient Position: Sitting   Cuff Size: Standard   Pulse: 64   Resp: 14   Temp: 98.1 °F (36.7 °C)   TempSrc: Tympanic   SpO2: 98%   Weight: 65.3 kg (144 lb)   Height: 5' 2" (1.575 m)     Body mass index is 26.34 kg/m². Physical Exam  Vitals and nursing note reviewed. Constitutional:       Appearance: Normal appearance. HENT:      Head: Normocephalic and atraumatic. Eyes:      Comments: Wears glasses  Pink discoloration to skin around eyes,   Cardiovascular:      Rate and Rhythm: Normal rate and regular rhythm. Heart sounds: Normal heart sounds. Pulmonary:      Effort: Pulmonary effort is normal.      Breath sounds: Normal breath sounds. Musculoskeletal:         General: Normal range of motion. Cervical back: Normal range of motion. Right lower leg: No edema. Left lower leg: No edema. Lymphadenopathy:      Cervical: No cervical adenopathy. Skin:     General: Skin is warm and dry. Coloration: Skin is pale. Findings: Rash present. Neurological:      General: No focal deficit present. Mental Status: She is alert and oriented to person, place, and time. Mental status is at baseline. Gait: Gait abnormal.   Psychiatric:         Mood and Affect: Mood normal.           RESULTS:  Hemoglobin A1C   Date/Time Value Ref Range Status   09/18/2023 12:00 AM 6.7  Final   09/19/2022 09:17 AM 7.4 (H) Normal 3.8-5.6%; PreDiabetic 5.7-6.4%;  Diabetic >=6.5%; Glycemic control for adults with diabetes <7.0% % Final   06/17/2016 10:30 AM 7.1 (H) 4.8 - 5.6 % Final     Comment:     Result Comment: Pre-diabetes: 5.7 - 6.4 Diabetes: >6.4           Glycemic control for adults with diabetes: <7.0  Performed at: [address], [place], [place], [id number], [id number]  MD:  [address] [phone]       Cholesterol   Date/Time Value Ref Range Status   09/19/2022 09:17  See Comment mg/dL Final     Comment:     Cholesterol:         Pediatric <18 Years        Desirable          <170 mg/dL      Borderline High    170-199 mg/dL      High               >=200 mg/dL        Adult >=18 Years            Desirable        <200 mg/dL      Borderline High  200-239 mg/dL      High             >239 mg/dL       Triglycerides   Date/Time Value Ref Range Status   09/19/2022 09:17  See Comment mg/dL Final     Comment:     Triglyceride:     0-9Y            <75mg/dL     10Y-17Y         <90 mg/dL       >=18Y     Normal          <150 mg/dL     Borderline High 150-199 mg/dL     High            200-499 mg/dL        Very High       >499 mg/dL    Specimen collection should occur prior to N-Acetylcysteine or Metamizole administration due to the potential for falsely depressed results. 06/17/2016 10:30 AM 90 0 - 149 mg/dL Final     Comment:     Performed at: [address], , [place], [place], [id number], [id number]  MD:  [address] [phone]       HDL, Direct   Date/Time Value Ref Range Status   09/19/2022 09:17 AM 41 (L) >=50 mg/dL Final     Comment:     Specimen collection should occur prior to Metamizole administration due to the potential for falsley depressed results. LDL Calculated   Date/Time Value Ref Range Status   09/19/2022 09:17 AM 46 0 - 100 mg/dL Final     Comment:     LDL Cholesterol:     Optimal           <100 mg/dl     Near Optimal      100-129 mg/dl     Above Optimal       Borderline High 130-159 mg/dl       High            160-189 mg/dl       Very High       >189 mg/dl         This screening LDL is a calculated result.    It does not have the accuracy of the Direct Measured LDL in the monitoring of patients with hyperlipidemia and/or statin therapy. Direct Measure LDL (AHQ139) must be ordered separately in these patients. Hemoglobin   Date/Time Value Ref Range Status   09/19/2022 09:17 AM 11.6 11.5 - 15.4 g/dL Final   02/08/2016 11:08 AM 12.2 11.1 - 15.9 g/dL Final     Hematocrit   Date/Time Value Ref Range Status   09/19/2022 09:17 AM 37.8 34.8 - 46.1 % Final   02/08/2016 11:08 AM 35.9 34.0 - 46.6 % Final     Platelets   Date/Time Value Ref Range Status   09/19/2022 09:17  149 - 390 Thousands/uL Final   02/08/2016 11:08  150 - 379 x10E3/uL Final     TSH 3RD GENERATON   Date/Time Value Ref Range Status   09/27/2022 02:16 PM 1.420 0.450 - 4.500 uIU/mL Final     Comment:     The recommended reference ranges for TSH during pregnancy are as follows:   First trimester 0.1 to 2.5 uIU/mL   Second trimester  0.2 to 3.0 uIU/mL   Third trimester 0.3 to 3.0 uIU/m    Note: Normal ranges may not apply to patients who are transgender, non-binary, or whose legal sex, sex at birth, and gender identity differ.   Adult TSH (3rd generation) reference range follows the recommended guidelines of the American Thy[employer] Association, January, 2020.   06/17/2016 10:30 AM 2.490 0.450 - 4.500 uIU/mL Final     Free T4   Date/Time Value Ref Range Status   06/17/2016 10:30 AM 1.13 0.82 - 1.77 ng/dL Final     Comment:       Performed at: [facility], [address] [place] [address], [id number], [id number]  MD:  [address] [phone]       Sodium   Date/Time Value Ref Range Status   09/27/2022 02:16  135 - 147 mmol/L Final     BUN   Date/Time Value Ref Range Status   09/27/2022 02:16 PM 17 5 - 25 mg/dL Final   02/08/2016 11:08 AM 25 8 - 27 mg/dL Final     Creatinine   Date/Time Value Ref Range Status   09/27/2022 02:16 PM 0.99 0.60 - 1.30 mg/dL Final     Comment:     Standardized to IDMS reference method   02/08/2016 11:08 AM 0.98 0.57 - 1.00 mg/dL Final      In chart    This note was created with voice recognition software. Phonic, grammatical and spelling errors may be present within the note as a result.

## 2023-10-13 NOTE — PROGRESS NOTES
Assessment:  1. Chronic pain syndrome    2. Sacroiliitis ([address])    3. Spinal stenosis of lumbar region with neurogenic claudication        Plan: This is an 70-year-old female returns her office after quite some time with bilateral low back pain that previously improved with SI joint injection in the past.  She is reporting similar symptoms today. On exam, she has tenderness to palpation over the bilateral SI joints with positive maneuvers as noted below. Discussed repeat injection given previous injection helped for about 1 year with almost complete relief. She takes ibuprofen 400mg BID PRN which helps but denies indigestion or DI discomfort with this. Complete risks and benefits including bleeding, infection, tissue reaction, nerve injury and allergic reaction were discussed. The approach was demonstrated using models and literature was provided. Verbal and written consent was obtained. My impressions and treatment recommendations were discussed in detail with the patient who verbalized understanding and had no further questions. Discharge instructions were provided. I personally saw and examined the patient and I agree with the above discussed plan of care. Orders Placed This Encounter   Procedures    FL spine and pain procedure     Standing Status:   Future     Standing Expiration Date:   10/16/2027     Order Specific Question:   Reason for Exam:     Answer:   b/l SI joint injection     Order Specific Question:   Anticoagulant hold needed? Answer:   no     No orders of the defined types were placed in this encounter. History of Present Illness:  Joaquin Oh is a 80 y.o. female who presents for a follow up office visit in regards to Back Pain (Low Back Pain). The patient’s current symptoms include lower back pain. 10 out of 10. Worse in the morning. Constant, sharp.   She had bilateral SI joint injection with Dr. Patti Morillo about 1 year ago with 1 year of significant relief, almost 100%.    I have personally reviewed and/or updated the patient's past medical history, past surgical history, family history, social history, current medications, allergies, and vital signs today. Review of Systems   Musculoskeletal:  Positive for arthralgias and gait problem.         ALLAN       Patient Active Problem List   Diagnosis    Coronary artery disease of native artery of native heart with stable angina pectoris (HCC)    Depression    Esophageal reflux    Hyperlipidemia    Hypertension    Hypothyroidism    Insomnia    Lumbar compression fracture (HCC)    Lumbar spinal stenosis    Normochromic normocytic anemia    Osteoarthritis of knee    Osteoporosis    Spinal stenosis    Type 2 diabetes mellitus ([address])    Vitamin B12 deficiency    Vitamin D deficiency    Screening mammogram, encounter for    Diabetic peripheral neuropathy (HCC)    Greater trochanteric bursitis of left hip    Left sided sciatica    Triclonal gammopathy    Medicare annual wellness visit, subsequent    Adhesive capsulitis of shoulder    Light chain disease, 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[address])    Non-Hodgkin's lymphoma ([address])    Trochanteric bursitis of right hip    Chronic pain syndrome    Chronic bilateral low back pain without sciatica    Sacroiliitis ([address])    Stage 3 chronic kidney disease, unspecified whether stage 3a or 3b CKD ([address])       Past Medical History:   Diagnosis Date    Cardiac disorder 01/01/2001    x2 stents after a failed stress test    Cellulitis     last assessed - [id number]    Constipation     last assessed - [id number]    COVID-19 08/15/2022    Diabetes mellitus ([address])     Disease of thyroid gland     Ecchymosis     last assessed - [id number]    Fall     last assessed - [id number]    Hyperlipidemia     Hypertension     Hypokalemia     last assessed - [id number]    Lower extremity weakness     last assessed - 32RLS2461    Vitamin D deficiency     last assessed - [id number]       Past Surgical History:   Procedure Laterality Date    BACK SURGERY CHOLECYSTECTOMY      COLONOSCOPY      CORONARY ANGIOPLASTY WITH STENT PLACEMENT      CT BONE MARROW BIOPSY AND ASPIRATION  1/15/2020    HYSTERECTOMY      OTHER SURGICAL HISTORY      Previous stent placement       Family History   Problem Relation Age of Onset    Heart disease Mother     Other Father         cerebral embolism - with cerebral infarction       Social History     Occupational History    Not on file   Tobacco Use    Smoking status: Former     Types: Cigarettes     Start date: 5/7/1958    Smokeless tobacco: Never   Vaping Use    Vaping Use: Never used   Substance and Sexual Activity    Alcohol use: No    Drug use: No    Sexual activity: Never     Partners: Male       Current Outpatient Medications on File Prior to Visit   Medication Sig    amLODIPine (NORVASC) 5 mg tablet Take 1 tablet (5 mg total) by mouth daily    aspirin 81 mg chewable tablet Chew 81 mg    atorvastatin (LIPITOR) 40 mg tablet Take 1 tablet (40 mg total) by mouth daily    baclofen 10 mg tablet TAKE 1 TABLET BY MOUTH NIGHTLY AT BEDTIME AS NEEDED AS DIRECTED    carvedilol (COREG) 12.5 mg tablet Take 1 tablet (12.5 mg total) by mouth 2 (two) times a day with meals    Cholecalciferol (VITAMIN D3) 2000 units capsule Take 1 capsule (2,000 Units total) by mouth daily    latanoprost (XALATAN) 0.005 % ophthalmic solution INSTILL 1 DROP INTO EACH EYE AT BEDTIME    levothyroxine 50 mcg tablet Take 1 tablet (50 mcg total) by mouth daily    lidocaine (Lidoderm) 5 % Apply 1 patch topically daily Remove & Discard patch within 12 hours or as directed by MD    losartan (COZAAR) 100 MG tablet Take 1 tablet (100 mg total) by mouth daily    Multiple Vitamins-Minerals (PRESERVISION AREDS 2 PO) Take by mouth    pioglitazone (ACTOS) 30 mg tablet Take 1 tablet (30 mg total) by mouth daily    zolpidem (AMBIEN) 5 mg tablet Take 5 mg by mouth     Current Facility-Administered Medications on File Prior to Visit   Medication    cyanocobalamin injection 1,000 mcg       Allergies   Allergen Reactions    Iodine - Food Allergy Other (See Comments)     IV CONTRAST DYE    Iv Dye  [Iodinated Contrast Media]     Other        Physical Exam:    /74   Pulse 62   Ht 5' 2" (1.575 m)   Wt 65 kg (143 lb 3.2 oz)   BMI 26.19 kg/m²     Constitutional:normal, well developed, well nourished, alert, in no distress and non-toxic and no overt pain behavior.   Eyes:anicteric  HEENT:grossly intact  Neck:supple, symmetric, trachea midline and no masses   Pulmonary:even and unlabored  Cardiovascular:No edema or pitting edema present  Skin:Normal without rashes or lesions and well hydrated  Psychiatric:Mood and affect appropriate  Neurologic:Cranial Nerves II-XII grossly intact  Musculoskeletal:normalTTP b/l SI joints, +gaenslens, thigh thrust, Golden test bilaterally    Imaging

## 2023-10-13 NOTE — H&P (VIEW-ONLY)
Assessment:  1. Chronic pain syndrome    2. Sacroiliitis ([address])    3. Spinal stenosis of lumbar region with neurogenic claudication        Plan: This is an 80-year-old female returns her office after quite some time with bilateral low back pain that previously improved with SI joint injection in the past.  She is reporting similar symptoms today. On exam, she has tenderness to palpation over the bilateral SI joints with positive maneuvers as noted below. Discussed repeat injection given previous injection helped for about 1 year with almost complete relief. She takes ibuprofen 400mg BID PRN which helps but denies indigestion or DI discomfort with this. Complete risks and benefits including bleeding, infection, tissue reaction, nerve injury and allergic reaction were discussed. The approach was demonstrated using models and literature was provided. Verbal and written consent was obtained. My impressions and treatment recommendations were discussed in detail with the patient who verbalized understanding and had no further questions. Discharge instructions were provided. I personally saw and examined the patient and I agree with the above discussed plan of care. Orders Placed This Encounter   Procedures    FL spine and pain procedure     Standing Status:   Future     Standing Expiration Date:   10/16/2027     Order Specific Question:   Reason for Exam:     Answer:   b/l SI joint injection     Order Specific Question:   Anticoagulant hold needed? Answer:   no     No orders of the defined types were placed in this encounter. History of Present Illness:  Patito Christopher is a 80 y.o. female who presents for a follow up office visit in regards to Back Pain (Low Back Pain). The patient’s current symptoms include lower back pain. 10 out of 10. Worse in the morning. Constant, sharp.   She had bilateral SI joint injection with Dr. Yasir Junior about 1 year ago with 1 year of significant relief, almost 100%.    I have personally reviewed and/or updated the patient's past medical history, past surgical history, family history, social history, current medications, allergies, and vital signs today. Review of Systems   Musculoskeletal:  Positive for arthralgias and gait problem.         ALLAN       Patient Active Problem List   Diagnosis    Coronary artery disease of native artery of native heart with stable angina pectoris (HCC)    Depression    Esophageal reflux    Hyperlipidemia    Hypertension    Hypothyroidism    Insomnia    Lumbar compression fracture (HCC)    Lumbar spinal stenosis    Normochromic normocytic anemia    Osteoarthritis of knee    Osteoporosis    Spinal stenosis    Type 2 diabetes mellitus ([address])    Vitamin B12 deficiency    Vitamin D deficiency    Screening mammogram, encounter for    Diabetic peripheral neuropathy (HCC)    Greater trochanteric bursitis of left hip    Left sided sciatica    Triclonal gammopathy    Medicare annual wellness visit, subsequent    Adhesive capsulitis of shoulder    Light chain disease, 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[address])    Non-Hodgkin's lymphoma ([address])    Trochanteric bursitis of right hip    Chronic pain syndrome    Chronic bilateral low back pain without sciatica    Sacroiliitis ([address])    Stage 3 chronic kidney disease, unspecified whether stage 3a or 3b CKD ([address])       Past Medical History:   Diagnosis Date    Cardiac disorder 01/01/2001    x2 stents after a failed stress test    Cellulitis     last assessed - [id number]    Constipation     last assessed - [id number]    COVID-19 08/15/2022    Diabetes mellitus ([address])     Disease of thyroid gland     Ecchymosis     last assessed - [id number]    Fall     last assessed - [id number]    Hyperlipidemia     Hypertension     Hypokalemia     last assessed - 34Xhw5498    Lower extremity weakness     last assessed - 50JUQ7512    Vitamin D deficiency     last assessed - 48Pca1282       Past Surgical History:   Procedure Laterality Date    BACK SURGERY CHOLECYSTECTOMY      COLONOSCOPY      CORONARY ANGIOPLASTY WITH STENT PLACEMENT      CT BONE MARROW BIOPSY AND ASPIRATION  1/15/2020    HYSTERECTOMY      OTHER SURGICAL HISTORY      Previous stent placement       Family History   Problem Relation Age of Onset    Heart disease Mother     Other Father         cerebral embolism - with cerebral infarction       Social History     Occupational History    Not on file   Tobacco Use    Smoking status: Former     Types: Cigarettes     Start date: 5/7/1958    Smokeless tobacco: Never   Vaping Use    Vaping Use: Never used   Substance and Sexual Activity    Alcohol use: No    Drug use: No    Sexual activity: Never     Partners: Male       Current Outpatient Medications on File Prior to Visit   Medication Sig    amLODIPine (NORVASC) 5 mg tablet Take 1 tablet (5 mg total) by mouth daily    aspirin 81 mg chewable tablet Chew 81 mg    atorvastatin (LIPITOR) 40 mg tablet Take 1 tablet (40 mg total) by mouth daily    baclofen 10 mg tablet TAKE 1 TABLET BY MOUTH NIGHTLY AT BEDTIME AS NEEDED AS DIRECTED    carvedilol (COREG) 12.5 mg tablet Take 1 tablet (12.5 mg total) by mouth 2 (two) times a day with meals    Cholecalciferol (VITAMIN D3) 2000 units capsule Take 1 capsule (2,000 Units total) by mouth daily    latanoprost (XALATAN) 0.005 % ophthalmic solution INSTILL 1 DROP INTO EACH EYE AT BEDTIME    levothyroxine 50 mcg tablet Take 1 tablet (50 mcg total) by mouth daily    lidocaine (Lidoderm) 5 % Apply 1 patch topically daily Remove & Discard patch within 12 hours or as directed by MD    losartan (COZAAR) 100 MG tablet Take 1 tablet (100 mg total) by mouth daily    Multiple Vitamins-Minerals (PRESERVISION AREDS 2 PO) Take by mouth    pioglitazone (ACTOS) 30 mg tablet Take 1 tablet (30 mg total) by mouth daily    zolpidem (AMBIEN) 5 mg tablet Take 5 mg by mouth     Current Facility-Administered Medications on File Prior to Visit   Medication    cyanocobalamin injection 1,000 mcg       Allergies   Allergen Reactions    Iodine - Food Allergy Other (See Comments)     IV CONTRAST DYE    Iv Dye  [Iodinated Contrast Media]     Other        Physical Exam:    /74   Pulse 62   Ht 5' 2" (1.575 m)   Wt 65 kg (143 lb 3.2 oz)   BMI 26.19 kg/m²     Constitutional:normal, well developed, well nourished, alert, in no distress and non-toxic and no overt pain behavior.   Eyes:anicteric  HEENT:grossly intact  Neck:supple, symmetric, trachea midline and no masses   Pulmonary:even and unlabored  Cardiovascular:No edema or pitting edema present  Skin:Normal without rashes or lesions and well hydrated  Psychiatric:Mood and affect appropriate  Neurologic:Cranial Nerves II-XII grossly intact  Musculoskeletal:normalTTP b/l SI joints, +gaenslens, thigh thrust, Golden test bilaterally    Imaging

## 2023-10-16 ENCOUNTER — TELEPHONE (OUTPATIENT)
Dept: RADIOLOGY | Facility: CLINIC | Age: 88
End: 2023-10-16

## 2023-10-16 ENCOUNTER — OFFICE VISIT (OUTPATIENT)
Dept: PAIN MEDICINE | Facility: CLINIC | Age: 88
End: 2023-10-16
Payer: COMMERCIAL

## 2023-10-16 VITALS
HEART RATE: 62 BPM | WEIGHT: 143.2 LBS | BODY MASS INDEX: 26.35 KG/M2 | HEIGHT: 62 IN | DIASTOLIC BLOOD PRESSURE: 74 MMHG | SYSTOLIC BLOOD PRESSURE: 148 MMHG

## 2023-10-16 DIAGNOSIS — M48.062 SPINAL STENOSIS OF LUMBAR REGION WITH NEUROGENIC CLAUDICATION: ICD-10-CM

## 2023-10-16 DIAGNOSIS — M46.1 SACROILIITIS (HCC): ICD-10-CM

## 2023-10-16 DIAGNOSIS — G89.4 CHRONIC PAIN SYNDROME: Primary | ICD-10-CM

## 2023-10-16 PROCEDURE — 99214 OFFICE O/P EST MOD 30 MIN: CPT | Performed by: STUDENT IN AN ORGANIZED HEALTH CARE EDUCATION/TRAINING PROGRAM

## 2023-10-25 NOTE — TELEPHONE ENCOUNTER
Caller: Quang Norman daughter in law     Doctor: Dr Benitez Dinh for call: If a cancellation becomes available call the daughter in law.      Call back#: [phone]

## 2023-10-25 NOTE — TELEPHONE ENCOUNTER
Caller: Anamika Yan daughter in law    Doctor/Office: Dr Marc Castaneda    Call regarding :  Sooner appt     Call was transferred to: Brooke

## 2023-10-26 ENCOUNTER — HOSPITAL ENCOUNTER (OUTPATIENT)
Dept: RADIOLOGY | Facility: CLINIC | Age: 88
Discharge: HOME | End: 2023-10-26
Admitting: STUDENT IN AN ORGANIZED HEALTH CARE EDUCATION/TRAINING PROGRAM
Payer: COMMERCIAL

## 2023-10-26 VITALS
RESPIRATION RATE: 18 BRPM | HEART RATE: 66 BPM | DIASTOLIC BLOOD PRESSURE: 92 MMHG | SYSTOLIC BLOOD PRESSURE: 138 MMHG | TEMPERATURE: 99 F | OXYGEN SATURATION: 97 %

## 2023-10-26 DIAGNOSIS — M46.1 SACROILIITIS (HCC): ICD-10-CM

## 2023-10-26 PROCEDURE — A9585 GADOBUTROL INJECTION: HCPCS | Performed by: STUDENT IN AN ORGANIZED HEALTH CARE EDUCATION/TRAINING PROGRAM

## 2023-10-26 PROCEDURE — 27096 INJECT SACROILIAC JOINT: CPT | Performed by: STUDENT IN AN ORGANIZED HEALTH CARE EDUCATION/TRAINING PROGRAM

## 2023-10-26 RX ORDER — BUPIVACAINE HCL/PF 2.5 MG/ML
3 VIAL (ML) INJECTION ONCE
Status: COMPLETED | OUTPATIENT
Start: 2023-10-26 | End: 2023-10-26

## 2023-10-26 RX ORDER — GADOBUTROL 604.72 MG/ML
1 INJECTION INTRAVENOUS ONCE
Status: DISCONTINUED | OUTPATIENT
Start: 2023-10-26 | End: 2023-10-26

## 2023-10-26 RX ORDER — GADOBUTROL 604.72 MG/ML
1 INJECTION INTRAVENOUS ONCE
Status: COMPLETED | OUTPATIENT
Start: 2023-10-26 | End: 2023-10-26

## 2023-10-26 RX ORDER — METHYLPREDNISOLONE ACETATE 80 MG/ML
80 INJECTION, SUSPENSION INTRA-ARTICULAR; INTRALESIONAL; INTRAMUSCULAR; PARENTERAL; SOFT TISSUE ONCE
Status: COMPLETED | OUTPATIENT
Start: 2023-10-26 | End: 2023-10-26

## 2023-10-26 RX ADMIN — METHYLPREDNISOLONE ACETATE 80 MG: 80 INJECTION, SUSPENSION INTRA-ARTICULAR; INTRALESIONAL; INTRAMUSCULAR; PARENTERAL; SOFT TISSUE at 13:54

## 2023-10-26 RX ADMIN — Medication 3 ML: at 13:54

## 2023-10-26 RX ADMIN — GADOBUTROL 1 ML: 604.72 INJECTION INTRAVENOUS at 13:54

## 2023-10-26 NOTE — INTERVAL H&P NOTE
Update: (This section must be completed if the H&P was completed greater than 24 hrs to procedure or admission)    H&P reviewed. After examining the patient, I find no changed to the H&P since it had been written. Patient re-evaluated.  Accept as history and physical.    Maite Gaffney MD/October 26, 2023/1:46 PM

## 2023-10-26 NOTE — DISCHARGE INSTR - LAB

## 2023-10-31 ENCOUNTER — TELEPHONE (OUTPATIENT)
Dept: PAIN MEDICINE | Facility: CLINIC | Age: 88
End: 2023-10-31

## 2023-10-31 NOTE — TELEPHONE ENCOUNTER
S/W pt who is S/P Rohan KENDALL on 10/26  Pt states she now has pain in a different area on her right hip. States pain is in outer part of right hip going down to top of leg. Denies groin or back pain. Only able to take couple of steps with walker. No pain with lying down or after sitting for a few minutes  Taking IB, using ice.     Advised pt may need to wait to see if injection helps this, but will ask SP about further recs  Pt agreeable  Please advise

## 2023-10-31 NOTE — TELEPHONE ENCOUNTER
Caller: Rosabel Runner    Doctor: Prince Morales    Reason for call: patients pain has moved to R hip she is unable to walk properly still has to use walker would like to speak with a nurse.      Call back#: [phone]

## 2023-11-03 DIAGNOSIS — M54.41 CHRONIC BILATERAL LOW BACK PAIN WITH RIGHT-SIDED SCIATICA: Primary | ICD-10-CM

## 2023-11-03 DIAGNOSIS — G89.29 CHRONIC BILATERAL LOW BACK PAIN WITH RIGHT-SIDED SCIATICA: Primary | ICD-10-CM

## 2023-11-12 ENCOUNTER — HOSPITAL ENCOUNTER (OUTPATIENT)
Dept: MRI IMAGING | Facility: HOSPITAL | Age: 88
Discharge: HOME | End: 2023-11-12
Attending: STUDENT IN AN ORGANIZED HEALTH CARE EDUCATION/TRAINING PROGRAM
Payer: COMMERCIAL

## 2023-11-12 DIAGNOSIS — M54.41 CHRONIC BILATERAL LOW BACK PAIN WITH RIGHT-SIDED SCIATICA: ICD-10-CM

## 2023-11-12 DIAGNOSIS — G89.29 CHRONIC BILATERAL LOW BACK PAIN WITH RIGHT-SIDED SCIATICA: ICD-10-CM

## 2023-11-12 PROCEDURE — 72148 MRI LUMBAR SPINE W/O DYE: CPT

## 2023-11-12 PROCEDURE — G1004 CDSM NDSC: HCPCS

## 2023-11-13 ENCOUNTER — TELEPHONE (OUTPATIENT)
Dept: PAIN MEDICINE | Facility: CLINIC | Age: 88
End: 2023-11-13

## 2023-11-13 ENCOUNTER — TELEPHONE (OUTPATIENT)
Age: 88
End: 2023-11-13

## 2023-11-13 NOTE — TELEPHONE ENCOUNTER
Caller: Zenaida Cheung     Doctor: Dr Kayla Rodriguez     Reason for call: Patient calling stating is in pain rt HIP extreme pain unable to walk patient would like to be scheduled for MRI review. Patient states had MRI done on 11/12 please schedule f/u with Dr Ryan Pineda I am unable to schedule.  Thank you     Call back#: [phone]

## 2023-11-14 ENCOUNTER — HOSPITAL ENCOUNTER (INPATIENT)
Facility: HOSPITAL | Age: 88
LOS: 6 days | Discharge: NURSING, REHAB OR LONG-TERM CARE | DRG: 556 | End: 2023-11-21
Attending: EMERGENCY MEDICINE | Admitting: INTERNAL MEDICINE
Payer: COMMERCIAL

## 2023-11-14 DIAGNOSIS — I10 HYPERTENSION, UNSPECIFIED TYPE: ICD-10-CM

## 2023-11-14 DIAGNOSIS — G89.29 ACUTE EXACERBATION OF CHRONIC LOW BACK PAIN: Primary | ICD-10-CM

## 2023-11-14 DIAGNOSIS — M54.50 ACUTE EXACERBATION OF CHRONIC LOW BACK PAIN: Primary | ICD-10-CM

## 2023-11-14 DIAGNOSIS — R26.2 AMBULATORY DYSFUNCTION: ICD-10-CM

## 2023-11-14 LAB
ALBUMIN SERPL BCP-MCNC: 4 G/DL (ref 3.5–5)
ALP SERPL-CCNC: 40 U/L (ref 34–104)
ALT SERPL W P-5'-P-CCNC: 14 U/L (ref 7–52)
ANION GAP SERPL CALCULATED.3IONS-SCNC: 9 MMOL/L
AST SERPL W P-5'-P-CCNC: 13 U/L (ref 13–39)
BASOPHILS # BLD AUTO: 0.03 THOUSANDS/ÂΜL (ref 0–0.1)
BASOPHILS NFR BLD AUTO: 0 % (ref 0–1)
BILIRUB DIRECT SERPL-MCNC: 0.14 MG/DL (ref 0–0.2)
BILIRUB SERPL-MCNC: 0.82 MG/DL (ref 0.2–1)
BUN SERPL-MCNC: 27 MG/DL (ref 5–25)
CALCIUM SERPL-MCNC: 9.3 MG/DL (ref 8.4–10.2)
CHLORIDE SERPL-SCNC: 106 MMOL/L (ref 96–108)
CO2 SERPL-SCNC: 24 MMOL/L (ref 21–32)
CREAT SERPL-MCNC: 1.06 MG/DL (ref 0.6–1.3)
EOSINOPHIL # BLD AUTO: 0.12 THOUSAND/ÂΜL (ref 0–0.61)
EOSINOPHIL NFR BLD AUTO: 2 % (ref 0–6)
ERYTHROCYTE [DISTWIDTH] IN BLOOD BY AUTOMATED COUNT: 15 % (ref 11.6–15.1)
GFR SERPL CREATININE-BSD FRML MDRD: 46 ML/MIN/1.73SQ M
GLUCOSE SERPL-MCNC: 150 MG/DL (ref 65–140)
GLUCOSE SERPL-MCNC: 174 MG/DL (ref 65–140)
GLUCOSE SERPL-MCNC: 176 MG/DL (ref 65–140)
HCT VFR BLD AUTO: 34.9 % (ref 34.8–46.1)
HGB BLD-MCNC: 11.1 G/DL (ref 11.5–15.4)
IMM GRANULOCYTES # BLD AUTO: 0.02 THOUSAND/UL (ref 0–0.2)
IMM GRANULOCYTES NFR BLD AUTO: 0 % (ref 0–2)
LYMPHOCYTES # BLD AUTO: 1.62 THOUSANDS/ÂΜL (ref 0.6–4.47)
LYMPHOCYTES NFR BLD AUTO: 23 % (ref 14–44)
MCH RBC QN AUTO: 29.8 PG (ref 26.8–34.3)
MCHC RBC AUTO-ENTMCNC: 31.8 G/DL (ref 31.4–37.4)
MCV RBC AUTO: 94 FL (ref 82–98)
MONOCYTES # BLD AUTO: 0.64 THOUSAND/ÂΜL (ref 0.17–1.22)
MONOCYTES NFR BLD AUTO: 9 % (ref 4–12)
NEUTROPHILS # BLD AUTO: 4.48 THOUSANDS/ÂΜL (ref 1.85–7.62)
NEUTS SEG NFR BLD AUTO: 66 % (ref 43–75)
NRBC BLD AUTO-RTO: 0 /100 WBCS
PLATELET # BLD AUTO: 232 THOUSANDS/UL (ref 149–390)
PMV BLD AUTO: 9.6 FL (ref 8.9–12.7)
POTASSIUM SERPL-SCNC: 4.2 MMOL/L (ref 3.5–5.3)
PROT SERPL-MCNC: 8.2 G/DL (ref 6.4–8.4)
RBC # BLD AUTO: 3.73 MILLION/UL (ref 3.81–5.12)
SODIUM SERPL-SCNC: 139 MMOL/L (ref 135–147)
WBC # BLD AUTO: 6.91 THOUSAND/UL (ref 4.31–10.16)

## 2023-11-14 PROCEDURE — 99284 EMERGENCY DEPT VISIT MOD MDM: CPT

## 2023-11-14 PROCEDURE — 80048 BASIC METABOLIC PNL TOTAL CA: CPT | Performed by: EMERGENCY MEDICINE

## 2023-11-14 PROCEDURE — 97162 PT EVAL MOD COMPLEX 30 MIN: CPT

## 2023-11-14 PROCEDURE — 99285 EMERGENCY DEPT VISIT HI MDM: CPT | Performed by: EMERGENCY MEDICINE

## 2023-11-14 PROCEDURE — 82948 REAGENT STRIP/BLOOD GLUCOSE: CPT

## 2023-11-14 PROCEDURE — 36415 COLL VENOUS BLD VENIPUNCTURE: CPT | Performed by: EMERGENCY MEDICINE

## 2023-11-14 PROCEDURE — 80076 HEPATIC FUNCTION PANEL: CPT | Performed by: EMERGENCY MEDICINE

## 2023-11-14 PROCEDURE — 99223 1ST HOSP IP/OBS HIGH 75: CPT | Performed by: INTERNAL MEDICINE

## 2023-11-14 PROCEDURE — 85025 COMPLETE CBC W/AUTO DIFF WBC: CPT | Performed by: EMERGENCY MEDICINE

## 2023-11-14 RX ORDER — LOSARTAN POTASSIUM 50 MG/1
100 TABLET ORAL DAILY
Status: DISCONTINUED | OUTPATIENT
Start: 2023-11-15 | End: 2023-11-21 | Stop reason: HOSPADM

## 2023-11-14 RX ORDER — ATORVASTATIN CALCIUM 40 MG/1
40 TABLET, FILM COATED ORAL DAILY
Status: DISCONTINUED | OUTPATIENT
Start: 2023-11-15 | End: 2023-11-21 | Stop reason: HOSPADM

## 2023-11-14 RX ORDER — ACETAMINOPHEN 325 MG/1
650 TABLET ORAL EVERY 6 HOURS PRN
Status: DISCONTINUED | OUTPATIENT
Start: 2023-11-14 | End: 2023-11-16

## 2023-11-14 RX ORDER — ASPIRIN 81 MG/1
81 TABLET, CHEWABLE ORAL DAILY
Status: DISCONTINUED | OUTPATIENT
Start: 2023-11-15 | End: 2023-11-21 | Stop reason: HOSPADM

## 2023-11-14 RX ORDER — ZOLPIDEM TARTRATE 5 MG/1
5 TABLET ORAL
Status: DISCONTINUED | OUTPATIENT
Start: 2023-11-14 | End: 2023-11-21 | Stop reason: HOSPADM

## 2023-11-14 RX ORDER — BACLOFEN 10 MG/1
10 TABLET ORAL
Status: DISCONTINUED | OUTPATIENT
Start: 2023-11-14 | End: 2023-11-21 | Stop reason: HOSPADM

## 2023-11-14 RX ORDER — INSULIN LISPRO 100 [IU]/ML
1-5 INJECTION, SOLUTION INTRAVENOUS; SUBCUTANEOUS
Status: DISCONTINUED | OUTPATIENT
Start: 2023-11-14 | End: 2023-11-21 | Stop reason: HOSPADM

## 2023-11-14 RX ORDER — LEVOTHYROXINE SODIUM 0.05 MG/1
50 TABLET ORAL DAILY
Status: DISCONTINUED | OUTPATIENT
Start: 2023-11-15 | End: 2023-11-21 | Stop reason: HOSPADM

## 2023-11-14 RX ORDER — CARVEDILOL 12.5 MG/1
12.5 TABLET ORAL 2 TIMES DAILY WITH MEALS
Status: DISCONTINUED | OUTPATIENT
Start: 2023-11-14 | End: 2023-11-21 | Stop reason: HOSPADM

## 2023-11-14 RX ORDER — ACETAMINOPHEN 325 MG/1
650 TABLET ORAL ONCE
Status: COMPLETED | OUTPATIENT
Start: 2023-11-14 | End: 2023-11-14

## 2023-11-14 RX ORDER — OXYCODONE HYDROCHLORIDE 5 MG/1
5 TABLET ORAL EVERY 6 HOURS PRN
Status: DISCONTINUED | OUTPATIENT
Start: 2023-11-14 | End: 2023-11-21 | Stop reason: HOSPADM

## 2023-11-14 RX ORDER — LATANOPROST 50 UG/ML
1 SOLUTION/ DROPS OPHTHALMIC
Status: DISCONTINUED | OUTPATIENT
Start: 2023-11-14 | End: 2023-11-21 | Stop reason: HOSPADM

## 2023-11-14 RX ORDER — HEPARIN SODIUM 5000 [USP'U]/ML
5000 INJECTION, SOLUTION INTRAVENOUS; SUBCUTANEOUS EVERY 8 HOURS SCHEDULED
Status: DISCONTINUED | OUTPATIENT
Start: 2023-11-15 | End: 2023-11-21 | Stop reason: HOSPADM

## 2023-11-14 RX ORDER — LIDOCAINE 50 MG/G
1 PATCH TOPICAL ONCE
Status: COMPLETED | OUTPATIENT
Start: 2023-11-14 | End: 2023-11-15

## 2023-11-14 RX ORDER — HYDRALAZINE HYDROCHLORIDE 20 MG/ML
5 INJECTION INTRAMUSCULAR; INTRAVENOUS EVERY 6 HOURS PRN
Status: DISCONTINUED | OUTPATIENT
Start: 2023-11-14 | End: 2023-11-21 | Stop reason: HOSPADM

## 2023-11-14 RX ORDER — AMLODIPINE BESYLATE 5 MG/1
5 TABLET ORAL DAILY
Status: DISCONTINUED | OUTPATIENT
Start: 2023-11-15 | End: 2023-11-20

## 2023-11-14 RX ADMIN — CARVEDILOL 12.5 MG: 12.5 TABLET, FILM COATED ORAL at 17:26

## 2023-11-14 RX ADMIN — INSULIN LISPRO 1 UNITS: 100 INJECTION, SOLUTION INTRAVENOUS; SUBCUTANEOUS at 17:26

## 2023-11-14 RX ADMIN — INSULIN LISPRO 1 UNITS: 100 INJECTION, SOLUTION INTRAVENOUS; SUBCUTANEOUS at 21:35

## 2023-11-14 RX ADMIN — LIDOCAINE 1 PATCH: 50 PATCH CUTANEOUS at 13:10

## 2023-11-14 RX ADMIN — Medication 2.5 MG: at 13:10

## 2023-11-14 RX ADMIN — Medication 2.5 MG: at 18:53

## 2023-11-14 RX ADMIN — ACETAMINOPHEN 650 MG: 325 TABLET, FILM COATED ORAL at 13:10

## 2023-11-14 NOTE — PHYSICAL THERAPY NOTE
PHYSICAL THERAPY EVALUATION  NAME:  Jesse Callaway  DATE: 11/14/23    AGE:   80 y.o.  Mrn:   [id number]  ADMIT DX:  Back pain [M54.9]  Problem List:   Patient Active Problem List   Diagnosis    Coronary artery disease of native artery of native heart with stable angina pectoris ([address])    Depression    Esophageal reflux    Hyperlipidemia    Hypertension    Hypothyroidism    Insomnia    Lumbar compression fracture (HCC)    Lumbar spinal stenosis    Normochromic normocytic anemia    Osteoarthritis of knee    Osteoporosis    Spinal stenosis    Type 2 diabetes mellitus (HCC)    Vitamin B12 deficiency    Vitamin D deficiency    Screening mammogram, encounter for    Diabetic peripheral neuropathy (HCC)    Greater trochanteric bursitis of left hip    Left sided sciatica    Triclonal gammopathy    Medicare annual wellness visit, subsequent    Adhesive capsulitis of shoulder    Light chain disease, 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[address])    Non-Hodgkin's lymphoma ([address])    Trochanteric bursitis of right hip    Chronic pain syndrome    Chronic bilateral low back pain without sciatica    Sacroiliitis ([address])    Stage 3 chronic kidney disease, unspecified whether stage 3a or 3b CKD ([address])       Past Medical History  Past Medical History:   Diagnosis Date    Cardiac disorder 01/01/2001    x2 stents after a failed stress test    Cellulitis     last assessed - [id number]    Constipation     last assessed - 92GZJ6044    COVID-19 08/15/2022    Diabetes mellitus ([address])     Disease of thyroid gland     Ecchymosis     last assessed - 26CHI2320    Fall     last assessed - 15RTB2637    Hyperlipidemia     Hypertension     Hypokalemia     last assessed - 29Dmz0622    Lower extremity weakness     last assessed - 03BDE4679    Vitamin D deficiency     last assessed - 26Rhy7440       Past Surgical History  Past Surgical History:   Procedure Laterality Date    BACK SURGERY      CHOLECYSTECTOMY      COLONOSCOPY      CORONARY ANGIOPLASTY WITH STENT PLACEMENT      CT BONE MARROW BIOPSY AND ASPIRATION  1/15/2020    HYSTERECTOMY      OTHER SURGICAL HISTORY      Previous stent placement       Length Of Stay: 0  Performed at least 2 patient identifiers during session: Name and Birthday       11/14/23 1323   PT Last Visit   PT Visit Date 11/14/23   Note Type   Note type Evaluation   Pain Assessment   Pain Assessment Tool 0-10   Pain Score 9   Pain Location/Orientation Orientation: Lower; Location: Back   Pain Radiating Towards R anterior and lateral LE extending to knee   Pain Onset/Description Onset: Ongoing;Frequency: Constant/Continuous   Effect of Pain on Daily Activities decreased mobility tolerance,   Patient's Stated Pain Goal No pain   Hospital Pain Intervention(s) Repositioned; Ambulation/increased activity  (Referring DO made aware)   Restrictions/Precautions   Weight Bearing Precautions Per Order No   Braces or Orthoses   (none per pt. Pt states prior use of lumbar support brace; however, pt has not used in years)   Other Precautions Spinal precautions; Fall Risk;Pain   Home Living   Type of [address] [place] [address] Two level;Stairs to enter with rails;Bed/bath upstairs  (FOS to second floor; stair glide available. 2 SARITHA with unilateral HR)   Bathroom Shower/Tub Walk-in shower   Bathroom Toilet Standard   Bathroom Equipment Shower chair   Bathroom Accessibility Accessible   Home Equipment Walker;Cane  (rollator walker)   Additional Comments Rollator used at baseline   Prior Function   Level of [place] Independent with functional mobility; Independent with ADLs; Independent with IADLS   Lives With Alone   Receives Help From Family  (son lives ~ 1 hour away)   IADLs Independent with driving; Independent with meal prep; Independent with medication management   Falls in the last 6 months 0  (pt denies falls)   General   Additional Pertinent History Pt reports worsening low back pain for about 1 month, states she does have history of chronic back pain with history of surgery and prior injections   Family/Caregiver Present Yes  (son)   Cognition   Overall Cognitive Status WFL   Arousal/Participation Alert   Orientation Level Oriented X4   Memory Within functional limits   Following Commands Follows all commands and directions without difficulty   Comments Pt agreeable to PT evaluation   RLE Assessment   RLE Assessment X   Strength RLE   R Hip ABduction 4-/5  (* increased R sided low back and lateral hip pain with resistance)   R Knee Extension 4-/5  (* increased R sided low back and lateral hip pain with resistance)   R Ankle Dorsiflexion 4-/5   LLE Assessment   LLE Assessment WFL   Vision-Basic Assessment   Current Vision Wears glasses all the time   Coordination   Sensation X  (pt endorses numbness to lateral and anterior hip/thigh extending to knee)   Light Touch   RLE Light Touch Grossly intact   LLE Light Touch Grossly intact   Bed Mobility   Additional Comments bed mobility not tested as pt seated in wc at start/end of session in ED   Transfers   Sit to Stand 4  Minimal assistance   Additional items Assist x 1; Increased time required;Verbal cues;Armrests   Stand to Sit 4  Minimal assistance   Additional items Assist x 1; Increased time required;Verbal cues;Armrests   Additional Comments RW used during transfers, increased time to complete due to pain   Ambulation/Elevation   Gait pattern Decreased foot clearance; Antalgic; Improper Weight shift; Shuffling; Short stride; Step to;Excessively slow   Gait Assistance 4  Minimal assist   Additional items Assist x 1;Verbal cues   Assistive Device Rolling walker   Distance 10'   Stair Management Assistance Not tested   Ambulation/Elevation Additional Comments Ambulation distance limited by pain and pt concern for RLE giving out   Balance   Static Sitting Good   Dynamic Sitting Fair +   Static Standing Fair -   Dynamic Standing Fair -   Ambulatory Fair -   Endurance Deficit   Endurance Deficit Yes   Endurance Deficit Description decreased activity tolerance, pain   Activity Tolerance   Activity Tolerance Patient limited by pain   Medical Staff Made Aware Eugenio Banks DO made aware of session outcomes and PT reccomendations   Assessment   Prognosis Good   Problem List Decreased strength;Decreased endurance; Impaired balance;Decreased mobility;Pain   Assessment Pt is 80 y.o. female seen for moderate-complexity PT evaluation on 11/14/2023 [facility] w/ <principal problem not specified>. PT was consulted to assess pt's functional mobility and d/c needs. Order placed for PT eval and tx order. PTA, pt was living alone in a two story home with 2 SARITHA and FOS to second floor (stair glide available). Pt reports independence at baseline with ADLs, IADLs, and functional mobility with use of rollator; pt states since onset of pain about 1 month ago she has had a progressively harder time completing ADLs and mobility due to pain. At time of eval, patient required Chang for STS to RW and ambulated 10' with RW Chang with ambulation limited by worsening R sided low back pain radiating to R knee with feelings of RLE possibly giving out. Upon evaluation, pt presenting with impaired functional mobility d/t decreased strength, decreased endurance, impaired balance, decreased mobility, pain, and activity intolerance. Pertinent PMHx and current co-morbidities affecting pt's physical performance at time of assessment include: HTN, chronic low back pain s/p lumbar surgery. Personal factors affecting pt at time of eval include: inaccessible home environment, lives in 2 story house, ambulating w/ assistive device, stairs to enter home, inability to ambulate household distances, inability to navigate community distances, inability to navigate level surfaces w/o external assistance, and limited home support. The following objective measures performed on IE also reveal limitations: AM-PAC 6-Clicks: 30/01.  Pt's clinical presentation is currently evolving seen in pt's presentation of advanced age, need for minimal assist w/ all phases of mobility when usually mobilizing independently, low back and RLE pain impacting overall mobility status, and ongoing medical assessment. Overall, pt's rehab potential and prognosis to return to PLOF is good as impacted by objective findings, warranting pt to receive further skilled PT interventions to address impairments, activity limitations, and participation restrictions. Pt to benefit from continued PT services to address deficits as defined above and maximize level of functional independent mobility and consistency. From PT/mobility standpoint, recommendation at time of d/c would be level 2, moderate resource intensity in order to facilitate return to PLOF. Goals   Patient Goals to have less pain   STG Expiration Date 11/24/23   Short Term Goal #1 In 10 days: Increase bilateral LE strength 1/2 grade to facilitate independent mobility, Perform all bed mobility tasks modified independent to decrease caregiver burden, Perform all transfers modified independent to improve independence, Ambulate > 150 ft. with RW modified independent w/o LOB and w/ normalized gait pattern 100% of the time, Navigate 2 stairs modified independent with unilateral handrail to facilitate return to previous living environment, and Increase all balance 1/2 grade to decrease risk for falls   PT Treatment Day 0   Plan   Treatment/Interventions Functional transfer training; Therapeutic exercise;Elevations; Endurance training;Patient/family training;Bed mobility;Gait training;Spoke to advanced practitioner   PT Frequency 3-5x/wk   Discharge Recommendation   Rehab Resource Intensity Level, PT II (Moderate Resource Intensity)   Equipment Recommended Kelsy Borrego Package Recommended Wheeled walker   AM-PAC Basic Mobility Inpatient   Turning in Flat Bed Without Bedrails 3   Lying on Back to Sitting on Edge of Flat Bed Without Bedrails 2   Moving Bed to Chair 3   Standing Up From Chair Using Arms 3   Walk in Room 3   Climb 3-5 Stairs With Railing 1   Basic Mobility Inpatient Raw Score 15   Basic Mobility Standardized Score 36.97   Highest Level Of Mobility   -HLM Goal 4: Move to chair/commode   -HLM Achieved 6: Walk 10 steps or more       Time In: 1323  Time Out: 1341  Total Evaluation Minutes: [facility], PT

## 2023-11-14 NOTE — ED NOTES
MRI results asked to be printed and provided to PT per Provider Jeniffer.       Lady Duran, LEN  11/14/23 9253

## 2023-11-14 NOTE — PLAN OF CARE
Problem: PHYSICAL THERAPY ADULT  Goal: Performs mobility at highest level of function for planned discharge setting. See evaluation for individualized goals. Description: Treatment/Interventions: Functional transfer training, Therapeutic exercise, Elevations, Endurance training, Patient/family training, Bed mobility, Gait training, Spoke to advanced practitioner  Equipment Recommended: Sunita Tellez       See flowsheet documentation for full assessment, interventions and recommendations. Outcome: Progressing  Note: Prognosis: Good  Problem List: Decreased strength, Decreased endurance, Impaired balance, Decreased mobility, Pain  Assessment: Pt is 80 y.o. female seen for moderate-complexity PT evaluation on 11/14/2023 s/p admit to [facility] on 11/14/2023 w/ <principal problem not specified>. PT was consulted to assess pt's functional mobility and d/c needs. Order placed for PT eval and tx order. PTA, pt was living alone in a two story home with 2 SARITHA and FOS to second floor (stair glide available). Pt reports independence at baseline with ADLs, IADLs, and functional mobility with use of rollator; pt states since onset of pain about 1 month ago she has had a progressively harder time completing ADLs and mobility due to pain. At time of eval, patient required Chang for STS to RW and ambulated 10' with RW Chang with ambulation limited by worsening R sided low back pain radiating to R knee with feelings of RLE possibly giving out. Upon evaluation, pt presenting with impaired functional mobility d/t decreased strength, decreased endurance, impaired balance, decreased mobility, pain, and activity intolerance. Pertinent PMHx and current co-morbidities affecting pt's physical performance at time of assessment include: HTN, chronic low back pain s/p lumbar surgery.  Personal factors affecting pt at time of eval include: inaccessible home environment, lives in 2 story house, ambulating w/ assistive device, stairs to enter home, inability to ambulate household distances, inability to navigate community distances, inability to navigate level surfaces w/o external assistance, and limited home support. The following objective measures performed on IE also reveal limitations: AM-PAC 6-Clicks: 73/97. Pt's clinical presentation is currently evolving seen in pt's presentation of advanced age, need for minimal assist w/ all phases of mobility when usually mobilizing independently, low back and RLE pain impacting overall mobility status, and ongoing medical assessment. Overall, pt's rehab potential and prognosis to return to PLOF is good as impacted by objective findings, warranting pt to receive further skilled PT interventions to address impairments, activity limitations, and participation restrictions. Pt to benefit from continued PT services to address deficits as defined above and maximize level of functional independent mobility and consistency. From PT/mobility standpoint, recommendation at time of d/c would be level 2, moderate resource intensity in order to facilitate return to PLOF. Rehab Resource Intensity Level, PT: II (Moderate Resource Intensity)    See flowsheet documentation for full assessment.    Kai Rodriguez; PT, DPT

## 2023-11-14 NOTE — ASSESSMENT & PLAN NOTE
Lab Results   Component Value Date    HGBA1C 6.7 09/18/2023       No results for input(s): "POCGLU" in the last 72 hours.     Blood Sugar Average: Last 72 hrs:  History of type 2 diabetes  Hold home pioglitazone   sliding scale insulin  Monitor blood sugar

## 2023-11-14 NOTE — ARC ADMISSION
Referral received for consideration of patient for Inpatient Acute Rehab. We will review patient's case and continue to follow patient's functional progress until a determination can be made. OT eval is needed for ARC review.

## 2023-11-14 NOTE — CASE MANAGEMENT
Case Management Assessment & Discharge Planning Note    Patient name Matt Segundo  Location ED 16/ED 16 MRN [id number]  : 1934 Date 2023       Current Admission Date: 2023  Current Admission Diagnosis:Back pain   Patient Active Problem List    Diagnosis Date Noted    Stage 3 chronic kidney disease, unspecified whether stage 3a or 3b CKD ([address]) 2023    Chronic pain syndrome 2022    Chronic bilateral low back pain without sciatica 2022    Sacroiliitis ([address]) 2022    Trochanteric bursitis of right hip 2021    Non-Hodgkin's lymphoma ([address]) 10/05/2020    Light chain disease, 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    Medicare annual wellness visit, subsequent 2020    Adhesive capsulitis of shoulder 2020    Triclonal gammopathy 2019    Greater trochanteric bursitis of left hip 2019    Left sided sciatica 2019    Diabetic peripheral neuropathy ([address]) 2019    Screening mammogram, encounter for 2018    Lumbar compression fracture ([address]) 10/11/2017    Vitamin D deficiency 10/11/2017    Vitamin B12 deficiency 2016    Lumbar spinal stenosis 2015    Normochromic normocytic anemia 2015    Depression 2015    Esophageal reflux 2013    Osteoarthritis of knee 2013    Spinal stenosis 2013    Coronary artery disease of native artery of native heart with stable angina pectoris ([address]) 2013    Hyperlipidemia 2013    Hypertension 2013    Hypothyroidism 2013    Insomnia 2013    Osteoporosis 2013    Type 2 diabetes mellitus ([address]) 2012      LOS (days): 0  Geometric Mean LOS (GMLOS) (days):   Days to GMLOS:     OBJECTIVE:              Current admission status: Emergency       Preferred Pharmacy:   [address]  [address] [place] [address]  Phone: [phone] Fax: [phone]    Primary Care Provider: Gary Lockhart MD    Primary Insurance: Carmelina Morales [facility] REP  Secondary Insurance:     ASSESSMENT:  Lianna Proxies    There are no active Health Care Proxies on file. Advance Directives  Does patient have a [address]?: No  Was patient offered paperwork?: Yes (offered and reviewed)  Does patient currently have a Health Care decision maker?: No  Does patient have Advance Directives?: Yes  Primary Contact: Mili Patton (Daughter)  [phone]         Readmission Root Cause  30 Day Readmission: No    Patient Information  Admitted from[de-identified] Home  Mental Status: Alert  During Assessment patient was accompanied by: Not accompanied during assessment  Assessment information provided by[de-identified] Patient  Primary Caregiver: Self  Support Systems: Son, Children, Family members  [facility]: [address] [place] [address] do you live in?:   [facility] entry access options.  Select all that apply.: Stairs  Number of steps to enter home.: 2  Do the steps have railings?: Yes  Type of Current Residence: [address] [place] [address] home  Upon entering residence, is there a bedroom on the main floor (no further steps)?: Yes  Upon entering residence, is there a bathroom on the main floor (no further steps)?: Yes  In the last 12 months, was there a time when you were not able to pay the mortgage or rent on time?: No  In the last 12 months, how many places have you lived?: 1  In the last 12 months, was there a time when you did not have a steady place to sleep or slept in a shelter (including now)?: No  Homeless/housing insecurity resource given?: No  Living Arrangements: Lives Alone  Is patient a ?: No    Activities of Daily Living Prior to Admission  Functional Status: Independent  Completes ADLs independently?: Yes  Ambulates independently?: Yes  Does patient use assisted devices?: Yes  Assisted Devices (DME) used: Star Chavez, Stair Chair/Adel  Does patient currently own DME?: Yes  What DME does the patient currently own?: Stair Chair/Glide, Walker, Rigo Feliciano  Does patient have a history of Outpatient Therapy (PT/OT)?: No  Does the patient have a history of Short-Term Rehab?: No  Does patient have a history of HHC?: No  Does patient currently have [address]?: No         Patient Information Continued  Income Source: SSI/SSD  Does patient have prescription coverage?: Yes  Within the past 12 months, you worried that your food would run out before you got the money to buy more.: Never true  Within the past 12 months, the food you bought just didn't last and you didn't have money to get more.: Never true  Food insecurity resource given?: No  Does patient receive dialysis treatments?: No  Does patient have a history of substance abuse?: No  Does patient have a history of Mental Health Diagnosis?: No    PHQ 2/9 Screening   Reviewed PHQ 2/9 Depression Screening Score?: No    Means of Transportation  Means of Transport to Appts[de-identified] Drives Self  In the past 12 months, has lack of transportation kept you from medical appointments or from getting medications?: No  In the past 12 months, has lack of transportation kept you from meetings, work, or from getting things needed for daily living?: No  Was application for public transport provided?: No        DISCHARGE DETAILS:    Discharge planning discussed with[de-identified] Pt at bedside  [place] of Choice: Yes  Comments - Freedom of Choice: CM was consulted as pt required placement. CM met with pt at bedside and introduced self/role. Pt is alert and oriented x3 able to make her needs known and enocuraged to do so. FOC discussed with pt at length. Pt is aware that she has a STR reccomendation in which pt is open to. [place] referral made with a 50 mile radius. Pt requested CM to call pt dtr in [facility] [place] to further discuss. Call made and left a detailed message. CM continues to follow and assist with pt dc plans.   CM contacted family/caregiver?: Yes (Left message)  Were Treatment Team discharge recommendations reviewed with patient/caregiver?: Yes  Did patient/caregiver verbalize understanding of patient care needs?: Yes  Were patient/caregiver advised of the risks associated with not following Treatment Team discharge recommendations?: Yes    Contacts  Patient Contacts: Aurelio Lees (Daughter)  Relationship to Patient[de-identified] Family  Contact Method: Phone  Phone Number: [phone]  Reason/Outcome: Continuity of Care, Emergency Contact, Discharge [facility]         Is the patient interested in [address] at discharge?: No    DME Referral Provided  Referral made for DME?: No    Other Referral/Resources/Interventions Provided:  Interventions: Short Term Rehab    Would you like to participate in our [facility] service program?  : No - Declined    Treatment Team Recommendation: Short Term Rehab  Discharge Destination Plan[de-identified] Short Term Rehab  Transport at Discharge : Wheelchair van  Dispatcher Contacted:  No

## 2023-11-14 NOTE — H&P
[address]  H&P  Name: Lorraine Bah 80 y.o. female I MRN: [id number]  Unit/Bed#: ED 12 I Date of Admission: 11/14/2023   Date of Service: 11/14/2023 I Hospital Day: 0      Assessment/Plan   * Ambulatory dysfunction  Assessment & Plan  Presented to the emergency department with ambulatory dysfunction. Patient notes for the past 2 weeks she been having worsening lower back pain and inability to walk. She reports she lives home alone and having difficulty taking care of herself  She recently underwent MRI on 11/12 that revealed multilevel degenerative thoracolumbar degenerative changes. Has had difficulty with ADLs at home. She was evaluated by PT/OT to emergency department recommending rehab. Case management is involved will need authorization  Follow-up further with case management  Appreciate PT/OT recommendations    Type 2 diabetes mellitus [facility])  Assessment & Plan  Lab Results   Component Value Date    HGBA1C 6.7 09/18/2023       No results for input(s): "POCGLU" in the last 72 hours.     Blood Sugar Average: Last 72 hrs:  History of type 2 diabetes  Hold home pioglitazone   sliding scale insulin  Monitor blood sugar      Hypothyroidism  Assessment & Plan  Continue Synthroid    Hypertension  Assessment & Plan  BP initial reading in the ER noted to be elevated with SBP of 190 possibly in setting of pain  Continue amlodipine, losartan and beta-blocker  Will add hydralazine IV as needed for now    Hyperlipidemia  Assessment & Plan  Continue statin therapy    Coronary artery disease of native artery of native heart with stable angina pectoris (HCC)  Assessment & Plan  History of coronary artery disease  Continue beta-blocker, aspirin, statin             VTE Prophylaxis: Heparin  / sequential compression device   Code Status: full code  POLST: There is no POLST form on file for this patient (pre-hospital)  Discussion with family: patient    Anticipated Length of Stay:  Patient will be admitted on an Observation basis with an anticipated length of stay of   ambulatory dysfunction< 2 midnights. Justification for Hospital Stay: Ambulatory dysfunction    Total Time for Visit, including Counseling / Coordination of Care: 60 minutes. Greater than 50% of this total time spent on direct patient counseling and coordination of care. Chief Complaint:   Back pain    History of Present Illness:    Anahi Jang is a 80 y.o. female with past medical history significant hypertension, hyperlipidemia, coronary artery disease, chronic back pain initially presented with worsening lower back pain. Patient reports worsening lower back pain and inability to walk for the past 2 weeks she was seen by pain management in October and received steroid injections with mild improvement. She otherwise denies any acute complaints. Denies numbness, weakness, fevers, chills, shortness of breath, chest pain. She reports she lives home alone inability to take care of herself. Review of Systems:    Review of Systems   Constitutional:  Negative for activity change, appetite change, chills, diaphoresis, fever and unexpected weight change. HENT:  Negative for congestion, facial swelling and rhinorrhea. Eyes:  Negative for photophobia and visual disturbance. Respiratory:  Negative for cough, shortness of breath and wheezing. Cardiovascular:  Negative for chest pain and palpitations. Gastrointestinal:  Negative for abdominal pain, blood in stool, constipation, diarrhea, nausea and vomiting. Genitourinary:  Negative for decreased urine volume, difficulty urinating, dysuria, flank pain, frequency, hematuria and urgency. Musculoskeletal:  Positive for back pain. Negative for arthralgias, joint swelling and myalgias. Neurological:  Negative for dizziness, syncope, facial asymmetry, light-headedness, numbness and headaches. Psychiatric/Behavioral:  Negative for confusion and decreased concentration.  The patient is not nervous/anxious. Past Medical and Surgical History:     Past Medical History:   Diagnosis Date    Cardiac disorder 01/01/2001    x2 stents after a failed stress test    Cellulitis     last assessed - [id number]    Constipation     last assessed - [id number]    COVID-19 08/15/2022    Diabetes mellitus ([address] [facility])     Disease of thyroid gland     Ecchymosis     last assessed - [id number]    Fall     last assessed - [id number]    Hyperlipidemia     Hypertension     Hypokalemia     last assessed - [id number]    Lower extremity weakness     last assessed - [id number]    Vitamin D deficiency     last assessed - [id number]       Past Surgical History:   Procedure Laterality Date    BACK SURGERY      CHOLECYSTECTOMY      COLONOSCOPY      CORONARY ANGIOPLASTY WITH STENT PLACEMENT      CT BONE MARROW BIOPSY AND ASPIRATION  1/15/2020    HYSTERECTOMY      OTHER SURGICAL HISTORY      Previous stent placement       Meds/Allergies:    Prior to Admission medications    Medication Sig Start Date End Date Taking?  Authorizing Provider   amLODIPine (NORVASC) 5 mg tablet Take 1 tablet (5 mg total) by mouth daily 5/22/23   Marlon Andujar MD   aspirin 81 mg chewable tablet Chew 81 mg    Historical Provider, MD   atorvastatin (LIPITOR) 40 mg tablet Take 1 tablet (40 mg total) by mouth daily 8/14/23   Forrest Kc MD   baclofen 10 mg tablet TAKE 1 TABLET BY MOUTH NIGHTLY AT BEDTIME AS NEEDED AS DIRECTED 10/14/19   Historical Provider, MD   carvedilol (COREG) 12.5 mg tablet Take 1 tablet (12.5 mg total) by mouth 2 (two) times a day with meals 8/23/23   Forrest Kc MD   Cholecalciferol (VITAMIN D3) 2000 units capsule Take 1 capsule (2,000 Units total) by mouth daily 2/27/19   Luz Kitchen DO   latanoprost (XALATAN) 0.005 % ophthalmic solution INSTILL 1 DROP INTO [facility] EYE AT BEDTIME 8/3/20   LAURIE Lowe   levothyroxine 50 mcg tablet Take 1 tablet (50 mcg total) by mouth daily 8/2/23   Forrest Kc MD lidocaine (Lidoderm) 5 % Apply 1 patch topically daily Remove & Discard patch within 12 hours or as directed by MD 12/21/21   Lizette Barr MD   losartan (COZAAR) 100 MG tablet Take 1 tablet (100 mg total) by mouth daily 8/30/23   Nora Rico MD   Multiple Vitamins-Minerals (PRESERVISION AREDS 2 PO) Take by mouth    Historical Provider, MD   pioglitazone (ACTOS) 30 mg tablet Take 1 tablet (30 mg total) by mouth daily 6/2/23   Nora Rico MD   zolpidem (AMBIEN) 5 mg tablet Take 5 mg by mouth    Historical Provider, MD ARMAS have reviewed home medications with patient personally. Allergies: Allergies   Allergen Reactions    Iodine - Food Allergy Other (See Comments)     IV CONTRAST DYE    Iv Dye  [Iodinated Contrast Media]     Other        Social History:     Marital Status: /Civil Union     Patient Pre-hospital Living Situation: home  Patient Pre-hospital Level of Mobility: w assistance  Patient Pre-hospital Diet Restrictions: none  Substance Use History:   Social History     Substance and Sexual Activity   Alcohol Use No     Social History     Tobacco Use   Smoking Status Former    Types: Cigarettes    Start date: 5/7/1958   Smokeless Tobacco Never     Social History     Substance and Sexual Activity   Drug Use No       Family History:    Family History   Problem Relation Age of Onset    Heart disease Mother     Other Father         cerebral embolism - with cerebral infarction       Physical Exam:     Vitals:   Blood Pressure: (!) 191/107 (11/14/23 1130)  Pulse: 71 (11/14/23 1130)  Temperature: 98 °F (36.7 °C) (11/14/23 1130)  Temp Source: Temporal (11/14/23 1130)  Respirations: 20 (11/14/23 1130)  Height: 5' 2" (157.5 cm) (11/14/23 1130)  Weight - Scale: 66.7 kg (147 lb) (11/14/23 1130)  SpO2: 99 % (11/14/23 1130)    Physical Exam  Constitutional:       General: She is not in acute distress. Appearance: She is well-developed. She is not diaphoretic.    HENT:      Head: Normocephalic and atraumatic. Nose: Nose normal.      Mouth/Throat:      Pharynx: No oropharyngeal exudate. Eyes:      General: No scleral icterus. Right eye: No discharge. Left eye: No discharge. Conjunctiva/sclera: Conjunctivae normal.   Neck:      Thyroid: No thyromegaly. Vascular: No JVD. Cardiovascular:      Rate and Rhythm: Normal rate and regular rhythm. Heart sounds: Normal heart sounds. No murmur heard. No friction rub. No gallop. Pulmonary:      Effort: Pulmonary effort is normal. No respiratory distress. Breath sounds: Normal breath sounds. No wheezing or rales. Chest:      Chest wall: No tenderness. Abdominal:      General: Bowel sounds are normal. There is no distension. Palpations: Abdomen is soft. Tenderness: There is no abdominal tenderness. There is no guarding or rebound. Musculoskeletal:         General: No tenderness or deformity. Normal range of motion. Cervical back: Normal range of motion and neck supple. Skin:     General: Skin is warm and dry. Findings: No erythema or rash. Neurological:      Mental Status: She is alert. Mental status is at baseline. Cranial Nerves: No cranial nerve deficit. Sensory: No sensory deficit. Motor: No abnormal muscle tone. Coordination: Coordination normal.             Additional Data:     Lab Results: I have personally reviewed pertinent reports.       Results from last 7 days   Lab Units 11/14/23  1321   WBC Thousand/uL 6.91   HEMOGLOBIN g/dL 11.1*   HEMATOCRIT % 34.9   PLATELETS Thousands/uL 232   NEUTROS PCT % 66   LYMPHS PCT % 23   MONOS PCT % 9   EOS PCT % 2     Results from last 7 days   Lab Units 11/14/23  1321   SODIUM mmol/L 139   POTASSIUM mmol/L 4.2   CHLORIDE mmol/L 106   CO2 mmol/L 24   BUN mg/dL 27*   CREATININE mg/dL 1.06   ANION GAP mmol/L 9   CALCIUM mg/dL 9.3   ALBUMIN g/dL 4.0   TOTAL BILIRUBIN mg/dL 0.82   ALK PHOS U/L 40   ALT U/L 14   AST U/L 13   GLUCOSE RANDOM mg/dL 150*                       Imaging: I have personally reviewed pertinent reports. No orders to display       EKG, Pathology, and Other Studies Reviewed on Admission:   EKG: reviewed    Allscripts / Epic Records Reviewed: Yes     ** Please Note: This note has been constructed using a voice recognition system.  **

## 2023-11-14 NOTE — ASSESSMENT & PLAN NOTE
BP initial reading in the ER noted to be elevated with SBP of 190 possibly in setting of pain  Continue amlodipine, losartan and beta-blocker  Will add hydralazine IV as needed for now

## 2023-11-14 NOTE — ED PROVIDER NOTES
History  Chief Complaint   Patient presents with    Back Pain     Lower back pain, recent hx of same, MRIs and injections done by spine center      Patient is a 80-year-old female with past medical history significant for hypertension, hyperlipidemia, hypothyroidism, well-controlled non-insulin-dependent diabetes, coronary artery disease status post stents, chronic low back pain status post lumbar spine surgery, presents to the emergency department for worsening low back pain and inability to walk. Patient states that in October she was seeing pain management who gave her steroid injections in her back which helped mildly. For the past 2 weeks, her pain has gotten progressively worse, similar to pain she has had chronically in her back. She localizes the pain to the lumbar region as well as the right SI joint region and states it radiates into her right thigh. She reports intermittent paresthesia of the right thigh. For the past 2 weeks she has been unable to walk or perform her ADLs due to the pain. She states it is pain that is prohibiting her from walking and not weakness in the legs. She denies that her legs are giving out on her. She denies any new injury prior to this pain getting worse. Patient states she had an MRI of her lumbar spine 2 days ago but does not have the results yet. Patient states she lives alone and is requesting admission due to inability to care for herself while she is in this type of pain. She has been taking Tylenol and ibuprofen. Her last dose of ibuprofen was 400 mg at around 9 AM.  Rest of review of systems is negative.   No fevers or chills, headache, dizziness or near syncope, cough, URI symptoms, chest pain, shortness of breath, palpitations, abdominal pain, nausea, vomiting, diarrhea, constipation, difficulty urinating, urinary retention or incontinence, dysuria, change in frequency, hematuria, flank pain, skin rash or color change, focal neurologic deficits other than the right thigh paresthesia. History provided by:  Patient and medical records   used: No    Back Pain  Associated symptoms: numbness    Associated symptoms: no abdominal pain, no chest pain, no dysuria, no fever, no headaches, no pelvic pain and no weakness        Prior to Admission Medications   Prescriptions Last Dose Informant Patient Reported? Taking?    Cholecalciferol (VITAMIN D3) 2000 units capsule  Self No No   Sig: Take 1 capsule (2,000 Units total) by mouth daily   Multiple Vitamins-Minerals (PRESERVISION AREDS 2 PO)  Self Yes No   Sig: Take by mouth   amLODIPine (NORVASC) 5 mg tablet  Self No No   Sig: Take 1 tablet (5 mg total) by mouth daily   aspirin 81 mg chewable tablet  Self Yes No   Sig: Chew 81 mg   atorvastatin (LIPITOR) 40 mg tablet  Self No No   Sig: Take 1 tablet (40 mg total) by mouth daily   baclofen 10 mg tablet  Self Yes No   Sig: TAKE 1 TABLET BY MOUTH NIGHTLY AT BEDTIME AS NEEDED AS DIRECTED   carvedilol (COREG) 12.5 mg tablet  Self No No   Sig: Take 1 tablet (12.5 mg total) by mouth 2 (two) times a day with meals   latanoprost (XALATAN) 0.005 % ophthalmic solution  Self No No   Sig: INSTILL 1 DROP INTO EACH EYE AT BEDTIME   levothyroxine 50 mcg tablet  Self No No   Sig: Take 1 tablet (50 mcg total) by mouth daily   lidocaine (Lidoderm) 5 %  Self No No   Sig: Apply 1 patch topically daily Remove & Discard patch within 12 hours or as directed by MD   losartan (COZAAR) 100 MG tablet  Self No No   Sig: Take 1 tablet (100 mg total) by mouth daily   pioglitazone (ACTOS) 30 mg tablet  Self No No   Sig: Take 1 tablet (30 mg total) by mouth daily   zolpidem (AMBIEN) 5 mg tablet  Self Yes No   Sig: Take 5 mg by mouth      Facility-Administered Medications Last Administration Doses Remaining   cyanocobalamin injection 1,000 mcg 2/27/2019 12:50 PM           Past Medical History:   Diagnosis Date    Cardiac disorder 01/01/2001    x2 stents after a failed stress test Cellulitis     last assessed - [id number]    Constipation     last assessed - [id number]    COVID-19 08/15/2022    Diabetes mellitus ([address] )     Disease of thyroid gland     Ecchymosis     last assessed - [id number]    Fall     last assessed - [id number]    Hyperlipidemia     Hypertension     Hypokalemia     last assessed - [id number]    Lower extremity weakness     last assessed - [id number]    Vitamin D deficiency     last assessed - [id number]       Past Surgical History:   Procedure Laterality Date    BACK SURGERY      CHOLECYSTECTOMY      COLONOSCOPY      CORONARY ANGIOPLASTY WITH STENT PLACEMENT      CT BONE MARROW BIOPSY AND ASPIRATION  1/15/2020    HYSTERECTOMY      OTHER SURGICAL HISTORY      Previous stent placement       Family History   Problem Relation Age of Onset    Heart disease Mother     Other Father         cerebral embolism - with cerebral infarction     I have reviewed and agree with the history as documented. E-Cigarette/Vaping    E-Cigarette Use Never User      E-Cigarette/Vaping Substances    Nicotine No     THC No     CBD No     Flavoring No     Other No     Unknown No      Social History     Tobacco Use    Smoking status: Former     Types: Cigarettes     Start date: 5/7/1958    Smokeless tobacco: Never   Vaping Use    Vaping Use: Never used   Substance Use Topics    Alcohol use: No    Drug use: No       Review of Systems   Constitutional:  Negative for chills and fever. HENT:  Negative for congestion, ear pain, rhinorrhea and sore throat. Respiratory:  Negative for cough, chest tightness, shortness of breath and wheezing. Cardiovascular:  Negative for chest pain and palpitations. Gastrointestinal:  Negative for abdominal pain, constipation, diarrhea, nausea and vomiting. Genitourinary:  Negative for dysuria, flank pain, frequency, hematuria and pelvic pain. Musculoskeletal:  Positive for back pain. Negative for neck pain and neck stiffness.    Skin:  Negative for color change, pallor, rash and wound. Allergic/Immunologic: Negative for immunocompromised state. Neurological:  Positive for numbness. Negative for dizziness, syncope, facial asymmetry, speech difficulty, weakness, light-headedness and headaches. Hematological:  Negative for adenopathy. Does not bruise/bleed easily. Psychiatric/Behavioral:  Negative for confusion and decreased concentration. All other systems reviewed and are negative. Physical Exam  Physical Exam  Vitals and nursing note reviewed. Constitutional:       General: She is not in acute distress. Appearance: Normal appearance. She is well-developed. She is not ill-appearing, toxic-appearing or diaphoretic. HENT:      Head: Normocephalic and atraumatic. Right Ear: External ear normal.      Left Ear: External ear normal.      Mouth/Throat:      Mouth: Mucous membranes are moist.      Pharynx: Oropharynx is clear. Eyes:      Extraocular Movements: Extraocular movements intact. Conjunctiva/sclera: Conjunctivae normal.   Neck:      Vascular: No JVD. Cardiovascular:      Rate and Rhythm: Normal rate and regular rhythm. Pulses: Normal pulses. Heart sounds: Normal heart sounds. No murmur heard. No friction rub. No gallop. Pulmonary:      Effort: Pulmonary effort is normal. No respiratory distress. Breath sounds: Normal breath sounds. No wheezing, rhonchi or rales. Abdominal:      General: There is no distension. Palpations: Abdomen is soft. Tenderness: There is no abdominal tenderness. There is no guarding or rebound. Musculoskeletal:         General: Tenderness present. No deformity or signs of injury. Cervical back: Normal range of motion and neck supple. No rigidity or tenderness. Comments: No midline cervical, thoracic or lumbar spine tenderness. There is tenderness over the right lumbar paravertebral region and right SI joint. Skin:     General: Skin is warm and dry.       Coloration: Skin is not pale. Findings: No erythema or rash. Neurological:      General: No focal deficit present. Mental Status: She is alert and oriented to person, place, and time. Sensory: No sensory deficit. Comments: No gross visit in bilateral upper or lower extremities. 5/5 strength bilateral upper extremities. 4/5 strength left lower extremity. Normal left and right dorsiflexion/plantarflexion.  Decreased ROM of right hip and 3/5 strength right proximal muscle group however patient reports it is pain that is limiting her exam.    Psychiatric:         Mood and Affect: Mood normal.         Behavior: Behavior normal.         Vital Signs  ED Triage Vitals   Temperature Pulse Respirations Blood Pressure SpO2   11/14/23 1130 11/14/23 1130 11/14/23 1130 11/14/23 1130 11/14/23 1130   98 °F (36.7 °C) 71 20 (!) 191/107 99 %      Temp Source Heart Rate Source Patient Position - Orthostatic VS BP Location FiO2 (%)   11/14/23 1130 11/14/23 1130 11/14/23 1130 11/14/23 1130 --   Temporal Monitor Sitting Left arm       Pain Score       11/14/23 1310       10 - Worst Possible Pain         Vitals:    11/14/23 1130   BP: (!) 191/107   BP Location: Left arm   Pulse: 71   Resp: 20   Temp: 98 °F (36.7 °C)   TempSrc: Temporal   SpO2: 99%   Weight: 66.7 kg (147 lb)   Height: 5' 2" (1.575 m)          Visual Acuity  Visual Acuity      Flowsheet Row Most Recent Value   L Pupil Size (mm) 3   R Pupil Size (mm) 3            ED Medications  Medications   lidocaine (LIDODERM) 5 % patch 1 patch (1 patch Topical Medication Applied 11/14/23 1310)   acetaminophen (TYLENOL) tablet 650 mg (650 mg Oral Given 11/14/23 1310)   oxyCODONE (ROXICODONE) split tablet 2.5 mg (2.5 mg Oral Given 11/14/23 1310)       Diagnostic Studies  Results Reviewed       Procedure Component Value Units Date/Time    Hepatic function panel [[id number]]  (Normal) Collected: 11/14/23 1321    Lab Status: Final result Specimen: Blood from Arm, Left Updated: 11/14/23 1345     Total Bilirubin 0.82 mg/dL      Bilirubin, Direct 0.14 mg/dL      Alkaline Phosphatase 40 U/L      AST 13 U/L      ALT 14 U/L      Total Protein 8.2 g/dL      Albumin 4.0 g/dL     Basic metabolic panel [[id number]]  (Abnormal) Collected: 11/14/23 1321    Lab Status: Final result Specimen: Blood from Arm, Left Updated: 11/14/23 1345     Sodium 139 mmol/L      Potassium 4.2 mmol/L      Chloride 106 mmol/L      CO2 24 mmol/L      ANION GAP 9 mmol/L      BUN 27 mg/dL      Creatinine 1.06 mg/dL      Glucose 150 mg/dL      Calcium 9.3 mg/dL      eGFR 46 ml/min/1.73sq m     Narrative:      [place] guidelines for Chronic Kidney Disease (CKD):     Stage 1 with normal or high GFR (GFR > 90 mL/min/1.73 square meters)    Stage 2 Mild CKD (GFR = 60-89 mL/min/1.73 square meters)    Stage 3A Moderate CKD (GFR = 45-59 mL/min/1.73 square meters)    Stage 3B Moderate CKD (GFR = 30-44 mL/min/1.73 square meters)    Stage 4 Severe CKD (GFR = 15-29 mL/min/1.73 square meters)    Stage 5 End Stage CKD (GFR <15 mL/min/1.73 square meters)  Note: GFR calculation is accurate only with a steady state creatinine    CBC and differential [[id number]]  (Abnormal) Collected: 11/14/23 1321    Lab Status: Final result Specimen: Blood from Arm, Left Updated: 11/14/23 1326     WBC 6.91 Thousand/uL      RBC 3.73 Million/uL      Hemoglobin 11.1 g/dL      Hematocrit 34.9 %      MCV 94 fL      MCH 29.8 pg      MCHC 31.8 g/dL      RDW 15.0 %      MPV 9.6 fL      Platelets 311 Thousands/uL      nRBC 0 /100 WBCs      Neutrophils Relative 66 %      Immat GRANS % 0 %      Lymphocytes Relative 23 %      Monocytes Relative 9 %      Eosinophils Relative 2 %      Basophils Relative 0 %      Neutrophils Absolute 4.48 Thousands/µL      Immature Grans Absolute 0.02 Thousand/uL      Lymphocytes Absolute 1.62 Thousands/µL      Monocytes Absolute 0.64 Thousand/µL      Eosinophils Absolute 0.12 Thousand/µL      Basophils Absolute 0.03 Thousands/µL                    No orders to display              Procedures  Procedures         ED Course  ED Course as of 11/14/23 1543   Tue Nov 14, 2023   1317 Dagoberto texted physical therapy and spoke with Karishma Horan, who will have someone from PT evaluate patient. Also dagoberto texted on-call ED case manager, Anastasia Son who will also see patient. Sudeep Cárdenas, physical therapist, to evaluate patient. 56 Was evaluated by physical therapy and physical therapy feels acute rehab would be beneficial given that patient lives alone. She was able to stand up and walk several feet with minimal assistance however after walking several feet, she complained of 9/10 back pain radiating into right leg. Will await CM consult and if unable to facilitate discharge from ED to rehab facility, will admit patient. 4082 A copy of patient's MRI results were printed and given to patient. [phone] Patient updated of blood work as well as PT recommendations. Waiting on case management consult. 1539 Updated by case management that patient's insurance requires prior authorization and they recommended admission as it will take at least 24 to 48 hours to get prior auth for placement. Dagoberto texted [facility] for admission. SBIRT 20yo+      Flowsheet Row Most Recent Value   Initial Alcohol Screen: US AUDIT-C     1. How often do you have a drink containing alcohol? 0 Filed at: 11/14/2023 1130   2. How many drinks containing alcohol do you have on a typical day you are drinking? 0 Filed at: 11/14/2023 1130   3a. Male UNDER 65: How often do you have five or more drinks on one occasion? 0 Filed at: 11/14/2023 1130   3b. FEMALE Any Age, or MALE 65+: How often do you have 4 or more drinks on one occassion? 0 Filed at: 11/14/2023 1130   Audit-C Score 0 Filed at: 11/14/2023 1130   FELIX: How many times in the past year have you. ..     Used an illegal drug or used a prescription medication for non-medical reasons? Never Filed at: 11/14/2023 1130                      Medical Decision Making  70-year-old female presents to the ED with acute on chronic low back pain causing ambulatory dysfunction and inability to walk due to the pain. Patient lives alone and is having difficulty performing ADLs due to this pain. She sees pain management and recently had a steroid injection in her back 1 month ago. Patient had a recent MRI of the lumbar spine 2 days ago with the following results: "IMPRESSION: Scoliosis and multilevel thoracolumbar degenerative change, superimposed upon developmental spinal canal stenosis. Moderate canal stenosis at the L1-2, L2-3 and L3-4 levels. Multilevel foraminal narrowing, most pronounced at L2-3 on the right. Previous posterior decompression at L3-4, L4-5 and L5-S1. No residual canal stenosis at the L4-5 or L5-S1 levels. Mild chronic compression deformity of the inferior endplate of L1. No acute osseous abnormality."    I suspect that it is patient's degenerative spinal stenosis that is causing her pain as well as nerve impingement causing radiculopathy on the right side. No evidence on recent MRI of spinal cord compression or cauda equina syndrome. Will treat pain with Tylenol 650mg, Oxycodone 2.5mg and Lidocaine patch. Will consider medrol dose pack/steroids. Given ambulatory dysfunction and the fact that patient lives alone, will consult PT and case management. Amount and/or Complexity of Data Reviewed  External Data Reviewed: radiology. Details: Reviewed MRI LUMBAR SPINE report from 11/12/23  Labs: ordered. Decision-making details documented in ED Course. Risk  OTC drugs. Prescription drug management. Decision regarding hospitalization.              Disposition  Final diagnoses:   Acute exacerbation of chronic low back pain   Ambulatory dysfunction     Time reflects when diagnosis was documented in both MDM as applicable and the Disposition within this note Time User Action Codes Description Comment    11/14/2023  3:41 PM Sonido Borja [M54.50,  G89.29] Acute exacerbation of chronic low back pain     11/14/2023  3:41 PM Sonido Borja [R26.2] Ambulatory dysfunction           ED Disposition       ED Disposition   Admit    Condition   Stable    Date/Time   Tue Nov 14, 2023 7918    Comment   Case was discussed with PUJA and the patient's admission status was agreed to be Admission Status: observation status to the service of Dr. Suzie Callaway . Follow-up Information    None         Patient's Medications   Discharge Prescriptions    No medications on file       No discharge procedures on file.     PDMP Review         Value Time User    PDMP Reviewed  Yes 10/11/2022  1:28 PM [id number]            ED Provider  Electronically Signed by             Joselito Esquivel DO  11/14/23 2851

## 2023-11-14 NOTE — ASSESSMENT & PLAN NOTE
Presented to the emergency department with ambulatory dysfunction. Patient notes for the past 2 weeks she been having worsening lower back pain and inability to walk. She reports she lives home alone and having difficulty taking care of herself  She recently underwent MRI on 11/12 that revealed multilevel degenerative thoracolumbar degenerative changes. Has had difficulty with ADLs at home. She was evaluated by PT/OT to emergency department recommending rehab.   Case management is involved will need authorization  Follow-up further with case management  Appreciate PT/OT recommendations

## 2023-11-15 ENCOUNTER — APPOINTMENT (INPATIENT)
Dept: RADIOLOGY | Facility: HOSPITAL | Age: 88
DRG: 556 | End: 2023-11-15
Payer: COMMERCIAL

## 2023-11-15 LAB
ANION GAP SERPL CALCULATED.3IONS-SCNC: 5 MMOL/L
BASOPHILS # BLD AUTO: 0.05 THOUSANDS/ÂΜL (ref 0–0.1)
BASOPHILS NFR BLD AUTO: 1 % (ref 0–1)
BUN SERPL-MCNC: 24 MG/DL (ref 5–25)
CALCIUM SERPL-MCNC: 8.9 MG/DL (ref 8.4–10.2)
CHLORIDE SERPL-SCNC: 109 MMOL/L (ref 96–108)
CO2 SERPL-SCNC: 26 MMOL/L (ref 21–32)
CREAT SERPL-MCNC: 1.12 MG/DL (ref 0.6–1.3)
EOSINOPHIL # BLD AUTO: 0.18 THOUSAND/ÂΜL (ref 0–0.61)
EOSINOPHIL NFR BLD AUTO: 3 % (ref 0–6)
ERYTHROCYTE [DISTWIDTH] IN BLOOD BY AUTOMATED COUNT: 14.9 % (ref 11.6–15.1)
GFR SERPL CREATININE-BSD FRML MDRD: 43 ML/MIN/1.73SQ M
GLUCOSE P FAST SERPL-MCNC: 122 MG/DL (ref 65–99)
GLUCOSE SERPL-MCNC: 122 MG/DL (ref 65–140)
GLUCOSE SERPL-MCNC: 132 MG/DL (ref 65–140)
GLUCOSE SERPL-MCNC: 149 MG/DL (ref 65–140)
GLUCOSE SERPL-MCNC: 169 MG/DL (ref 65–140)
GLUCOSE SERPL-MCNC: 176 MG/DL (ref 65–140)
HCT VFR BLD AUTO: 29.9 % (ref 34.8–46.1)
HGB BLD-MCNC: 9.6 G/DL (ref 11.5–15.4)
IMM GRANULOCYTES # BLD AUTO: 0.02 THOUSAND/UL (ref 0–0.2)
IMM GRANULOCYTES NFR BLD AUTO: 0 % (ref 0–2)
LYMPHOCYTES # BLD AUTO: 2.25 THOUSANDS/ÂΜL (ref 0.6–4.47)
LYMPHOCYTES NFR BLD AUTO: 36 % (ref 14–44)
MCH RBC QN AUTO: 29.6 PG (ref 26.8–34.3)
MCHC RBC AUTO-ENTMCNC: 32.1 G/DL (ref 31.4–37.4)
MCV RBC AUTO: 92 FL (ref 82–98)
MONOCYTES # BLD AUTO: 0.98 THOUSAND/ÂΜL (ref 0.17–1.22)
MONOCYTES NFR BLD AUTO: 16 % (ref 4–12)
NEUTROPHILS # BLD AUTO: 2.83 THOUSANDS/ÂΜL (ref 1.85–7.62)
NEUTS SEG NFR BLD AUTO: 44 % (ref 43–75)
NRBC BLD AUTO-RTO: 0 /100 WBCS
PLATELET # BLD AUTO: 198 THOUSANDS/UL (ref 149–390)
PMV BLD AUTO: 9.4 FL (ref 8.9–12.7)
POTASSIUM SERPL-SCNC: 4.5 MMOL/L (ref 3.5–5.3)
RBC # BLD AUTO: 3.24 MILLION/UL (ref 3.81–5.12)
SODIUM SERPL-SCNC: 140 MMOL/L (ref 135–147)
WBC # BLD AUTO: 6.31 THOUSAND/UL (ref 4.31–10.16)

## 2023-11-15 PROCEDURE — 83550 IRON BINDING TEST: CPT | Performed by: FAMILY MEDICINE

## 2023-11-15 PROCEDURE — 85025 COMPLETE CBC W/AUTO DIFF WBC: CPT | Performed by: INTERNAL MEDICINE

## 2023-11-15 PROCEDURE — 99233 SBSQ HOSP IP/OBS HIGH 50: CPT | Performed by: FAMILY MEDICINE

## 2023-11-15 PROCEDURE — 82728 ASSAY OF FERRITIN: CPT | Performed by: FAMILY MEDICINE

## 2023-11-15 PROCEDURE — 73502 X-RAY EXAM HIP UNI 2-3 VIEWS: CPT

## 2023-11-15 PROCEDURE — 82948 REAGENT STRIP/BLOOD GLUCOSE: CPT

## 2023-11-15 PROCEDURE — 80048 BASIC METABOLIC PNL TOTAL CA: CPT | Performed by: INTERNAL MEDICINE

## 2023-11-15 PROCEDURE — 97116 GAIT TRAINING THERAPY: CPT

## 2023-11-15 PROCEDURE — 97167 OT EVAL HIGH COMPLEX 60 MIN: CPT

## 2023-11-15 PROCEDURE — 83540 ASSAY OF IRON: CPT | Performed by: FAMILY MEDICINE

## 2023-11-15 PROCEDURE — 97110 THERAPEUTIC EXERCISES: CPT

## 2023-11-15 RX ADMIN — BACLOFEN 10 MG: 10 TABLET ORAL at 22:08

## 2023-11-15 RX ADMIN — ACETAMINOPHEN 650 MG: 325 TABLET, FILM COATED ORAL at 00:21

## 2023-11-15 RX ADMIN — INSULIN LISPRO 1 UNITS: 100 INJECTION, SOLUTION INTRAVENOUS; SUBCUTANEOUS at 12:56

## 2023-11-15 RX ADMIN — INSULIN LISPRO 1 UNITS: 100 INJECTION, SOLUTION INTRAVENOUS; SUBCUTANEOUS at 22:03

## 2023-11-15 RX ADMIN — CARVEDILOL 12.5 MG: 12.5 TABLET, FILM COATED ORAL at 17:18

## 2023-11-15 RX ADMIN — ASPIRIN 81 MG: 81 TABLET, CHEWABLE ORAL at 08:55

## 2023-11-15 RX ADMIN — AMLODIPINE BESYLATE 5 MG: 5 TABLET ORAL at 08:55

## 2023-11-15 RX ADMIN — CARVEDILOL 12.5 MG: 12.5 TABLET, FILM COATED ORAL at 08:55

## 2023-11-15 RX ADMIN — ZOLPIDEM TARTRATE 5 MG: 5 TABLET, COATED ORAL at 22:08

## 2023-11-15 RX ADMIN — ZOLPIDEM TARTRATE 5 MG: 5 TABLET, COATED ORAL at 00:21

## 2023-11-15 RX ADMIN — BACLOFEN 10 MG: 10 TABLET ORAL at 00:21

## 2023-11-15 RX ADMIN — ACETAMINOPHEN 650 MG: 325 TABLET, FILM COATED ORAL at 18:26

## 2023-11-15 RX ADMIN — HEPARIN SODIUM 5000 UNITS: 5000 INJECTION INTRAVENOUS; SUBCUTANEOUS at 22:03

## 2023-11-15 RX ADMIN — LEVOTHYROXINE SODIUM 50 MCG: 0.05 TABLET ORAL at 08:55

## 2023-11-15 RX ADMIN — LATANOPROST 1 DROP: 50 SOLUTION OPHTHALMIC at 22:02

## 2023-11-15 RX ADMIN — HEPARIN SODIUM 5000 UNITS: 5000 INJECTION INTRAVENOUS; SUBCUTANEOUS at 15:42

## 2023-11-15 RX ADMIN — LOSARTAN POTASSIUM 100 MG: 50 TABLET, FILM COATED ORAL at 08:55

## 2023-11-15 RX ADMIN — HEPARIN SODIUM 5000 UNITS: 5000 INJECTION INTRAVENOUS; SUBCUTANEOUS at 05:28

## 2023-11-15 RX ADMIN — ATORVASTATIN CALCIUM 40 MG: 40 TABLET, FILM COATED ORAL at 08:55

## 2023-11-15 NOTE — PLAN OF CARE
Problem: PHYSICAL THERAPY ADULT  Goal: Performs mobility at highest level of function for planned discharge setting. See evaluation for individualized goals. Description: Treatment/Interventions: Functional transfer training, LE strengthening/ROM, Elevations, Therapeutic exercise, Endurance training, Patient/family training, Bed mobility, Gait training, Spoke to nursing, OT  Equipment Recommended: Jose Deng       See flowsheet documentation for full assessment, interventions and recommendations. 11/15/2023 1441 by Becki Cardona PT  Outcome: Progressing  Note: Prognosis: Good  Problem List: Decreased strength, Decreased endurance, Impaired balance, Decreased mobility, Pain  Assessment: Chart reviewed. Patient was received supine in bed in NAD and agreeable to PT session. Today's PT treatment session consisted of therapeutic activity for facilitation of transitional movements and safe performance of correct technique for bed mobility and sit to stand transfers, therapeutic exercise to increase lower extremity muscle strength, and gait training to promote safe and functional ambulation on level surfaces. In comparison to the previous session the patient has made progress as evident by ability to ambulate two gait trials. Pt required a seated rest break between each gait trial. When ambulating pt exhibits decreased radhika, decreased stride length, and  an antalgic gait pattern. Pt tolerated all therapeutic exercise well without complaints of increased pain. Overall, patient tolerated today's session well and continues to be making progress towards achieving her STG's. Patient's prognosis for achieving their STG's is good as evident by pt's motivation. PT intervention continues to be appropriate as the patient continues to be limited by pain, decreased lower extremity strength, impaired balance, decreased endurance, gait deviations, and decreased functional mobility.  Continue to recommend level 2, moderate resource intensity. PT to continue to see patient in order to address the deficits listed above and provide interventions consistent with the POC in order to achieve STG's and optimize the patient's independence with functional mobility. Barriers to Discharge: Inaccessible home environment, Decreased caregiver support     Rehab Resource Intensity Level, PT: II (Moderate Resource Intensity)    See flowsheet documentation for full assessment.

## 2023-11-15 NOTE — PLAN OF CARE
Problem: MUSCULOSKELETAL - ADULT  Goal: Maintain or return mobility to safest level of function  Description: INTERVENTIONS:  - Assess patient's ability to carry out ADLs; assess patient's baseline for ADL function and identify physical deficits which impact ability to perform ADLs (bathing, care of mouth/teeth, toileting, grooming, dressing, etc.)  - Assess/evaluate cause of self-care deficits   - Assess range of motion  - Assess patient's mobility  - Assess patient's need for assistive devices and provide as appropriate  - Encourage maximum independence but intervene and supervise when necessary  - Involve family in performance of ADLs  - Assess for home care needs following discharge   - Consider OT consult to assist with ADL evaluation and planning for discharge  - Provide patient education as appropriate  Outcome: Progressing     Problem: PAIN - ADULT  Goal: Verbalizes/displays adequate comfort level or baseline comfort level  Description: Interventions:  - Encourage patient to monitor pain and request assistance  - Assess pain using appropriate pain scale  - Administer analgesics based on type and severity of pain and evaluate response  - Implement non-pharmacological measures as appropriate and evaluate response  - Consider cultural and social influences on pain and pain management  - Notify physician/advanced practitioner if interventions unsuccessful or patient reports new pain  Outcome: Progressing     Problem: DISCHARGE PLANNING  Goal: Discharge to home or other facility with appropriate resources  Description: INTERVENTIONS:  - Identify barriers to discharge w/patient and caregiver  - Arrange for needed discharge resources and transportation as appropriate  - Identify discharge learning needs (meds, wound care, etc.)  - Arrange for interpretive services to assist at discharge as needed  - Refer to Case Management Department for coordinating discharge planning if the patient needs post-hospital services based on physician/advanced practitioner order or complex needs related to functional status, cognitive ability, or social support system  Outcome: Progressing     Problem: Knowledge Deficit  Goal: Patient/family/caregiver demonstrates understanding of disease process, treatment plan, medications, and discharge instructions  Description: Complete learning assessment and assess knowledge base.   Interventions:  - Provide teaching at level of understanding  - Provide teaching via preferred learning methods  Outcome: Progressing

## 2023-11-15 NOTE — CASE MANAGEMENT
Case Management Discharge Planning Note    Patient name Salem Irina  Location [address] [facility] P[place]y 332/-[id number] MRN [id number]  : 1934 Date 11/15/2023       Current Admission Date: 2023  Current Admission Diagnosis:Ambulatory dysfunction   Patient Active Problem List    Diagnosis Date Noted    Ambulatory dysfunction 2023    Stage 3 chronic kidney disease, unspecified whether stage 3a or 3b CKD ([address]) 2023    Chronic pain syndrome 2022    Chronic bilateral low back pain without sciatica 2022    Sacroiliitis ([address]) 2022    Trochanteric bursitis of right hip 2021    Non-Hodgkin's lymphoma ([address]) 10/05/2020    Light chain disease, 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    Medicare annual wellness visit, subsequent 2020    Adhesive capsulitis of shoulder 2020    Triclonal gammopathy 2019    Greater trochanteric bursitis of left hip 2019    Left sided sciatica 2019    Diabetic peripheral neuropathy ([address]) 2019    Screening mammogram, encounter for 2018    Lumbar compression fracture ([address]) 10/11/2017    Vitamin D deficiency 10/11/2017    Vitamin B12 deficiency 2016    Lumbar spinal stenosis 2015    Normochromic normocytic anemia 2015    Depression 2015    Esophageal reflux 2013    Osteoarthritis of knee 2013    Spinal stenosis 2013    Coronary artery disease of native artery of native heart with stable angina pectoris ([address]) 2013    Hyperlipidemia 2013    Hypertension 2013    Hypothyroidism 2013    Insomnia 2013    Osteoporosis 2013    Type 2 diabetes mellitus ([address]) 2012      LOS (days): 0  Geometric Mean LOS (GMLOS) (days): 2.60  Days to GMLOS:2.4     OBJECTIVE:  Risk of Unplanned Readmission Score: 11.84         Current admission status: Inpatient   Preferred Pharmacy:   [address] [place] [address] MALIK HERNÁNDEZ - [address] [facility]  [address] [facility] [place] [address]  Phone: [phone] Fax: [phone]    Primary Care Provider: Shankar Mills MD    Primary Insurance: Melinda SINGLETON  Secondary Insurance:     DISCHARGE DETAILS:    CM contacted patients daughter via phone as no one was in room with patient. Patient informed previous cm that she would like daughter in law yuni who works for [employer] to make the decision about rehab. Yuni called cm back and stated she was at work and could not talk long. Yuni gave cm email and CM verbally confirmed email and sent it to [email or web address].       [place] (Daughter)   [phone]

## 2023-11-15 NOTE — ASSESSMENT & PLAN NOTE
Lab Results   Component Value Date    HGBA1C 6.7 09/18/2023       Recent Labs     11/14/23  1616 11/14/23 2046 11/15/23  0724   POCGLU 174* 176* 132       Blood Sugar Average: Last 72 hrs:  (P) [id number] of type 2 diabetes  Stable  BG this AM  Hold home pioglitazone   sliding scale insulin  Monitor blood sugar

## 2023-11-15 NOTE — PROGRESS NOTES
[address]  Progress Note  Name: Artis Bumpers  MRN: [id number]  Unit/Bed#: -01 I Date of Admission: 11/14/2023   Date of Service: 11/15/2023 I Hospital Day: 0    Assessment/Plan   * Ambulatory dysfunction  Assessment & Plan  Presented to the emergency department with ambulatory dysfunction. Patient notes for the past 2 weeks she been having worsening lower back pain and inability to walk. She reports she lives home alone and having difficulty taking care of herself  She recently underwent MRI on 11/12 that revealed multilevel degenerative thoracolumbar degenerative changes. Has had difficulty with ADLs at home. She was evaluated by PT/OT to emergency department recommending rehab. Case management is involved will need authorization  Follow-up further with case management  Appreciate PT/OT recommendations  Cont with pain control for right hip pain    Type 2 diabetes mellitus [facility])  Assessment & Plan  Lab Results   Component Value Date    HGBA1C 6.7 09/18/2023       Recent Labs     11/14/23  1616 11/14/23  2046 11/15/23  0724   POCGLU 174* 176* 132       Blood Sugar Average: Last 72 hrs:  (P) [id number] of type 2 diabetes  Stable  BG this AM  Hold home pioglitazone   sliding scale insulin  Monitor blood sugar      Hypothyroidism  Assessment & Plan  Continue Synthroid. OP followup     Hypertension  Assessment & Plan  /62   Pulse 63   Temp 98.4 °F (36.9 °C)   Resp 17   Ht 5' 2" (1.575 m)   Wt 66.7 kg (147 lb 0.8 oz)   SpO2 97%   BMI 26.90 kg/m²   Stable pressures  BP initial reading in the ER noted to be elevated with SBP of 190 possibly in setting of pain  Continue amlodipine, losartan and beta-blocker  Continue hydralazine IV as needed for now    Hyperlipidemia  Assessment & Plan  Continue statin therapy.  OP followup    Coronary artery disease of native artery of native heart with stable angina pectoris [facility])  Assessment & Plan  No chest pain  History of coronary artery disease  Continue beta-blocker, aspirin, statin           VTE Pharmacologic Prophylaxis: VTE Score: 6 High Risk (Score >/= 5) - Pharmacological DVT Prophylaxis Ordered: heparin. Sequential Compression Devices Ordered. Mobility:   Basic Mobility Inpatient Raw Score: 16  JH-HLM Goal: 5: Stand one or more mins  JH-HLM Achieved: 2: Bed activities/Dependent transfer  HLM Goal NOT achieved. Continue with multidisciplinary rounding and encourage appropriate mobility to improve upon [facility] goals. Patient Centered Rounds: I performed bedside rounds with nursing staff today. Discussions with Specialists or Other Care Team Provider: yes CM    Education and Discussions with Family / Patient: Patient declined call to . Total Time Spent on Date of Encounter in care of patient: 45 mins. This time was spent on one or more of the following: performing physical exam; counseling and coordination of care; obtaining or reviewing history; documenting in the medical record; reviewing/ordering tests, medications or procedures; communicating with other healthcare professionals and discussing with patient's family/caregivers.     Current Length of Stay: 0 day(s)  Current Patient Status: Inpatient   Certification Statement: The patient will continue to require additional inpatient hospital stay due to placement  Discharge Plan:  pending placement    Code Status: Level 1 - Full Code    Subjective:   Seen and examined at bedside  Right sided hip pain  No back pain today  No alarm signs or symptoms  Unable to bear much weight on right leg due to pain in the right hip  H/o steroid injection but only lasted for few weeks     Objective:     Vitals:   Temp (24hrs), Av.4 °F (36.9 °C), Min:97.9 °F (36.6 °C), Max:99.2 °F (37.3 °C)    Temp:  [97.9 °F (36.6 °C)-99.2 °F (37.3 °C)] 98.4 °F (36.9 °C)  HR:  [59-69] 63  Resp:  [17-18] 17  BP: (137-159)/() 141/62  SpO2:  [94 %-99 %] 97 %  Body mass index is 26.9 kg/m².      Input and Output Summary (last 24 hours):   No intake or output data in the 24 hours ending 11/15/23 1240    Physical Exam:   Physical Exam s1,2 (+) R  Lungs CTA  ROM restricted right hip due to pain   No new focal neuro deficit  A&Ox3  Oral mucosa moist    Additional Data:     Labs:  Results from last 7 days   Lab Units 11/15/23  0531   WBC [place]/uL 6.31   HEMOGLOBIN g/dL 9.6*   HEMATOCRIT % 29.9*   PLATELETS Thousands/uL 198   NEUTROS PCT % 44   LYMPHS PCT % 36   MONOS PCT % 16*   EOS PCT % 3     Results from last 7 days   Lab Units 11/15/23  0531 11/14/23  1321   SODIUM mmol/L 140 139   POTASSIUM mmol/L 4.5 4.2   CHLORIDE mmol/L 109* 106   CO2 mmol/L 26 24   BUN mg/dL 24 27*   CREATININE mg/dL 1.12 1.06   ANION GAP mmol/L 5 9   CALCIUM mg/dL 8.9 9.3   ALBUMIN g/dL  --  4.0   TOTAL BILIRUBIN mg/dL  --  0.82   ALK PHOS U/L  --  40   ALT U/L  --  14   AST U/L  --  13   GLUCOSE RANDOM mg/dL 122 150*         Results from last 7 days   Lab Units 11/15/23  1112 11/15/23  0724 11/14/23  2046 11/14/23  1616   POC GLUCOSE mg/dl 169* 132 176* 174*               Lines/Drains:  Invasive Devices       Peripheral Intravenous Line  Duration             Peripheral IV 11/14/23 Left Antecubital <1 day                          Imaging: Reviewed radiology reports from this admission including: MRI spine    Recent Cultures (last 7 days):         Last 24 Hours Medication List:   Current Facility-Administered Medications   Medication Dose Route Frequency Provider Last Rate    acetaminophen  650 mg Oral Q6H PRN Yovani Reilly MD      amLODIPine  5 mg Oral Daily Yovani Reilly MD      aspirin  81 mg Oral Daily Yovani Reilly MD      atorvastatin  40 mg Oral Daily Yovani Reilly MD      baclofen  10 mg Oral HS PRN LAURIE Cooley      carvedilol  12.5 mg Oral BID With Meals Yovani Reilly MD      heparin (porcine)  5,000 Units Subcutaneous Q8H [facility] Yovani Reilly MD      hydrALAZINE  5 mg Intravenous Q6H PRN Yovani Reilly MD      insulin lispro  1-5 Units Subcutaneous TID AC Yovani Reilly MD      insulin lispro  1-5 Units Subcutaneous HS Yovani Reilly MD      latanoprost  1 drop Both Eyes HS Yovani Reilly MD      levothyroxine  50 mcg Oral Daily Yovani Reilly MD      losartan  100 mg Oral Daily Yovani Reilly MD      oxyCODONE  5 mg Oral Q6H PRN Yovani Reilly MD      oxyCODONE  2.5 mg Oral Q6H PRN Yovani Reilly MD      zolpidem  5 mg Oral HS PRN Chay Blackmon MD          Today, Patient Was Seen By: Leon Marie MD    **Please Note: This note may have been constructed using a voice recognition system. **

## 2023-11-15 NOTE — PHYSICAL THERAPY NOTE
Physical Therapy Treatment Note    Patient Name: Mario Forrester    Diagnosis: Back pain [M54.9]  Acute exacerbation of chronic low back pain [M54.50, G89.29]  Ambulatory dysfunction [R26.2]     11/15/23 0989   PT Last Visit   PT Visit Date 11/15/23   Note Type   Note Type Treatment   Pain Assessment   Pain Assessment Tool 0-10   Pain Score   (0/10 at rest; 10/10 with mobility)   Pain Location/Orientation Orientation: Right;Orientation: Lower; Location: Back   Pain Onset/Description Onset: Ongoing   Hospital Pain Intervention(s) Repositioned; Ambulation/increased activity   Restrictions/Precautions   Weight Bearing Precautions Per Order No   Braces or Orthoses Other (Comment)  (history of back brace-does not wear)   Other Precautions Chair Alarm; Bed Alarm; Fall Risk;Pain;Spinal precautions   General   Chart Reviewed Yes   Response to Previous Treatment Patient with no complaints from previous session. Family/Caregiver Present No   Cognition   Overall Cognitive Status WFL   Arousal/Participation Alert; Responsive; Cooperative   Attention Within functional limits   Orientation Level Oriented X4   Memory Within functional limits   Following Commands Follows all commands and directions without difficulty   Comments Pt agreeable to PT. Subjective   Subjective "I haven't walked yet today."   Bed Mobility   Supine to Sit 5  Supervision   Additional items Assist x 1;HOB elevated; Bedrails; Increased time required;Verbal cues   Transfers   Sit to Stand 4  Minimal assistance   Additional items Assist x 1; Increased time required;Verbal cues   Stand to Sit 4  Minimal assistance   Additional items Assist x 1; Increased time required;Verbal cues;Armrests   Ambulation/Elevation   Gait pattern Decreased toe off;Decreased heel strike;Decreased hip extension; Short stride; Shuffling; Antalgic   Gait Assistance 4  Minimal assist   Additional items Assist x 1;Verbal cues   Assistive Device Rolling walker   Distance 10 feet x 2 trials with a seated rest break between each trial   Balance   Static Sitting Good   Dynamic Sitting Fair +   Static Standing Fair -   Dynamic Standing Poor +   Ambulatory Poor +   Endurance Deficit   Endurance Deficit Yes   Endurance Deficit Description decreased activity tolerance   Activity Tolerance   Activity Tolerance Patient limited by pain   Medical Staff Made Aware OT Raquel   Exercises   Hip Flexion Sitting;10 reps;AROM; Bilateral   Hip Adduction Sitting;10 reps;AROM; Bilateral   Knee AROM Long Arc Quad Sitting;15 reps;AROM; Bilateral   Ankle Pumps Sitting;20 reps;AROM; Bilateral   Assessment   Prognosis Good   Problem List Decreased strength;Decreased endurance; Impaired balance;Decreased mobility;Pain   Assessment Chart reviewed. Patient was received supine in bed in NAD and agreeable to PT session. Today's PT treatment session consisted of therapeutic activity for facilitation of transitional movements and safe performance of correct technique for bed mobility and sit to stand transfers, therapeutic exercise to increase lower extremity muscle strength, and gait training to promote safe and functional ambulation on level surfaces. In comparison to the previous session the patient has made progress as evident by ability to ambulate two gait trials. Pt required a seated rest break between each gait trial. When ambulating pt exhibits decreased radhika, decreased stride length, and  an antalgic gait pattern. Pt tolerated all therapeutic exercise well without complaints of increased pain. Overall, patient tolerated today's session well and continues to be making progress towards achieving her STG's. Patient's prognosis for achieving their STG's is good as evident by pt's motivation. PT intervention continues to be appropriate as the patient continues to be limited by pain, decreased lower extremity strength, impaired balance, decreased endurance, gait deviations, and decreased functional mobility.  Continue to recommend level 2, moderate resource intensity. PT to continue to see patient in order to address the deficits listed above and provide interventions consistent with the POC in order to achieve STG's and optimize the patient's independence with functional mobility. Barriers to Discharge Inaccessible home environment;Decreased caregiver support   Goals   STG Expiration Date 11/24/23   PT Treatment Day 1   Plan   Treatment/Interventions Functional transfer training;LE strengthening/ROM; Elevations; Therapeutic exercise; Endurance training;Patient/family training;Bed mobility;Gait training;Spoke to nursing;OT   Progress Progressing toward goals   PT Frequency 3-5x/wk   Discharge Recommendation   Rehab Resource Intensity Level, PT II (Moderate Resource Intensity)   AM-PAC Basic Mobility Inpatient   Turning in Flat Bed Without Bedrails 3   Lying on Back to Sitting on Edge of Flat Bed Without Bedrails 3   Moving Bed to Chair 3   Standing Up From Chair Using Arms 3   Walk in Room 3   Climb 3-5 Stairs With Railing 2   Basic Mobility Inpatient Raw Score 17   Basic Mobility Standardized Score 39.67   Highest Level Of Mobility   JH-HLM Goal 5: Stand one or more mins   JH-HLM Achieved 6: Walk 10 steps or more   Education   Education Provided Mobility training;Home exercise program;Assistive device   Patient Demonstrates acceptance/verbal understanding;Reinforcement needed   End of Consult   Patient Position at End of Consult Bedside chair;Bed/Chair alarm activated; All needs within reach  (RN aware)     Von Forrester, PT, DPT    Time of PT treatment session: 7382-2239  23 minutes

## 2023-11-15 NOTE — PLAN OF CARE
Problem: OCCUPATIONAL THERAPY ADULT  Goal: Performs self-care activities at highest level of function for planned discharge setting. See evaluation for individualized goals. Description: Treatment Interventions: ADL retraining, Functional transfer training, UE strengthening/ROM, Endurance training, Patient/family training, Compensatory technique education, Activityengagement, Equipment evaluation/education          See flowsheet documentation for full assessment, interventions and recommendations. Note: Limitation: Decreased ADL status, Decreased UE strength, Decreased endurance, Decreased self-care trans, Decreased high-level ADLs  Prognosis: Good  Assessment: Patient is a 80 y.o. female seen for OT evaluation s/p admit to [facility]  on 11/14/2023 w/Ambulatory dysfunction. Commorbidities affecting patient's functional performance at time of assessment include: CAD, HLD, HTN, hypothyroidism, and DM2. Orders placed for OT evaluation and treatment and up and OOB as tolerated . Performed at least two patient identifiers during session including name and wristband. Prior to admission, Patient reporting being independent with ADLs/IADLs, ambulatory with rollator and lives alone. Personal factors affecting patient at time of initial evaluation include: limited caregiver support, steps to enter, difficulty performing ADLs, and difficulty performing IADLs. Upon evaluation, patient requires supervision and minimal  assist for UB ADLs, moderate assist for LB ADLs, transfers and functional ambulation in room and bathroom with minimal  assist, with the use of Rolling Walker. Patient is oriented x 4.   Occupational performance is affected by the following deficits: decreased functional reach, decreased muscle strength, dynamic sit/ stand balance deficit with poor standing tolerance time for self care and functional mobility, decreased activity tolerance, increased pain, and delayed righting and equilibrium reactions. Patient to benefit from continued Occupational Therapy treatment while in the hospital to address deficits as defined above and maximize level of functional independence with ADLs and functional mobility. Occupational Performance areas to address include: grooming , bathing/ shower, dressing, toilet hygiene, transfer to all surfaces, and functional ambulation. From OT standpoint, recommendation at time of d/c would be Level II (moderate resource intensity).      Rehab Resource Intensity Level, OT: II (Moderate Resource Intensity)        Maribel Segovia OTD, OTR/L

## 2023-11-15 NOTE — UTILIZATION REVIEW
Initial Clinical Review    OBS order 11/14 1541 converted to IP on 11/15 1231 for amb dysfunction requiring safe DC plan . Admission: Date/Time/Statement:   Admission Orders (From admission, onward)       Ordered        11/15/23 1231  Inpatient Admission  Once            11/14/23 1541  Place in Observation  Once                           Inpatient Admission     Standing Status:   Standing     Number of Occurrences:   1     Order Specific Question:   Level of Care     Answer:   Med Surg [16]     Order Specific Question:   Estimated length of stay     Answer:   More than 2 Midnights     Order Specific Question:   Certification     Answer:   I certify that inpatient services are medically necessary for this patient for a duration of greater than two midnights. See H&P and MD Progress Notes for additional information about the patient's course of treatment. ED Arrival Information       Expected   -    Arrival   11/14/2023 11:22    Acuity   Urgent              Means of arrival   Wheelchair    Escorted by   Family Member    Service   Hospitalist    Admission type   Emergency              Arrival complaint   LEG PAIN             Chief Complaint   Patient presents with    Back Pain     Lower back pain, recent hx of same, MRIs and injections done by spine center        Initial Presentation: 80 y.o. female  past medical history significant hypertension, hyperlipidemia, coronary artery disease, chronic back pain initially presented with worsening lower back pain. Patient reports worsening lower back pain and inability to walk for the past 2 weeks she was seen by pain management in October and received steroid injections with mild improvement. Admitted OBS  status w/ amb dysfunction plan for PT OT eval . DM SSI and monitor . HTN Continue amlodipine, losartan and beta-blocker. Add hydralazine IV as needed . HLD statin . CAD BB , asa, statin . 11/15 IM Note   PT rec rehab , CM working on getting auth .  Cont pain control . Cont BP meds. C/o R hip pain . Unable to bear much weight on R leg d/t pain . Date: 11/16    Day 3: Has surpassed a 2nd midnight with active treatments and services, which include pain control and safe DC plan , PT eval inaccessible home environment , dec caregiver support .               ED Triage Vitals   Temperature Pulse Respirations Blood Pressure SpO2   11/14/23 1130 11/14/23 1130 11/14/23 1130 11/14/23 1130 11/14/23 1130   98 °F (36.7 °C) 71 20 (!) 191/107 99 %      Temp Source Heart Rate Source Patient Position - Orthostatic VS BP Location FiO2 (%)   11/14/23 1130 11/14/23 1130 11/14/23 1130 11/14/23 1130 --   Temporal Monitor Sitting Left arm       Pain Score       11/14/23 1310       10 - Worst Possible Pain          Wt Readings from Last 1 Encounters:   11/14/23 66.7 kg (147 lb 0.8 oz)     Additional Vital Signs:   11/15/23 0735 -- -- -- -- -- 97 % None (Room air) --   11/15/23 07:26:19 98.4 °F (36.9 °C) 63 17 141/62 88 96 % -- --   11/14/23 2345 -- -- -- -- -- -- None (Room air) --   11/14/23 23:35:55 99.2 °F (37.3 °C) 59 -- 137/63 88 94 % -- --   11/14/23 19:17:42 97.9 °F (36.6 °C) 65 18 142/67 92 96 % -- --   11/14/23 19:17:31 97.9 °F (36.6 °C) 63 -- -- -- 97 % -- --   11/14/23 1730 -- 66 -- -- -- 99 % -- --   11/14/23 1648 -- 69 18 159/110 Abnormal  -- 97 % None (Room air) Sitting     Pertinent Labs/Diagnostic Test Results:   No orders to display         Results from last 7 days   Lab Units 11/15/23  0531 11/14/23  1321   WBC Thousand/uL 6.31 6.91   HEMOGLOBIN g/dL 9.6* 11.1*   HEMATOCRIT % 29.9* 34.9   PLATELETS Thousands/uL 198 232   NEUTROS ABS Thousands/µL 2.83 4.48         Results from last 7 days   Lab Units 11/15/23  0531 11/14/23  1321   SODIUM mmol/L 140 139   POTASSIUM mmol/L 4.5 4.2   CHLORIDE mmol/L 109* 106   CO2 mmol/L 26 24   ANION GAP mmol/L 5 9   BUN mg/dL 24 27*   CREATININE mg/dL 1.12 1.06   EGFR ml/min/1.73sq m 43 46   CALCIUM mg/dL 8.9 9.3     Results from last 7 days Lab Units 11/14/23  1321   AST U/L 13   ALT U/L 14   ALK PHOS U/L 40   TOTAL PROTEIN g/dL 8.2   ALBUMIN g/dL 4.0   TOTAL BILIRUBIN mg/dL 0.82   BILIRUBIN DIRECT mg/dL 0.14     Results from last 7 days   Lab Units 11/15/23  1112 11/15/23  0724 11/14/23  2046 11/14/23  1616   POC GLUCOSE mg/dl 169* 132 176* 174*     Results from last 7 days   Lab Units 11/15/23  0531 11/14/23  1321   GLUCOSE RANDOM mg/dL 122 150*           ED Treatment:   Medication Administration from 11/14/2023 1122 to 11/14/2023 1907         Date/Time Order Dose Route Action     11/14/2023 1310 EST acetaminophen (TYLENOL) tablet 650 mg 650 mg Oral Given     11/14/2023 1310 EST lidocaine (LIDODERM) 5 % patch 1 patch 1 patch Topical Medication Applied     11/14/2023 1310 EST oxyCODONE (ROXICODONE) split tablet 2.5 mg 2.5 mg Oral Given     11/14/2023 1726 EST insulin lispro (HumaLOG) 100 units/mL subcutaneous injection 1-5 Units 1 Units Subcutaneous Given     11/14/2023 1853 EST oxyCODONE (ROXICODONE) split tablet 2.5 mg 2.5 mg Oral Given     11/14/2023 1726 EST carvedilol (COREG) tablet 12.5 mg 12.5 mg Oral Given          Past Medical History:   Diagnosis Date    Cardiac disorder 01/01/2001    x2 stents after a failed stress test    Cellulitis     last assessed - [id number]    Constipation     last assessed - [id number]    COVID-19 08/15/2022    Diabetes mellitus ([address])     Disease of thyroid gland     Ecchymosis     last assessed - [id number]    Fall     last assessed - [id number]    Hyperlipidemia     Hypertension     Hypokalemia     last assessed - [id number]    Lower extremity weakness     last assessed - 58KRP9555    Vitamin D deficiency     last assessed - 19Wav4782     Present on Admission:   Coronary artery disease of native artery of native heart with stable angina pectoris ([address])   Hyperlipidemia   Hypertension   Type 2 diabetes mellitus ([address])   Hypothyroidism      Admitting Diagnosis: Back pain [M54.9]  Acute exacerbation of chronic low back pain [M54.50, G89.29]  Ambulatory dysfunction [R26.2]  Age/Sex: 80 y.o. female  Admission Orders:  Scheduled Medications:  amLODIPine, 5 mg, Oral, Daily  aspirin, 81 mg, Oral, Daily  atorvastatin, 40 mg, Oral, Daily  carvedilol, 12.5 mg, Oral, BID With Meals  heparin (porcine), 5,000 Units, Subcutaneous, Q8H GERA  insulin lispro, 1-5 Units, Subcutaneous, TID AC  insulin lispro, 1-5 Units, Subcutaneous, HS  latanoprost, 1 drop, Both Eyes, HS  levothyroxine, 50 mcg, Oral, Daily  losartan, 100 mg, Oral, Daily      Continuous IV Infusions:     PRN Meds:  acetaminophen, 650 mg, Oral, Q6H PRN  baclofen, 10 mg, Oral, HS PRN  hydrALAZINE, 5 mg, Intravenous, Q6H PRN  oxyCODONE, 5 mg, Oral, Q6H PRN  oxyCODONE, 2.5 mg, Oral, Q6H PRN  zolpidem, 5 mg, Oral, HS PRN        IP CONSULT TO CASE MANAGEMENT    Network Utilization Review Department  ATTENTION: Please call with any questions or concerns to [phone] and carefully listen to the prompts so that you are directed to the right person. All voicemails are confidential.   For Discharge needs, contact Care Management DC Support Team at [phone] opt. 2  Send all requests for admission clinical reviews, approved or denied determinations and any other requests to dedicated fax number below belonging to the campus where the patient is receiving treatment.  List of dedicated fax numbers for the Facilities:  [place] DENIALS (Administrative/Medical Necessity) [phone]   DISCHARGE SUPPORT TEAM (NETWORK) [phone] Sean Dietrich (Maternity/NICU/Pediatrics) [phone]   [address] [place] [address] Road [address] [place] [address] [address] [place] [id number] [address]  [facility] [phone]

## 2023-11-15 NOTE — ASSESSMENT & PLAN NOTE
/62   Pulse 63   Temp 98.4 °F (36.9 °C)   Resp 17   Ht 5' 2" (1.575 m)   Wt 66.7 kg (147 lb 0.8 oz)   SpO2 97%   BMI 26.90 kg/m²   Stable pressures  BP initial reading in the ER noted to be elevated with SBP of 190 possibly in setting of pain  Continue amlodipine, losartan and beta-blocker  Continue hydralazine IV as needed for now

## 2023-11-15 NOTE — ASSESSMENT & PLAN NOTE
Presented to the emergency department with ambulatory dysfunction. Patient notes for the past 2 weeks she been having worsening lower back pain and inability to walk. She reports she lives home alone and having difficulty taking care of herself  She recently underwent MRI on 11/12 that revealed multilevel degenerative thoracolumbar degenerative changes. Has had difficulty with ADLs at home. She was evaluated by PT/OT to emergency department recommending rehab.   Case management is involved will need authorization  Follow-up further with case management  Appreciate PT/OT recommendations  Cont with pain control for right hip pain

## 2023-11-15 NOTE — OCCUPATIONAL THERAPY NOTE
Occupational Therapy Evaluation      Isabel Barnetto    11/15/2023    Patient Active Problem List   Diagnosis    Coronary artery disease of native artery of native heart with stable angina pectoris (HCC)    Depression    Esophageal reflux    Hyperlipidemia    Hypertension    Hypothyroidism    Insomnia    Lumbar compression fracture (HCC)    Lumbar spinal stenosis    Normochromic normocytic anemia    Osteoarthritis of knee    Osteoporosis    Spinal stenosis    Type 2 diabetes mellitus ([address])    Vitamin B12 deficiency    Vitamin D deficiency    Screening mammogram, encounter for    Diabetic peripheral neuropathy ([address])    Greater trochanteric bursitis of left hip    Left sided sciatica    Triclonal gammopathy    Medicare annual wellness visit, subsequent    Adhesive capsulitis of shoulder    Light chain disease, 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[address])    Non-Hodgkin's lymphoma ([address])    Trochanteric bursitis of right hip    Chronic pain syndrome    Chronic bilateral low back pain without sciatica    Sacroiliitis ([address])    Stage 3 chronic kidney disease, unspecified whether stage 3a or 3b CKD ([address])    Ambulatory dysfunction       Past Medical History:   Diagnosis Date    Cardiac disorder 01/01/2001    x2 stents after a failed stress test    Cellulitis     last assessed - [id number]    Constipation     last assessed - [id number]    COVID-19 08/15/2022    Diabetes mellitus ([address])     Disease of thyroid gland     Ecchymosis     last assessed - 86VOJ1053    Fall     last assessed - 30LMB1489    Hyperlipidemia     Hypertension     Hypokalemia     last assessed - 17Skm2012    Lower extremity weakness     last assessed - 23RCH3745    Vitamin D deficiency     last assessed - 24Atz7488       Past Surgical History:   Procedure Laterality Date    BACK SURGERY      CHOLECYSTECTOMY      COLONOSCOPY      CORONARY ANGIOPLASTY WITH STENT PLACEMENT      CT BONE MARROW BIOPSY AND ASPIRATION  1/15/2020    HYSTERECTOMY      OTHER SURGICAL HISTORY      Previous stent placement        11/15/23 0745   OT Last Visit   OT Visit Date 11/15/23   Note Type   Note type Evaluation   Additional Comments Pt agreeable to OT eval. Upon arrival pt supine in bed with HOB elevated. Pain Assessment   Pain Assessment Tool 0-10   Pain Score   (0/10 at rest; 10/10 c mobility)   Pain Location/Orientation Orientation: Lower; Location: Back   Pain Onset/Description Onset: Ongoing;Frequency: Constant/Continuous   Hospital Pain Intervention(s) Ambulation/increased activity;Repositioned;Medication (See MAR)   Restrictions/Precautions   Weight Bearing Precautions Per Order No   Braces or Orthoses   (Pt states prior use of lumbar support brace; however, pt has not used in years)   Other Precautions Chair Alarm; Bed Alarm;Spinal precautions; Fall Risk;Pain   Home Living   Type of [address] [place] [address] Two level;Bed/bath upstairs;Stairs to enter with rails  (2 SARITHA; stair glide to 2nd floor)   Bathroom Shower/Tub Walk-in shower   [address] [facility] chair   [address] [facility] Walker;Cane;Stair glide  (utilizes rollator on 1st floor and rolling chair on 2nd floor)   Prior Function   Level of [place] Independent with ADLs; Independent with functional mobility; Independent with IADLS   Lives With Alone   Receives Help From Family  (son lives ~1 hour away; cleaning lady 1x/week)   IADLs Independent with driving; Independent with meal prep; Independent with medication management   Falls in the last 6 months 0   Vocational Retired   Lifestyle   Autonomy Patient reporting being independent with ADLs/IADLs, ambulatory with rollator and lives alone   Reciprocal Relationships Son   Service to Others Retired   Intrinsic Gratification knitting and crocheting   ADL   Palm 6  Modified independent   [address] [facility] [address]  [address] [facility] 5  [address] [place] [address]  Moderate Assistance   UB Dressing Assistance 4  Minimal Assistance   LB Dressing Assistance 3  Moderate [address]  4  Minimal Assistance   Additional Comments ADL levels based on functional performance during OT eval   Bed Mobility   Supine to Sit 5  Supervision   Additional items Assist x 1;HOB elevated; Bedrails; Increased time required;Verbal cues   Sit to Supine   (DNT: pt seated OOB in recliner at end of session)   Additional Comments Pt denied lightheaded/dizziness with transitional movements   Transfers   Sit to Stand 4  Minimal assistance   Additional items Assist x 1;Bedrails; Increased time required;Verbal cues   Stand to Sit 4  Minimal assistance   Additional items Assist x 1; Armrests; Increased time required;Verbal cues   Toilet transfer 4  Minimal assistance   Additional items Assist x 1; Increased time required;Verbal cues;Standard toilet  (R grab bar)   Functional Mobility   Functional Mobility 4  Minimal assistance   Additional Comments Pt ambulated to/from bathroom with no overt SOB or LOB. Pt limited by pain. Additional items Rolling walker   Balance   Static Sitting Good   Dynamic Sitting Fair +   Static Standing Fair -   Dynamic Standing Fair -   Activity Tolerance   Activity Tolerance Patient limited by pain   Medical Staff Made Aware Pt seen as a co-eval with PT due to the patient's co-morbidities and clinically unstable presentation indicated by chart review. RUE Assessment   RUE Assessment WFL  (grossly 4-/5 MMT)   LUE Assessment   LUE Assessment WFL  (grossly 4-/5 MMT)   Hand Function   Gross Motor Coordination Functional   Fine Motor Coordination Functional   Sensation   Light Touch No apparent deficits   Vision-Basic Assessment   Current Vision Wears glasses all the time   Visual History Macular degeneration   Cognition   Overall Cognitive Status WFL   Arousal/Participation Responsive; Alert; Cooperative   Attention Within functional limits   Orientation Level Oriented X4   Memory Within functional limits   Following Commands Follows all commands and directions without difficulty   Assessment   Limitation Decreased ADL status; Decreased UE strength;Decreased endurance;Decreased self-care trans;Decreased high-level ADLs   Prognosis Good   Assessment Patient is a 80 y.o. female seen for OT evaluation s/[facility] w/Ambulatory dysfunction. Commorbidities affecting patient's functional performance at time of assessment include: CAD, HLD, HTN, hypothyroidism, and DM2. Orders placed for OT evaluation and treatment and up and OOB as tolerated . Performed at least two patient identifiers during session including name and wristband. Prior to admission, Patient reporting being independent with ADLs/IADLs, ambulatory with rollator and lives alone. Personal factors affecting patient at time of initial evaluation include: limited caregiver support, steps to enter, difficulty performing ADLs, and difficulty performing IADLs. Upon evaluation, patient requires supervision and minimal  assist for UB ADLs, moderate assist for LB ADLs, transfers and functional ambulation in room and bathroom with minimal  assist, with the use of Rolling Walker. Patient is oriented x 4. Occupational performance is affected by the following deficits: decreased functional reach, decreased muscle strength, dynamic sit/ stand balance deficit with poor standing tolerance time for self care and functional mobility, decreased activity tolerance, increased pain, and delayed righting and equilibrium reactions. Patient to benefit from continued Occupational Therapy treatment while in the hospital to address deficits as defined above and maximize level of functional independence with ADLs and functional mobility. Occupational Performance areas to address include: grooming , bathing/ shower, dressing, toilet hygiene, transfer to all surfaces, and functional ambulation.  From OT standpoint, recommendation at time of d/c would be Level II (moderate resource intensity). Goals   Patient Goals to have less pain   Plan   Treatment Interventions ADL retraining;Functional transfer training;UE strengthening/ROM; Endurance training;Patient/family training; Compensatory technique education; Activityengagement;Equipment evaluation/education   Goal Expiration Date 11/30/23   OT Treatment Day 0   OT Frequency 3-5x/wk   Discharge Recommendation   Rehab Resource Intensity Level, OT II (Moderate Resource Intensity)   AM-PAC Daily Activity Inpatient   Lower Body Dressing 2   Bathing 2   Toileting 3   Upper Body Dressing 3   Grooming 3   Eating 4   Daily Activity Raw Score 17   Daily Activity Standardized Score (Calc for Raw Score >=11) 37.26   AM-PAC Applied Cognition Inpatient   Following a Speech/Presentation 4   Understanding Ordinary Conversation 4   Taking Medications 3   Remembering Where Things Are Placed or Put Away 3   Remembering List of 4-5 Errands 3   Taking Care of Complicated Tasks 3   Applied Cognition Raw Score 20   Applied Cognition Standardized Score 41.76   Modified Eileen Scale   Modified [place] Scale 4   End of Consult   Patient Position at End of Consult Bedside chair; All needs within reach  (PT present)   Nurse Communication Nurse aware of consult     GOALS:    *ADL transfers with (I) for inc'd independence with ADLs/purposeful tasks    *UB ADL with (I) for inc'd independence with self cares    *LB ADL with (I) using AE prn for inc'd independence with self cares    *Toileting with (I) for clothing management and hygiene for return to PLOF with personal care    *Increase stand tolerance x 5  m for inc'd tolerance with standing purposeful tasks    *Participate in 10m UE therex to increase overall stamina/activity tolerance for purposeful tasks    *Bed mobility- (I) for inc'd independence to manage own comfort and initiate EOB & OOB purposeful tasks    *Patient will verbalize 3 safety awareness/ principles to prevent falls in the home setting. *Patient will increase OOB/sitting tolerance to 2-4 hours per day to increase participation in self-care and leisure tasks with no s/s of exertion.      *Pt will demonstrate use of long handled AE during 100% of tx sessions for increased ADL safety and independence following D/C     CHERIE Gar, OTR/L

## 2023-11-15 NOTE — NURSING NOTE
Patient requested night medications. Stated they take ambien and baclofen.  No order for baclofen at this time so this nurse reached out to [facility] with patient's request.

## 2023-11-16 LAB
ERYTHROCYTE [DISTWIDTH] IN BLOOD BY AUTOMATED COUNT: 14.9 % (ref 11.6–15.1)
FERRITIN SERPL-MCNC: 58 NG/ML (ref 11–307)
GLUCOSE SERPL-MCNC: 115 MG/DL (ref 65–140)
GLUCOSE SERPL-MCNC: 122 MG/DL (ref 65–140)
GLUCOSE SERPL-MCNC: 149 MG/DL (ref 65–140)
GLUCOSE SERPL-MCNC: 167 MG/DL (ref 65–140)
HCT VFR BLD AUTO: 28.8 % (ref 34.8–46.1)
HGB BLD-MCNC: 9.2 G/DL (ref 11.5–15.4)
IRON SATN MFR SERPL: 32 % (ref 15–50)
IRON SERPL-MCNC: 84 UG/DL (ref 50–212)
MCH RBC QN AUTO: 29.4 PG (ref 26.8–34.3)
MCHC RBC AUTO-ENTMCNC: 31.9 G/DL (ref 31.4–37.4)
MCV RBC AUTO: 92 FL (ref 82–98)
PLATELET # BLD AUTO: 183 THOUSANDS/UL (ref 149–390)
PMV BLD AUTO: 9.5 FL (ref 8.9–12.7)
RBC # BLD AUTO: 3.13 MILLION/UL (ref 3.81–5.12)
TIBC SERPL-MCNC: 266 UG/DL (ref 250–450)
UIBC SERPL-MCNC: 182 UG/DL (ref 155–355)
WBC # BLD AUTO: 6.09 THOUSAND/UL (ref 4.31–10.16)

## 2023-11-16 PROCEDURE — 85027 COMPLETE CBC AUTOMATED: CPT | Performed by: FAMILY MEDICINE

## 2023-11-16 PROCEDURE — 97110 THERAPEUTIC EXERCISES: CPT

## 2023-11-16 PROCEDURE — 82948 REAGENT STRIP/BLOOD GLUCOSE: CPT

## 2023-11-16 PROCEDURE — 97116 GAIT TRAINING THERAPY: CPT

## 2023-11-16 PROCEDURE — 99233 SBSQ HOSP IP/OBS HIGH 50: CPT | Performed by: FAMILY MEDICINE

## 2023-11-16 PROCEDURE — 97535 SELF CARE MNGMENT TRAINING: CPT

## 2023-11-16 RX ORDER — ACETAMINOPHEN 325 MG/1
975 TABLET ORAL EVERY 8 HOURS SCHEDULED
Status: DISCONTINUED | OUTPATIENT
Start: 2023-11-16 | End: 2023-11-21 | Stop reason: HOSPADM

## 2023-11-16 RX ORDER — LIDOCAINE 50 MG/G
1 PATCH TOPICAL DAILY
Status: DISCONTINUED | OUTPATIENT
Start: 2023-11-16 | End: 2023-11-21 | Stop reason: HOSPADM

## 2023-11-16 RX ADMIN — INSULIN LISPRO 1 UNITS: 100 INJECTION, SOLUTION INTRAVENOUS; SUBCUTANEOUS at 17:47

## 2023-11-16 RX ADMIN — HEPARIN SODIUM 5000 UNITS: 5000 INJECTION INTRAVENOUS; SUBCUTANEOUS at 22:24

## 2023-11-16 RX ADMIN — Medication 2.5 MG: at 17:45

## 2023-11-16 RX ADMIN — ACETAMINOPHEN 975 MG: 325 TABLET, FILM COATED ORAL at 14:49

## 2023-11-16 RX ADMIN — OXYCODONE HYDROCHLORIDE 5 MG: 5 TABLET ORAL at 10:48

## 2023-11-16 RX ADMIN — LEVOTHYROXINE SODIUM 50 MCG: 0.05 TABLET ORAL at 08:29

## 2023-11-16 RX ADMIN — ZOLPIDEM TARTRATE 5 MG: 5 TABLET, COATED ORAL at 22:23

## 2023-11-16 RX ADMIN — HEPARIN SODIUM 5000 UNITS: 5000 INJECTION INTRAVENOUS; SUBCUTANEOUS at 14:49

## 2023-11-16 RX ADMIN — BACLOFEN 10 MG: 10 TABLET ORAL at 22:23

## 2023-11-16 RX ADMIN — ASPIRIN 81 MG: 81 TABLET, CHEWABLE ORAL at 08:29

## 2023-11-16 RX ADMIN — HEPARIN SODIUM 5000 UNITS: 5000 INJECTION INTRAVENOUS; SUBCUTANEOUS at 05:07

## 2023-11-16 RX ADMIN — CARVEDILOL 12.5 MG: 12.5 TABLET, FILM COATED ORAL at 17:45

## 2023-11-16 RX ADMIN — CARVEDILOL 12.5 MG: 12.5 TABLET, FILM COATED ORAL at 08:29

## 2023-11-16 RX ADMIN — AMLODIPINE BESYLATE 5 MG: 5 TABLET ORAL at 08:29

## 2023-11-16 RX ADMIN — LOSARTAN POTASSIUM 100 MG: 50 TABLET, FILM COATED ORAL at 08:29

## 2023-11-16 RX ADMIN — LATANOPROST 1 DROP: 50 SOLUTION OPHTHALMIC at 22:24

## 2023-11-16 RX ADMIN — ATORVASTATIN CALCIUM 40 MG: 40 TABLET, FILM COATED ORAL at 08:29

## 2023-11-16 RX ADMIN — LIDOCAINE 1 PATCH: 50 PATCH CUTANEOUS at 10:48

## 2023-11-16 RX ADMIN — ACETAMINOPHEN 975 MG: 325 TABLET, FILM COATED ORAL at 22:23

## 2023-11-16 NOTE — ASSESSMENT & PLAN NOTE
Lab Results   Component Value Date    HGBA1C 6.7 09/18/2023       Recent Labs     11/15/23  1600 11/15/23  2047 11/16/23  0737 11/16/23  1115   POCGLU 149* 176* 115 149*         Blood Sugar Average: Last 72 hrs:  (P) 155History of type 2 diabetes  Stable  BG this AM  Hold home pioglitazone   sliding scale insulin  Monitor blood sugar

## 2023-11-16 NOTE — PROGRESS NOTES
[address]  Progress Note  Name: Xiomara Raman  MRN: [id number]  Unit/Bed#: -01 I Date of Admission: 11/14/2023   Date of Service: 11/16/2023 I Hospital Day: 1    Assessment/Plan   * Ambulatory dysfunction  Assessment & Plan  Presented to the emergency department with ambulatory dysfunction. Patient notes for the past 2 weeks she been having worsening lower back pain and inability to walk. She reports she lives home alone and having difficulty taking care of herself  She recently underwent MRI on 11/12 that revealed multilevel degenerative thoracolumbar degenerative changes. Has had difficulty with ADLs at home. She was evaluated by PT/OT to emergency department recommending rehab. Case management is involved will need authorization  Follow-up further with case management  Appreciate PT/OT recommendations  Cont with pain control for right hip pain    Type 2 diabetes mellitus [facility])  Assessment & Plan  Lab Results   Component Value Date    HGBA1C 6.7 09/18/2023       Recent Labs     11/15/23  1600 11/15/23  2047 11/16/23  0737 11/16/23  1115   POCGLU 149* 176* 115 149*         Blood Sugar Average: Last 72 hrs:  (P) 155History of type 2 diabetes  Stable  BG this AM  Hold home pioglitazone   sliding scale insulin  Monitor blood sugar      Hypothyroidism  Assessment & Plan  Continue Synthroid. OP followup     Hypertension  Assessment & Plan  /61   Pulse 64   Temp 99.2 °F (37.3 °C) (Oral)   Resp (!) 23   Ht 5' 2" (1.575 m)   Wt 66.7 kg (147 lb 0.8 oz)   SpO2 94%   BMI 26.90 kg/m²   Stable pressures  BP initial reading in the ER noted to be elevated with SBP of 190 possibly in setting of pain  Continue amlodipine, losartan and beta-blocker  Continue hydralazine IV as needed for now    Hyperlipidemia  Assessment & Plan  Continue statin therapy.  OP followup    Coronary artery disease of native artery of native heart with stable angina pectoris [facility])  Assessment & Plan  No chest pain  History of coronary artery disease  Continue beta-blocker, aspirin, statin             VTE Pharmacologic Prophylaxis: VTE Score: 6 High Risk (Score >/= 5) - Pharmacological DVT Prophylaxis Ordered: heparin. Sequential Compression Devices Ordered. Mobility:   Basic Mobility Inpatient Raw Score: 17  JH-HLM Goal: 5: Stand one or more mins  JH-HLM Achieved: 6: Walk 10 steps or more  HLM Goal NOT achieved. Continue with multidisciplinary rounding and encourage appropriate mobility to improve upon [facility] goals. Patient Centered Rounds: I performed bedside rounds with nursing staff today. Discussions with Specialists or Other Care Team Provider: yes CM    Education and Discussions with Family / Patient: Updated  (daughter) via phone. Total Time Spent on Date of Encounter in care of patient: 45 mins. This time was spent on one or more of the following: performing physical exam; counseling and coordination of care; obtaining or reviewing history; documenting in the medical record; reviewing/ordering tests, medications or procedures; communicating with other healthcare professionals and discussing with patient's family/caregivers. Current Length of Stay: 1 day(s)  Current Patient Status: Inpatient   Certification Statement: The patient will continue to require additional inpatient hospital stay due to pending auth  Discharge Plan: Anticipate discharge tomorrow to rehab facility. Code Status: Level 1 - Full Code    Subjective:   Seen and examined at bedside  Ongoing right hip pain  Inability to ambulate    Objective:     Vitals:   Temp (24hrs), Av.8 °F (37.1 °C), Min:98.3 °F (36.8 °C), Max:99.2 °F (37.3 °C)    Temp:  [98.3 °F (36.8 °C)-99.2 °F (37.3 °C)] 99.2 °F (37.3 °C)  HR:  [61-74] 64  Resp:  [16-23] 23  BP: (129-140)/(61-64) 131/61  SpO2:  [94 %-97 %] 94 %  Body mass index is 26.9 kg/m². Input and Output Summary (last 24 hours):      Intake/Output Summary (Last 24 hours) at 11/16/2023 1310  Last data filed at 11/16/2023 0529  Gross per 24 hour   Intake --   Output 600 ml   Net -600 ml       Physical Exam:   Physical Exam General- Awake, alert and oriented x 3, looks comfortable  HEENT- Normocephalic, atraumatic, oral mucosa- moist  Neck- Supple, No carotid bruit, no JVD  CVS- Normal S1/ S2, Regular rate and rhythm, No murmur, No edema  Respiratory system- B/L clear breath sounds, no wheezing  Abdomen- Soft, Non distended, no tenderness, Bowel sound- present 4 quads  Genitourinary- No suprapubic tenderness, No CVA tenderness  Skin- No new bruise or rash  Musculoskeletal- restricted ROM right hip and unable to bear weight right hip due to pian  Psych- No acute psychosis  CNS- CN II- XII grossly intact, No acute focal neurologic deficit noted      Additional Data:     Labs:  Results from last 7 days   Lab Units 11/16/23  0459 11/15/23  0531   WBC Thousand/uL 6.09 6.31   HEMOGLOBIN g/dL 9.2* 9.6*   HEMATOCRIT % 28.8* 29.9*   PLATELETS Thousands/uL 183 198   NEUTROS PCT %  --  44   LYMPHS PCT %  --  36   MONOS PCT %  --  16*   EOS PCT %  --  3     Results from last 7 days   Lab Units 11/15/23  0531 11/14/23  1321   SODIUM mmol/L 140 139   POTASSIUM mmol/L 4.5 4.2   CHLORIDE mmol/L 109* 106   CO2 mmol/L 26 24   BUN mg/dL 24 27*   CREATININE mg/dL 1.12 1.06   ANION GAP mmol/L 5 9   CALCIUM mg/dL 8.9 9.3   ALBUMIN g/dL  --  4.0   TOTAL BILIRUBIN mg/dL  --  0.82   ALK PHOS U/L  --  40   ALT U/L  --  14   AST U/L  --  13   GLUCOSE RANDOM mg/dL 122 150*         Results from last 7 days   Lab Units 11/16/23  1115 11/16/23  0737 11/15/23  2047 11/15/23  1600 11/15/23  1112 11/15/23  0724 11/14/23  2046 11/14/23  1616   POC GLUCOSE mg/dl 149* 115 176* 149* 169* 132 176* 174*               Lines/Drains:  Invasive Devices       Peripheral Intravenous Line  Duration             Peripheral IV 11/14/23 Left Antecubital 1 day                          Imaging: Reviewed radiology reports from this admission including: xray(s)    Recent Cultures (last 7 days):         Last 24 Hours Medication List:   Current Facility-Administered Medications   Medication Dose Route Frequency Provider Last Rate    acetaminophen  975 mg Oral Q8H Pippa Villavicencio MD      amLODIPine  5 mg Oral Daily Yovani Reilly MD      aspirin  81 mg Oral Daily Yovani Reilly MD      atorvastatin  40 mg Oral Daily Yovani Reilly MD      baclofen  10 mg Oral HS PRN LAURIE Cooley      carvedilol  12.5 mg Oral BID With Meals Yovani Reilly MD      Diclofenac Sodium  2 g Topical 4x Daily Aracelis Lawrence MD      heparin (porcine)  5,000 Units Subcutaneous Q8H [facility] Yovani Reilly MD      hydrALAZINE  5 mg Intravenous Q6H PRN Yovani Reilly MD      insulin lispro  1-5 Units Subcutaneous TID AC Yovani Reilly MD      insulin lispro  1-5 Units Subcutaneous HS Yovani Reilly MD      latanoprost  1 drop Both Eyes HS Yovani Reilly MD      levothyroxine  50 mcg Oral Daily Yovani Reilly MD      lidocaine  1 patch Topical Daily Aracelis Lawrence MD      losartan  100 mg Oral Daily Yovani Reilly MD      oxyCODONE  5 mg Oral Q6H PRN Yovani Reilly MD      oxyCODONE  2.5 mg Oral Q6H PRN Yovani Reilly MD      zolpidem  5 mg Oral HS PRN Juana Lara MD          Today, Patient Was Seen By: Aracelis Lawrence MD    **Please Note: This note may have been constructed using a voice recognition system. **

## 2023-11-16 NOTE — PLAN OF CARE
Problem: INFECTION - ADULT  Goal: Absence or prevention of progression during hospitalization  Description: INTERVENTIONS:  - Assess and monitor for signs and symptoms of infection  - Monitor lab/diagnostic results  - Monitor all insertion sites, i.e. indwelling lines, tubes, and drains  - Monitor endotracheal if appropriate and nasal secretions for changes in amount and color  - [place] appropriate cooling/warming therapies per order  - Administer medications as ordered  - Instruct and encourage patient and family to use good hand hygiene technique  - Identify and instruct in appropriate isolation precautions for identified infection/condition  Outcome: Progressing  Goal: Absence of fever/infection during neutropenic period  Description: INTERVENTIONS:  - Monitor WBC    Outcome: Progressing     Problem: Knowledge Deficit  Goal: Patient/family/caregiver demonstrates understanding of disease process, treatment plan, medications, and discharge instructions  Description: Complete learning assessment and assess knowledge base.   Interventions:  - Provide teaching at level of understanding  - Provide teaching via preferred learning methods  Outcome: Progressing

## 2023-11-16 NOTE — CASE MANAGEMENT
[address] received request for authorization from Care Manager.   Authorization request for: Acute Rehab  Facility Name: [facility]   EMT:[id number]  Facility MD:  Dr. Loi Wright   NPI: [id number]  Authorization initiated by contacting insurance: Evan Samano  Via: Melon #[email or web address]   Pending Reference #:[id number]  Clinicals submitted via: Availity attachment

## 2023-11-16 NOTE — PLAN OF CARE
Problem: PAIN - ADULT  Goal: Verbalizes/displays adequate comfort level or baseline comfort level  Description: Interventions:  - Encourage patient to monitor pain and request assistance  - Assess pain using appropriate pain scale  - Administer analgesics based on type and severity of pain and evaluate response  - Implement non-pharmacological measures as appropriate and evaluate response  - Consider cultural and social influences on pain and pain management  - Notify physician/advanced practitioner if interventions unsuccessful or patient reports new pain  Outcome: Progressing     Problem: INFECTION - ADULT  Goal: Absence or prevention of progression during hospitalization  Description: INTERVENTIONS:  - Assess and monitor for signs and symptoms of infection  - Monitor lab/diagnostic results  - Monitor all insertion sites, i.e. indwelling lines, tubes, and drains  - Monitor endotracheal if appropriate and nasal secretions for changes in amount and color  - [place] appropriate cooling/warming therapies per order  - Administer medications as ordered  - Instruct and encourage patient and family to use good hand hygiene technique  - Identify and instruct in appropriate isolation precautions for identified infection/condition  Outcome: Progressing

## 2023-11-16 NOTE — OCCUPATIONAL THERAPY NOTE
Occupational Therapy Progress Note     Patient Name: Jennifer Walden  Today's Date: 11/16/2023  Problem List  Principal Problem:    Ambulatory dysfunction  Active Problems:    Coronary artery disease of native artery of native heart with stable angina pectoris ([address])    Hyperlipidemia    Hypertension    Hypothyroidism    Type 2 diabetes mellitus ([address])        11/16/23 1415   OT Last Visit   OT Visit Date 11/16/23   Note Type   Note Type Treatment   Pain Assessment   Pain Assessment Tool 0-10   Pain Score No Pain  (0/10 at rest, 8-9/10 with mobility)   Pain Location/Orientation Orientation: Right;Location: Hip;Location: Leg   Pain Onset/Description Onset: Ongoing   Restrictions/Precautions   Weight Bearing Precautions Per Order No   Other Precautions Chair Alarm; Bed Alarm; Fall Risk;Pain   ADL   Where Assessed Other (Comment)  (Assist levels for some self care tasks are based on functional assessment of performance skills and deficits observed during session.)   Eating Assistance 5  Supervision/Setup   Eating Deficit Setup   Grooming Assistance 5  Supervision/Setup   Grooming Deficit Setup;Supervision/safety; Increased time to complete  (while seated)   UB Bathing Assistance 5  Supervision/Setup   UB Bathing Deficit Setup;Supervision/safety; Increased time to complete  (while seated)   LB Bathing Assistance 4  Minimal Assistance   LB Bathing Deficit Setup;Steadying;Supervision/safety; Increased time to complete   UB Dressing Assistance 5  Supervision/Setup   UB Dressing Deficit Setup;Supervision/safety; Increased time to complete   LB Dressing Assistance 3  Moderate Assistance   LB Dressing Deficit Setup;Supervision/safety; Increased time to complete; Thread RLE into pants; Thread LLE into pants   Toileting Assistance  5  Supervision/Setup   Toileting Deficit Setup;Supervison/safety; Increased time to complete   Bed Mobility   Supine to Sit 5  Supervision   Additional items Assist x 1;HOB elevated; Increased time required   Sit to Supine 5  Supervision   Additional items Assist x 1;HOB elevated; Bedrails; Increased time required   Transfers   Sit to Stand 4  Minimal assistance   Additional items Assist x 1; Increased time required   Stand to Sit 4  Minimal assistance   Additional items Assist x 1; Armrests; Increased time required   Stand pivot 4  Minimal assistance   Additional items Assist x 1; Increased time required   Additional Comments with RW   Functional Mobility   Functional Mobility 4  Minimal assistance   Additional Comments assist x1   Additional items Rolling walker   Therapeutic Excerise-Strength   UE Strength Yes   Right Upper Extremity- Strength   R Shoulder Flexion; Horizontal ABduction   R Elbow Elbow flexion;Elbow extension   R Hand   (composite  and extension)   R Position Seated   R Weight/Reps/Sets 1x10   Left Upper Extremity-Strength   L Shoulder Flexion; Horizontal ABduction   L Elbow Elbow flexion;Elbow extension   L Hand   (composite  and extension)   L Position Seated   L Weights/Reps/Sets 1x10   Subjective   Subjective "It only hurts when I get up."   Cognition   Overall Cognitive Status [facility]   Arousal/Participation Alert; Cooperative   Attention Within functional limits   Orientation Level Oriented X4   Memory Within functional limits   Following Commands Follows all commands and directions without difficulty   Activity Tolerance   Activity Tolerance Patient tolerated treatment well;Patient limited by pain   Medical Staff Made Aware Hilary HARRINGTON   Assessment   Assessment Pt seen this date for skilled OT session focused on ADLs, functional transfers and mobility, safety education. The patient was received L side lying in bed, NAD, PIV access, on RA, [facility]. She participated in functional bed mobility, functional ambulation in the room, and seated BUE exercises. Pt required CGA with RW and frequent rest breaks during functional ambulation, and Minimal Assistance to Moderate Assistance during some basic ADLs.  At end of session the patient was located supine in bed with call bell in reach and all needs met. Overall the patient remains below her functional baseline, and is primarily limited at this time due to pain, generalized deconditioning, and decreased activity tolerance. OT will continue to follow while acute to address POC. At this time, recommend Level 2 Moderate Resource Intensity upon d/c.   Plan   Treatment Interventions ADL retraining;Functional transfer training;UE strengthening/ROM; Endurance training;Patient/family training   Goal Expiration Date 11/30/23   OT Treatment Day 1   OT Frequency 3-5x/wk   Discharge Recommendation   Rehab Resource Intensity Level, OT II (Moderate Resource Intensity)   AM-PAC Daily Activity Inpatient   Lower Body Dressing 2   Bathing 3   Toileting 3   Upper Body Dressing 3   Grooming 3   Eating 4   Daily Activity Raw Score 18   Daily Activity Standardized Score (Calc for Raw Score >=11) 38.66   AM-PAC Applied Cognition Inpatient   Following a Speech/Presentation 4   Understanding Ordinary Conversation 4   Taking Medications 4   Remembering Where Things Are Placed or Put Away 3   Remembering List of 4-5 Errands 3   Taking Care of Complicated Tasks 3   Applied Cognition Raw Score 21   Applied Cognition Standardized Score 44.3       Kalie Jackman OTR/L

## 2023-11-16 NOTE — CASE MANAGEMENT
Case Management Discharge Planning Note    Patient name Corina Chung  Location [address] [facility] [place] 332/-01 MRN [id number]  : 1934 Date 2023       Current Admission Date: 2023  Current Admission Diagnosis:Ambulatory dysfunction   Patient Active Problem List    Diagnosis Date Noted    Ambulatory dysfunction 2023    Stage 3 chronic kidney disease, unspecified whether stage 3a or 3b CKD ([address]) 2023    Chronic pain syndrome 2022    Chronic bilateral low back pain without sciatica 2022    Sacroiliitis ([address]) 2022    Trochanteric bursitis of right hip 2021    Non-Hodgkin's lymphoma ([address]) 10/05/2020    Light chain disease, 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    Medicare annual wellness visit, subsequent 2020    Adhesive capsulitis of shoulder 2020    Triclonal gammopathy 2019    Greater trochanteric bursitis of left hip 2019    Left sided sciatica 2019    Diabetic peripheral neuropathy ([address]) 2019    Screening mammogram, encounter for 2018    Lumbar compression fracture ([address]) 10/11/2017    Vitamin D deficiency 10/11/2017    Vitamin B12 deficiency 2016    Lumbar spinal stenosis 2015    Normochromic normocytic anemia 2015    Depression 2015    Esophageal reflux 2013    Osteoarthritis of knee 2013    Spinal stenosis 2013    Coronary artery disease of native artery of native heart with stable angina pectoris ([address]) 2013    Hyperlipidemia 2013    Hypertension 2013    Hypothyroidism 2013    Insomnia 2013    Osteoporosis 2013    Type 2 diabetes mellitus ([address]) 2012      LOS (days): 1  Geometric Mean LOS (GMLOS) (days): 2.60  Days to GMLOS:1.7     OBJECTIVE:  Risk of Unplanned Readmission Score: 11.83         Current admission status: Inpatient   Preferred Pharmacy:   [facility] [id number] - [place] - [address]  [address] [place] [address]  Phone: [phone] Fax: [phone]    Primary Care Provider: Laurann Goodell, MD    Primary Insurance: [facility]  Secondary Insurance:     DISCHARGE DETAILS:     Talia Hancock (Daughter)   [address]   [phone]    CM left voicemail for patients daughter in law yuni.

## 2023-11-16 NOTE — PLAN OF CARE
Problem: PHYSICAL THERAPY ADULT  Goal: Performs mobility at highest level of function for planned discharge setting. See evaluation for individualized goals. Description: Treatment/Interventions: Functional transfer training, LE strengthening/ROM, Elevations, Therapeutic exercise, Endurance training, Patient/family training, Bed mobility, Gait training, Spoke to nursing, OT  Equipment Recommended: Fran Amor       See flowsheet documentation for full assessment, interventions and recommendations. Outcome: Progressing  Note: Prognosis: Good  Problem List: Decreased strength, Decreased endurance, Impaired balance, Decreased mobility, Pain  Assessment: Chart reviewed. Patient was received supine in bed in NAD and agreeable to PT session. Today's PT treatment session consisted of therapeutic activity for facilitation of transitional movements and safe performance of correct technique for bed mobility and sit to stand transfers, therapeutic exercise to increase lower extremity muscle strength, and gait training to promote safe and functional ambulation on level surfaces. In comparison to the previous session the patient has made progress as evident by ability to tolerate increased repetitions of seated hip adduction and seated hip flexion. Pt with reports of minimal discomfort when performing right lower extremity hip flexion. Pt denied complaints of pain with other therapeutic exercises. Pt's ambulation distance remains limited secondary to complaints of pain. When ambulating pt exhibits decreased radhika and decreased stride length. Overall, patient tolerated today's session well and continues to be making slow progress towards achieving her STG's. Patient's prognosis for achieving their STG's is good as evident by pt's motivation.  PT intervention continues to be appropriate as the patient continues to be limited by pain, decreased lower extremity strength, impaired balance, decreased endurance, gait deviations, and decreased functional mobility. Continue to recommend level 2, moderate resource intensity. PT to continue to see patient in order to address the deficits listed above and provide interventions consistent with the POC in order to achieve STG's and optimize the patient's independence with functional mobility. Barriers to Discharge: Inaccessible home environment, Decreased caregiver support     Rehab Resource Intensity Level, PT: II (Moderate Resource Intensity)    See flowsheet documentation for full assessment.

## 2023-11-16 NOTE — PHYSICAL THERAPY NOTE
Physical Therapy Treatment Note    Patient Name: Becky Houston    Diagnosis: Back pain [M54.9]  Acute exacerbation of chronic low back pain [M54.50, G89.29]  Ambulatory dysfunction [R26.2]     11/16/23 0740   PT Last Visit   PT Visit Date 11/16/23   Note Type   Note Type Treatment for insurance authorization   Pain Assessment   Pain Assessment Tool 0-10   Pain Score   (0/10 rest; 8-9/10)   Pain Location/Orientation Orientation: Right;Location: Hip;Location: Leg   Pain Radiating Towards extending to right knee   Pain Onset/Description Onset: Ongoing   Hospital Pain Intervention(s) Repositioned; Ambulation/increased activity   Restrictions/Precautions   Weight Bearing Precautions Per Order No   Other Precautions Chair Alarm; Bed Alarm; Fall Risk;Pain   General   Chart Reviewed Yes   Response to Previous Treatment Patient with no complaints from previous session. Family/Caregiver Present No   Cognition   Overall Cognitive Status WFL   Arousal/Participation Alert; Responsive; Cooperative   Attention Within functional limits   Orientation Level Oriented X4   Memory Within functional limits   Following Commands Follows all commands and directions without difficulty   Comments Pt agreeable to PT. Subjective   Subjective "I can walk to the bathroom, but the walk back is difficult."   Bed Mobility   Supine to Sit 5  Supervision   Additional items Assist x 1;HOB elevated; Bedrails; Increased time required;Verbal cues   Transfers   Sit to Stand 4  Minimal assistance   Additional items Assist x 1; Increased time required;Verbal cues   Stand to Sit 4  Minimal assistance   Additional items Assist x 1; Increased time required;Verbal cues   Ambulation/Elevation   Gait pattern Decreased toe off;Decreased heel strike;Decreased hip extension; Short stride; Shuffling   Gait Assistance 4  Minimal assist   Additional items Assist x 1;Verbal cues   Assistive Device Rolling walker   Distance 10 feet x 2 trials with a seated rest break between each trial   Ambulation/Elevation Additional Comments Pt's ambulation distance limited secondary to complaints of increased pain; resolved with a seated rest break   Balance   Static Sitting Good   Dynamic Sitting Fair +   Static Standing Fair -   Dynamic Standing Poor +   Ambulatory Poor +   Endurance Deficit   Endurance Deficit Yes   Endurance Deficit Description decreased activity tolerance   Activity Tolerance   Activity Tolerance Patient tolerated treatment well;Patient limited by pain   Exercises   Hip Flexion Sitting;15 reps;AROM; Bilateral   Hip Adduction Sitting;15 reps;AROM; Bilateral   Knee AROM Long Arc Quad Sitting;15 reps;AROM; Bilateral   Ankle Pumps Sitting;20 reps;AROM; Bilateral   Assessment   Prognosis Good   Problem List Decreased strength;Decreased endurance; Impaired balance;Decreased mobility;Pain   Assessment Chart reviewed. Patient was received supine in bed in NAD and agreeable to PT session. Today's PT treatment session consisted of therapeutic activity for facilitation of transitional movements and safe performance of correct technique for bed mobility and sit to stand transfers, therapeutic exercise to increase lower extremity muscle strength, and gait training to promote safe and functional ambulation on level surfaces. In comparison to the previous session the patient has made progress as evident by ability to tolerate increased repetitions of seated hip adduction and seated hip flexion. Pt with reports of minimal discomfort when performing right lower extremity hip flexion. Pt denied complaints of pain with other therapeutic exercises. Pt's ambulation distance remains limited secondary to complaints of pain. When ambulating pt exhibits decreased radhika and decreased stride length. Overall, patient tolerated today's session well and continues to be making slow progress towards achieving her STG's. Patient's prognosis for achieving their STG's is good as evident by pt's motivation.  PT intervention continues to be appropriate as the patient continues to be limited by pain, decreased lower extremity strength, impaired balance, decreased endurance, gait deviations, and decreased functional mobility. Continue to recommend level 2, moderate resource intensity. PT to continue to see patient in order to address the deficits listed above and provide interventions consistent with the POC in order to achieve STG's and optimize the patient's independence with functional mobility. Barriers to Discharge Inaccessible home environment;Decreased caregiver support   Goals   STG Expiration Date 11/24/23   PT Treatment Day 2   Plan   Treatment/Interventions Functional transfer training;LE strengthening/ROM; Therapeutic exercise; Endurance training;Patient/family training;Bed mobility;Gait training;OT;Elevations   Progress Slow progress, decreased activity tolerance   PT Frequency 3-5x/wk   Discharge Recommendation   Rehab Resource Intensity Level, PT II (Moderate Resource Intensity)   AM-PAC Basic Mobility Inpatient   Turning in Flat Bed Without Bedrails 3   Lying on Back to Sitting on Edge of Flat Bed Without Bedrails 3   Moving Bed to Chair 3   Standing Up From Chair Using Arms 3   Walk in Room 3   Climb 3-5 Stairs With Railing 2   Basic Mobility Inpatient Raw Score 17   Basic Mobility Standardized Score 39.67   Highest Level Of Mobility   JH-HLM Goal 5: Stand one or more mins   JH-HLM Achieved 6: Walk 10 steps or more   Education   Education Provided Mobility training;Home exercise program;Assistive device   Patient Demonstrates acceptance/verbal understanding;Reinforcement needed   End of Consult   Patient Position at End of Consult Bedside chair;Bed/Chair alarm activated; All needs within reach     Sanjuana Helms PT, DPT    Time of PT treatment session: 3220-0231  24 minutes

## 2023-11-16 NOTE — ASSESSMENT & PLAN NOTE
/61   Pulse 64   Temp 99.2 °F (37.3 °C) (Oral)   Resp (!) 23   Ht 5' 2" (1.575 m)   Wt 66.7 kg (147 lb 0.8 oz)   SpO2 94%   BMI 26.90 kg/m²   Stable pressures  BP initial reading in the ER noted to be elevated with SBP of 190 possibly in setting of pain  Continue amlodipine, losartan and beta-blocker  Continue hydralazine IV as needed for now

## 2023-11-17 LAB
GLUCOSE SERPL-MCNC: 119 MG/DL (ref 65–140)
GLUCOSE SERPL-MCNC: 139 MG/DL (ref 65–140)
GLUCOSE SERPL-MCNC: 146 MG/DL (ref 65–140)
GLUCOSE SERPL-MCNC: 160 MG/DL (ref 65–140)

## 2023-11-17 PROCEDURE — 99233 SBSQ HOSP IP/OBS HIGH 50: CPT | Performed by: FAMILY MEDICINE

## 2023-11-17 PROCEDURE — 97535 SELF CARE MNGMENT TRAINING: CPT

## 2023-11-17 PROCEDURE — 82948 REAGENT STRIP/BLOOD GLUCOSE: CPT

## 2023-11-17 RX ADMIN — CARVEDILOL 12.5 MG: 12.5 TABLET, FILM COATED ORAL at 09:31

## 2023-11-17 RX ADMIN — ATORVASTATIN CALCIUM 40 MG: 40 TABLET, FILM COATED ORAL at 09:32

## 2023-11-17 RX ADMIN — HEPARIN SODIUM 5000 UNITS: 5000 INJECTION INTRAVENOUS; SUBCUTANEOUS at 16:56

## 2023-11-17 RX ADMIN — ZOLPIDEM TARTRATE 5 MG: 5 TABLET, COATED ORAL at 22:23

## 2023-11-17 RX ADMIN — INSULIN LISPRO 1 UNITS: 100 INJECTION, SOLUTION INTRAVENOUS; SUBCUTANEOUS at 22:15

## 2023-11-17 RX ADMIN — LATANOPROST 1 DROP: 50 SOLUTION OPHTHALMIC at 22:13

## 2023-11-17 RX ADMIN — BACLOFEN 10 MG: 10 TABLET ORAL at 22:23

## 2023-11-17 RX ADMIN — CARVEDILOL 12.5 MG: 12.5 TABLET, FILM COATED ORAL at 16:56

## 2023-11-17 RX ADMIN — LOSARTAN POTASSIUM 100 MG: 50 TABLET, FILM COATED ORAL at 09:31

## 2023-11-17 RX ADMIN — ACETAMINOPHEN 975 MG: 325 TABLET, FILM COATED ORAL at 22:12

## 2023-11-17 RX ADMIN — Medication 2 G: at 22:13

## 2023-11-17 RX ADMIN — ASPIRIN 81 MG: 81 TABLET, CHEWABLE ORAL at 09:31

## 2023-11-17 RX ADMIN — ACETAMINOPHEN 975 MG: 325 TABLET, FILM COATED ORAL at 16:56

## 2023-11-17 RX ADMIN — AMLODIPINE BESYLATE 5 MG: 5 TABLET ORAL at 09:31

## 2023-11-17 RX ADMIN — LEVOTHYROXINE SODIUM 50 MCG: 0.05 TABLET ORAL at 09:32

## 2023-11-17 RX ADMIN — LIDOCAINE 1 PATCH: 50 PATCH CUTANEOUS at 11:07

## 2023-11-17 RX ADMIN — HEPARIN SODIUM 5000 UNITS: 5000 INJECTION INTRAVENOUS; SUBCUTANEOUS at 22:15

## 2023-11-17 RX ADMIN — HEPARIN SODIUM 5000 UNITS: 5000 INJECTION INTRAVENOUS; SUBCUTANEOUS at 05:44

## 2023-11-17 RX ADMIN — ACETAMINOPHEN 975 MG: 325 TABLET, FILM COATED ORAL at 05:44

## 2023-11-17 NOTE — PROGRESS NOTES
[address]  Progress Note  Name: Jefry Coles  MRN: [id number]  Unit/Bed#: -01 I Date of Admission: 11/14/2023   Date of Service: 11/17/2023 I Hospital Day: 2    Assessment/Plan   * Ambulatory dysfunction  Assessment & Plan  Presented to the emergency department with ambulatory dysfunction. Patient notes for the past 2 weeks she been having worsening lower back pain and inability to walk. She reports she lives home alone and having difficulty taking care of herself  She recently underwent MRI on 11/12 that revealed multilevel degenerative thoracolumbar degenerative changes. Has had difficulty with ADLs at home. She was evaluated by PT/OT to emergency department recommending rehab. Case management is involved will need authorization  Follow-up further with case management, pending auth with [facility]  Appreciate PT/OT recommendations  Cont with pain control for right hip pain    Type 2 diabetes mellitus [facility])  Assessment & Plan  Lab Results   Component Value Date    HGBA1C 6.7 09/18/2023       Recent Labs     11/16/23  1115 11/16/23  1610 11/16/23  2042 11/17/23  0722   POCGLU 149* 167* 122 119         Blood Sugar Average: Last 72 hrs:  (P) [id number] of type 2 diabetes  Stable BG this AM  Hold home pioglitazone   sliding scale insulin  Monitor blood sugar      Hypothyroidism  Assessment & Plan  Continue Synthroid. OP followup     Hypertension  Assessment & Plan  /62   Pulse 60   Temp 98.1 °F (36.7 °C)   Resp 14   Ht 5' 2" (1.575 m)   Wt 66.7 kg (147 lb 0.8 oz)   SpO2 95%   BMI 26.90 kg/m²   Stable pressurs  BP initial reading in the ER noted to be elevated with SBP of 190 possibly in setting of pain  Continue amlodipine, losartan and beta-blocker  Continue hydralazine IV as needed for now    Hyperlipidemia  Assessment & Plan  Continue statin therapy.  OP followup    Coronary artery disease of native artery of native heart with stable angina pectoris [facility])  Assessment & Plan  No chest pain  History of coronary artery disease  Continue beta-blocker, aspirin, statin               VTE Pharmacologic Prophylaxis: VTE Score: 6 High Risk (Score >/= 5) - Pharmacological DVT Prophylaxis Ordered: heparin. Sequential Compression Devices Ordered. Mobility:   Basic Mobility Inpatient Raw Score: 17  JH-HLM Goal: 5: Stand one or more mins  JH-HLM Achieved: 7: Walk 25 feet or more  HLM Goal achieved. Continue to encourage appropriate mobility. Patient Centered Rounds: I performed bedside rounds with nursing staff today. Discussions with Specialists or Other Care Team Provider: yes cm    Education and Discussions with Family / Patient:  dtr has been kept updated on the phone. Total Time Spent on Date of Encounter in care of patient: 35 mins. This time was spent on one or more of the following: performing physical exam; counseling and coordination of care; obtaining or reviewing history; documenting in the medical record; reviewing/ordering tests, medications or procedures; communicating with other healthcare professionals and discussing with patient's family/caregivers. Current Length of Stay: 2 day(s)  Current Patient Status: Inpatient   Certification Statement: The patient will continue to require additional inpatient hospital stay due to pending auth  Discharge Plan: Anticipate discharge tomorrow to rehab facility. Code Status: Level 1 - Full Code    Subjective:   Seen and examined at bedside  Pain regimen is helping the pt to ambulate more she says  Denies any new symptoms    Objective:     Vitals:   Temp (24hrs), Av.1 °F (36.7 °C), Min:97.9 °F (36.6 °C), Max:98.3 °F (36.8 °C)    Temp:  [97.9 °F (36.6 °C)-98.3 °F (36.8 °C)] 98.1 °F (36.7 °C)  HR:  [58-65] 60  Resp:  [14-18] 14  BP: (120-137)/(46-62) 137/62  SpO2:  [95 %-97 %] 95 %  Body mass index is 26.9 kg/m². Input and Output Summary (last 24 hours):      Intake/Output Summary (Last 24 hours) at 11/17/2023 1404  Last data filed at 11/17/2023 0900  Gross per 24 hour   Intake 120 ml   Output --   Net 120 ml       Physical Exam:   Physical Exam General- Awake, alert and oriented x 3, looks comfortable  HEENT- Normocephalic, atraumatic, oral mucosa- moist  Neck- Supple, No carotid bruit, no JVD  CVS- Normal S1/ S2, Regular rate and rhythm, No murmur, No edema  Respiratory system- B/L clear breath sounds, no wheezing  Abdomen- Soft, Non distended, no tenderness, Bowel sound- present 4 quads  Genitourinary- No suprapubic tenderness, No CVA tenderness  Skin- No new bruise or rash  Musculoskeletal- restricted rom in the right hip  Psych- No acute psychosis  CNS- CN II- XII grossly intact, No acute focal neurologic deficit noted      Additional Data:     Labs:  Results from last 7 days   Lab Units 11/16/23  0459 11/15/23  0531   WBC Thousand/uL 6.09 6.31   HEMOGLOBIN g/dL 9.2* 9.6*   HEMATOCRIT % 28.8* 29.9*   PLATELETS Thousands/uL 183 198   NEUTROS PCT %  --  44   LYMPHS PCT %  --  36   MONOS PCT %  --  16*   EOS PCT %  --  3     Results from last 7 days   Lab Units 11/15/23  0531 11/14/23  1321   SODIUM mmol/L 140 139   POTASSIUM mmol/L 4.5 4.2   CHLORIDE mmol/L 109* 106   CO2 mmol/L 26 24   BUN mg/dL 24 27*   CREATININE mg/dL 1.12 1.06   ANION GAP mmol/L 5 9   CALCIUM mg/dL 8.9 9.3   ALBUMIN g/dL  --  4.0   TOTAL BILIRUBIN mg/dL  --  0.82   ALK PHOS U/L  --  40   ALT U/L  --  14   AST U/L  --  13   GLUCOSE RANDOM mg/dL 122 150*         Results from last 7 days   Lab Units 11/17/23  1103 11/17/23  0722 11/16/23  2042 11/16/23  1610 11/16/23  1115 11/16/23  0737 11/15/23  2047 11/15/23  1600 11/15/23  1112 11/15/23  0724 11/14/23  2046 11/14/23  1616   POC GLUCOSE mg/dl 139 119 122 167* 149* 115 176* 149* 169* 132 176* 174*               Lines/Drains:  Invasive Devices       Peripheral Intravenous Line  Duration             Peripheral IV 11/14/23 Left Antecubital 3 days                          Imaging: No pertinent imaging reviewed. Recent Cultures (last 7 days):         Last 24 Hours Medication List:   Current Facility-Administered Medications   Medication Dose Route Frequency Provider Last Rate    acetaminophen  975 mg Oral Q8H Nazia Chang MD      amLODIPine  5 mg Oral Daily Yovani Reilly MD      aspirin  81 mg Oral Daily Yovani Reilly MD      atorvastatin  40 mg Oral Daily Yovani Reilly MD      baclofen  10 mg Oral HS PRN LAURIE Cooley      carvedilol  12.5 mg Oral BID With Meals Yovani Reilly MD      Diclofenac Sodium  2 g Topical 4x Daily Aylin Gomez MD      heparin (porcine)  5,000 Units Subcutaneous Q8H [facility] Yovani Reilly MD      hydrALAZINE  5 mg Intravenous Q6H PRN Yovani Reilly MD      insulin lispro  1-5 Units Subcutaneous TID AC Yovani Reilly MD      insulin lispro  1-5 Units Subcutaneous HS Yovani Reilly MD      latanoprost  1 drop Both Eyes HS Yovani Reilly MD      levothyroxine  50 mcg Oral Daily Yovani Reilly MD      lidocaine  1 patch Topical Daily Aylin Gomez MD      losartan  100 mg Oral Daily Yovani Reilly MD      oxyCODONE  5 mg Oral Q6H PRN Yovani Reilly MD      oxyCODONE  2.5 mg Oral Q6H PRN Yovani Reilly MD      zolpidem  5 mg Oral HS PRN Irene Short MD          Today, Patient Was Seen By: Aylin Gomez MD    **Please Note: This note may have been constructed using a voice recognition system. **

## 2023-11-17 NOTE — ASSESSMENT & PLAN NOTE
Lab Results   Component Value Date    HGBA1C 6.7 09/18/2023       Recent Labs     11/16/23  1115 11/16/23  1610 11/16/23 2042 11/17/23  0722   POCGLU 149* 167* 122 119         Blood Sugar Average: Last 72 hrs:  (P) [id number] of type 2 diabetes  Stable BG this AM  Hold home pioglitazone   sliding scale insulin  Monitor blood sugar

## 2023-11-17 NOTE — PLAN OF CARE
Problem: OCCUPATIONAL THERAPY ADULT  Goal: Performs self-care activities at highest level of function for planned discharge setting. See evaluation for individualized goals. Description: Treatment Interventions: ADL retraining, Functional transfer training, UE strengthening/ROM, Endurance training, Patient/family training          See flowsheet documentation for full assessment, interventions and recommendations. Outcome: Progressing  Note: Limitation: Decreased ADL status, Decreased UE strength, Decreased endurance, Decreased self-care trans, Decreased high-level ADLs  Prognosis: Good  Assessment: Patient participated in Skilled OT session this date with interventions consisting of ADL re training with the use of correct body mechnaics, therapeutic exercise to: increase functional use of BUEs, increase BUE muscle strength ,  therapeutic activities to: increase activity tolerance, and increase dynamic sit/ stand balance during functional activity  . Patient agreeable to OT treatment session, upon arrival patient was found supine in bed and in no apparent distress. Patient completed bed mobility with supervision and functional transfers wit CGA. Pt ambulated to/from bathroom with CGA utilizing RW. Pt required supervision/set-up for toilet hygiene. While supine in bed, pt completed 1 set x 15 reps of BUE exercises to increase strength and endurance. In comparison to previous session, patient with improvements in balance and functional transfers. Patient requiring frequent rest periods. Patient continues to be functioning below baseline level, occupational performance remains limited secondary to factors listed above and increased risk for falls and injury. From OT standpoint, recommendation at time of d/c would be Level II (moderate resource intensity).    Patient to benefit from continued Occupational Therapy treatment while in the hospital to address deficits as defined above and maximize level of functional independence with ADLs and functional mobility.      Rehab Resource Intensity Level, OT: II (Moderate Resource Intensity)        Shelley Solares OTD, OTR/L

## 2023-11-17 NOTE — CASE MANAGEMENT
Support Center has received intent to deny. Received for [facility] Authorization.    Insurance: [employer]  Denial Reason: Member's care can be safely and effectively managed at a [address]: [place]   Denial #:[id number]  Peer to Peer [phone]       Deadline:11/20/23 @ 12pm ET   Appeal P# [phone]   Appeal F#   Care Manager Notified: Nickie Arenas Text sent to [facility] Physician Advisor Liaison Team

## 2023-11-17 NOTE — ASSESSMENT & PLAN NOTE
/62   Pulse 60   Temp 98.1 °F (36.7 °C)   Resp 14   Ht 5' 2" (1.575 m)   Wt 66.7 kg (147 lb 0.8 oz)   SpO2 95%   BMI 26.90 kg/m²   Stable pressurs  BP initial reading in the ER noted to be elevated with SBP of 190 possibly in setting of pain  Continue amlodipine, losartan and beta-blocker  Continue hydralazine IV as needed for now

## 2023-11-17 NOTE — PLAN OF CARE
Problem: SAFETY ADULT  Goal: Patient will remain free of falls  Description: INTERVENTIONS:  - Educate patient/family on patient safety including physical limitations  - Instruct patient to call for assistance with activity   - Consult OT/PT to assist with strengthening/mobility   - Keep Call bell within reach  - Keep bed low and locked with side rails adjusted as appropriate  - Keep care items and personal belongings within reach  - Initiate and maintain comfort rounds  - Make Fall Risk Sign visible to staff  - Offer Toileting every 2 Hours, in advance of need  - Apply yellow socks and bracelet for high fall risk patients  - Consider moving patient to room near nurses station  Outcome: Progressing  Goal: Maintain or return to baseline ADL function  Description: INTERVENTIONS:  -  Assess patient's ability to carry out ADLs; assess patient's baseline for ADL function and identify physical deficits which impact ability to perform ADLs (bathing, care of mouth/teeth, toileting, grooming, dressing, etc.)  - Assess/evaluate cause of self-care deficits   - Assess range of motion  - Assess patient's mobility; develop plan if impaired  - Assess patient's need for assistive devices and provide as appropriate  - Encourage maximum independence but intervene and supervise when necessary  - Involve family in performance of ADLs  - Assess for home care needs following discharge   - Consider OT consult to assist with ADL evaluation and planning for discharge  - Provide patient education as appropriate  Outcome: Progressing  Goal: Maintains/Returns to pre admission functional level  Description: INTERVENTIONS:  - Perform AM-PAC 6 Click Basic Mobility/ Daily Activity assessment daily.  - Set and communicate daily mobility goal to care team and patient/family/caregiver.    - Collaborate with rehabilitation services on mobility goals if consulted  - Out of bed for toileting  - Record patient progress and toleration of activity level Outcome: Progressing

## 2023-11-17 NOTE — OCCUPATIONAL THERAPY NOTE
Occupational Therapy Treatment Note     Patient Name: Gomez Chapin  Today's Date: 11/17/2023  Problem List  Principal Problem:    Ambulatory dysfunction  Active Problems:    Coronary artery disease of native artery of native heart with stable angina pectoris ([address])    Hyperlipidemia    Hypertension    Hypothyroidism    Type 2 diabetes mellitus ([address])        11/17/23 1558   OT Last Visit   OT Visit Date 11/17/23   Note Type   Note Type Treatment   Pain Assessment   Pain Assessment Tool 0-10   Pain Score   (0/10 at rest; 6/10 c walking)   Pain Location/Orientation Orientation: Right;Location: Hip   Pain Onset/Description Frequency: Intermittent; Descriptor: [place]  (c mobility)   Hospital Pain Intervention(s) Ambulation/increased activity;Repositioned;Medication (See MAR)   Restrictions/Precautions   Weight Bearing Precautions Per Order No   Other Precautions Fall Risk;Pain   Lifestyle   Autonomy Patient reporting being independent with ADLs/IADLs, ambulatory with rollator and lives alone at baseline. Pt reports that she hasn't driven in ~2-7 months. Reciprocal Relationships Son   Service to Others Retired   [facility] and [address]  [address]  [address] [place] [address] Deficit Steadying;Verbal cueing;Supervison/safety; Increased time to complete;Grab bar use   Bed Mobility   Supine to Sit 5  Supervision   Additional items Assist x 1;HOB elevated; Bedrails; Increased time required   Sit to Supine 5  Supervision   Additional items Assist x 1;HOB elevated; Bedrails; Increased time required   Transfers   Sit to Stand   (CGA)   Additional items Assist x 1;Bedrails; Increased time required;Verbal cues   Stand to Sit   (CGA)   Additional items Assist x 1;Bedrails; Increased time required;Verbal cues   Toilet transfer   (CGA)   Additional items Assist x 1; Increased time required;Verbal cues;Standard toilet   Functional Mobility   Functional Mobility   (CGA)   Additional Comments Pt ambulated to/from bathroom with no overt SOB or LOB. Pt reported increase in pain with mobility. Additional items Rolling walker   Toilet Transfers   Toilet Transfer From [employer] Transfer Type To and from   Toilet Transfer to Standard toilet   Toilet Transfer Technique Ambulating   Toilet Transfers Contact guard   Therapeutic Excerise-Strength   UE Strength Yes  (to increase strength and endurance)   Right Upper Extremity- Strength   R Shoulder Flexion; Extension  (Protraction/Retraction)   R Elbow Elbow flexion;Elbow extension   R Position Supine;Against gravity   R Weight/Reps/Sets 1 set x 15 reps each   Left Upper Extremity-Strength   L Shoulder Flexion; Extension  (Protraction/Retraction)   L Elbow Elbow flexion;Elbow extension   L Position Supine   L Weights/Reps/Sets 1 set x 15 reps each   Cognition   Overall Cognitive Status WFL   Arousal/Participation Alert; Cooperative   Attention Within functional limits   Orientation Level Oriented X4   Memory Within functional limits   Following Commands Follows all commands and directions without difficulty   Comments Pt agreeable to OT session   Activity Tolerance   Activity Tolerance Patient limited by pain   Assessment   Assessment Patient participated in Skilled OT session this date with interventions consisting of ADL re training with the use of correct body mechnaics, therapeutic exercise to: increase functional use of BUEs, increase BUE muscle strength ,  therapeutic activities to: increase activity tolerance, and increase dynamic sit/ stand balance during functional activity  . Patient agreeable to OT treatment session, upon arrival patient was found supine in bed and in no apparent distress. Patient completed bed mobility with supervision and functional transfers wit CGA. Pt ambulated to/from bathroom with CGA utilizing RW. Pt required supervision/set-up for toilet hygiene.  While supine in bed, pt completed 1 set x 15 reps of BUE exercises to increase strength and endurance. In comparison to previous session, patient with improvements in balance and functional transfers. Patient requiring frequent rest periods. Patient continues to be functioning below baseline level, occupational performance remains limited secondary to factors listed above and increased risk for falls and injury. From OT standpoint, recommendation at time of d/c would be Level II (moderate resource intensity). Patient to benefit from continued Occupational Therapy treatment while in the hospital to address deficits as defined above and maximize level of functional independence with ADLs and functional mobility. Plan   Treatment Interventions ADL retraining;Functional transfer training;UE strengthening/ROM; Endurance training;Patient/family training   Goal Expiration Date 11/30/23   OT Treatment Day 2   OT Frequency 3-5x/wk   Discharge Recommendation   Rehab Resource Intensity Level, OT II (Moderate Resource Intensity)   AM-PAC Daily Activity Inpatient   Lower Body Dressing 2   Bathing 2   Toileting 3   Upper Body Dressing 3   Grooming 3   Eating 4   Daily Activity Raw Score 17   Daily Activity Standardized Score (Calc for Raw Score >=11) 37.26   AM-PAC Applied Cognition Inpatient   Following a Speech/Presentation 4   Understanding Ordinary Conversation 4   Taking Medications 4   Remembering Where Things Are Placed or Put Away 3   Remembering List of 4-5 Errands 3   Taking Care of Complicated Tasks 3   Applied Cognition Raw Score 21   Applied Cognition Standardized Score 44.3   Modified [place] Scale   Modified [place] Scale 4   End of Consult   Patient Position at End of Consult Supine; All needs within reach   Nurse Communication Nurse aware of consult  (PCA in room at end of session)     Dilma CRESPO, OTR/L

## 2023-11-17 NOTE — ASSESSMENT & PLAN NOTE
Presented to the emergency department with ambulatory dysfunction. Patient notes for the past 2 weeks she been having worsening lower back pain and inability to walk. She reports she lives home alone and having difficulty taking care of herself  She recently underwent MRI on 11/12 that revealed multilevel degenerative thoracolumbar degenerative changes. Has had difficulty with ADLs at home. She was evaluated by PT/OT to emergency department recommending rehab.   Case management is involved will need authorization  Follow-up further with case management, pending auth with [employer]  Appreciate PT/OT recommendations  Cont with pain control for right hip pain

## 2023-11-18 LAB
GLUCOSE SERPL-MCNC: 117 MG/DL (ref 65–140)
GLUCOSE SERPL-MCNC: 118 MG/DL (ref 65–140)
GLUCOSE SERPL-MCNC: 120 MG/DL (ref 65–140)
GLUCOSE SERPL-MCNC: 123 MG/DL (ref 65–140)
GLUCOSE SERPL-MCNC: 126 MG/DL (ref 65–140)

## 2023-11-18 PROCEDURE — 99232 SBSQ HOSP IP/OBS MODERATE 35: CPT | Performed by: FAMILY MEDICINE

## 2023-11-18 PROCEDURE — 82948 REAGENT STRIP/BLOOD GLUCOSE: CPT

## 2023-11-18 RX ADMIN — HEPARIN SODIUM 5000 UNITS: 5000 INJECTION INTRAVENOUS; SUBCUTANEOUS at 06:28

## 2023-11-18 RX ADMIN — ACETAMINOPHEN 975 MG: 325 TABLET, FILM COATED ORAL at 22:12

## 2023-11-18 RX ADMIN — AMLODIPINE BESYLATE 5 MG: 5 TABLET ORAL at 08:57

## 2023-11-18 RX ADMIN — LEVOTHYROXINE SODIUM 50 MCG: 0.05 TABLET ORAL at 08:57

## 2023-11-18 RX ADMIN — ASPIRIN 81 MG: 81 TABLET, CHEWABLE ORAL at 08:57

## 2023-11-18 RX ADMIN — HEPARIN SODIUM 5000 UNITS: 5000 INJECTION INTRAVENOUS; SUBCUTANEOUS at 22:12

## 2023-11-18 RX ADMIN — HEPARIN SODIUM 5000 UNITS: 5000 INJECTION INTRAVENOUS; SUBCUTANEOUS at 13:44

## 2023-11-18 RX ADMIN — LATANOPROST 1 DROP: 50 SOLUTION OPHTHALMIC at 22:13

## 2023-11-18 RX ADMIN — ATORVASTATIN CALCIUM 40 MG: 40 TABLET, FILM COATED ORAL at 08:57

## 2023-11-18 RX ADMIN — LIDOCAINE 1 PATCH: 50 PATCH CUTANEOUS at 11:19

## 2023-11-18 RX ADMIN — LOSARTAN POTASSIUM 100 MG: 50 TABLET, FILM COATED ORAL at 08:57

## 2023-11-18 RX ADMIN — ACETAMINOPHEN 975 MG: 325 TABLET, FILM COATED ORAL at 06:28

## 2023-11-18 RX ADMIN — ACETAMINOPHEN 975 MG: 325 TABLET, FILM COATED ORAL at 13:44

## 2023-11-18 RX ADMIN — Medication 2 G: at 22:13

## 2023-11-18 RX ADMIN — ZOLPIDEM TARTRATE 5 MG: 5 TABLET, COATED ORAL at 22:12

## 2023-11-18 RX ADMIN — BACLOFEN 10 MG: 10 TABLET ORAL at 22:15

## 2023-11-18 RX ADMIN — CARVEDILOL 12.5 MG: 12.5 TABLET, FILM COATED ORAL at 17:03

## 2023-11-18 RX ADMIN — CARVEDILOL 12.5 MG: 12.5 TABLET, FILM COATED ORAL at 07:54

## 2023-11-18 NOTE — ASSESSMENT & PLAN NOTE
/66   Pulse 68   Temp 97.8 °F (36.6 °C)   Resp 17   Ht 5' 2" (1.575 m)   Wt 66.7 kg (147 lb 0.8 oz)   SpO2 96%   BMI 26.90 kg/m²   Stable pressures  BP initial reading in the ER noted to be elevated with SBP of 190 possibly in setting of pain  Continue amlodipine, losartan and beta-blocker  Continue hydralazine IV as needed for now

## 2023-11-18 NOTE — ASSESSMENT & PLAN NOTE
Lab Results   Component Value Date    HGBA1C 6.7 09/18/2023       Recent Labs     11/17/23  1612 11/17/23  2103 11/18/23  0727 11/18/23  0749   POCGLU 146* 160* 126 123         Blood Sugar Average: Last 72 hrs:  (P) [id number] of type 2 diabetes  Stable BG  Hold home pioglitazone   sliding scale insulin  Monitor blood sugar

## 2023-11-18 NOTE — CASE MANAGEMENT
Case Management Discharge Planning Note    Patient name Aquilino Beaulieu  Location [address] [facility] [place] 332/-01 MRN [id number]  : 1934 Date 2023       Current Admission Date: 2023  Current Admission Diagnosis:Ambulatory dysfunction   Patient Active Problem List    Diagnosis Date Noted    Ambulatory dysfunction 2023    Stage 3 chronic kidney disease, unspecified whether stage 3a or 3b CKD ([address]) 2023    Chronic pain syndrome 2022    Chronic bilateral low back pain without sciatica 2022    Sacroiliitis ([address]) 2022    Trochanteric bursitis of right hip 2021    Non-Hodgkin's lymphoma ([address]) 10/05/2020    Light chain disease, 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    Medicare annual wellness visit, subsequent 2020    Adhesive capsulitis of shoulder 2020    Triclonal gammopathy 2019    Greater trochanteric bursitis of left hip 2019    Left sided sciatica 2019    Diabetic peripheral neuropathy ([address]) 2019    Screening mammogram, encounter for 2018    Lumbar compression fracture ([address]) 10/11/2017    Vitamin D deficiency 10/11/2017    Vitamin B12 deficiency 2016    Lumbar spinal stenosis 2015    Normochromic normocytic anemia 2015    Depression 2015    Esophageal reflux 2013    Osteoarthritis of knee 2013    Spinal stenosis 2013    Coronary artery disease of native artery of native heart with stable angina pectoris ([address]) 2013    Hyperlipidemia 2013    Hypertension 2013    Hypothyroidism 2013    Insomnia 2013    Osteoporosis 2013    Type 2 diabetes mellitus ([address]) 2012      LOS (days): 3  Geometric Mean LOS (GMLOS) (days): 2.60  Days to GMLOS:-0.4     OBJECTIVE:  Risk of Unplanned Readmission Score: 12.47         Current admission status: Inpatient   Preferred Pharmacy:   [address] [place] [address] [facility] [address] [facility]  [address] [place] [place] [address]  Phone: [phone] Fax: [phone]    Primary Care Provider: Jose R Kim MD    [facility]  Secondary Insurance:     DISCHARGE DETAILS:                                          Other Referral/Resources/Interventions Provided:  Referral Comments: Insurance denied [employer] [facility].   A peer to peer to be done by [facility] physician team

## 2023-11-18 NOTE — PROGRESS NOTES
[address]  Progress Note  Name: Lurlene Fleischer  MRN: [id number]  Unit/Bed#: -01 I Date of Admission: 11/14/2023   Date of Service: 11/18/2023 I Hospital Day: 3    Assessment/Plan   * Ambulatory dysfunction  Assessment & Plan  Presented to the emergency department with ambulatory dysfunction. Patient notes for the past 2 weeks she been having worsening lower back pain and inability to walk. She reports she lives home alone and having difficulty taking care of herself  She recently underwent MRI on 11/12 that revealed multilevel degenerative thoracolumbar degenerative changes. Has had difficulty with ADLs at home. She was evaluated by PT/OT to emergency department recommending rehab. Case management is involved will need authorization  Follow-up further with case management, Uzair Johnson was denies as per - [facility] team to do Peer to peer  Appreciate PT/OT recommendations  Cont with pain control for right hip pain    Type 2 diabetes mellitus [facility])  Assessment & Plan  Lab Results   Component Value Date    HGBA1C 6.7 09/18/2023       Recent Labs     11/17/23  1612 11/17/23  2103 11/18/23  0727 11/18/23  0749   POCGLU 146* 160* 126 123         Blood Sugar Average: Last 72 hrs:  (P) [id number] of type 2 diabetes  Stable BG  Hold home pioglitazone   sliding scale insulin  Monitor blood sugar      Hypothyroidism  Assessment & Plan  Continue Synthroid. OP followup     Hypertension  Assessment & Plan  /66   Pulse 68   Temp 97.8 °F (36.6 °C)   Resp 17   Ht 5' 2" (1.575 m)   Wt 66.7 kg (147 lb 0.8 oz)   SpO2 96%   BMI 26.90 kg/m²   Stable pressures  BP initial reading in the ER noted to be elevated with SBP of 190 possibly in setting of pain  Continue amlodipine, losartan and beta-blocker  Continue hydralazine IV as needed for now    Hyperlipidemia  Assessment & Plan  Continue statin therapy.  OP followup    Coronary artery disease of native artery of native heart with stable angina pectoris ([address])  Assessment & Plan  No chest pain  History of coronary artery disease  Continue beta-blocker, aspirin, statin               VTE Pharmacologic Prophylaxis: VTE Score: 6 High Risk (Score >/= 5) - Pharmacological DVT Prophylaxis Ordered: heparin. Sequential Compression Devices Ordered. Mobility:   Basic Mobility Inpatient Raw Score: 17  JH-HLM Goal: 5: Stand one or more mins  JH-HLM Achieved: 7: Walk 25 feet or more  HLM Goal achieved. Continue to encourage appropriate mobility. Patient Centered Rounds: I performed bedside rounds with nursing staff today. Discussions with Specialists or Other Care Team Provider: BRAD    Education and Discussions with Family / Patient:  pts dtr has been kep tupdated. Total Time Spent on Date of Encounter in care of patient: 37 mins. This time was spent on one or more of the following: performing physical exam; counseling and coordination of care; obtaining or reviewing history; documenting in the medical record; reviewing/ordering tests, medications or procedures; communicating with other healthcare professionals and discussing with patient's family/caregivers. Current Length of Stay: 3 day(s)  Current Patient Status: Inpatient   Certification Statement: The patient will continue to require additional inpatient hospital stay due to peer to peer with insurance co  Discharge Plan:  TBD based on peer to peer    Code Status: Level 1 - Full Code    Subjective:   Seen and examined at bedside  No new complaints  Pain is better controlled    Objective:     Vitals:   Temp (24hrs), Av.1 °F (36.7 °C), Min:97.8 °F (36.6 °C), Max:98.3 °F (36.8 °C)    Temp:  [97.8 °F (36.6 °C)-98.3 °F (36.8 °C)] 97.8 °F (36.6 °C)  HR:  [64-68] 68  Resp:  [17] 17  BP: (128-155)/(48-66) 130/66  SpO2:  [94 %-97 %] 97 %  Body mass index is 26.9 kg/m².      Input and Output Summary (last 24 hours):   No intake or output data in the 24 hours ending 23 1208    Physical Exam:   Physical Exam General- Awake, alert and oriented x 3, looks comfortable  HEENT- Normocephalic, atraumatic, oral mucosa- moist  Neck- Supple, No carotid bruit, no JVD  CVS- Normal S1/ S2, Regular rate and rhythm, No murmur, No edema  Respiratory system- B/L clear breath sounds, no wheezing  Abdomen- Soft, Non distended, no tenderness, Bowel sound- present 4 quads  Genitourinary- No suprapubic tenderness, No CVA tenderness  Skin- No new bruise or rash  Musculoskeletal-ROM right LE restricted due to pain  Psych- No acute psychosis  CNS- CN II- XII grossly intact, No acute focal neurologic deficit noted      Additional Data:     Labs:  Results from last 7 days   Lab Units 11/16/23  0459 11/15/23  0531   WBC Thousand/uL 6.09 6.31   HEMOGLOBIN g/dL 9.2* 9.6*   HEMATOCRIT % 28.8* 29.9*   PLATELETS Thousands/uL 183 198   NEUTROS PCT %  --  44   LYMPHS PCT %  --  36   MONOS PCT %  --  16*   EOS PCT %  --  3     Results from last 7 days   Lab Units 11/15/23  0531 11/14/23  1321   SODIUM mmol/L 140 139   POTASSIUM mmol/L 4.5 4.2   CHLORIDE mmol/L 109* 106   CO2 mmol/L 26 24   BUN mg/dL 24 27*   CREATININE mg/dL 1.12 1.06   ANION GAP mmol/L 5 9   CALCIUM mg/dL 8.9 9.3   ALBUMIN g/dL  --  4.0   TOTAL BILIRUBIN mg/dL  --  0.82   ALK PHOS U/L  --  40   ALT U/L  --  14   AST U/L  --  13   GLUCOSE RANDOM mg/dL 122 150*         Results from last 7 days   Lab Units 11/18/23  1057 11/18/23  0749 11/18/23  0727 11/17/23  2103 11/17/23  1612 11/17/23  1103 11/17/23  0722 11/16/23  2042 11/16/23  1610 11/16/23  1115 11/16/23  0737 11/15/23  2047   POC GLUCOSE mg/dl 118 123 126 160* 146* 139 119 122 167* 149* 115 176*               Lines/Drains:  Invasive Devices       Peripheral Intravenous Line  Duration             Peripheral IV 11/14/23 Left Antecubital 3 days                          Imaging: No pertinent imaging reviewed.     Recent Cultures (last 7 days):         Last 24 Hours Medication List:   Current Facility-Administered Medications   Medication Dose Route Frequency Provider Last Rate    acetaminophen  975 mg Oral [facility] Oliver Sr MD      amLODIPine  5 mg Oral Daily Yovani Reilly MD      aspirin  81 mg Oral Daily Yovani Reilly MD      atorvastatin  40 mg Oral Daily Yovani Reilly MD      baclofen  10 mg Oral HS PRN LAURIE Cooley      carvedilol  12.5 mg Oral BID With Meals Yovani Reilly MD      Diclofenac Sodium  2 g Topical 4x Daily Oliver Sr MD      heparin (porcine)  5,000 Units Subcutaneous Q8H [facility] Yovani Reilly MD      hydrALAZINE  5 mg Intravenous Q6H PRN Yovani Reilly MD      insulin lispro  1-5 Units Subcutaneous TID AC Yovani Reilly MD      insulin lispro  1-5 Units Subcutaneous HS Yovani Reilly MD      latanoprost  1 drop Both Eyes HS Yovani Reilly MD      levothyroxine  50 mcg Oral Daily Yovani Reilly MD      lidocaine  1 patch Topical Daily Oliver Sr MD      losartan  100 mg Oral Daily Yovani Reilly MD      oxyCODONE  5 mg Oral Q6H PRN Yovani Reilly MD      oxyCODONE  2.5 mg Oral Q6H PRN Yovani Reilly MD      zolpidem  5 mg Oral HS PRN Baudilio Barraza MD          Today, Patient Was Seen By: Oliver Sr MD    **Please Note: This note may have been constructed using a voice recognition system. **

## 2023-11-18 NOTE — ASSESSMENT & PLAN NOTE
Presented to the emergency department with ambulatory dysfunction. Patient notes for the past 2 weeks she been having worsening lower back pain and inability to walk. She reports she lives home alone and having difficulty taking care of herself  She recently underwent MRI on 11/12 that revealed multilevel degenerative thoracolumbar degenerative changes. Has had difficulty with ADLs at home. She was evaluated by PT/OT to emergency department recommending rehab.   Case management is involved will need authorization  Follow-up further with case management, Pastor Gallo was denies as per - [facility] team to do Peer to peer  Appreciate PT/OT recommendations  Cont with pain control for right hip pain

## 2023-11-19 LAB
ANION GAP SERPL CALCULATED.3IONS-SCNC: 7 MMOL/L
BASOPHILS # BLD AUTO: 0.02 THOUSANDS/ÂΜL (ref 0–0.1)
BASOPHILS NFR BLD AUTO: 0 % (ref 0–1)
BUN SERPL-MCNC: 24 MG/DL (ref 5–25)
CALCIUM SERPL-MCNC: 9.1 MG/DL (ref 8.4–10.2)
CHLORIDE SERPL-SCNC: 107 MMOL/L (ref 96–108)
CO2 SERPL-SCNC: 25 MMOL/L (ref 21–32)
CREAT SERPL-MCNC: 0.94 MG/DL (ref 0.6–1.3)
EOSINOPHIL # BLD AUTO: 0.13 THOUSAND/ÂΜL (ref 0–0.61)
EOSINOPHIL NFR BLD AUTO: 3 % (ref 0–6)
ERYTHROCYTE [DISTWIDTH] IN BLOOD BY AUTOMATED COUNT: 14.9 % (ref 11.6–15.1)
GFR SERPL CREATININE-BSD FRML MDRD: 53 ML/MIN/1.73SQ M
GLUCOSE SERPL-MCNC: 111 MG/DL (ref 65–140)
GLUCOSE SERPL-MCNC: 113 MG/DL (ref 65–140)
GLUCOSE SERPL-MCNC: 137 MG/DL (ref 65–140)
GLUCOSE SERPL-MCNC: 139 MG/DL (ref 65–140)
GLUCOSE SERPL-MCNC: 183 MG/DL (ref 65–140)
HCT VFR BLD AUTO: 30.8 % (ref 34.8–46.1)
HGB BLD-MCNC: 9.9 G/DL (ref 11.5–15.4)
IMM GRANULOCYTES # BLD AUTO: 0.03 THOUSAND/UL (ref 0–0.2)
IMM GRANULOCYTES NFR BLD AUTO: 1 % (ref 0–2)
LYMPHOCYTES # BLD AUTO: 1.77 THOUSANDS/ÂΜL (ref 0.6–4.47)
LYMPHOCYTES NFR BLD AUTO: 37 % (ref 14–44)
MCH RBC QN AUTO: 29.3 PG (ref 26.8–34.3)
MCHC RBC AUTO-ENTMCNC: 32.1 G/DL (ref 31.4–37.4)
MCV RBC AUTO: 91 FL (ref 82–98)
MONOCYTES # BLD AUTO: 0.87 THOUSAND/ÂΜL (ref 0.17–1.22)
MONOCYTES NFR BLD AUTO: 18 % (ref 4–12)
NEUTROPHILS # BLD AUTO: 1.92 THOUSANDS/ÂΜL (ref 1.85–7.62)
NEUTS SEG NFR BLD AUTO: 41 % (ref 43–75)
NRBC BLD AUTO-RTO: 0 /100 WBCS
PLATELET # BLD AUTO: 187 THOUSANDS/UL (ref 149–390)
PMV BLD AUTO: 9.5 FL (ref 8.9–12.7)
POTASSIUM SERPL-SCNC: 4 MMOL/L (ref 3.5–5.3)
RBC # BLD AUTO: 3.38 MILLION/UL (ref 3.81–5.12)
SODIUM SERPL-SCNC: 139 MMOL/L (ref 135–147)
WBC # BLD AUTO: 4.74 THOUSAND/UL (ref 4.31–10.16)

## 2023-11-19 PROCEDURE — 80048 BASIC METABOLIC PNL TOTAL CA: CPT | Performed by: FAMILY MEDICINE

## 2023-11-19 PROCEDURE — 85025 COMPLETE CBC W/AUTO DIFF WBC: CPT | Performed by: FAMILY MEDICINE

## 2023-11-19 PROCEDURE — 99231 SBSQ HOSP IP/OBS SF/LOW 25: CPT | Performed by: FAMILY MEDICINE

## 2023-11-19 PROCEDURE — 82948 REAGENT STRIP/BLOOD GLUCOSE: CPT

## 2023-11-19 RX ADMIN — LEVOTHYROXINE SODIUM 50 MCG: 0.05 TABLET ORAL at 08:32

## 2023-11-19 RX ADMIN — BACLOFEN 10 MG: 10 TABLET ORAL at 22:11

## 2023-11-19 RX ADMIN — HEPARIN SODIUM 5000 UNITS: 5000 INJECTION INTRAVENOUS; SUBCUTANEOUS at 22:11

## 2023-11-19 RX ADMIN — HEPARIN SODIUM 5000 UNITS: 5000 INJECTION INTRAVENOUS; SUBCUTANEOUS at 13:14

## 2023-11-19 RX ADMIN — ACETAMINOPHEN 975 MG: 325 TABLET, FILM COATED ORAL at 13:14

## 2023-11-19 RX ADMIN — ZOLPIDEM TARTRATE 5 MG: 5 TABLET, COATED ORAL at 22:11

## 2023-11-19 RX ADMIN — CARVEDILOL 12.5 MG: 12.5 TABLET, FILM COATED ORAL at 08:32

## 2023-11-19 RX ADMIN — LATANOPROST 1 DROP: 50 SOLUTION OPHTHALMIC at 22:11

## 2023-11-19 RX ADMIN — OXYCODONE HYDROCHLORIDE 5 MG: 5 TABLET ORAL at 20:10

## 2023-11-19 RX ADMIN — AMLODIPINE BESYLATE 5 MG: 5 TABLET ORAL at 08:32

## 2023-11-19 RX ADMIN — INSULIN LISPRO 1 UNITS: 100 INJECTION, SOLUTION INTRAVENOUS; SUBCUTANEOUS at 12:38

## 2023-11-19 RX ADMIN — LIDOCAINE 1 PATCH: 50 PATCH CUTANEOUS at 12:42

## 2023-11-19 RX ADMIN — CARVEDILOL 12.5 MG: 12.5 TABLET, FILM COATED ORAL at 17:10

## 2023-11-19 RX ADMIN — ATORVASTATIN CALCIUM 40 MG: 40 TABLET, FILM COATED ORAL at 08:33

## 2023-11-19 RX ADMIN — ASPIRIN 81 MG: 81 TABLET, CHEWABLE ORAL at 08:32

## 2023-11-19 RX ADMIN — ACETAMINOPHEN 975 MG: 325 TABLET, FILM COATED ORAL at 05:59

## 2023-11-19 RX ADMIN — ACETAMINOPHEN 975 MG: 325 TABLET, FILM COATED ORAL at 22:11

## 2023-11-19 RX ADMIN — LOSARTAN POTASSIUM 100 MG: 50 TABLET, FILM COATED ORAL at 08:32

## 2023-11-19 RX ADMIN — HEPARIN SODIUM 5000 UNITS: 5000 INJECTION INTRAVENOUS; SUBCUTANEOUS at 05:59

## 2023-11-19 NOTE — ASSESSMENT & PLAN NOTE
Lab Results   Component Value Date    HGBA1C 6.7 09/18/2023       Recent Labs     11/18/23  0749 11/18/23  1057 11/18/23  1553 11/18/23 2103   POCGLU 123 118 120 117         Blood Sugar Average: Last 72 hrs:  (P) [id number] of type 2 diabetes  Stable BG, well controlled currently  Hold home pioglitazone   sliding scale insulin  Monitor blood sugar

## 2023-11-19 NOTE — ASSESSMENT & PLAN NOTE
/58   Pulse 64   Temp 98.7 °F (37.1 °C)   Resp 16   Ht 5' 2" (1.575 m)   Wt 66.7 kg (147 lb 0.8 oz)   SpO2 94%   BMI 26.90 kg/m²   Acceptable pressurs  BP initial reading in the ER noted to be elevated with SBP of 190 possibly in setting of pain  Continue amlodipine, losartan and beta-blocker  Continue hydralazine IV as needed for now

## 2023-11-19 NOTE — PLAN OF CARE
Problem: PAIN - ADULT  Goal: Verbalizes/displays adequate comfort level or baseline comfort level  Description: Interventions:  - Encourage patient to monitor pain and request assistance  - Assess pain using appropriate pain scale  - Administer analgesics based on type and severity of pain and evaluate response  - Implement non-pharmacological measures as appropriate and evaluate response  - Consider cultural and social influences on pain and pain management  - Notify physician/advanced practitioner if interventions unsuccessful or patient reports new pain  Outcome: Progressing     Problem: INFECTION - ADULT  Goal: Absence or prevention of progression during hospitalization  Description: INTERVENTIONS:  - Assess and monitor for signs and symptoms of infection  - Monitor lab/diagnostic results  - Monitor all insertion sites, i.e. indwelling lines, tubes, and drains  - Monitor endotracheal if appropriate and nasal secretions for changes in amount and color  - [place] appropriate cooling/warming therapies per order  - Administer medications as ordered  - Instruct and encourage patient and family to use good hand hygiene technique  - Identify and instruct in appropriate isolation precautions for identified infection/condition  Outcome: Progressing  Goal: Absence of fever/infection during neutropenic period  Description: INTERVENTIONS:  - Monitor WBC    Outcome: Progressing     Problem: SAFETY ADULT  Goal: Patient will remain free of falls  Description: INTERVENTIONS:  - Educate patient/family on patient safety including physical limitations  - Instruct patient to call for assistance with activity   - Consult OT/PT to assist with strengthening/mobility   - Keep Call bell within reach  - Keep bed low and locked with side rails adjusted as appropriate  - Keep care items and personal belongings within reach  - Initiate and maintain comfort rounds  - Make Fall Risk Sign visible to staff  - Apply yellow socks and bracelet for high fall risk patients  - Consider moving patient to room near nurses station  Outcome: Progressing  Goal: Maintain or return to baseline ADL function  Description: INTERVENTIONS:  -  Assess patient's ability to carry out ADLs; assess patient's baseline for ADL function and identify physical deficits which impact ability to perform ADLs (bathing, care of mouth/teeth, toileting, grooming, dressing, etc.)  - Assess/evaluate cause of self-care deficits   - Assess range of motion  - Assess patient's mobility; develop plan if impaired  - Assess patient's need for assistive devices and provide as appropriate  - Encourage maximum independence but intervene and supervise when necessary  - Involve family in performance of ADLs  - Assess for home care needs following discharge   - Consider OT consult to assist with ADL evaluation and planning for discharge  - Provide patient education as appropriate  Outcome: Progressing  Goal: Maintains/Returns to pre admission functional level  Description: INTERVENTIONS:  - Perform AM-PAC 6 Click Basic Mobility/ Daily Activity assessment daily.  - Set and communicate daily mobility goal to care team and patient/family/caregiver.    - Collaborate with rehabilitation services on mobility goals if consulted  - Out of bed for toileting  - Record patient progress and toleration of activity level   Outcome: Progressing     Problem: DISCHARGE PLANNING  Goal: Discharge to home or other facility with appropriate resources  Description: INTERVENTIONS:  - Identify barriers to discharge w/patient and caregiver  - Arrange for needed discharge resources and transportation as appropriate  - Identify discharge learning needs (meds, wound care, etc.)  - Arrange for interpretive services to assist at discharge as needed  - Refer to Case Management Department for coordinating discharge planning if the patient needs post-hospital services based on physician/advanced practitioner order or complex needs related to functional status, cognitive ability, or social support system  Outcome: Progressing     Problem: Knowledge Deficit  Goal: Patient/family/caregiver demonstrates understanding of disease process, treatment plan, medications, and discharge instructions  Description: Complete learning assessment and assess knowledge base.   Interventions:  - Provide teaching at level of understanding  - Provide teaching via preferred learning methods  Outcome: Progressing     Problem: MUSCULOSKELETAL - ADULT  Goal: Maintain or return mobility to safest level of function  Description: INTERVENTIONS:  - Assess patient's ability to carry out ADLs; assess patient's baseline for ADL function and identify physical deficits which impact ability to perform ADLs (bathing, care of mouth/teeth, toileting, grooming, dressing, etc.)  - Assess/evaluate cause of self-care deficits   - Assess range of motion  - Assess patient's mobility  - Assess patient's need for assistive devices and provide as appropriate  - Encourage maximum independence but intervene and supervise when necessary  - Involve family in performance of ADLs  - Assess for home care needs following discharge   - Consider OT consult to assist with ADL evaluation and planning for discharge  - Provide patient education as appropriate  Outcome: Progressing  Goal: Maintain proper alignment of affected body part  Description: INTERVENTIONS:  - Support, maintain and protect limb and body alignment  - Provide patient/ family with appropriate education  Outcome: Progressing     Problem: Prexisting or High Potential for Compromised Skin Integrity  Goal: Skin integrity is maintained or improved  Description: INTERVENTIONS:  - Identify patients at risk for skin breakdown  - Assess and monitor skin integrity  - Assess and monitor nutrition and hydration status  - Monitor labs   - Assess for incontinence   - Turn and reposition patient  - Assist with mobility/ambulation  - Relieve pressure over bony prominences  - Avoid friction and shearing  - Provide appropriate hygiene as needed including keeping skin clean and dry  - Evaluate need for skin moisturizer/barrier cream  - Collaborate with interdisciplinary team   - Patient/family teaching  - Consider wound care consult   Outcome: Progressing

## 2023-11-19 NOTE — ASSESSMENT & PLAN NOTE
Presented to the emergency department with ambulatory dysfunction. Patient notes for the past 2 weeks she been having worsening lower back pain and inability to walk. She reports she lives home alone and having difficulty taking care of herself  She recently underwent MRI on 11/12 that revealed multilevel degenerative thoracolumbar degenerative changes. Has had difficulty with ADLs at home. She was evaluated by PT/OT to emergency department recommending rehab. Case management is involved will need authorization  Follow-up further with case management, Jose White was denies as per - [facility] team to do Peer to peer.  pending  Appreciate PT/OT recommendations  Cont with pain control for right hip pain

## 2023-11-19 NOTE — PROGRESS NOTES
[address]  Progress Note  Name: Marietta Butler  MRN: [id number]  Unit/Bed#: -01 I Date of Admission: 11/14/2023   Date of Service: 11/19/2023 I Hospital Day: 4    Assessment/Plan   * Ambulatory dysfunction  Assessment & Plan  Presented to the emergency department with ambulatory dysfunction. Patient notes for the past 2 weeks she been having worsening lower back pain and inability to walk. She reports she lives home alone and having difficulty taking care of herself  She recently underwent MRI on 11/12 that revealed multilevel degenerative thoracolumbar degenerative changes. Has had difficulty with ADLs at home. She was evaluated by PT/OT to emergency department recommending rehab. Case management is involved will need authorization  Follow-up further with case management, Pastor Gallo was denies as per - [facility] team to do Peer to peer. pending  Appreciate PT/OT recommendations  Cont with pain control for right hip pain    Type 2 diabetes mellitus [facility])  Assessment & Plan  Lab Results   Component Value Date    HGBA1C 6.7 09/18/2023       Recent Labs     11/18/23  0749 11/18/23  1057 11/18/23  1553 11/18/23  2103   POCGLU 123 118 120 117         Blood Sugar Average: Last 72 hrs:  (P) [id number] of type 2 diabetes  Stable BG, well controlled currently  Hold home pioglitazone   sliding scale insulin  Monitor blood sugar      Hypothyroidism  Assessment & Plan  Continue Synthroid. OP followup     Hypertension  Assessment & Plan  /58   Pulse 64   Temp 98.7 °F (37.1 °C)   Resp 16   Ht 5' 2" (1.575 m)   Wt 66.7 kg (147 lb 0.8 oz)   SpO2 94%   BMI 26.90 kg/m²   Acceptable pressurs  BP initial reading in the ER noted to be elevated with SBP of 190 possibly in setting of pain  Continue amlodipine, losartan and beta-blocker  Continue hydralazine IV as needed for now    Hyperlipidemia  Assessment & Plan  Continue statin therapy.  OP followup    Coronary artery disease of native artery of native heart with stable angina pectoris (HCC)  Assessment & Plan  No chest pain  History of coronary artery disease  Continue beta-blocker, aspirin, statin             VTE Pharmacologic Prophylaxis: VTE Score: 6 High Risk (Score >/= 5) - Pharmacological DVT Prophylaxis Ordered: heparin. Sequential Compression Devices Ordered. Mobility:   Basic Mobility Inpatient Raw Score: 17  JH-HLM Goal: 5: Stand one or more mins  JH-HLM Achieved: 7: Walk 25 feet or more  HLM Goal achieved. Continue to encourage appropriate mobility. Patient Centered Rounds: I performed bedside rounds with nursing staff today. Discussions with Specialists or Other Care Team Provider: BRAD    Education and Discussions with Family / Patient:  pt updated, no updates for today. Total Time Spent on Date of Encounter in care of patient: 20 mins. This time was spent on one or more of the following: performing physical exam; counseling and coordination of care; obtaining or reviewing history; documenting in the medical record; reviewing/ordering tests, medications or procedures; communicating with other healthcare professionals and discussing with patient's family/caregivers. Current Length of Stay: 4 day(s)  Current Patient Status: Inpatient   Certification Statement: The patient will continue to require additional inpatient hospital stay due to pending peer to peer  Discharge Plan:  based on peer to peer    Code Status: Level 1 - Full Code    Subjective:   Seen and examined at bedside  Pain has improved  Pt is ambulating well    Objective:     Vitals:   Temp (24hrs), Av.5 °F (36.9 °C), Min:98.3 °F (36.8 °C), Max:98.7 °F (37.1 °C)    Temp:  [98.3 °F (36.8 °C)-98.7 °F (37.1 °C)] 98.3 °F (36.8 °C)  HR:  [58-64] 60  Resp:  [16] 16  BP: (128-135)/(57-61) 135/61  SpO2:  [93 %-97 %] 93 %  Body mass index is 26.9 kg/m². Input and Output Summary (last 24 hours):      Intake/Output Summary (Last 24 hours) at 2023 1215  Last data filed at 11/19/2023 0900  Gross per 24 hour   Intake 480 ml   Output 400 ml   Net 80 ml       Physical Exam:   Physical Exam General- Awake, alert and oriented x 3, looks comfortable  HEENT- Normocephalic, atraumatic, oral mucosa- moist  Neck- Supple, No carotid bruit, no JVD  CVS- Normal S1/ S2, Regular rate and rhythm, No murmur, No edema  Respiratory system- B/L clear breath sounds, no wheezing  Abdomen- Soft, Non distended, no tenderness, Bowel sound- present 4 quads  Genitourinary- No suprapubic tenderness, No CVA tenderness  Skin- No new bruise or rash  Musculoskeletal- ROM improved at right hip as pain has improved  Psych- No acute psychosis  CNS- CN II- XII grossly intact, No acute focal neurologic deficit noted      Additional Data:     Labs:  Results from last 7 days   Lab Units 11/19/23  0457   WBC Thousand/uL 4.74   HEMOGLOBIN g/dL 9.9*   HEMATOCRIT % 30.8*   PLATELETS Thousands/uL 187   NEUTROS PCT % 41*   LYMPHS PCT % 37   MONOS PCT % 18*   EOS PCT % 3     Results from last 7 days   Lab Units 11/19/23  0457 11/15/23  0531 11/14/23  1321   SODIUM mmol/L 139   < > 139   POTASSIUM mmol/L 4.0   < > 4.2   CHLORIDE mmol/L 107   < > 106   CO2 mmol/L 25   < > 24   BUN mg/dL 24   < > 27*   CREATININE mg/dL 0.94   < > 1.06   ANION GAP mmol/L 7   < > 9   CALCIUM mg/dL 9.1   < > 9.3   ALBUMIN g/dL  --   --  4.0   TOTAL BILIRUBIN mg/dL  --   --  0.82   ALK PHOS U/L  --   --  40   ALT U/L  --   --  14   AST U/L  --   --  13   GLUCOSE RANDOM mg/dL 111   < > 150*    < > = values in this interval not displayed.          Results from last 7 days   Lab Units 11/19/23  1101 11/19/23  0738 11/18/23  2103 11/18/23  1553 11/18/23  1057 11/18/23  0749 11/18/23  0727 11/17/23  2103 11/17/23  1612 11/17/23  1103 11/17/23  0722 11/16/23 2042   POC GLUCOSE mg/dl 183* 139 117 120 118 123 126 160* 146* 139 119 122               Lines/Drains:  Invasive Devices       Peripheral Intravenous Line  Duration Peripheral IV 11/18/23 Right Wrist <1 day                          Imaging: No pertinent imaging reviewed. Recent Cultures (last 7 days):         Last 24 Hours Medication List:   Current Facility-Administered Medications   Medication Dose Route Frequency Provider Last Rate    acetaminophen  975 mg Oral Q8H Elysia Koehler MD      amLODIPine  5 mg Oral Daily Yovani Reilly MD      aspirin  81 mg Oral Daily Yovani Reilly MD      atorvastatin  40 mg Oral Daily Yovani Reilly MD      baclofen  10 mg Oral HS PRN LAURIE Cooley      carvedilol  12.5 mg Oral BID With Meals Yovani Reilly MD      Diclofenac Sodium  2 g Topical 4x Daily Cindee Schlatter, MD      heparin (porcine)  5,000 Units Subcutaneous Q8H [address] Yovani Reilly MD      hydrALAZINE  5 mg Intravenous Q6H PRN Yovani Reilly MD      insulin lispro  1-5 Units Subcutaneous TID AC Yovani Reilly MD      insulin lispro  1-5 Units Subcutaneous HS Yovani Reilly MD      latanoprost  1 drop Both Eyes HS Yovani Reilly MD      levothyroxine  50 mcg Oral Daily Yovani Reilly MD      lidocaine  1 patch Topical Daily Cindee Schlatter, MD      losartan  100 mg Oral Daily Yovani Reilly MD      oxyCODONE  5 mg Oral Q6H PRN Yovani Reilly MD      oxyCODONE  2.5 mg Oral Q6H PRN Yovani Reilly MD      zolpidem  5 mg Oral HS PRN Roverto Solis MD          Today, Patient Was Seen By: Cindee Schlatter, MD    **Please Note: This note may have been constructed using a voice recognition system. **

## 2023-11-20 LAB
GLUCOSE SERPL-MCNC: 109 MG/DL (ref 65–140)
GLUCOSE SERPL-MCNC: 134 MG/DL (ref 65–140)
GLUCOSE SERPL-MCNC: 161 MG/DL (ref 65–140)
GLUCOSE SERPL-MCNC: 175 MG/DL (ref 65–140)

## 2023-11-20 PROCEDURE — 99232 SBSQ HOSP IP/OBS MODERATE 35: CPT | Performed by: FAMILY MEDICINE

## 2023-11-20 PROCEDURE — 97535 SELF CARE MNGMENT TRAINING: CPT

## 2023-11-20 PROCEDURE — 82948 REAGENT STRIP/BLOOD GLUCOSE: CPT

## 2023-11-20 RX ORDER — AMLODIPINE BESYLATE 10 MG/1
10 TABLET ORAL DAILY
Status: DISCONTINUED | OUTPATIENT
Start: 2023-11-21 | End: 2023-11-21 | Stop reason: HOSPADM

## 2023-11-20 RX ADMIN — CARVEDILOL 12.5 MG: 12.5 TABLET, FILM COATED ORAL at 08:47

## 2023-11-20 RX ADMIN — LATANOPROST 1 DROP: 50 SOLUTION OPHTHALMIC at 21:13

## 2023-11-20 RX ADMIN — Medication 2.5 MG: at 11:10

## 2023-11-20 RX ADMIN — ACETAMINOPHEN 975 MG: 325 TABLET, FILM COATED ORAL at 15:15

## 2023-11-20 RX ADMIN — BACLOFEN 10 MG: 10 TABLET ORAL at 22:39

## 2023-11-20 RX ADMIN — LIDOCAINE 1 PATCH: 50 PATCH CUTANEOUS at 11:10

## 2023-11-20 RX ADMIN — CARVEDILOL 12.5 MG: 12.5 TABLET, FILM COATED ORAL at 17:08

## 2023-11-20 RX ADMIN — HEPARIN SODIUM 5000 UNITS: 5000 INJECTION INTRAVENOUS; SUBCUTANEOUS at 15:15

## 2023-11-20 RX ADMIN — ACETAMINOPHEN 975 MG: 325 TABLET, FILM COATED ORAL at 21:07

## 2023-11-20 RX ADMIN — INSULIN LISPRO 1 UNITS: 100 INJECTION, SOLUTION INTRAVENOUS; SUBCUTANEOUS at 17:08

## 2023-11-20 RX ADMIN — LOSARTAN POTASSIUM 100 MG: 50 TABLET, FILM COATED ORAL at 08:47

## 2023-11-20 RX ADMIN — INSULIN LISPRO 1 UNITS: 100 INJECTION, SOLUTION INTRAVENOUS; SUBCUTANEOUS at 11:10

## 2023-11-20 RX ADMIN — ATORVASTATIN CALCIUM 40 MG: 40 TABLET, FILM COATED ORAL at 08:47

## 2023-11-20 RX ADMIN — LEVOTHYROXINE SODIUM 50 MCG: 0.05 TABLET ORAL at 08:47

## 2023-11-20 RX ADMIN — ACETAMINOPHEN 975 MG: 325 TABLET, FILM COATED ORAL at 06:10

## 2023-11-20 RX ADMIN — HEPARIN SODIUM 5000 UNITS: 5000 INJECTION INTRAVENOUS; SUBCUTANEOUS at 21:07

## 2023-11-20 RX ADMIN — HEPARIN SODIUM 5000 UNITS: 5000 INJECTION INTRAVENOUS; SUBCUTANEOUS at 06:10

## 2023-11-20 RX ADMIN — AMLODIPINE BESYLATE 5 MG: 5 TABLET ORAL at 08:47

## 2023-11-20 RX ADMIN — ZOLPIDEM TARTRATE 5 MG: 5 TABLET, COATED ORAL at 22:39

## 2023-11-20 RX ADMIN — ASPIRIN 81 MG: 81 TABLET, CHEWABLE ORAL at 08:47

## 2023-11-20 NOTE — PLAN OF CARE
Problem: INFECTION - ADULT  Goal: Absence or prevention of progression during hospitalization  Description: INTERVENTIONS:  - Assess and monitor for signs and symptoms of infection  - Monitor lab/diagnostic results  - Monitor all insertion sites, i.e. indwelling lines, tubes, and drains  - Monitor endotracheal if appropriate and nasal secretions for changes in amount and color  - [place] appropriate cooling/warming therapies per order  - Administer medications as ordered  - Instruct and encourage patient and family to use good hand hygiene technique  - Identify and instruct in appropriate isolation precautions for identified infection/condition  Outcome: Progressing  Goal: Absence of fever/infection during neutropenic period  Description: INTERVENTIONS:  - Monitor WBC    Outcome: Progressing     Problem: MUSCULOSKELETAL - ADULT  Goal: Maintain or return mobility to safest level of function  Description: INTERVENTIONS:  - Assess patient's ability to carry out ADLs; assess patient's baseline for ADL function and identify physical deficits which impact ability to perform ADLs (bathing, care of mouth/teeth, toileting, grooming, dressing, etc.)  - Assess/evaluate cause of self-care deficits   - Assess range of motion  - Assess patient's mobility  - Assess patient's need for assistive devices and provide as appropriate  - Encourage maximum independence but intervene and supervise when necessary  - Involve family in performance of ADLs  - Assess for home care needs following discharge   - Consider OT consult to assist with ADL evaluation and planning for discharge  - Provide patient education as appropriate  Outcome: Progressing  Goal: Maintain proper alignment of affected body part  Description: INTERVENTIONS:  - Support, maintain and protect limb and body alignment  - Provide patient/ family with appropriate education  Outcome: Progressing

## 2023-11-20 NOTE — PLAN OF CARE
Problem: OCCUPATIONAL THERAPY ADULT  Goal: Performs self-care activities at highest level of function for planned discharge setting. See evaluation for individualized goals. Description: Treatment Interventions: ADL retraining, Functional transfer training, UE strengthening/ROM, Endurance training, Energy conservation, Activityengagement, Cardiac education, Continued evaluation, Compensatory technique education, Equipment evaluation/education, Patient/family training          See flowsheet documentation for full assessment, interventions and recommendations. Note: Limitation: Decreased ADL status, Decreased UE strength, Decreased endurance, Decreased self-care trans, Decreased high-level ADLs  Prognosis: Good  Assessment: Pt participated in skilled OT session today addressing the following interventions ADL retraining with proper body mechanics, Patient / Family Education, Transfer Training, Therapeutic Activity, Safety Awareness, Fall Prevention, Back safety education & training, Activity tolerance training, and Standing tolerance training. Upon arrival, OT completed 2 pt identifiers. Pt agreeable to OT treatment session, upon arrival patient was found alert, responsive and in no apparent distress. Pt completed sit to stand with CGA. Pt completed ADLS at sink. Pt demonstrated ability to complete UB ADLS with S and LB ADLS with S.  Pt completed functional mobility with RW and S. Pt completed xfer to toilet with RW and S and completed toileting with S.  Pt requiring VCs and A throughout and edu in energy conservation, breath support, pursed lipped breathing, safety, and increasing independence with ADL performance. Pt completed 2 sets of 10 chair push ups. Pt continues to be functioning below baseline level, occupational performance remains limited secondary to factors listed above and increased risk for falls and injury.  From OT standpoint, recommendation at time of d/c would be Level 2 [email or web address] Intensity). Pt to benefit from continued Occupational Therapy treatment while in the hospital to address deficits as defined above and maximize level of functional independence with ADLs and functional mobility.      Rehab Resource Intensity Level, OT: II (Moderate Resource Intensity)

## 2023-11-20 NOTE — ASSESSMENT & PLAN NOTE
Lab Results   Component Value Date    HGBA1C 6.7 09/18/2023       Recent Labs     11/19/23  1101 11/19/23  1608 11/19/23 2051 11/20/23  0731   POCGLU 183* 113 137 109         Blood Sugar Average: Last 72 hrs:  (P) [id number] of type 2 diabetes  Stable BG, well controlled currently  Hold home pioglitazone   sliding scale insulin  Monitor blood sugar

## 2023-11-20 NOTE — CASE MANAGEMENT
Case Management Discharge Planning Note    Patient name Marques eBnnett  Location [address] [facility] [place] 332/-01 MRN [id number]  : 1934 Date 2023       Current Admission Date: 2023  Current Admission Diagnosis:Ambulatory dysfunction   Patient Active Problem List    Diagnosis Date Noted    Ambulatory dysfunction 2023    Stage 3 chronic kidney disease, unspecified whether stage 3a or 3b CKD ([address]) 2023    Chronic pain syndrome 2022    Chronic bilateral low back pain without sciatica 2022    Sacroiliitis ([address]) 2022    Trochanteric bursitis of right hip 2021    Non-Hodgkin's lymphoma ([address]) 10/05/2020    Light chain disease, 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    Medicare annual wellness visit, subsequent 2020    Adhesive capsulitis of shoulder 2020    Triclonal gammopathy 2019    Greater trochanteric bursitis of left hip 2019    Left sided sciatica 2019    Diabetic peripheral neuropathy ([address]) 2019    Screening mammogram, encounter for 2018    Lumbar compression fracture ([address]) 10/11/2017    Vitamin D deficiency 10/11/2017    Vitamin B12 deficiency 2016    Lumbar spinal stenosis 2015    Normochromic normocytic anemia 2015    Depression 2015    Esophageal reflux 2013    Osteoarthritis of knee 2013    Spinal stenosis 2013    Coronary artery disease of native artery of native heart with stable angina pectoris ([address]) 2013    Hyperlipidemia 2013    Hypertension 2013    Hypothyroidism 2013    Insomnia 2013    Osteoporosis 2013    Type 2 diabetes mellitus ([address]) 2012      LOS (days): 5  Geometric Mean LOS (GMLOS) (days): 2.60  Days to GMLOS:-2.5     OBJECTIVE:  Risk of Unplanned Readmission Score: 12.89         Current admission status: Inpatient   Preferred Pharmacy:   [address] [place] [address] [facility] [address] [facility]  [address] [facility] [place] [address]  Phone: [phone] Fax: [phone]    Primary Care Provider: Sherre Koyanagi, MD    Primary Insurance: Moriah Yoder [facility]  Secondary Insurance:     DISCHARGE DETAILS:

## 2023-11-20 NOTE — CASE MANAGEMENT
Support Center has received denial.  Received for Acute Rehab Authorization.    Insurance: [employer]  Denial Reason:  Did not meet need for IP rehab  [facility]: [facility]   Denial #: [id number]   Appeal P#   [phone]    Eddie Martell F#  [phone]  Care Manager Notified: Blanca Barnes

## 2023-11-20 NOTE — ASSESSMENT & PLAN NOTE
Presented to the emergency department with ambulatory dysfunction. Patient notes for the past 2 weeks she been having worsening lower back pain and inability to walk. She reports she lives home alone and having difficulty taking care of herself  She recently underwent MRI on 11/12 that revealed multilevel degenerative thoracolumbar degenerative changes. Has had difficulty with ADLs at home. She was evaluated by PT/OT to emergency department recommending rehab. Case management is involved will need authorization  Follow-up further with case management, Uzair Johnson was denies as per - [facility] team to do Peer to peer.  pending  Appreciate PT/OT recommendations  Cont with pain control for right hip pain

## 2023-11-20 NOTE — OCCUPATIONAL THERAPY NOTE
Occupational Therapy Treatment Note      [facility] Sessions    11/20/2023    Principal Problem:    Ambulatory dysfunction  Active Problems:    Coronary artery disease of native artery of native heart with stable angina pectoris ([address])    Hyperlipidemia    Hypertension    Hypothyroidism    Type 2 diabetes mellitus ([address])      Past Medical History:   Diagnosis Date    Cardiac disorder 01/01/2001    x2 stents after a failed stress test    Cellulitis     last assessed - [id number]    Constipation     last assessed - [id number]    COVID-19 08/15/2022    Diabetes mellitus ([address])     Disease of thyroid gland     Ecchymosis     last assessed - [id number]    Fall     last assessed - [id number]    Hyperlipidemia     Hypertension     Hypokalemia     last assessed - [id number]    Lower extremity weakness     last assessed - [id number]    Vitamin D deficiency     last assessed - [id number]       Past Surgical History:   Procedure Laterality Date    BACK SURGERY      CHOLECYSTECTOMY      COLONOSCOPY      CORONARY ANGIOPLASTY WITH STENT PLACEMENT      CT BONE MARROW BIOPSY AND ASPIRATION  1/15/2020    HYSTERECTOMY      OTHER SURGICAL HISTORY      Previous stent placement       11/20/23 1420   OT Last Visit   OT Visit Date 11/20/23   Note Type   Note Type Treatment   Pain Assessment   Pain Assessment Tool 0-10   Pain Score 4   Pain Location/Orientation Orientation: Right;Location: Hip   Lifestyle   Autonomy Patient reporting being independent with ADLs/IADLs, ambulatory with rollator and lives alone at baseline. Pt reports that she hasn't driven in ~4-7 months.    Reciprocal Relationships Son   Service to Others Retired   Intrinsic Gratification knitting and [place] 6  Modified independent   Eating Deficit Increased time to complete   Grooming Assistance 6  Modified Independent   Grooming Deficit Increased time to complete   UB Bathing Assistance 6  Modified Independent   UB Bathing Deficit Increased time to complete LB Bathing Assistance 5  Supervision/Setup   LB Bathing Deficit Increased time to complete;Supervision/safety;Setup   UB Dressing Assistance 6  Modified independent   UB Dressing Deficit Increased time to complete   LB Dressing Assistance 5  Supervision/Setup   LB Dressing Deficit Increased time to complete;Supervision/safety;Setup   Toileting Assistance  5  Supervision/Setup   Toileting Deficit Increased time to complete;Supervison/safety;Setup   Transfers   Sit to Stand 4  Minimal assistance  (CGA)   Additional items Assist x 1; Increased time required;Verbal cues;Armrests   Stand to Sit 4  Minimal assistance  (CGA)   Additional items Assist x 1; Armrests; Increased time required;Verbal cues   Functional Mobility   Functional Mobility 4  Minimal assistance   Additional items Rolling walker   Toilet Transfers   Toilet Transfer From Rolling walker   Toilet Transfer Type To and from   Toilet Transfer to Standard toilet   Toilet Transfer Technique Ambulating   Toilet Transfers Contact guard   Cognition   Overall Cognitive Status [facility]   Arousal/Participation Alert; Cooperative   Attention Within functional limits   Orientation Level Oriented X4   Memory Within functional limits   Following Commands Follows all commands and directions without difficulty   Assessment   Assessment Pt participated in skilled OT session today addressing the following interventions ADL retraining with proper body mechanics, Patient / Family Education, Transfer Training, Therapeutic Activity, Safety Awareness, Fall Prevention, Back safety education & training, Activity tolerance training, and Standing tolerance training. Upon arrival, OT completed 2 pt identifiers. Pt agreeable to OT treatment session, upon arrival patient was found alert, responsive and in no apparent distress. Pt completed sit to stand with CGA. Pt completed ADLS at sink.    Pt demonstrated ability to complete UB ADLS with S and LB ADLS with S.  Pt completed functional mobility with RW and S. Pt completed xfer to toilet with RW and S and completed toileting with S.  Pt requiring VCs and A throughout and edu in energy conservation, breath support, pursed lipped breathing, safety, and increasing independence with ADL performance. Pt completed 2 sets of 10 chair push ups. Pt continues to be functioning below baseline level, occupational performance remains limited secondary to factors listed above and increased risk for falls and injury. From OT standpoint, recommendation at time of d/c would be Level 2 (Mod Resource Intensity). Pt to benefit from continued Occupational Therapy treatment while in the hospital to address deficits as defined above and maximize level of functional independence with ADLs and functional mobility. Plan   Treatment Interventions ADL retraining;Functional transfer training;UE strengthening/ROM; Endurance training;Energy conservation; Activityengagement;Cardiac education;Continued evaluation; Compensatory technique education;Equipment evaluation/education;Patient/family training   Goal Expiration Date 11/30/23   OT Treatment Day 3   OT Frequency 3-5x/wk   Discharge Recommendation   Rehab Resource Intensity Level, OT II (Moderate Resource Intensity)     Inocente Collins MS OTR/L

## 2023-11-20 NOTE — PROGRESS NOTES
[address]  Progress Note  Name: Dash Ch  MRN: [id number]  Unit/Bed#: -01 I Date of Admission: 11/14/2023   Date of Service: 11/20/2023 I Hospital Day: 5    Assessment/Plan   * Ambulatory dysfunction  Assessment & Plan  Presented to the emergency department with ambulatory dysfunction. Patient notes for the past 2 weeks she been having worsening lower back pain and inability to walk. She reports she lives home alone and having difficulty taking care of herself  She recently underwent MRI on 11/12 that revealed multilevel degenerative thoracolumbar degenerative changes. Has had difficulty with ADLs at home. She was evaluated by PT/OT to emergency department recommending rehab. Case management is involved will need authorization  Follow-up further with case management, Timmy Yen was denies as per - [facility] team to do Peer to peer. pending  Appreciate PT/OT recommendations  Cont with pain control for right hip pain    Type 2 diabetes mellitus [facility])  Assessment & Plan  Lab Results   Component Value Date    HGBA1C 6.7 09/18/2023       Recent Labs     11/19/23  1101 11/19/23  1608 11/19/23  2051 11/20/23  0731   POCGLU 183* 113 137 109         Blood Sugar Average: Last 72 hrs:  (P) 132.8596106748833279Wtnrxct of type 2 diabetes  Stable BG, well controlled currently  Hold home pioglitazone   sliding scale insulin  Monitor blood sugar      Hypothyroidism  Assessment & Plan  Continue Synthroid. OP followup     Hypertension  Assessment & Plan  /73   Pulse 65   Temp 98.7 °F (37.1 °C)   Resp 16   Ht 5' 2" (1.575 m)   Wt 66.7 kg (147 lb 0.8 oz)   SpO2 94%   BMI 26.90 kg/m²   Slightly elevated pressures- increase amlodipine to 10mg  BP initial reading in the ER noted to be elevated with SBP of 190 possibly in setting of pain  Continue amlodipine, losartan and beta-blocker  Continue hydralazine IV as needed for now    Hyperlipidemia  Assessment & Plan  Continue statin therapy.  OP followup    Coronary artery disease of native artery of native heart with stable angina pectoris [facility])  Assessment & Plan  No chest pain  History of coronary artery disease  Continue beta-blocker, aspirin, statin             VTE Pharmacologic Prophylaxis: VTE Score: 6 High Risk (Score >/= 5) - Pharmacological DVT Prophylaxis Ordered: heparin. Sequential Compression Devices Ordered. Mobility:   Basic Mobility Inpatient Raw Score: 17  JH-HLM Goal: 5: Stand one or more mins  JH-HLM Achieved: 7: Walk 25 feet or more  HLM Goal achieved. Continue to encourage appropriate mobility. Patient Centered Rounds: I performed bedside rounds with nursing staff today. Discussions with Specialists or Other Care Team Provider: yes CM    Education and Discussions with Family / Patient:  no new updates, will call with updates. Total Time Spent on Date of Encounter in care of patient: 25 mins. This time was spent on one or more of the following: performing physical exam; counseling and coordination of care; obtaining or reviewing history; documenting in the medical record; reviewing/ordering tests, medications or procedures; communicating with other healthcare professionals and discussing with patient's family/caregivers. Current Length of Stay: 5 day(s)  Current Patient Status: Inpatient   Certification Statement: The patient will continue to require additional inpatient hospital stay due to pending auth- peer to peer pending  Discharge Plan:  pending placement    Code Status: Level 1 - Full Code    Subjective:   Seen and examined at bedside  Pain is better  Increased amlodipine to 10mg, discussed with pt, agreerable    Objective:     Vitals:   Temp (24hrs), Av.8 °F (37.1 °C), Min:98.7 °F (37.1 °C), Max:98.9 °F (37.2 °C)    Temp:  [98.7 °F (37.1 °C)-98.9 °F (37.2 °C)] 98.7 °F (37.1 °C)  HR:  [63-68] 65  BP: (128-168)/(52-73) 168/73  SpO2:  [94 %-96 %] 94 %  Body mass index is 26.9 kg/m².      Input and Output Summary (last 24 hours): Intake/Output Summary (Last 24 hours) at 11/20/2023 1304  Last data filed at 11/20/2023 0601  Gross per 24 hour   Intake 480 ml   Output 400 ml   Net 80 ml       Physical Exam:   Physical Exam General- Awake, alert and oriented x 3, looks comfortable  HEENT- Normocephalic, atraumatic, oral mucosa- moist  Neck- Supple, No carotid bruit, no JVD  CVS- Normal S1/ S2, Regular rate and rhythm, No murmur, No edema  Respiratory system- B/L clear breath sounds, no wheezing  Abdomen- Soft, Non distended, no tenderness, Bowel sound- present 4 quads  Genitourinary- No suprapubic tenderness, No CVA tenderness  Skin- No new bruise or rash  Musculoskeletal- improvin ROM right hip  Psych- No acute psychosis  CNS- CN II- XII grossly intact, No acute focal neurologic deficit noted      Additional Data:     Labs:  Results from last 7 days   Lab Units 11/19/23  0457   WBC Thousand/uL 4.74   HEMOGLOBIN g/dL 9.9*   HEMATOCRIT % 30.8*   PLATELETS Thousands/uL 187   NEUTROS PCT % 41*   LYMPHS PCT % 37   MONOS PCT % 18*   EOS PCT % 3     Results from last 7 days   Lab Units 11/19/23  0457 11/15/23  0531 11/14/23  1321   SODIUM mmol/L 139   < > 139   POTASSIUM mmol/L 4.0   < > 4.2   CHLORIDE mmol/L 107   < > 106   CO2 mmol/L 25   < > 24   BUN mg/dL 24   < > 27*   CREATININE mg/dL 0.94   < > 1.06   ANION GAP mmol/L 7   < > 9   CALCIUM mg/dL 9.1   < > 9.3   ALBUMIN g/dL  --   --  4.0   TOTAL BILIRUBIN mg/dL  --   --  0.82   ALK PHOS U/L  --   --  40   ALT U/L  --   --  14   AST U/L  --   --  13   GLUCOSE RANDOM mg/dL 111   < > 150*    < > = values in this interval not displayed.          Results from last 7 days   Lab Units 11/20/23  1105 11/20/23  0731 11/19/23  2051 11/19/23  1608 11/19/23  1101 11/19/23  0738 11/18/23  2103 11/18/23  1553 11/18/23  1057 11/18/23  0749 11/18/23  0727 11/17/23  2103   POC GLUCOSE mg/dl 161* 109 137 113 183* 139 117 120 118 123 126 160*               Lines/Drains:  Invasive Devices Peripheral Intravenous Line  Duration             Peripheral IV 11/18/23 Right Wrist 2 days                          Imaging: No pertinent imaging reviewed. Recent Cultures (last 7 days):         Last 24 Hours Medication List:   Current Facility-Administered Medications   Medication Dose Route Frequency Provider Last Rate    acetaminophen  975 mg Oral [facility] Eddie Denis MD      [START ON 11/21/2023] amLODIPine  10 mg Oral Daily Eddie Denis MD      aspirin  81 mg Oral Daily Yovani Reilly MD      atorvastatin  40 mg Oral Daily Yovani Reilly MD      baclofen  10 mg Oral HS PRN LAURIE Cooley      carvedilol  12.5 mg Oral BID With Meals Yovani Reilly MD      Diclofenac Sodium  2 g Topical 4x Daily Eddie Denis MD      heparin (porcine)  5,000 Units Subcutaneous Q8H [facility] Yovani Reilly MD      hydrALAZINE  5 mg Intravenous Q6H PRN Yovani Reilly MD      insulin lispro  1-5 Units Subcutaneous TID AC Yovani Reilly MD      insulin lispro  1-5 Units Subcutaneous HS Yovani Reilly MD      latanoprost  1 drop Both Eyes HS Yovani Reilly MD      levothyroxine  50 mcg Oral Daily Yovani Reilly MD      lidocaine  1 patch Topical Daily Eddie Denis MD      losartan  100 mg Oral Daily Yovani Reilly MD      oxyCODONE  5 mg Oral Q6H PRN Yovani Reilly MD      oxyCODONE  2.5 mg Oral Q6H PRN Yovani Reilly MD      zolpidem  5 mg Oral HS PRN Elizabet Wilkins MD          Today, Patient Was Seen By: Eddie Denis MD    **Please Note: This note may have been constructed using a voice recognition system. **

## 2023-11-20 NOTE — ASSESSMENT & PLAN NOTE
/73   Pulse 65   Temp 98.7 °F (37.1 °C)   Resp 16   Ht 5' 2" (1.575 m)   Wt 66.7 kg (147 lb 0.8 oz)   SpO2 94%   BMI 26.90 kg/m²   Slightly elevated pressures- increase amlodipine to 10mg  BP initial reading in the ER noted to be elevated with SBP of 190 possibly in setting of pain  Continue amlodipine, losartan and beta-blocker  Continue hydralazine IV as needed for now

## 2023-11-21 VITALS
HEIGHT: 62 IN | SYSTOLIC BLOOD PRESSURE: 134 MMHG | HEART RATE: 61 BPM | RESPIRATION RATE: 16 BRPM | BODY MASS INDEX: 27.06 KG/M2 | DIASTOLIC BLOOD PRESSURE: 59 MMHG | OXYGEN SATURATION: 94 % | WEIGHT: 147.05 LBS | TEMPERATURE: 98.3 F

## 2023-11-21 LAB
GLUCOSE SERPL-MCNC: 107 MG/DL (ref 65–140)
GLUCOSE SERPL-MCNC: 124 MG/DL (ref 65–140)
GLUCOSE SERPL-MCNC: 161 MG/DL (ref 65–140)

## 2023-11-21 PROCEDURE — 97110 THERAPEUTIC EXERCISES: CPT

## 2023-11-21 PROCEDURE — 99239 HOSP IP/OBS DSCHRG MGMT >30: CPT | Performed by: STUDENT IN AN ORGANIZED HEALTH CARE EDUCATION/TRAINING PROGRAM

## 2023-11-21 PROCEDURE — 82948 REAGENT STRIP/BLOOD GLUCOSE: CPT

## 2023-11-21 PROCEDURE — 97116 GAIT TRAINING THERAPY: CPT

## 2023-11-21 RX ORDER — AMLODIPINE BESYLATE 10 MG/1
10 TABLET ORAL DAILY
Refills: 0
Start: 2023-11-22

## 2023-11-21 RX ADMIN — LIDOCAINE 1 PATCH: 50 PATCH CUTANEOUS at 11:24

## 2023-11-21 RX ADMIN — CARVEDILOL 12.5 MG: 12.5 TABLET, FILM COATED ORAL at 08:26

## 2023-11-21 RX ADMIN — CARVEDILOL 12.5 MG: 12.5 TABLET, FILM COATED ORAL at 17:23

## 2023-11-21 RX ADMIN — ACETAMINOPHEN 975 MG: 325 TABLET, FILM COATED ORAL at 13:05

## 2023-11-21 RX ADMIN — LEVOTHYROXINE SODIUM 50 MCG: 0.05 TABLET ORAL at 05:34

## 2023-11-21 RX ADMIN — LOSARTAN POTASSIUM 100 MG: 50 TABLET, FILM COATED ORAL at 08:26

## 2023-11-21 RX ADMIN — HEPARIN SODIUM 5000 UNITS: 5000 INJECTION INTRAVENOUS; SUBCUTANEOUS at 13:06

## 2023-11-21 RX ADMIN — ASPIRIN 81 MG: 81 TABLET, CHEWABLE ORAL at 08:26

## 2023-11-21 RX ADMIN — AMLODIPINE BESYLATE 10 MG: 10 TABLET ORAL at 08:26

## 2023-11-21 RX ADMIN — ACETAMINOPHEN 975 MG: 325 TABLET, FILM COATED ORAL at 05:27

## 2023-11-21 RX ADMIN — INSULIN LISPRO 1 UNITS: 100 INJECTION, SOLUTION INTRAVENOUS; SUBCUTANEOUS at 13:05

## 2023-11-21 RX ADMIN — HEPARIN SODIUM 5000 UNITS: 5000 INJECTION INTRAVENOUS; SUBCUTANEOUS at 05:27

## 2023-11-21 RX ADMIN — ATORVASTATIN CALCIUM 40 MG: 40 TABLET, FILM COATED ORAL at 08:26

## 2023-11-21 NOTE — ASSESSMENT & PLAN NOTE
Lab Results   Component Value Date    HGBA1C 6.7 09/18/2023       Recent Labs     11/20/23  1618 11/20/23 2052 11/21/23  0720 11/21/23  1123   POCGLU 175* 134 124 161*         Blood Sugar Average: Last 72 hrs:  (P) 136History of type 2 diabetes  Stable BG, well controlled currently

## 2023-11-21 NOTE — DISCHARGE SUMMARY
[address]  Discharge- Jennifer Walden 1934, 80 y.o. female MRN: [id number]  Unit/Bed#: MS [facility] Encounter: [id number]  Primary Care Provider: Dillan Quevedo MD   Date and time admitted to hospital: 11/14/2023 12:07 PM    * Ambulatory dysfunction  Assessment & Plan  Presented to the emergency department with ambulatory dysfunction. Patient notes for the past 2 weeks she been having worsening lower back pain and inability to walk. She reports she lives home alone and having difficulty taking care of herself  She recently underwent MRI on 11/12 that revealed multilevel degenerative thoracolumbar degenerative changes. Has had difficulty with ADLs at home. She was evaluated by PT/OT to emergency department recommending rehab. Case management is involved will need authorization  Follow-up further with case management, Gris Arita was denies as per - [facility] team to do Peer to peer. pending  Appreciate PT/OT recommendations  Cont with pain control for right hip pain    Type 2 diabetes mellitus [facility])  Assessment & Plan  Lab Results   Component Value Date    HGBA1C 6.7 09/18/2023       Recent Labs     11/20/23  1618 11/20/23  2052 11/21/23  0720 11/21/23  1123   POCGLU 175* 134 124 161*         Blood Sugar Average: Last 72 hrs:  (P) 136History of type 2 diabetes  Stable BG, well controlled currently      Hypothyroidism  Assessment & Plan  Continue Synthroid. OP followup     Hypertension  Assessment & Plan  /59 (BP Location: Right arm)   Pulse 61   Temp 98.3 °F (36.8 °C) (Oral)   Resp 16   Ht 5' 2" (1.575 m)   Wt 66.7 kg (147 lb 0.8 oz)   SpO2 94%   BMI 26.90 kg/m²   Slightly elevated pressures- increase amlodipine to 10mg  BP initial reading in the ER noted to be elevated with SBP of 190 possibly in setting of pain  Continue amlodipine, losartan and beta-blocker  Continue hydralazine IV as needed for now    Hyperlipidemia  Assessment & Plan  Continue statin therapy.  OP followup    Coronary artery disease of native artery of native heart with stable angina pectoris [facility])  Assessment & Plan  No chest pain  History of coronary artery disease  Continue beta-blocker, aspirin, statin      Medical Problems       Resolved Problems  Date Reviewed: 11/20/2023   None       Discharging Physician / Practitioner: Quin Rodrigez MD  PCP: Everett Mejia MD  Admission Date:   Admission Orders (From admission, onward)       Ordered        11/15/23 1231  Inpatient Admission  Once            11/14/23 1541  Place in Observation  Once                          Discharge Date: 11/21/23    Consultations During Hospital Stay:  PT/OT    Procedures Performed:   None    Significant Findings / Test Results:   None    Incidental Findings:   None     Test Results Pending at Discharge (will require follow up): None     Outpatient Tests Requested:  None    Complications:  None    Reason for Admission: Difficulty ambulating    Hospital Course:   Teo Edwards is a 80 y.o. female patient who originally presented to the hospital on 11/14/2023 due to difficulty ambulating. She was seen by PT/OT who recommended acute rehab. She was noted to have elevated blood pressures on admission which improved with increased dose of amlodipine. She was medically cleared and required several extra days of hospital stay while awaiting rehab authorization. Please see above list of diagnoses and related plan for additional information. Condition at Discharge: good    Discharge Day Visit / Exam:   Subjective:  Patient reports feeling well.  No new symptoms  Vitals: Blood Pressure: 134/59 (11/21/23 1445)  Pulse: 61 (11/21/23 1445)  Temperature: 98.3 °F (36.8 °C) (11/21/23 1445)  Temp Source: Oral (11/21/23 1445)  Respirations: 16 (11/21/23 1445)  Height: 5' 2" (157.5 cm) (11/21/23 0830)  Weight - Scale: 66.7 kg (147 lb 0.8 oz) (11/14/23 1917)  SpO2: 94 % (11/21/23 1445)  Exam:   Physical Exam  Vitals and nursing note reviewed. Constitutional:       Appearance: Normal appearance. HENT:      Head: Normocephalic and atraumatic. Mouth/Throat:      Mouth: Mucous membranes are moist.   Eyes:      Conjunctiva/sclera: Conjunctivae normal.      Pupils: Pupils are equal, round, and reactive to light. Cardiovascular:      Rate and Rhythm: Normal rate and regular rhythm. Pulses: Normal pulses. Heart sounds: Normal heart sounds. No murmur heard. No gallop. Pulmonary:      Effort: Pulmonary effort is normal. No respiratory distress. Breath sounds: Normal breath sounds. No wheezing or rales. Abdominal:      General: Abdomen is flat. Bowel sounds are normal. There is no distension. Palpations: Abdomen is soft. Tenderness: There is no abdominal tenderness. There is no guarding or rebound. Musculoskeletal:         General: No swelling. Normal range of motion. Skin:     General: Skin is warm and dry. Capillary Refill: Capillary refill takes less than 2 seconds. Neurological:      General: No focal deficit present. Mental Status: She is alert. Mental status is at baseline. Psychiatric:         Mood and Affect: Mood normal.          Discussion with Family: Patient declined call to . Discharge instructions/Information to patient and family:   See after visit summary for information provided to patient and family. Provisions for Follow-Up Care:  See after visit summary for information related to follow-up care and any pertinent home health orders. Mobility at time of Discharge:   Basic Mobility Inpatient Raw Score: 17  JH-HLM Goal: 5: Stand one or more mins  JH-HLM Achieved: 7: Walk 25 feet or more  HLM Goal achieved. Continue to encourage appropriate mobility. Disposition:   [facility]ute [facility] at [facility] Readmission: None     Discharge Statement:  I spent 60 minutes discharging the patient. This time was spent on the day of discharge.  I had direct contact with the patient on the day of discharge. Greater than 50% of the total time was spent examining patient, answering all patient questions, arranging and discussing plan of care with patient as well as directly providing post-discharge instructions. Additional time then spent on discharge activities. Discharge Medications:  See after visit summary for reconciled discharge medications provided to patient and/or family.       **Please Note: This note may have been constructed using a voice recognition system**

## 2023-11-21 NOTE — CASE MANAGEMENT
Case Management Discharge Planning Note    Patient name Cale Coleman  Location [address] [facility] [place] 332/-01 MRN [id number]  : 1934 Date 2023       Current Admission Date: 2023  Current Admission Diagnosis:Ambulatory dysfunction   Patient Active Problem List    Diagnosis Date Noted    Ambulatory dysfunction 2023    Stage 3 chronic kidney disease, unspecified whether stage 3a or 3b CKD ([address]) 2023    Chronic pain syndrome 2022    Chronic bilateral low back pain without sciatica 2022    Sacroiliitis ([address]) 2022    Trochanteric bursitis of right hip 2021    Non-Hodgkin's lymphoma ([address]) 10/05/2020    Light chain disease, 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    Medicare annual wellness visit, subsequent 2020    Adhesive capsulitis of shoulder 2020    Triclonal gammopathy 2019    Greater trochanteric bursitis of left hip 2019    Left sided sciatica 2019    Diabetic peripheral neuropathy ([address]) 2019    Screening mammogram, encounter for 2018    Lumbar compression fracture ([address]) 10/11/2017    Vitamin D deficiency 10/11/2017    Vitamin B12 deficiency 2016    Lumbar spinal stenosis 2015    Normochromic normocytic anemia 2015    Depression 2015    Esophageal reflux 2013    Osteoarthritis of knee 2013    Spinal stenosis 2013    Coronary artery disease of native artery of native heart with stable angina pectoris ([address]) 2013    Hyperlipidemia 2013    Hypertension 2013    Hypothyroidism 2013    Insomnia 2013    Osteoporosis 2013    Type 2 diabetes mellitus ([address]) 2012      LOS (days): 6  Geometric Mean LOS (GMLOS) (days): 2.60  Days to GMLOS:-3.5     OBJECTIVE:  Risk of Unplanned Readmission Score: 13.09         Current admission status: Inpatient   Preferred Pharmacy:   [address] [place] [address] [facility] [address] [facility]  [address] [facility] [place] [address]  Phone: [phone] Fax: [phone]    Primary Care Provider: Gary Lockhart MD    Primary Insurance: [address] [place] [address]:     [address] [place] [address] Rd Number: [id number]

## 2023-11-21 NOTE — PROGRESS NOTES
[address]  Progress Note  Name: Radha Kraft  MRN: [id number]  Unit/Bed#: -01 I Date of Admission: 11/14/2023   Date of Service: 11/21/2023 I Hospital Day: 6    Assessment/Plan   * Ambulatory dysfunction  Assessment & Plan  Presented to the emergency department with ambulatory dysfunction. Patient notes for the past 2 weeks she been having worsening lower back pain and inability to walk. She reports she lives home alone and having difficulty taking care of herself  She recently underwent MRI on 11/12 that revealed multilevel degenerative thoracolumbar degenerative changes. Has had difficulty with ADLs at home. She was evaluated by PT/OT to emergency department recommending rehab. Case management is involved will need authorization  Follow-up further with case management, Murali Ji was denies as per - [facility] team to do Peer to peer. pending  Appreciate PT/OT recommendations  Cont with pain control for right hip pain    Type 2 diabetes mellitus [facility])  Assessment & Plan  Lab Results   Component Value Date    HGBA1C 6.7 09/18/2023       Recent Labs     11/20/23  1618 11/20/23  2052 11/21/23  0720 11/21/23  1123   POCGLU 175* 134 124 161*         Blood Sugar Average: Last 72 hrs:  (P) 136History of type 2 diabetes  Stable BG, well controlled currently  Hold home pioglitazone   sliding scale insulin  Monitor blood sugar      Hypothyroidism  Assessment & Plan  Continue Synthroid. OP followup     Hypertension  Assessment & Plan  /57   Pulse 62   Temp 98.5 °F (36.9 °C)   Resp 18   Ht 5' 2" (1.575 m)   Wt 66.7 kg (147 lb 0.8 oz)   SpO2 97%   BMI 26.90 kg/m²   Slightly elevated pressures- increase amlodipine to 10mg  BP initial reading in the ER noted to be elevated with SBP of 190 possibly in setting of pain  Continue amlodipine, losartan and beta-blocker  Continue hydralazine IV as needed for now    Hyperlipidemia  Assessment & Plan  Continue statin therapy.  OP followup    Coronary artery disease of native artery of native heart with stable angina pectoris [facility])  Assessment & Plan  No chest pain  History of coronary artery disease  Continue beta-blocker, aspirin, statin           VTE Pharmacologic Prophylaxis: VTE Score: 6 High Risk (Score >/= 5) - Pharmacological DVT Prophylaxis Ordered: heparin. Sequential Compression Devices Ordered. Mobility:   Basic Mobility Inpatient Raw Score: 17  JH-HLM Goal: 5: Stand one or more mins  JH-HLM Achieved: 7: Walk 25 feet or more  HLM Goal achieved. Continue to encourage appropriate mobility. Patient Centered Rounds: I performed bedside rounds with nursing staff today. Discussions with Specialists or Other Care Team Provider: BRAD    Education and Discussions with Family / Patient: Patient declined call to . Total Time Spent on Date of Encounter in care of patient: 30 mins. This time was spent on one or more of the following: performing physical exam; counseling and coordination of care; obtaining or reviewing history; documenting in the medical record; reviewing/ordering tests, medications or procedures; communicating with other healthcare professionals and discussing with patient's family/caregivers. Current Length of Stay: 6 day(s)  Current Patient Status: Inpatient   Certification Statement: The patient will continue to require additional inpatient hospital stay due to rehab placement  Discharge Plan: Anticipate discharge in 24-48 hrs to rehab facility. Code Status: Level 1 - Full Code    Subjective:   Patient reports feeling well overall. No new symptoms today. Patiently awaiting rehab placement. Objective:     Vitals:   Temp (24hrs), Av.7 °F (37.1 °C), Min:98.3 °F (36.8 °C), Max:99.2 °F (37.3 °C)    Temp:  [98.3 °F (36.8 °C)-99.2 °F (37.3 °C)] 98.5 °F (36.9 °C)  HR:  [55-62] 62  BP: (125-135)/(57-60) 135/57  SpO2:  [94 %-97 %] 97 %  Body mass index is 26.9 kg/m².      Input and Output Summary (last 24 hours): Intake/Output Summary (Last 24 hours) at 11/21/2023 1341  Last data filed at 11/21/2023 0525  Gross per 24 hour   Intake 240 ml   Output 300 ml   Net -60 ml       Physical Exam:   Physical Exam  Vitals and nursing note reviewed. Constitutional:       Appearance: Normal appearance. HENT:      Head: Normocephalic and atraumatic. Mouth/Throat:      Mouth: Mucous membranes are moist.   Eyes:      Conjunctiva/sclera: Conjunctivae normal.      Pupils: Pupils are equal, round, and reactive to light. Cardiovascular:      Rate and Rhythm: Normal rate and regular rhythm. Pulses: Normal pulses. Heart sounds: Normal heart sounds. No murmur heard. No gallop. Pulmonary:      Effort: Pulmonary effort is normal. No respiratory distress. Breath sounds: Normal breath sounds. No wheezing or rales. Abdominal:      General: Abdomen is flat. Bowel sounds are normal. There is no distension. Palpations: Abdomen is soft. Tenderness: There is no abdominal tenderness. There is no guarding or rebound. Musculoskeletal:         General: No swelling. Normal range of motion. Skin:     General: Skin is warm and dry. Capillary Refill: Capillary refill takes less than 2 seconds. Neurological:      General: No focal deficit present. Mental Status: She is alert. Mental status is at baseline.    Psychiatric:         Mood and Affect: Mood normal.          Additional Data:     Labs:  Results from last 7 days   Lab Units 11/19/23  0457   WBC Thousand/uL 4.74   HEMOGLOBIN g/dL 9.9*   HEMATOCRIT % 30.8*   PLATELETS Thousands/uL 187   NEUTROS PCT % 41*   LYMPHS PCT % 37   MONOS PCT % 18*   EOS PCT % 3     Results from last 7 days   Lab Units 11/19/23  0457   SODIUM mmol/L 139   POTASSIUM mmol/L 4.0   CHLORIDE mmol/L 107   CO2 mmol/L 25   BUN mg/dL 24   CREATININE mg/dL 0.94   ANION GAP mmol/L 7   CALCIUM mg/dL 9.1   GLUCOSE RANDOM mg/dL 111         Results from last 7 days   Lab Units 11/21/23  1123 11/21/23  0720 11/20/23  2052 11/20/23  1618 11/20/23  1105 11/20/23  0731 11/19/23  2051 11/19/23  1608 11/19/23  1101 11/19/23  0738 11/18/23  2103 11/18/23  1553   POC GLUCOSE mg/dl 161* 124 134 175* 161* 109 137 113 183* 139 117 120               Lines/Drains:  Invasive Devices       Peripheral Intravenous Line  Duration             Peripheral IV 11/18/23 Right Wrist 3 days                          Imaging: No pertinent imaging reviewed. Recent Cultures (last 7 days):         Last 24 Hours Medication List:   Current Facility-Administered Medications   Medication Dose Route Frequency Provider Last Rate    acetaminophen  975 mg Oral Q8H Kim Carbajal MD      amLODIPine  10 mg Oral Daily Dee Love MD      aspirin  81 mg Oral Daily Yovani Reilly MD      atorvastatin  40 mg Oral Daily Yovani Reilly MD      baclofen  10 mg Oral HS PRN LAURIE Cooley      carvedilol  12.5 mg Oral BID With Meals Yovani Reilly MD      Diclofenac Sodium  2 g Topical 4x Daily Dee Love MD      heparin (porcine)  5,000 Units Subcutaneous Q8H [address] Yovani Reilly MD      hydrALAZINE  5 mg Intravenous Q6H PRN Yovani Reilly MD      insulin lispro  1-5 Units Subcutaneous TID AC Yovani Reilly MD      insulin lispro  1-5 Units Subcutaneous HS Yovani Reilly MD      latanoprost  1 drop Both Eyes HS Yovani Reilly MD      levothyroxine  50 mcg Oral Daily Yovani Reilly MD      lidocaine  1 patch Topical Daily Dee Love MD      losartan  100 mg Oral Daily Yovani Reilly MD      oxyCODONE  5 mg Oral Q6H PRN Yovani Reilly MD      oxyCODONE  2.5 mg Oral Q6H PRN Yovani Reilly MD      zolpidem  5 mg Oral HS PRN Vishal Pino MD          Today, Patient Was Seen By: Megan Palafox MD    **Please Note: This note may have been constructed using a voice recognition system. **

## 2023-11-21 NOTE — CASE MANAGEMENT
[facility] has received approved authorization from insurance:   [facility] in by Rep: Olga Amador P#  [phone]  Authorization received for: [facility]  Facility: [facility]   Authorization #:[id number]    Start of Care:11/21  Next Review Date:11/24  Continued Stay Care Coordinator: Francisca ISSA#: [phone]   Submit next review to: fax [phone]   Care Manager notified:Marilynn Norris

## 2023-11-21 NOTE — PLAN OF CARE
Problem: PAIN - ADULT  Goal: Verbalizes/displays adequate comfort level or baseline comfort level  Description: Interventions:  - Encourage patient to monitor pain and request assistance  - Assess pain using appropriate pain scale  - Administer analgesics based on type and severity of pain and evaluate response  - Implement non-pharmacological measures as appropriate and evaluate response  - Consider cultural and social influences on pain and pain management  - Notify physician/advanced practitioner if interventions unsuccessful or patient reports new pain  Outcome: Progressing     Problem: INFECTION - ADULT  Goal: Absence or prevention of progression during hospitalization  Description: INTERVENTIONS:  - Assess and monitor for signs and symptoms of infection  - Monitor lab/diagnostic results  - Monitor all insertion sites, i.e. indwelling lines, tubes, and drains  - Monitor endotracheal if appropriate and nasal secretions for changes in amount and color  - [place] appropriate cooling/warming therapies per order  - Administer medications as ordered  - Instruct and encourage patient and family to use good hand hygiene technique  - Identify and instruct in appropriate isolation precautions for identified infection/condition  Outcome: Progressing     Problem: MUSCULOSKELETAL - ADULT  Goal: Maintain or return mobility to safest level of function  Description: INTERVENTIONS:  - Assess patient's ability to carry out ADLs; assess patient's baseline for ADL function and identify physical deficits which impact ability to perform ADLs (bathing, care of mouth/teeth, toileting, grooming, dressing, etc.)  - Assess/evaluate cause of self-care deficits   - Assess range of motion  - Assess patient's mobility  - Assess patient's need for assistive devices and provide as appropriate  - Encourage maximum independence but intervene and supervise when necessary  - Involve family in performance of ADLs  - Assess for home care needs following discharge   - Consider OT consult to assist with ADL evaluation and planning for discharge  - Provide patient education as appropriate  Outcome: Progressing     Problem: Nutrition/Hydration-ADULT  Goal: Nutrient/Hydration intake appropriate for improving, restoring or maintaining nutritional needs  Description: Monitor and assess patient's nutrition/hydration status for malnutrition. Collaborate with interdisciplinary team and initiate plan and interventions as ordered. Monitor patient's weight and dietary intake as ordered or per policy. Utilize nutrition screening tool and intervene as necessary. Determine patient's food preferences and provide high-protein, high-caloric foods as appropriate.      INTERVENTIONS:  - Monitor oral intake, urinary output, labs, and treatment plans  - Assess nutrition and hydration status and recommend course of action  - Evaluate amount of meals eaten  - Assist patient with eating if necessary   - Allow adequate time for meals  - Recommend/ encourage appropriate diets, oral nutritional supplements, and vitamin/mineral supplements  - Order, calculate, and assess calorie counts as needed  - Recommend, monitor, and adjust tube feedings and TPN/PPN based on assessed needs  - Assess need for intravenous fluids  - Provide specific nutrition/hydration education as appropriate  - Include patient/family/caregiver in decisions related to nutrition  Outcome: Progressing

## 2023-11-21 NOTE — CASE MANAGEMENT
Case Management Discharge Planning Note    Patient name David Gillette  Location [address] [facility] 332/-01 MRN [id number]  : 1934 Date 2023       Current Admission Date: 2023  Current Admission Diagnosis:Ambulatory dysfunction   Patient Active Problem List    Diagnosis Date Noted    Ambulatory dysfunction 2023    Stage 3 chronic kidney disease, unspecified whether stage 3a or 3b CKD ([address]) 2023    Chronic pain syndrome 2022    Chronic bilateral low back pain without sciatica 2022    Sacroiliitis ([address]) 2022    Trochanteric bursitis of right hip 2021    Non-Hodgkin's lymphoma ([address]) 10/05/2020    Light chain disease, 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    Medicare annual wellness visit, subsequent 2020    Adhesive capsulitis of shoulder 2020    Triclonal gammopathy 2019    Greater trochanteric bursitis of left hip 2019    Left sided sciatica 2019    Diabetic peripheral neuropathy ([address]) 2019    Screening mammogram, encounter for 2018    Lumbar compression fracture ([address]) 10/11/2017    Vitamin D deficiency 10/11/2017    Vitamin B12 deficiency 2016    Lumbar spinal stenosis 2015    Normochromic normocytic anemia 2015    Depression 2015    Esophageal reflux 2013    Osteoarthritis of knee 2013    Spinal stenosis 2013    Coronary artery disease of native artery of native heart with stable angina pectoris ([address]) 2013    Hyperlipidemia 2013    Hypertension 2013    Hypothyroidism 2013    Insomnia 2013    Osteoporosis 2013    Type 2 diabetes mellitus ([address]) 2012      LOS (days): 6  Geometric Mean LOS (GMLOS) (days): 2.60  Days to GMLOS:-3.5     OBJECTIVE:  Risk of Unplanned Readmission Score: 13.09         Current admission status: Inpatient   Preferred Pharmacy:   [facility] [address] [facility] [id number] - MALIK HERN[place]NDEZ - [address]  [address] [place] [address]  Phone: [phone] Fax: [phone]    Primary Care Provider: Fatimah Ayon MD    Primary Insurance: [employer] [facility]  Secondary Insurance:     DISCHARGE DETAILS:    Discharge planning discussed with[de-identified] Patient's Daughter  Freedom of Choice: Yes  Comments - Freedom of Choice: CM spoke with patient's daughter regarding DC plan. Patient's daughter reported that her  can provide transportation to [place] at 5:00 PM today. CM contacted family/caregiver?: Yes  Were Treatment Team discharge recommendations reviewed with patient/caregiver?: Yes  Did patient/caregiver verbalize understanding of patient care needs?: N/A- going to facility  Were patient/caregiver advised of the risks associated with not following Treatment Team discharge recommendations?: Yes    Contacts  Patient Contacts: Amparo Marie  Relationship to Patient[de-identified] Family  Contact Method: Phone  Phone Number: [phone]  Reason/Outcome: Continuity of Care, Discharge [address]         Is the patient interested in [address] at discharge?: No    DME Referral Provided  Referral made for DME?: No    Other Referral/Resources/Interventions Provided:  Interventions: Short Term Rehab  Referral Comments: CM received a phone call back from Amelia with [facility] reporting that she can accept patient today. PASRR completed and sent via WhenSoon. Would you like to participate in our [address] service program?  : No - Declined    Treatment Team Recommendation: Short Term Rehab  Discharge Destination Plan[de-identified] Short Term Rehab  Transport at Discharge : Family  Dispatcher Contacted: No     ETA of Transport (Date): 11/21/23  ETA of Transport (Time): [address] Name, [address] :  [address] [place] [address]  Receiving Facility/Agency Phone Number: [phone]  Facility/Agency Fax Number: [phone]

## 2023-11-21 NOTE — PLAN OF CARE
Problem: PHYSICAL THERAPY ADULT  Goal: Performs mobility at highest level of function for planned discharge setting. See evaluation for individualized goals. Description: Treatment/Interventions: Functional transfer training, LE strengthening/ROM, Elevations, Therapeutic exercise, Endurance training, Patient/family training, Bed mobility, Gait training, Spoke to nursing, OT  Equipment Recommended: Refugio Jackman       See flowsheet documentation for full assessment, interventions and recommendations. Outcome: Progressing  Note: Prognosis: Good  Problem List: Decreased strength, Decreased endurance, Impaired balance, Decreased mobility, Pain  Assessment: Chart reviewed. Patient was received seated in recliner in [facility] and agreeable to PT session. Today's PT treatment session consisted of therapeutic activity for facilitation of transitional movements and safe performance of correct technique for sit to stand transfers, therapeutic exercise to increase lower extremity muscle strength, and gait training to promote safe and functional ambulation on level surfaces. In comparison to the previous session the patient has made progress as evident by ability to ambulate an increased distance with decreased assistance. When ambulating pt exhibits decreased radhika and decreased stride length. Pt's ambulation distance remains limited secondary to complaints of increased pain. Pt tolerated all therapeutic exercise well without complaints. Pt tolerated increased repetitions of seated knee extension without pain. Overall, patient tolerated today's session well and continues to be making progress towards achieving her STG's. Patient's prognosis for achieving their STG's is good as evident by pt's motivation. PT intervention continues to be appropriate as the patient continues to be limited by pain, decreased lower extremity strength, impaired balance, decreased endurance, gait deviations, and decreased functional mobility.  Continue to recommend level 2, moderate resource intensity. PT to continue to see patient in order to address the deficits listed above and provide interventions consistent with the POC in order to achieve STG's and optimize the patient's independence with functional mobility. Barriers to Discharge: Inaccessible home environment, Decreased caregiver support     Rehab Resource Intensity Level, PT: II (Moderate Resource Intensity)    See flowsheet documentation for full assessment.

## 2023-11-21 NOTE — ASSESSMENT & PLAN NOTE
Presented to the emergency department with ambulatory dysfunction. Patient notes for the past 2 weeks she been having worsening lower back pain and inability to walk. She reports she lives home alone and having difficulty taking care of herself  She recently underwent MRI on 11/12 that revealed multilevel degenerative thoracolumbar degenerative changes. Has had difficulty with ADLs at home. She was evaluated by PT/OT to emergency department recommending rehab. Case management is involved will need authorization  Follow-up further with case management, Sarai Alvarado was denies as per - [facility] team to do Peer to peer.  pending  Appreciate PT/OT recommendations  Cont with pain control for right hip pain

## 2023-11-21 NOTE — ASSESSMENT & PLAN NOTE
Lab Results   Component Value Date    HGBA1C 6.7 09/18/2023       Recent Labs     11/20/23  1618 11/20/23 2052 11/21/23  0720 11/21/23  1123   POCGLU 175* 134 124 161*         Blood Sugar Average: Last 72 hrs:  (P) 136History of type 2 diabetes  Stable BG, well controlled currently  Hold home pioglitazone   sliding scale insulin  Monitor blood sugar

## 2023-11-21 NOTE — PLAN OF CARE
Problem: PAIN - ADULT  Goal: Verbalizes/displays adequate comfort level or baseline comfort level  Description: Interventions:  - Encourage patient to monitor pain and request assistance  - Assess pain using appropriate pain scale  - Administer analgesics based on type and severity of pain and evaluate response  - Implement non-pharmacological measures as appropriate and evaluate response  - Consider cultural and social influences on pain and pain management  - Notify physician/advanced practitioner if interventions unsuccessful or patient reports new pain  Outcome: Progressing     Problem: INFECTION - ADULT  Goal: Absence or prevention of progression during hospitalization  Description: INTERVENTIONS:  - Assess and monitor for signs and symptoms of infection  - Monitor lab/diagnostic results  - Monitor all insertion sites, i.e. indwelling lines, tubes, and drains  - Monitor endotracheal if appropriate and nasal secretions for changes in amount and color  - [place] appropriate cooling/warming therapies per order  - Administer medications as ordered  - Instruct and encourage patient and family to use good hand hygiene technique  - Identify and instruct in appropriate isolation precautions for identified infection/condition  Outcome: Progressing  Goal: Absence of fever/infection during neutropenic period  Description: INTERVENTIONS:  - Monitor WBC    Outcome: Progressing     Problem: SAFETY ADULT  Goal: Patient will remain free of falls  Description: INTERVENTIONS:  - Educate patient/family on patient safety including physical limitations  - Instruct patient to call for assistance with activity   - Consult OT/PT to assist with strengthening/mobility   - Keep Call bell within reach  - Keep bed low and locked with side rails adjusted as appropriate  - Keep care items and personal belongings within reach  - Initiate and maintain comfort rounds  - Make Fall Risk Sign visible to staff  - Apply yellow socks and bracelet for high fall risk patients  - Consider moving patient to room near nurses station  Outcome: Progressing  Goal: Maintain or return to baseline ADL function  Description: INTERVENTIONS:  -  Assess patient's ability to carry out ADLs; assess patient's baseline for ADL function and identify physical deficits which impact ability to perform ADLs (bathing, care of mouth/teeth, toileting, grooming, dressing, etc.)  - Assess/evaluate cause of self-care deficits   - Assess range of motion  - Assess patient's mobility; develop plan if impaired  - Assess patient's need for assistive devices and provide as appropriate  - Encourage maximum independence but intervene and supervise when necessary  - Involve family in performance of ADLs  - Assess for home care needs following discharge   - Consider OT consult to assist with ADL evaluation and planning for discharge  - Provide patient education as appropriate  Outcome: Progressing  Goal: Maintains/Returns to pre admission functional level  Description: INTERVENTIONS:  - Perform AM-PAC 6 Click Basic Mobility/ Daily Activity assessment daily.  - Set and communicate daily mobility goal to care team and patient/family/caregiver.    - Collaborate with rehabilitation services on mobility goals if consulted  - Out of bed for toileting  - Record patient progress and toleration of activity level   Outcome: Progressing     Problem: DISCHARGE PLANNING  Goal: Discharge to home or other facility with appropriate resources  Description: INTERVENTIONS:  - Identify barriers to discharge w/patient and caregiver  - Arrange for needed discharge resources and transportation as appropriate  - Identify discharge learning needs (meds, wound care, etc.)  - Arrange for interpretive services to assist at discharge as needed  - Refer to Case Management Department for coordinating discharge planning if the patient needs post-hospital services based on physician/advanced practitioner order or complex needs related to functional status, cognitive ability, or social support system  Outcome: Progressing     Problem: Knowledge Deficit  Goal: Patient/family/caregiver demonstrates understanding of disease process, treatment plan, medications, and discharge instructions  Description: Complete learning assessment and assess knowledge base.   Interventions:  - Provide teaching at level of understanding  - Provide teaching via preferred learning methods  Outcome: Progressing     Problem: MUSCULOSKELETAL - ADULT  Goal: Maintain or return mobility to safest level of function  Description: INTERVENTIONS:  - Assess patient's ability to carry out ADLs; assess patient's baseline for ADL function and identify physical deficits which impact ability to perform ADLs (bathing, care of mouth/teeth, toileting, grooming, dressing, etc.)  - Assess/evaluate cause of self-care deficits   - Assess range of motion  - Assess patient's mobility  - Assess patient's need for assistive devices and provide as appropriate  - Encourage maximum independence but intervene and supervise when necessary  - Involve family in performance of ADLs  - Assess for home care needs following discharge   - Consider OT consult to assist with ADL evaluation and planning for discharge  - Provide patient education as appropriate  Outcome: Progressing  Goal: Maintain proper alignment of affected body part  Description: INTERVENTIONS:  - Support, maintain and protect limb and body alignment  - Provide patient/ family with appropriate education  Outcome: Progressing     Problem: Prexisting or High Potential for Compromised Skin Integrity  Goal: Skin integrity is maintained or improved  Description: INTERVENTIONS:  - Identify patients at risk for skin breakdown  - Assess and monitor skin integrity  - Assess and monitor nutrition and hydration status  - Monitor labs   - Assess for incontinence   - Turn and reposition patient  - Assist with mobility/ambulation  - Relieve pressure over bony prominences  - Avoid friction and shearing  - Provide appropriate hygiene as needed including keeping skin clean and dry  - Evaluate need for skin moisturizer/barrier cream  - Collaborate with interdisciplinary team   - Patient/family teaching  - Consider wound care consult   Outcome: Progressing     Problem: Nutrition/Hydration-ADULT  Goal: Nutrient/Hydration intake appropriate for improving, restoring or maintaining nutritional needs  Description: Monitor and assess patient's nutrition/hydration status for malnutrition. Collaborate with interdisciplinary team and initiate plan and interventions as ordered. Monitor patient's weight and dietary intake as ordered or per policy. Utilize nutrition screening tool and intervene as necessary. Determine patient's food preferences and provide high-protein, high-caloric foods as appropriate.      INTERVENTIONS:  - Monitor oral intake, urinary output, labs, and treatment plans  - Assess nutrition and hydration status and recommend course of action  - Evaluate amount of meals eaten  - Assist patient with eating if necessary   - Allow adequate time for meals  - Recommend/ encourage appropriate diets, oral nutritional supplements, and vitamin/mineral supplements  - Order, calculate, and assess calorie counts as needed  - Recommend, monitor, and adjust tube feedings and TPN/PPN based on assessed needs  - Assess need for intravenous fluids  - Provide specific nutrition/hydration education as appropriate  - Include patient/family/caregiver in decisions related to nutrition  Outcome: Progressing

## 2023-11-21 NOTE — ASSESSMENT & PLAN NOTE
/57   Pulse 62   Temp 98.5 °F (36.9 °C)   Resp 18   Ht 5' 2" (1.575 m)   Wt 66.7 kg (147 lb 0.8 oz)   SpO2 97%   BMI 26.90 kg/m²   Slightly elevated pressures- increase amlodipine to 10mg  BP initial reading in the ER noted to be elevated with SBP of 190 possibly in setting of pain  Continue amlodipine, losartan and beta-blocker  Continue hydralazine IV as needed for now

## 2023-11-21 NOTE — ASSESSMENT & PLAN NOTE
/59 (BP Location: Right arm)   Pulse 61   Temp 98.3 °F (36.8 °C) (Oral)   Resp 16   Ht 5' 2" (1.575 m)   Wt 66.7 kg (147 lb 0.8 oz)   SpO2 94%   BMI 26.90 kg/m²   Slightly elevated pressures- increase amlodipine to 10mg  BP initial reading in the ER noted to be elevated with SBP of 190 possibly in setting of pain  Continue amlodipine, losartan and beta-blocker  Continue hydralazine IV as needed for now

## 2023-11-21 NOTE — PHYSICAL THERAPY NOTE
Physical Therapy Treatment Note    Patient Name: Rosangela Arevalo    Diagnosis: Back pain [M54.9]  Acute exacerbation of chronic low back pain [M54.50, G89.29]  Ambulatory dysfunction [R26.2]     11/21/23 1015   PT Last Visit   PT Visit Date 11/21/23   Note Type   Note Type Treatment for insurance authorization   Pain Assessment   Pain Assessment Tool 0-10   Pain Score   (0/10 at rest; 8/10 with mobility)   Pain Location/Orientation Orientation: Right;Location: Hip;Orientation: Lower; Location: Back   Pain Onset/Description Frequency: Intermittent   Hospital Pain Intervention(s) Repositioned; Ambulation/increased activity   Restrictions/Precautions   Weight Bearing Precautions Per Order No   Other Precautions Chair Alarm; Bed Alarm; Fall Risk;Pain   General   Chart Reviewed Yes   Response to Previous Treatment Patient with no complaints from previous session. Family/Caregiver Present No   Cognition   Overall Cognitive Status WFL   Arousal/Participation Alert; Responsive; Cooperative   Attention Within functional limits   Orientation Level Oriented X4   Memory Within functional limits   Following Commands Follows all commands and directions without difficulty   Comments Pt agreeable to PT. Subjective   Subjective "I hope to go to rehab today."   Bed Mobility   Additional Comments Pt was received seated in recliner in NAD. Transfers   Sit to Stand   (CG assist)   Additional items Assist x 1; Armrests; Increased time required;Verbal cues   Stand to Sit   (CG assist)   Additional items Assist x 1; Armrests; Increased time required;Verbal cues   Ambulation/Elevation   Gait pattern Short stride; Shuffling;Decreased hip extension;Decreased heel strike   Gait Assistance 4  Minimal assist  (CG assist)   Additional items Assist x 1;Verbal cues   Assistive Device Rolling walker   Distance 50 feet   Balance   Static Sitting Good   Dynamic Sitting Fair +   Static Standing Fair   Dynamic Standing [address] Deficit   Endurance Deficit Yes   Endurance Deficit Description decreased activity tolerance   Activity Tolerance   Activity Tolerance Patient tolerated treatment well;Patient limited by pain   Exercises   Hip Flexion Sitting;15 reps;AROM; Bilateral   Hip Adduction Sitting;15 reps;AROM; Bilateral   Knee AROM Long Arc Quad Sitting;20 reps;AROM; Right;25 reps; Left   Ankle Pumps Sitting;20 reps;AROM; Bilateral   Assessment   Prognosis Good   Problem List Decreased strength;Decreased endurance; Impaired balance;Decreased mobility;Pain   Assessment Chart reviewed. Patient was received seated in recliner in [facility] and agreeable to PT session. Today's PT treatment session consisted of therapeutic activity for facilitation of transitional movements and safe performance of correct technique for sit to stand transfers, therapeutic exercise to increase lower extremity muscle strength, and gait training to promote safe and functional ambulation on level surfaces. In comparison to the previous session the patient has made progress as evident by ability to ambulate an increased distance with decreased assistance. When ambulating pt exhibits decreased radhika and decreased stride length. Pt's ambulation distance remains limited secondary to complaints of increased pain. Pt tolerated all therapeutic exercise well without complaints. Pt tolerated increased repetitions of seated knee extension without pain. Overall, patient tolerated today's session well and continues to be making progress towards achieving her STG's. Patient's prognosis for achieving their STG's is good as evident by pt's motivation. PT intervention continues to be appropriate as the patient continues to be limited by pain, decreased lower extremity strength, impaired balance, decreased endurance, gait deviations, and decreased functional mobility. Continue to recommend level 2, moderate resource intensity.  PT to continue to see patient in order to address the deficits listed above and provide interventions consistent with the POC in order to achieve STG's and optimize the patient's independence with functional mobility. Barriers to Discharge Inaccessible home environment;Decreased caregiver support   Goals   STG Expiration Date 11/24/23   PT Treatment Day 3   Plan   Treatment/Interventions Functional transfer training;LE strengthening/ROM; Elevations; Therapeutic exercise; Endurance training;Patient/family training;Bed mobility;Gait training;OT   Progress Progressing toward goals   PT Frequency 3-5x/wk   Discharge Recommendation   Rehab Resource Intensity Level, PT II (Moderate Resource Intensity)   AM-PAC Basic Mobility Inpatient   Turning in Flat Bed Without Bedrails 3   Lying on Back to Sitting on Edge of Flat Bed Without Bedrails 3   Moving Bed to Chair 3   Standing Up From Chair Using Arms 3   Walk in Room 3   Climb 3-5 Stairs With Railing 2   Basic Mobility Inpatient Raw Score 17   Basic Mobility Standardized Score 39.67   Highest Level Of Mobility   JH-HLM Goal 5: Stand one or more mins   JH-HLM Achieved 7: Walk 25 feet or more   Education   Education Provided Mobility training;Home exercise program;Assistive device   Patient Demonstrates acceptance/verbal understanding;Reinforcement needed     Ailyn Cruz PT, DPT    Time of PT treatment session: 3027-9693  23 minutes

## 2023-11-21 NOTE — ASSESSMENT & PLAN NOTE
Presented to the emergency department with ambulatory dysfunction. Patient notes for the past 2 weeks she been having worsening lower back pain and inability to walk. She reports she lives home alone and having difficulty taking care of herself  She recently underwent MRI on 11/12 that revealed multilevel degenerative thoracolumbar degenerative changes. Has had difficulty with ADLs at home. She was evaluated by PT/OT to emergency department recommending rehab. Case management is involved will need authorization  Follow-up further with case management, Jorge Katz was denies as per - [facility] team to do Peer to peer.  pending  Appreciate PT/OT recommendations  Cont with pain control for right hip pain

## 2023-11-21 NOTE — CASE MANAGEMENT
[address] received request for authorization from Care Manager.   Authorization request for: SNF  Facility Name: [facility]  ALIA:[id number]     Facility MD: Clemente Harper     NPI: [id number]  Authorization initiated by contacting insurance:[employer]   Via: Availity   Pending Reference #:[id number]   Clinicals submitted via: Availity attachment

## 2023-11-22 ENCOUNTER — TRANSITIONAL CARE MANAGEMENT (OUTPATIENT)
Dept: INTERNAL MEDICINE CLINIC | Facility: CLINIC | Age: 88
End: 2023-11-22

## 2023-11-22 NOTE — UTILIZATION REVIEW
NOTIFICATION OF ADMISSION DISCHARGE   This is a Notification of Discharge from [facility] BIJAL Keyes bijal. Please be advised that this patient has been discharge from our facility. Below you will find the admission and discharge date and time including the patient’s disposition. UTILIZATION REVIEW CONTACT:  Ten [facility]gar[facility]  Utilization   Network Utilization Review Department  Phone: [phone] x carefully listen to the prompts. All voicemails are confidential.  Email: [email or web address]     ADMISSION INFORMATION  PRESENTATION DATE: 11/14/2023 12:07 PM  OBERVATION ADMISSION DATE: 11/14/23  INPATIENT ADMISSION DATE: 11/15/23 12:31 PM   DISCHARGE DATE: 11/21/2023  6:29 PM   DISPOSITION:[facility] SNF/TCU/SNU    Network Utilization Review Department  ATTENTION: Please call with any questions or concerns to [phone] and carefully listen to the prompts so that you are directed to the right person. All voicemails are confidential.   For Discharge needs, contact Care Management DC Support Team at [phone] opt. 2  Send all requests for admission clinical reviews, approved or denied determinations and any other requests to dedicated fax number below belonging to the campus where the patient is receiving treatment.  List of dedicated fax numbers for the Facilities:  [place] DENIALS (Administrative/Medical Necessity) [phone]   DISCHARGE SUPPORT TEAM (Network) [phone]   [address] [facility] (Maternity/NICU/Pediatrics) [phone]   [address] [place] [address] Drive [phone]   [facility] [address] [facility] [phone] [address] [place] [phone]   [address] [address] [phone]

## 2023-11-28 ENCOUNTER — TELEPHONE (OUTPATIENT)
Dept: INTERNAL MEDICINE CLINIC | Facility: CLINIC | Age: 88
End: 2023-11-28

## 2023-11-28 NOTE — TELEPHONE ENCOUNTER
I put pt in for fri at 1:20 hansel.      If we want to reach out to hansel note her,  Hoa Terry her daughter had texted me directly about her appt so maybe reach her or pt not sure whose best.

## 2023-11-29 ENCOUNTER — TRANSITIONAL CARE MANAGEMENT (OUTPATIENT)
Dept: INTERNAL MEDICINE CLINIC | Facility: CLINIC | Age: 88
End: 2023-11-29

## 2023-12-01 ENCOUNTER — OFFICE VISIT (OUTPATIENT)
Dept: INTERNAL MEDICINE CLINIC | Facility: CLINIC | Age: 88
End: 2023-12-01
Payer: COMMERCIAL

## 2023-12-01 VITALS
OXYGEN SATURATION: 98 % | WEIGHT: 141.6 LBS | HEIGHT: 62 IN | SYSTOLIC BLOOD PRESSURE: 138 MMHG | DIASTOLIC BLOOD PRESSURE: 64 MMHG | BODY MASS INDEX: 26.06 KG/M2 | RESPIRATION RATE: 16 BRPM | HEART RATE: 67 BPM

## 2023-12-01 DIAGNOSIS — G89.29 CHRONIC BILATERAL LOW BACK PAIN WITHOUT SCIATICA: ICD-10-CM

## 2023-12-01 DIAGNOSIS — G89.4 CHRONIC PAIN SYNDROME: ICD-10-CM

## 2023-12-01 DIAGNOSIS — M54.50 CHRONIC BILATERAL LOW BACK PAIN WITHOUT SCIATICA: ICD-10-CM

## 2023-12-01 DIAGNOSIS — M25.551 RIGHT HIP PAIN: ICD-10-CM

## 2023-12-01 DIAGNOSIS — M46.1 SACROILIITIS (HCC): ICD-10-CM

## 2023-12-01 DIAGNOSIS — R26.9 ABNORMAL GAIT: ICD-10-CM

## 2023-12-01 DIAGNOSIS — M80.00XD OSTEOPOROSIS WITH CURRENT PATHOLOGICAL FRACTURE WITH ROUTINE HEALING, UNSPECIFIED OSTEOPOROSIS TYPE, SUBSEQUENT ENCOUNTER: ICD-10-CM

## 2023-12-01 DIAGNOSIS — M54.32 LEFT SIDED SCIATICA: Primary | ICD-10-CM

## 2023-12-01 DIAGNOSIS — M70.61 TROCHANTERIC BURSITIS OF RIGHT HIP: ICD-10-CM

## 2023-12-01 DIAGNOSIS — R26.2 AMBULATORY DYSFUNCTION: ICD-10-CM

## 2023-12-01 DIAGNOSIS — S32.000D COMPRESSION FRACTURE OF LUMBAR VERTEBRA WITH ROUTINE HEALING, UNSPECIFIED LUMBAR VERTEBRAL LEVEL, SUBSEQUENT ENCOUNTER: ICD-10-CM

## 2023-12-01 PROCEDURE — 99496 TRANSJ CARE MGMT HIGH F2F 7D: CPT | Performed by: INTERNAL MEDICINE

## 2023-12-01 NOTE — PROGRESS NOTES
RIAssessment & Plan     1. Left sided sciatica  -     Referral to [address]; Future    2. Right hip pain  -     Referral to [address] Future  -     Ambulatory Referral to Orthopedic Surgery; Future    3. Abnormal gait  -     Referral to [address] Future    4. Compression fracture of lumbar vertebra with routine healing, unspecified lumbar vertebral level, subsequent encounter    5. Sacroiliitis ([address])    6. Trochanteric bursitis of right hip    7. Chronic pain syndrome    8. Chronic bilateral low back pain without sciatica    9. Ambulatory dysfunction    10. Osteoporosis with current pathological fracture with routine healing, unspecified osteoporosis type, subsequent encounter      BMI Counseling: Body mass index is 25.9 kg/m². The BMI is above normal. Nutrition recommendations include decreasing portion sizes and encouraging healthy choices of fruits and vegetables. Exercise recommendations include moderate physical activity 150 minutes/week. Rationale for BMI follow-up plan is due to patient being overweight or obese. Subjective     Transitional Care Management Review:   Fadia Cunha is a 80 y.o. female here for TCM follow up. During the TCM phone call patient stated:  TCM Call       Date and time call was made  11/29/2023  7:27 AM    Hospital care reviewed  Records reviewed    Patient was hospitialized at  [address] [facility]; Other (comment)    [facility]    Date of Admission  11/14/23    Date of discharge  11/27/23    Diagnosis  Ambulatory dysfunction    Disposition  Home; Rehabilitation center    Were the patients medications reviewed and updated  Yes    Current Symptoms  None          TCM Call       Post hospital issues  None    Scheduled for follow up?   Yes    Did you obtain your prescribed medications  Yes    Do you need help managing your prescriptions or medications  No    Is transportation to your appointment needed  No    I have advised the patient to call PCP with any new or worsening symptoms  Denise Rocha, Practice Coordinator    Living Arrangements  Children          Debra Saldana is here for transition of care visit; we discussed her hospitalization, dc summary and dc instructions; she presented to the hospital with intractable back pain, she was following with outpatient pain management; admitted for 1 week, seen by Pt, patient was sent to [place] rehab but left after 5 days; left early; still struggling and needs visiting nurse to come to her home; she cannot drive and is using a cane for ambulation;     HTN controlled; continue current regimen. Hip pain; see ortho. Review of Systems   Constitutional:  Negative for chills and fever. HENT:  Negative for ear pain and sore throat. Eyes:  Negative for pain and visual disturbance. Respiratory:  Negative for cough and shortness of breath. Cardiovascular:  Negative for chest pain and palpitations. Gastrointestinal:  Negative for abdominal pain and vomiting. Genitourinary:  Negative for dysuria and hematuria. Musculoskeletal:  Positive for arthralgias and gait problem (using cane). Negative for back pain. Skin:  Negative for color change and rash. Neurological:  Negative for seizures and syncope. All other systems reviewed and are negative. Objective     /64 (BP Location: Left arm, Patient Position: Sitting, Cuff Size: Adult)   Pulse 67   Resp 16   Ht 5' 2" (1.575 m)   Wt 64.2 kg (141 lb 9.6 oz)   SpO2 98%   BMI 25.90 kg/m²      Physical Exam  Vitals and nursing note reviewed. Constitutional:       General: She is not in acute distress. Appearance: She is well-developed. HENT:      Head: Normocephalic and atraumatic. Eyes:      Conjunctiva/sclera: Conjunctivae normal.   Cardiovascular:      Rate and Rhythm: Normal rate and regular rhythm. Heart sounds: No murmur heard.   Pulmonary:      Effort: Pulmonary effort is normal. No respiratory distress. Breath sounds: Normal breath sounds. Abdominal:      Tenderness: There is no abdominal tenderness. Musculoskeletal:         General: No swelling. Cervical back: Neck supple. Skin:     General: Skin is warm and dry. Capillary Refill: Capillary refill takes less than 2 seconds. Neurological:      Mental Status: She is alert and oriented to person, place, and time. Gait: Gait abnormal (using cane).    Psychiatric:         Mood and Affect: Mood normal.     Medications have been reviewed by provider in current encounter    Everett Mejia MD

## 2023-12-07 ENCOUNTER — TELEPHONE (OUTPATIENT)
Dept: INTERNAL MEDICINE CLINIC | Facility: CLINIC | Age: 88
End: 2023-12-07

## 2023-12-07 NOTE — TELEPHONE ENCOUNTER
Patient's son dropped off form to be completed ([address] [facility])    Please call son when ready for pick-up    Put in MA bin

## 2023-12-12 ENCOUNTER — OFFICE VISIT (OUTPATIENT)
Dept: OBGYN CLINIC | Facility: CLINIC | Age: 88
End: 2023-12-12
Payer: COMMERCIAL

## 2023-12-12 VITALS
BODY MASS INDEX: 25.62 KG/M2 | HEART RATE: 69 BPM | HEIGHT: 62 IN | SYSTOLIC BLOOD PRESSURE: 166 MMHG | DIASTOLIC BLOOD PRESSURE: 73 MMHG | WEIGHT: 139.2 LBS

## 2023-12-12 DIAGNOSIS — M16.11 PRIMARY OSTEOARTHRITIS OF RIGHT HIP: ICD-10-CM

## 2023-12-12 DIAGNOSIS — I10 HYPERTENSION, UNSPECIFIED TYPE: ICD-10-CM

## 2023-12-12 DIAGNOSIS — M70.61 GREATER TROCHANTERIC BURSITIS, RIGHT: Primary | ICD-10-CM

## 2023-12-12 PROCEDURE — 99214 OFFICE O/P EST MOD 30 MIN: CPT | Performed by: ORTHOPAEDIC SURGERY

## 2023-12-12 PROCEDURE — 20610 DRAIN/INJ JOINT/BURSA W/O US: CPT | Performed by: ORTHOPAEDIC SURGERY

## 2023-12-12 RX ORDER — METHYLPREDNISOLONE ACETATE 40 MG/ML
2 INJECTION, SUSPENSION INTRA-ARTICULAR; INTRALESIONAL; INTRAMUSCULAR; SOFT TISSUE
Status: COMPLETED | OUTPATIENT
Start: 2023-12-12 | End: 2023-12-12

## 2023-12-12 RX ORDER — AMLODIPINE BESYLATE 10 MG/1
5 TABLET ORAL DAILY
Qty: 45 TABLET | Refills: 1 | Status: SHIPPED | OUTPATIENT
Start: 2023-12-12 | End: 2023-12-22

## 2023-12-12 RX ORDER — BUPIVACAINE HYDROCHLORIDE 2.5 MG/ML
2 INJECTION, SOLUTION INFILTRATION; PERINEURAL
Status: COMPLETED | OUTPATIENT
Start: 2023-12-12 | End: 2023-12-12

## 2023-12-12 RX ADMIN — BUPIVACAINE HYDROCHLORIDE 2 ML: 2.5 INJECTION, SOLUTION INFILTRATION; PERINEURAL at 14:30

## 2023-12-12 RX ADMIN — METHYLPREDNISOLONE ACETATE 2 ML: 40 INJECTION, SUSPENSION INTRA-ARTICULAR; INTRALESIONAL; INTRAMUSCULAR; SOFT TISSUE at 14:30

## 2023-12-12 NOTE — LETTER
December 12, 2023     Ray Junior, [address]    Patient: Noah Brennan   YOB: 1934   Date of Visit: 12/12/2023       Dear Dr. Bebeto Pelaez: Thank you for referring Noah Brennan to me for evaluation. Below are my notes for this consultation. If you have questions, please do not hesitate to call me. I look forward to following your patient along with you. Sincerely,        Davin Dyson MD        CC: No Recipients    Davin Dyson MD  12/12/2023 11:37 PM  Sign when Signing Visit  Orthopaedics Office Visit - New to Me/new problem Patient Visit    ASSESSMENT/PLAN:    Assessment:   Right hip osteoarthritis  Right greater trochanteric bursitis     Plan:   Weight-bear as tolerated. Home exercise program reviewed. Right greater greater trochanteric bursal injection performed at today's visit. She tolerated procedure well. All questions and concerns were answered. Ice and over-the-counter medication as needed for pain relief. Patient was referred to orthopaedics directly for evaluation of R hip pain by Amina Bello MD. Findings are as demonstrated in note and will be communicated back to referring provider. To Do Next Visit:  F/u 4 weeks     _____________________________________________________  CHIEF COMPLAINT:  Chief Complaint   Patient presents with   • Right Hip - Pain         SUBJECTIVE:  Noah Brennan is a 80 y.o. female who presents today for initial evaluation right hip pain referred by her PCP. She states that she has been having ongoing right hip pain now for several months. She denies any injury or trauma. She states that her pain is in the groin as well as deep within the lateral aspect of her hip. She states that she was previously seeing a provider [facility] who was treating her for sacroiliitis, however after receiving the ultrasound-guided injection she noticed that only some of her pain was relieved. She previously has received 2 greater trochanteric bursal injections in 2019 and 2020 which did provide significant relief of her symptoms. She has not attended any outpatient physical therapy, but does have home health coming for at home physical therapy.   She is currently managing her pain with rest, ice, and topical cream.    PAST MEDICAL HISTORY:  Past Medical History:   Diagnosis Date   • Cardiac disorder 01/01/2001    x2 stents after a failed stress test   • Cellulitis     last assessed - [id number]   • Constipation     last assessed - [id number]   • COVID-19 08/15/2022   • Diabetes mellitus ([address])    • Disease of thyroid gland    • Ecchymosis     last assessed - [id number]   • Fall     last assessed - [id number]   • Hyperlipidemia    • Hypertension    • Hypokalemia     last assessed - [id number]   • Lower extremity weakness     last assessed - [id number]   • Vitamin D deficiency     last assessed - [id number]       PAST SURGICAL HISTORY:  Past Surgical History:   Procedure Laterality Date   • BACK SURGERY     • CHOLECYSTECTOMY     • COLONOSCOPY     • CORONARY ANGIOPLASTY WITH STENT PLACEMENT     • CT BONE MARROW BIOPSY AND ASPIRATION  1/15/2020   • HYSTERECTOMY     • OTHER SURGICAL HISTORY      Previous stent placement       FAMILY HISTORY:  Family History   Problem Relation Age of Onset   • Heart disease Mother    • Other Father         cerebral embolism - with cerebral infarction       SOCIAL HISTORY:  Social History     Tobacco Use   • Smoking status: Former     Types: Cigarettes     Start date: 5/7/1958   • Smokeless tobacco: Never   Vaping Use   • Vaping Use: Never used   Substance Use Topics   • Alcohol use: Never   • Drug use: No       MEDICATIONS:    Current Outpatient Medications:   •  amLODIPine (NORVASC) 10 mg tablet, Take 0.5 tablets (5 mg total) by mouth daily, Disp: 45 tablet, Rfl: 1  •  aspirin 81 mg chewable tablet, Chew 81 mg, Disp: , Rfl:   •  atorvastatin (LIPITOR) 40 mg tablet, Take 1 tablet (40 mg total) by mouth daily, Disp: 90 tablet, Rfl: 3  •  baclofen 10 mg tablet, TAKE 1 TABLET BY MOUTH NIGHTLY AT BEDTIME AS NEEDED AS DIRECTED, Disp: , Rfl: 3  •  carvedilol (COREG) 12.5 mg tablet, Take 1 tablet (12.5 mg total) by mouth 2 (two) times a day with meals, Disp: 180 tablet, Rfl: 1  •  Cholecalciferol (VITAMIN D3) 2000 units capsule, Take 1 capsule (2,000 Units total) by mouth daily, Disp: 100 capsule, Rfl: 2  •  latanoprost (XALATAN) 0.005 % ophthalmic solution, INSTILL 1 DROP INTO EACH EYE AT BEDTIME, Disp: 9 mL, Rfl: 0  •  levothyroxine 50 mcg tablet, Take 1 tablet (50 mcg total) by mouth daily, Disp: 90 tablet, Rfl: 1  •  lidocaine (Lidoderm) 5 %, Apply 1 patch topically daily Remove & Discard patch within 12 hours or as directed by MD, Disp: 30 patch, Rfl: 0  •  losartan (COZAAR) 100 MG tablet, Take 1 tablet (100 mg total) by mouth daily, Disp: 90 tablet, Rfl: 1  •  Multiple Vitamins-Minerals (PRESERVISION AREDS 2 PO), Take by mouth, Disp: , Rfl:   •  pioglitazone (ACTOS) 30 mg tablet, Take 1 tablet (30 mg total) by mouth daily, Disp: 90 tablet, Rfl: 1  •  zolpidem (AMBIEN) 5 mg tablet, Take 5 mg by mouth, Disp: , Rfl:     Current Facility-Administered Medications:   •  cyanocobalamin injection 1,000 mcg, 1,000 mcg, Intramuscular, Q30 Days, Babita Emmanuel DO, 1,000 mcg at 02/27/19 1250    ALLERGIES:  Allergies   Allergen Reactions   • Iodine - Food Allergy Other (See Comments)     IV CONTRAST DYE   • Iv Dye  [Iodinated Contrast Media] Hives   • Other        REVIEW OF SYSTEMS:  MSK: As noted in HPI  Neuro: WNL  Pertinent items are otherwise noted in HPI. A comprehensive review of systems was otherwise negative.     LABS:  HgA1c:   Lab Results   Component Value Date    HGBA1C 6.7 09/18/2023     BMP:   Lab Results   Component Value Date    GLUCOSE 144 (H) 02/08/2016    CALCIUM 9.1 11/19/2023     02/08/2016    K 4.0 11/19/2023    CO2 25 11/19/2023     11/19/2023    BUN 24 11/19/2023 CREATININE 0.94 11/19/2023     CBC: No components found for: "CBC"    _____________________________________________________  PHYSICAL EXAMINATION:  Vital signs: /73   Pulse 69   Ht 5' 2" (1.575 m)   Wt 63.1 kg (139 lb 3.2 oz)   BMI 25.46 kg/m²   General: No acute distress, awake and alert  Psychiatric: Mood and affect appear appropriate  HEENT: Trachea Midline, No torticollis, no apparent facial trauma  Cardiovascular: No audible murmurs; Extremities appear perfused  Pulmonary: No audible wheezing or stridor  Skin: No open lesions; see further details (if any) below    MUSCULOSKELETAL EXAMINATION:  Extremities:  Right hip      Alignment / Posture:  Normal resting hip posture. Inspection:  No swelling. No erythema. No ecchymosis. Palpation:   Greater trochanter tenderness. No crepitus. ROM:  Hip Flexion 110. Hip ER 30. Hip IR 10. Strength:  5/5 hip flexors and abductors. Stability:  No objective hip instability. Tests:  (-) Straight leg raise. Neurovascular:  Sensation intact in DP/SP/Robison/Sa/T nerve distributions. 2+ DP & PT pulses. Gait:  Steady with walker. _____________________________________________________  STUDIES REVIEWED:  I personally reviewed the images and interpretation is as follows:  XR Right hip was reviewed which demonstrates moderate to severe osteoarthritis. PROCEDURES PERFORMED:  Large joint arthrocentesis: R greater trochanteric bursa  Universal Protocol:  Consent: Verbal consent obtained. Risks and benefits: risks, benefits and alternatives were discussed  Consent given by: patient  Time out: Immediately prior to procedure a "time out" was called to verify the correct patient, procedure, equipment, support staff and site/side marked as required.   Timeout called at: 12/12/2023 3:00 PM.  Patient understanding: patient states understanding of the procedure being performed  Patient consent: the patient's understanding of the procedure matches consent given  Site marked: the operative site was marked  Patient identity confirmed: verbally with patient  Supporting Documentation  Indications: pain and diagnostic evaluation   Procedure Details  Location: hip - R greater trochanteric bursa  Preparation: Patient was prepped and draped in the usual sterile fashion  Needle size: 22 G  Ultrasound guidance: no  Approach: posterolateral  Medications administered: 2 mL bupivacaine 0.25 %; 2 mL methylPREDNISolone acetate 40 mg/mL              Scribe Attestation      I,:  Jose Love am acting as a scribe while in the presence of the attending physician.:       I,:  Caitlyn Melendez MD personally performed the services described in this documentation    as scribed in my presence.:

## 2023-12-12 NOTE — TELEPHONE ENCOUNTER
Patient is requesting a refill on amlodipine 10 mg tablet. She states she is taking  5 mg daily.  90 day supply    [facility]    Call back #[phone]

## 2023-12-12 NOTE — PROGRESS NOTES
Orthopaedics Office Visit - New to Me/new problem Patient Visit    ASSESSMENT/PLAN:    Assessment:   Right hip osteoarthritis  Right greater trochanteric bursitis     Plan:   Weight-bear as tolerated. Home exercise program reviewed. Right greater greater trochanteric bursal injection performed at today's visit. She tolerated procedure well. All questions and concerns were answered. Ice and over-the-counter medication as needed for pain relief. Patient was referred to orthopaedics directly for evaluation of R hip pain by Miriam Ward MD. Findings are as demonstrated in note and will be communicated back to referring provider. To Do Next Visit:  F/u 4 weeks     _____________________________________________________  CHIEF COMPLAINT:  Chief Complaint   Patient presents with    Right Hip - Pain         SUBJECTIVE:  Tayler Albarran is a 80 y.o. female who presents today for initial evaluation right hip pain referred by her PCP. She states that she has been having ongoing right hip pain now for several months. She denies any injury or trauma. She states that her pain is in the groin as well as deep within the lateral aspect of her hip. She states that she was previously seeing a provider from [facility] who was treating her for sacroiliitis, however after receiving the ultrasound-guided injection she noticed that only some of her pain was relieved. She previously has received 2 greater trochanteric bursal injections in 2019 and 2020 which did provide significant relief of her symptoms. She has not attended any outpatient physical therapy, but does have home health coming for at home physical therapy.   She is currently managing her pain with rest, ice, and topical cream.    PAST MEDICAL HISTORY:  Past Medical History:   Diagnosis Date    Cardiac disorder 01/01/2001    x2 stents after a failed stress test    Cellulitis     last assessed - [id number]    Constipation     last assessed - [id number] COVID-19 08/15/2022    Diabetes mellitus ([address] [facility])     Disease of thyroid gland     Ecchymosis     last assessed - [id number]    Fall     last assessed - [id number]    Hyperlipidemia     Hypertension     Hypokalemia     last assessed - [id number]    Lower extremity weakness     last assessed - [id number]    Vitamin D deficiency     last assessed - 67Kzm5582       PAST SURGICAL HISTORY:  Past Surgical History:   Procedure Laterality Date    BACK SURGERY      CHOLECYSTECTOMY      COLONOSCOPY      CORONARY ANGIOPLASTY WITH STENT PLACEMENT      CT BONE MARROW BIOPSY AND ASPIRATION  1/15/2020    HYSTERECTOMY      OTHER SURGICAL HISTORY      Previous stent placement       FAMILY HISTORY:  Family History   Problem Relation Age of Onset    Heart disease Mother     Other Father         cerebral embolism - with cerebral infarction       SOCIAL HISTORY:  Social History     Tobacco Use    Smoking status: Former     Types: Cigarettes     Start date: 5/7/1958    Smokeless tobacco: Never   Vaping Use    Vaping Use: Never used   Substance Use Topics    Alcohol use: Never    Drug use: No       MEDICATIONS:    Current Outpatient Medications:     amLODIPine (NORVASC) 10 mg tablet, Take 0.5 tablets (5 mg total) by mouth daily, Disp: 45 tablet, Rfl: 1    aspirin 81 mg chewable tablet, Chew 81 mg, Disp: , Rfl:     atorvastatin (LIPITOR) 40 mg tablet, Take 1 tablet (40 mg total) by mouth daily, Disp: 90 tablet, Rfl: 3    baclofen 10 mg tablet, TAKE 1 TABLET BY MOUTH NIGHTLY AT BEDTIME AS NEEDED AS DIRECTED, Disp: , Rfl: 3    carvedilol (COREG) 12.5 mg tablet, Take 1 tablet (12.5 mg total) by mouth 2 (two) times a day with meals, Disp: 180 tablet, Rfl: 1    Cholecalciferol (VITAMIN D3) 2000 units capsule, Take 1 capsule (2,000 Units total) by mouth daily, Disp: 100 capsule, Rfl: 2    latanoprost (XALATAN) 0.005 % ophthalmic solution, INSTILL 1 DROP INTO EACH EYE AT BEDTIME, Disp: 9 mL, Rfl: 0    levothyroxine 50 mcg tablet, Take 1 tablet (50 mcg total) by mouth daily, Disp: 90 tablet, Rfl: 1    lidocaine (Lidoderm) 5 %, Apply 1 patch topically daily Remove & Discard patch within 12 hours or as directed by MD, Disp: 30 patch, Rfl: 0    losartan (COZAAR) 100 MG tablet, Take 1 tablet (100 mg total) by mouth daily, Disp: 90 tablet, Rfl: 1    Multiple Vitamins-Minerals (PRESERVISION AREDS 2 PO), Take by mouth, Disp: , Rfl:     pioglitazone (ACTOS) 30 mg tablet, Take 1 tablet (30 mg total) by mouth daily, Disp: 90 tablet, Rfl: 1    zolpidem (AMBIEN) 5 mg tablet, Take 5 mg by mouth, Disp: , Rfl:     Current Facility-Administered Medications:     cyanocobalamin injection 1,000 mcg, 1,000 mcg, Intramuscular, Q30 Days, Citlalli Thompson DO, 1,000 mcg at 02/27/19 1250    ALLERGIES:  Allergies   Allergen Reactions    Iodine - Food Allergy Other (See Comments)     IV CONTRAST DYE    Iv Dye  [Iodinated Contrast Media] Hives    Other        REVIEW OF SYSTEMS:  MSK: As noted in HPI  Neuro: WNL  Pertinent items are otherwise noted in HPI. A comprehensive review of systems was otherwise negative. LABS:  HgA1c:   Lab Results   Component Value Date    HGBA1C 6.7 09/18/2023     BMP:   Lab Results   Component Value Date    GLUCOSE 144 (H) 02/08/2016    CALCIUM 9.1 11/19/2023     02/08/2016    K 4.0 11/19/2023    CO2 25 11/19/2023     11/19/2023    BUN 24 11/19/2023    CREATININE 0.94 11/19/2023     CBC: No components found for: "CBC"    _____________________________________________________  PHYSICAL EXAMINATION:  Vital signs: /73   Pulse 69   Ht 5' 2" (1.575 m)   Wt 63.1 kg (139 lb 3.2 oz)   BMI 25.46 kg/m²   General: No acute distress, awake and alert  Psychiatric: Mood and affect appear appropriate  HEENT: Trachea Midline, No torticollis, no apparent facial trauma  Cardiovascular: No audible murmurs;  Extremities appear perfused  Pulmonary: No audible wheezing or stridor  Skin: No open lesions; see further details (if any) below    MUSCULOSKELETAL EXAMINATION:  Extremities:  Right hip      Alignment / Posture:  Normal resting hip posture. Inspection:  No swelling. No erythema. No ecchymosis. Palpation:   Greater trochanter tenderness. No crepitus. ROM:  Hip Flexion 110. Hip ER 30. Hip IR 10. Strength:  5/5 hip flexors and abductors. Stability:  No objective hip instability. Tests:  (-) Straight leg raise. Neurovascular:  Sensation intact in DP/SP/Robison/Sa/T nerve distributions. 2+ DP & PT pulses. Gait:  Steady with walker. _____________________________________________________  STUDIES REVIEWED:  I personally reviewed the images and interpretation is as follows:  XR Right hip was reviewed which demonstrates moderate to severe osteoarthritis. PROCEDURES PERFORMED:  Large joint arthrocentesis: R greater trochanteric bursa  Universal Protocol:  Consent: Verbal consent obtained. Risks and benefits: risks, benefits and alternatives were discussed  Consent given by: patient  Time out: Immediately prior to procedure a "time out" was called to verify the correct patient, procedure, equipment, support staff and site/side marked as required.   Timeout called at: 12/12/2023 3:00 PM.  Patient understanding: patient states understanding of the procedure being performed  Patient consent: the patient's understanding of the procedure matches consent given  Site marked: the operative site was marked  Patient identity confirmed: verbally with patient  Supporting Documentation  Indications: pain and diagnostic evaluation   Procedure Details  Location: hip - R greater trochanteric bursa  Preparation: Patient was prepped and draped in the usual sterile fashion  Needle size: 22 G  Ultrasound guidance: no  Approach: posterolateral  Medications administered: 2 mL bupivacaine 0.25 %; 2 mL methylPREDNISolone acetate 40 mg/mL              Scribe Attestation      I,:  Estrellita Hayden am acting as a scribe while in the presence of the attending physician.:       I,:  Lorraine Banegas Chad Kelley MD personally performed the services described in this documentation    as scribed in my presence.:

## 2023-12-18 DIAGNOSIS — I10 HYPERTENSION, UNSPECIFIED TYPE: ICD-10-CM

## 2023-12-18 RX ORDER — AMLODIPINE BESYLATE 10 MG/1
5 TABLET ORAL DAILY
Qty: 45 TABLET | Refills: 1 | Status: CANCELLED | OUTPATIENT
Start: 2023-12-18

## 2023-12-21 ENCOUNTER — TELEPHONE (OUTPATIENT)
Age: 88
End: 2023-12-21

## 2023-12-21 DIAGNOSIS — M54.50 ACUTE EXACERBATION OF CHRONIC LOW BACK PAIN: Primary | ICD-10-CM

## 2023-12-21 DIAGNOSIS — G89.29 ACUTE EXACERBATION OF CHRONIC LOW BACK PAIN: Primary | ICD-10-CM

## 2023-12-21 RX ORDER — TRAMADOL HYDROCHLORIDE 50 MG/1
50 TABLET ORAL EVERY 8 HOURS PRN
Qty: 20 TABLET | Refills: 0 | Status: SHIPPED | OUTPATIENT
Start: 2023-12-21 | End: 2024-01-23

## 2023-12-21 NOTE — TELEPHONE ENCOUNTER
Caller: Patient     Doctor: Isis     Reason for call: Patient having increase pain into the lower back and was seen in the ED for it as well and was admitted for a week.     She was then in rehab for 5 days and now having therapy at home.     She has tylenol and that does not seem to be helping and she is using the cream and patches.    She is looking to see if anything else can be prescribed to help with this at this time.    Please advise     Call back#: [phone]

## 2023-12-21 NOTE — TELEPHONE ENCOUNTER
S/W pt and advised of same  Pt agreeable  Appt made with MG first avail 1/2/24  Please send to [facility] on file

## 2023-12-21 NOTE — TELEPHONE ENCOUNTER
Can send short course of tramadol. Needs OV. Can be with MG to review MRI and reassess pain. Can't keep treating over the phone.

## 2023-12-21 NOTE — TELEPHONE ENCOUNTER
Please review  Pt had MRI and was supposed to have OV to discuss  OV's were cancelled and pt went into hosp  Please advise

## 2023-12-21 NOTE — TELEPHONE ENCOUNTER
Sent. I would have her make sure she takes in the presence of someone the first time to ensure she does not have any issues.

## 2023-12-22 ENCOUNTER — TELEPHONE (OUTPATIENT)
Dept: INTERNAL MEDICINE CLINIC | Facility: CLINIC | Age: 88
End: 2023-12-22

## 2023-12-22 DIAGNOSIS — I10 HYPERTENSION, UNSPECIFIED TYPE: ICD-10-CM

## 2023-12-22 RX ORDER — AMLODIPINE BESYLATE 5 MG/1
10 TABLET ORAL DAILY
Qty: 180 TABLET | Refills: 0 | Status: SHIPPED | OUTPATIENT
Start: 2023-12-22

## 2023-12-22 RX ORDER — AMLODIPINE BESYLATE 5 MG/1
5 TABLET ORAL DAILY
Qty: 90 TABLET | Refills: 0 | Status: SHIPPED | OUTPATIENT
Start: 2023-12-22 | End: 2023-12-22 | Stop reason: SDUPTHER

## 2023-12-22 NOTE — TELEPHONE ENCOUNTER
Patient said that the 10 mg Amlodipine is not scored for her to cut in half and she wanted to know if you could please send in a scipt for the 5 mg tablet?      Please advise.......

## 2024-01-02 ENCOUNTER — OFFICE VISIT (OUTPATIENT)
Dept: PAIN MEDICINE | Facility: CLINIC | Age: 89
End: 2024-01-02
Payer: COMMERCIAL

## 2024-01-02 VITALS
WEIGHT: 138.6 LBS | HEART RATE: 67 BPM | SYSTOLIC BLOOD PRESSURE: 158 MMHG | BODY MASS INDEX: 25.51 KG/M2 | HEIGHT: 62 IN | DIASTOLIC BLOOD PRESSURE: 75 MMHG

## 2024-01-02 DIAGNOSIS — G89.4 CHRONIC PAIN SYNDROME: Primary | ICD-10-CM

## 2024-01-02 DIAGNOSIS — N18.30 STAGE 3 CHRONIC KIDNEY DISEASE, UNSPECIFIED WHETHER STAGE 3A OR 3B CKD (HCC): ICD-10-CM

## 2024-01-02 DIAGNOSIS — M54.16 LUMBAR RADICULOPATHY: ICD-10-CM

## 2024-01-02 DIAGNOSIS — M46.1 SACROILIITIS (HCC): ICD-10-CM

## 2024-01-02 PROCEDURE — 99214 OFFICE O/P EST MOD 30 MIN: CPT

## 2024-01-02 RX ORDER — HYDROCODONE BITARTRATE AND ACETAMINOPHEN 5; 325 MG/1; MG/1
1 TABLET ORAL 2 TIMES DAILY PRN
Qty: 14 TABLET | Refills: 0 | Status: SHIPPED | OUTPATIENT
Start: 2024-01-02

## 2024-01-02 NOTE — H&P (VIEW-ONLY)
Pain Medicine Follow-Up Note    Assessment:  1. Chronic pain syndrome    2. Lumbar radiculopathy    3. Sacroiliitis (HCC)    4. Stage 3 chronic kidney disease, unspecified whether stage 3a or 3b CKD (HCC)        Plan:  Orders Placed This Encounter   Procedures    FL spine and pain procedure     Standing Status:   Future     Standing Expiration Date:   1/2/2028     Order Specific Question:   Reason for Exam:     Answer:   L2-L3 LESI     Order Specific Question:   Anticoagulant hold needed?     Answer:   no       New Medications Ordered This Visit   Medications    HYDROcodone-acetaminophen (NORCO) 5-325 mg per tablet     Sig: Take 1 tablet by mouth 2 (two) times a day as needed for pain May split tablets in half Max Daily Amount: 2 tablets     Dispense:  14 tablet     Refill:  0       My impressions and treatment recommendations were discussed in detail with the patient who verbalized understanding and had no further questions.      Patient returns to the office due to worsening bilateral low back pain that radiates down her right anterior thigh as well as right groin pain.  Patient recently had an MRI of her lumbar spine patient does have moderate tricompartmental canal stenosis with moderate bilateral foraminal narrowing at L1-L2 which correlates with patient's symptoms.  I recommend that the patient have a lumbar epidural steroid injection at L2-L3.    The patient was recently prescribed tramadol 50 mg tablets 1 tablet every 8 hours as needed for severe pain.  Patient stated she tried them several times as she did not find them effective though she did state they made her slightly drowsy.  Patient stopped using them and started taking Tylenol and ibuprofen.  Expressed to the patient that she should avoid ibuprofen but continue Tylenol up to 3000 mg/day.  Today I recommend that she trial hydrocodone/acetaminophen 5/325 mg tablet patient may take 1 tablet up to twice a day as needed for severe pain patient encouraged  to trial splitting the tablets in half initially.  Patient verbalized understanding.    [place] Prescription Drug Monitoring Program report was reviewed and was appropriate     There are risks associated with opioid medications, including dependence, addiction and tolerance. The patient understands and agrees to use these medications only as prescribed. Potential side effects of the medications include, but are not limited to, constipation, drowsiness, addiction, impaired judgment and risk of fatal overdose if not taken as prescribed. The patient was warned against driving while taking sedation medications.  Sharing medications is a felony. At this point in time, the patient is showing no signs of addiction, abuse, diversion or suicidal ideation.    Complete risks and benefits including bleeding, infection, tissue reaction, nerve injury and allergic reaction were discussed. The approach was demonstrated using models and literature was provided. Verbal and written consent was obtained.    Follow-up is planned in 4 weeks time or sooner as warranted.  Discharge instructions were provided. I personally saw and examined the patient and I agree with the above discussed plan of care.    History of Present Illness:    Alejandra Patton is a 89 y.o. female who presents to [facility] for interval re-evaluation of the above stated pain complaints. The patient has a past medical and chronic pain history as outlined in the assessment section. She was last seen on 10/26/2023.    At today's visit patient states that their pain symptoms are the same with a pain score of 9/10 on the verbal numeric pain scale.  The patient's pain is worse in the morning.  The patient's pain is intermittent in nature.  And the quality of the patient's pain is described as sharp.  The patient's pain is located in the bilateral low back radiating down her right buttock into her right hip and groin and anterior thigh.     Other  than as stated above, the patient denies any interval changes in medications, medical condition, mental condition, symptoms, or allergies since the last office visit.         Review of Systems:    Review of Systems   Respiratory:  Negative for shortness of breath.    Cardiovascular:  Negative for chest pain.   Gastrointestinal:  Negative for constipation, diarrhea, nausea and vomiting.   Musculoskeletal:  Positive for arthralgias and gait problem. Negative for joint swelling and myalgias.        DROM     Skin:  Negative for rash.   Neurological:  Negative for dizziness, seizures and weakness.   All other systems reviewed and are negative.        Past Medical History:   Diagnosis Date    Cardiac disorder 01/01/2001    x2 stents after a failed stress test    Cellulitis     last assessed - 19Nov2012    Constipation     last assessed - 14Mar2013    COVID-19 08/15/2022    Diabetes mellitus (HCC)     Disease of thyroid gland     Ecchymosis     last assessed - 03Jan2017    Fall     last assessed - 03Jan2017    Hyperlipidemia     Hypertension     Hypokalemia     last assessed - 02Jun2015    Lower extremity weakness     last assessed - [id number]    Vitamin D deficiency     last assessed - [id number]       Past Surgical History:   Procedure Laterality Date    BACK SURGERY      CHOLECYSTECTOMY      COLONOSCOPY      CORONARY ANGIOPLASTY WITH STENT PLACEMENT      CT BONE MARROW BIOPSY AND ASPIRATION  1/15/2020    HYSTERECTOMY      OTHER SURGICAL HISTORY      Previous stent placement       Family History   Problem Relation Age of Onset    Heart disease Mother     Other Father         cerebral embolism - with cerebral infarction       Social History     Occupational History    Not on file   Tobacco Use    Smoking status: Former     Types: Cigarettes     Start date: 5/7/1958    Smokeless tobacco: Never   Vaping Use    Vaping status: Never Used   Substance and Sexual Activity    Alcohol use: Never    Drug use: No    Sexual activity:  Never     Partners: Male         Current Outpatient Medications:     amLODIPine (NORVASC) 5 mg tablet, Take 2 tablets (10 mg total) by mouth daily, Disp: 180 tablet, Rfl: 0    aspirin 81 mg chewable tablet, Chew 81 mg, Disp: , Rfl:     atorvastatin (LIPITOR) 40 mg tablet, Take 1 tablet (40 mg total) by mouth daily, Disp: 90 tablet, Rfl: 3    baclofen 10 mg tablet, TAKE 1 TABLET BY MOUTH NIGHTLY AT BEDTIME AS NEEDED AS DIRECTED, Disp: , Rfl: 3    carvedilol (COREG) 12.5 mg tablet, Take 1 tablet (12.5 mg total) by mouth 2 (two) times a day with meals, Disp: 180 tablet, Rfl: 1    Cholecalciferol (VITAMIN D3) 2000 units capsule, Take 1 capsule (2,000 Units total) by mouth daily, Disp: 100 capsule, Rfl: 2    HYDROcodone-acetaminophen (NORCO) 5-325 mg per tablet, Take 1 tablet by mouth 2 (two) times a day as needed for pain May split tablets in half Max Daily Amount: 2 tablets, Disp: 14 tablet, Rfl: 0    latanoprost (XALATAN) 0.005 % ophthalmic solution, INSTILL 1 DROP INTO EACH EYE AT BEDTIME, Disp: 9 mL, Rfl: 0    levothyroxine 50 mcg tablet, Take 1 tablet (50 mcg total) by mouth daily, Disp: 90 tablet, Rfl: 1    lidocaine (Lidoderm) 5 %, Apply 1 patch topically daily Remove & Discard patch within 12 hours or as directed by MD, Disp: 30 patch, Rfl: 0    losartan (COZAAR) 100 MG tablet, Take 1 tablet (100 mg total) by mouth daily, Disp: 90 tablet, Rfl: 1    Multiple Vitamins-Minerals (PRESERVISION AREDS 2 PO), Take by mouth, Disp: , Rfl:     pioglitazone (ACTOS) 30 mg tablet, Take 1 tablet (30 mg total) by mouth daily, Disp: 90 tablet, Rfl: 1    traMADol (Ultram) 50 mg tablet, Take 1 tablet (50 mg total) by mouth every 8 (eight) hours as needed for severe pain, Disp: 20 tablet, Rfl: 0    zolpidem (AMBIEN) 5 mg tablet, Take 5 mg by mouth, Disp: , Rfl:     Current Facility-Administered Medications:     cyanocobalamin injection 1,000 mcg, 1,000 mcg, Intramuscular, Q30 Days, Sukumar Valencia DO, 1,000 mcg at 02/27/19  "1250    Allergies   Allergen Reactions    Iodine - Food Allergy Other (See Comments)     IV CONTRAST DYE    Iv Dye  [Iodinated Contrast Media] Hives    Other        Physical Exam:    /75   Pulse 67   Ht 5' 2\" (1.575 m)   Wt 62.9 kg (138 lb 9.6 oz)   BMI 25.35 kg/m²     Constitutional:normal, well developed, well nourished, alert, in no distress and non-toxic and no overt pain behavior.  Eyes:anicteric  HEENT:grossly intact  Neck:supple, symmetric, trachea midline and no masses   Pulmonary:even and unlabored  Cardiovascular:No edema or pitting edema present  Skin:Normal without rashes or lesions and well hydrated  Psychiatric:Mood and affect appropriate  Neurologic:Cranial Nerves II-XII grossly intact  Musculoskeletal:antalgic and shuffling      Imaging  FL spine and pain procedure    (Results Pending)         Orders Placed This Encounter   Procedures    FL spine and pain procedure       This document was created using speech voice recognition software.   Grammatical errors, random word insertions, pronoun errors, and incomplete sentences are an occasional consequence of this system due to software limitations, ambient noise, and hardware issues.   Any formal questions or concerns about content, text, or information contained within the body of this dictation should be directly addressed to the provider for clarification.    "

## 2024-01-02 NOTE — PATIENT INSTRUCTIONS
Epidural Steroid Injection, Ambulatory Care   GENERAL INFORMATION:   What do I need to know about an epidural steroid injection?  An epidural steroid injection (VIVEK) is a procedure to inject steroid medicine into the epidural space. The epidural space is between your spinal cord and vertebrae. Steroids reduce inflammation and fluid buildup in your spine that may be causing pain. You may be given pain medicine along with the steroids.   How do I prepare for an VIVEK?  Your healthcare provider will talk to you about how to prepare for your procedure. He will tell you what medicines to take or not take on the day of your procedure. You may need to stop taking blood thinners or other medicines several days before your procedure. You may need to adjust any diabetes medicine you take on the day of your procedure. Steroid medicine can increase your blood sugar level.   What will happen during an VIVEK?   You will be given medicine to numb the procedure area. You will be awake for the procedure, but you will not feel pain. You may also be given medicine to help you relax during the procedure. Contrast liquid will be used to help your healthcare provider see the area better. Tell the healthcare provider if you have ever had an allergic reaction to contrast liquid.    Your healthcare provider may place the needle into your neck area, middle of your back, or tailbone area. He may inject the medicine next to the nerves that are causing your pain. He may instead inject the medicine into a larger area of the epidural space. This helps the medicine spread to more nerves. Your healthcare provider will use a fluoroscope to help guide the needle to the right place. A fluoroscope is a type of x-ray. After the procedure, a bandage will be placed over the injection site to prevent infection.  What are the risks of an VIVEK?  You may have temporary or permanent nerve damage or paralysis. You may have bleeding or develop a serious infection,  such as meningitis (swelling of the brain coverings). An abscess may also develop. You may need surgery to fix the abscess. You may have a seizure, anxiety, or trouble sleeping. If you are a man, you may have temporary erectile dysfunction (not able to have an erection).   CARE AGREEMENT:   You have the right to help plan your care. Learn about your health condition and how it may be treated. Discuss treatment options with your caregivers to decide what care you want to receive. You always have the right to refuse treatment. The above information is an  only. It is not intended as medical advice for individual conditions or treatments. Talk to your doctor, nurse or pharmacist before following any medical regimen to see if it is safe and effective for you.  © 2014 [employer] Information is for End User's use only and may not be sold, redistributed or otherwise used for commercial purposes. All illustrations and images included in CareNotes® are the copyrighted property of [employer] Inc. or [employer].

## 2024-01-02 NOTE — PROGRESS NOTES
Pain Medicine Follow-Up Note    Assessment:  1. Chronic pain syndrome    2. Lumbar radiculopathy    3. Sacroiliitis (HCC)    4. Stage 3 chronic kidney disease, unspecified whether stage 3a or 3b CKD (HCC)        Plan:  Orders Placed This Encounter   Procedures    FL spine and pain procedure     Standing Status:   Future     Standing Expiration Date:   1/2/2028     Order Specific Question:   Reason for Exam:     Answer:   L2-L3 LESI     Order Specific Question:   Anticoagulant hold needed?     Answer:   no       New Medications Ordered This Visit   Medications    HYDROcodone-acetaminophen (NORCO) 5-325 mg per tablet     Sig: Take 1 tablet by mouth 2 (two) times a day as needed for pain May split tablets in half Max Daily Amount: 2 tablets     Dispense:  14 tablet     Refill:  0       My impressions and treatment recommendations were discussed in detail with the patient who verbalized understanding and had no further questions.      Patient returns to the office due to worsening bilateral low back pain that radiates down her right anterior thigh as well as right groin pain.  Patient recently had an MRI of her lumbar spine patient does have moderate tricompartmental canal stenosis with moderate bilateral foraminal narrowing at L1-L2 which correlates with patient's symptoms.  I recommend that the patient have a lumbar epidural steroid injection at L2-L3.    The patient was recently prescribed tramadol 50 mg tablets 1 tablet every 8 hours as needed for severe pain.  Patient stated she tried them several times as she did not find them effective though she did state they made her slightly drowsy.  Patient stopped using them and started taking Tylenol and ibuprofen.  Expressed to the patient that she should avoid ibuprofen but continue Tylenol up to 3000 mg/day.  Today I recommend that she trial hydrocodone/acetaminophen 5/325 mg tablet patient may take 1 tablet up to twice a day as needed for severe pain patient encouraged  to trial splitting the tablets in half initially.  Patient verbalized understanding.    [place] Prescription Drug Monitoring Program report was reviewed and was appropriate     There are risks associated with opioid medications, including dependence, addiction and tolerance. The patient understands and agrees to use these medications only as prescribed. Potential side effects of the medications include, but are not limited to, constipation, drowsiness, addiction, impaired judgment and risk of fatal overdose if not taken as prescribed. The patient was warned against driving while taking sedation medications.  Sharing medications is a felony. At this point in time, the patient is showing no signs of addiction, abuse, diversion or suicidal ideation.    Complete risks and benefits including bleeding, infection, tissue reaction, nerve injury and allergic reaction were discussed. The approach was demonstrated using models and literature was provided. Verbal and written consent was obtained.    Follow-up is planned in 4 weeks time or sooner as warranted.  Discharge instructions were provided. I personally saw and examined the patient and I agree with the above discussed plan of care.    History of Present Illness:    Alejandra Patton is a 89 y.o. female who presents to [facility] for interval re-evaluation of the above stated pain complaints. The patient has a past medical and chronic pain history as outlined in the assessment section. She was last seen on 10/26/2023.    At today's visit patient states that their pain symptoms are the same with a pain score of 9/10 on the verbal numeric pain scale.  The patient's pain is worse in the morning.  The patient's pain is intermittent in nature.  And the quality of the patient's pain is described as sharp.  The patient's pain is located in the bilateral low back radiating down her right buttock into her right hip and groin and anterior thigh.     Other  than as stated above, the patient denies any interval changes in medications, medical condition, mental condition, symptoms, or allergies since the last office visit.         Review of Systems:    Review of Systems   Respiratory:  Negative for shortness of breath.    Cardiovascular:  Negative for chest pain.   Gastrointestinal:  Negative for constipation, diarrhea, nausea and vomiting.   Musculoskeletal:  Positive for arthralgias and gait problem. Negative for joint swelling and myalgias.        DROM     Skin:  Negative for rash.   Neurological:  Negative for dizziness, seizures and weakness.   All other systems reviewed and are negative.        Past Medical History:   Diagnosis Date    Cardiac disorder 01/01/2001    x2 stents after a failed stress test    Cellulitis     last assessed - 19Nov2012    Constipation     last assessed - 14Mar2013    COVID-19 08/15/2022    Diabetes mellitus (HCC)     Disease of thyroid gland     Ecchymosis     last assessed - 03Jan2017    Fall     last assessed - 03Jan2017    Hyperlipidemia     Hypertension     Hypokalemia     last assessed - 02Jun2015    Lower extremity weakness     last assessed - [id number]    Vitamin D deficiency     last assessed - [id number]       Past Surgical History:   Procedure Laterality Date    BACK SURGERY      CHOLECYSTECTOMY      COLONOSCOPY      CORONARY ANGIOPLASTY WITH STENT PLACEMENT      CT BONE MARROW BIOPSY AND ASPIRATION  1/15/2020    HYSTERECTOMY      OTHER SURGICAL HISTORY      Previous stent placement       Family History   Problem Relation Age of Onset    Heart disease Mother     Other Father         cerebral embolism - with cerebral infarction       Social History     Occupational History    Not on file   Tobacco Use    Smoking status: Former     Types: Cigarettes     Start date: 5/7/1958    Smokeless tobacco: Never   Vaping Use    Vaping status: Never Used   Substance and Sexual Activity    Alcohol use: Never    Drug use: No    Sexual activity:  Never     Partners: Male         Current Outpatient Medications:     amLODIPine (NORVASC) 5 mg tablet, Take 2 tablets (10 mg total) by mouth daily, Disp: 180 tablet, Rfl: 0    aspirin 81 mg chewable tablet, Chew 81 mg, Disp: , Rfl:     atorvastatin (LIPITOR) 40 mg tablet, Take 1 tablet (40 mg total) by mouth daily, Disp: 90 tablet, Rfl: 3    baclofen 10 mg tablet, TAKE 1 TABLET BY MOUTH NIGHTLY AT BEDTIME AS NEEDED AS DIRECTED, Disp: , Rfl: 3    carvedilol (COREG) 12.5 mg tablet, Take 1 tablet (12.5 mg total) by mouth 2 (two) times a day with meals, Disp: 180 tablet, Rfl: 1    Cholecalciferol (VITAMIN D3) 2000 units capsule, Take 1 capsule (2,000 Units total) by mouth daily, Disp: 100 capsule, Rfl: 2    HYDROcodone-acetaminophen (NORCO) 5-325 mg per tablet, Take 1 tablet by mouth 2 (two) times a day as needed for pain May split tablets in half Max Daily Amount: 2 tablets, Disp: 14 tablet, Rfl: 0    latanoprost (XALATAN) 0.005 % ophthalmic solution, INSTILL 1 DROP INTO EACH EYE AT BEDTIME, Disp: 9 mL, Rfl: 0    levothyroxine 50 mcg tablet, Take 1 tablet (50 mcg total) by mouth daily, Disp: 90 tablet, Rfl: 1    lidocaine (Lidoderm) 5 %, Apply 1 patch topically daily Remove & Discard patch within 12 hours or as directed by MD, Disp: 30 patch, Rfl: 0    losartan (COZAAR) 100 MG tablet, Take 1 tablet (100 mg total) by mouth daily, Disp: 90 tablet, Rfl: 1    Multiple Vitamins-Minerals (PRESERVISION AREDS 2 PO), Take by mouth, Disp: , Rfl:     pioglitazone (ACTOS) 30 mg tablet, Take 1 tablet (30 mg total) by mouth daily, Disp: 90 tablet, Rfl: 1    traMADol (Ultram) 50 mg tablet, Take 1 tablet (50 mg total) by mouth every 8 (eight) hours as needed for severe pain, Disp: 20 tablet, Rfl: 0    zolpidem (AMBIEN) 5 mg tablet, Take 5 mg by mouth, Disp: , Rfl:     Current Facility-Administered Medications:     cyanocobalamin injection 1,000 mcg, 1,000 mcg, Intramuscular, Q30 Days, Sukumar Valencia DO, 1,000 mcg at 02/27/19  "1250    Allergies   Allergen Reactions    Iodine - Food Allergy Other (See Comments)     IV CONTRAST DYE    Iv Dye  [Iodinated Contrast Media] Hives    Other        Physical Exam:    /75   Pulse 67   Ht 5' 2\" (1.575 m)   Wt 62.9 kg (138 lb 9.6 oz)   BMI 25.35 kg/m²     Constitutional:normal, well developed, well nourished, alert, in no distress and non-toxic and no overt pain behavior.  Eyes:anicteric  HEENT:grossly intact  Neck:supple, symmetric, trachea midline and no masses   Pulmonary:even and unlabored  Cardiovascular:No edema or pitting edema present  Skin:Normal without rashes or lesions and well hydrated  Psychiatric:Mood and affect appropriate  Neurologic:Cranial Nerves II-XII grossly intact  Musculoskeletal:antalgic and shuffling      Imaging  FL spine and pain procedure    (Results Pending)         Orders Placed This Encounter   Procedures    FL spine and pain procedure       This document was created using speech voice recognition software.   Grammatical errors, random word insertions, pronoun errors, and incomplete sentences are an occasional consequence of this system due to software limitations, ambient noise, and hardware issues.   Any formal questions or concerns about content, text, or information contained within the body of this dictation should be directly addressed to the provider for clarification.    "

## 2024-01-03 ENCOUNTER — TELEPHONE (OUTPATIENT)
Dept: RADIOLOGY | Facility: CLINIC | Age: 89
End: 2024-01-03

## 2024-01-03 NOTE — TELEPHONE ENCOUNTER
Procedure scheduled. Patient is not on blood thinners or asp 325. She does take 81 mg asp. All preprocedure instructions have been reviewed.

## 2024-01-12 ENCOUNTER — APPOINTMENT (EMERGENCY)
Dept: RADIOLOGY | Facility: HOSPITAL | Age: 89
DRG: 552 | End: 2024-01-12
Payer: COMMERCIAL

## 2024-01-12 ENCOUNTER — HOSPITAL ENCOUNTER (INPATIENT)
Facility: HOSPITAL | Age: 89
LOS: 6 days | Discharge: NURSING, REHAB OR LONG-TERM CARE | DRG: 552 | End: 2024-01-19
Attending: EMERGENCY MEDICINE | Admitting: INTERNAL MEDICINE
Payer: COMMERCIAL

## 2024-01-12 DIAGNOSIS — M25.551 RIGHT HIP PAIN: Primary | ICD-10-CM

## 2024-01-12 DIAGNOSIS — R26.2 AMBULATORY DYSFUNCTION: ICD-10-CM

## 2024-01-12 LAB
ALBUMIN SERPL BCP-MCNC: 3.5 G/DL (ref 3.5–5)
ALP SERPL-CCNC: 41 U/L (ref 34–104)
ALT SERPL W P-5'-P-CCNC: 23 U/L (ref 7–52)
ANION GAP SERPL CALCULATED.3IONS-SCNC: 6 MMOL/L
AST SERPL W P-5'-P-CCNC: 39 U/L (ref 13–39)
BASOPHILS # BLD AUTO: 0.04 THOUSANDS/ÂΜL (ref 0–0.1)
BASOPHILS NFR BLD AUTO: 0 % (ref 0–1)
BILIRUB SERPL-MCNC: 0.84 MG/DL (ref 0.2–1)
BUN SERPL-MCNC: 26 MG/DL (ref 5–25)
CALCIUM SERPL-MCNC: 9 MG/DL (ref 8.4–10.2)
CHLORIDE SERPL-SCNC: 105 MMOL/L (ref 96–108)
CO2 SERPL-SCNC: 27 MMOL/L (ref 21–32)
CREAT SERPL-MCNC: 0.9 MG/DL (ref 0.6–1.3)
EOSINOPHIL # BLD AUTO: 0.1 THOUSAND/ÂΜL (ref 0–0.61)
EOSINOPHIL NFR BLD AUTO: 1 % (ref 0–6)
ERYTHROCYTE [DISTWIDTH] IN BLOOD BY AUTOMATED COUNT: 15.2 % (ref 11.6–15.1)
GFR SERPL CREATININE-BSD FRML MDRD: 56 ML/MIN/1.73SQ M
GLUCOSE SERPL-MCNC: 142 MG/DL (ref 65–140)
HCT VFR BLD AUTO: 36.3 % (ref 34.8–46.1)
HGB BLD-MCNC: 11.6 G/DL (ref 11.5–15.4)
IMM GRANULOCYTES # BLD AUTO: 0.06 THOUSAND/UL (ref 0–0.2)
IMM GRANULOCYTES NFR BLD AUTO: 1 % (ref 0–2)
LYMPHOCYTES # BLD AUTO: 1.69 THOUSANDS/ÂΜL (ref 0.6–4.47)
LYMPHOCYTES NFR BLD AUTO: 19 % (ref 14–44)
MCH RBC QN AUTO: 30.4 PG (ref 26.8–34.3)
MCHC RBC AUTO-ENTMCNC: 32 G/DL (ref 31.4–37.4)
MCV RBC AUTO: 95 FL (ref 82–98)
MONOCYTES # BLD AUTO: 0.94 THOUSAND/ÂΜL (ref 0.17–1.22)
MONOCYTES NFR BLD AUTO: 10 % (ref 4–12)
NEUTROPHILS # BLD AUTO: 6.31 THOUSANDS/ÂΜL (ref 1.85–7.62)
NEUTS SEG NFR BLD AUTO: 69 % (ref 43–75)
NRBC BLD AUTO-RTO: 0 /100 WBCS
PLATELET # BLD AUTO: 267 THOUSANDS/UL (ref 149–390)
PMV BLD AUTO: 9.7 FL (ref 8.9–12.7)
POTASSIUM SERPL-SCNC: 3.8 MMOL/L (ref 3.5–5.3)
PROT SERPL-MCNC: 7.2 G/DL (ref 6.4–8.4)
RBC # BLD AUTO: 3.82 MILLION/UL (ref 3.81–5.12)
SODIUM SERPL-SCNC: 138 MMOL/L (ref 135–147)
WBC # BLD AUTO: 9.14 THOUSAND/UL (ref 4.31–10.16)

## 2024-01-12 PROCEDURE — 99285 EMERGENCY DEPT VISIT HI MDM: CPT | Performed by: PHYSICIAN ASSISTANT

## 2024-01-12 PROCEDURE — 36415 COLL VENOUS BLD VENIPUNCTURE: CPT | Performed by: PHYSICIAN ASSISTANT

## 2024-01-12 PROCEDURE — 99285 EMERGENCY DEPT VISIT HI MDM: CPT

## 2024-01-12 PROCEDURE — 73502 X-RAY EXAM HIP UNI 2-3 VIEWS: CPT

## 2024-01-12 PROCEDURE — 99223 1ST HOSP IP/OBS HIGH 75: CPT | Performed by: STUDENT IN AN ORGANIZED HEALTH CARE EDUCATION/TRAINING PROGRAM

## 2024-01-12 PROCEDURE — 85025 COMPLETE CBC W/AUTO DIFF WBC: CPT | Performed by: PHYSICIAN ASSISTANT

## 2024-01-12 PROCEDURE — 80053 COMPREHEN METABOLIC PANEL: CPT | Performed by: PHYSICIAN ASSISTANT

## 2024-01-12 RX ORDER — BACLOFEN 10 MG/1
10 TABLET ORAL
Status: DISCONTINUED | OUTPATIENT
Start: 2024-01-12 | End: 2024-01-19 | Stop reason: HOSPADM

## 2024-01-12 RX ORDER — ACETAMINOPHEN 325 MG/1
650 TABLET ORAL EVERY 6 HOURS PRN
Status: DISCONTINUED | OUTPATIENT
Start: 2024-01-12 | End: 2024-01-19 | Stop reason: HOSPADM

## 2024-01-12 RX ORDER — LEVOTHYROXINE SODIUM 0.05 MG/1
50 TABLET ORAL
Status: DISCONTINUED | OUTPATIENT
Start: 2024-01-13 | End: 2024-01-19 | Stop reason: HOSPADM

## 2024-01-12 RX ORDER — HEPARIN SODIUM 5000 [USP'U]/ML
5000 INJECTION, SOLUTION INTRAVENOUS; SUBCUTANEOUS EVERY 8 HOURS SCHEDULED
Status: DISCONTINUED | OUTPATIENT
Start: 2024-01-12 | End: 2024-01-19 | Stop reason: HOSPADM

## 2024-01-12 RX ORDER — OXYCODONE HYDROCHLORIDE 5 MG/1
5 TABLET ORAL EVERY 4 HOURS PRN
Status: DISCONTINUED | OUTPATIENT
Start: 2024-01-12 | End: 2024-01-17

## 2024-01-12 RX ORDER — CARVEDILOL 12.5 MG/1
12.5 TABLET ORAL 2 TIMES DAILY WITH MEALS
Status: DISCONTINUED | OUTPATIENT
Start: 2024-01-12 | End: 2024-01-19 | Stop reason: HOSPADM

## 2024-01-12 RX ORDER — AMLODIPINE BESYLATE 10 MG/1
10 TABLET ORAL DAILY
Status: DISCONTINUED | OUTPATIENT
Start: 2024-01-13 | End: 2024-01-12

## 2024-01-12 RX ORDER — MORPHINE SULFATE 15 MG/1
15 TABLET ORAL ONCE
Status: COMPLETED | OUTPATIENT
Start: 2024-01-12 | End: 2024-01-12

## 2024-01-12 RX ORDER — LOSARTAN POTASSIUM 50 MG/1
100 TABLET ORAL DAILY
Status: DISCONTINUED | OUTPATIENT
Start: 2024-01-13 | End: 2024-01-12

## 2024-01-12 RX ORDER — HYDROMORPHONE HCL/PF 1 MG/ML
0.5 SYRINGE (ML) INJECTION EVERY 4 HOURS PRN
Status: DISCONTINUED | OUTPATIENT
Start: 2024-01-12 | End: 2024-01-17

## 2024-01-12 RX ORDER — LATANOPROST 50 UG/ML
1 SOLUTION/ DROPS OPHTHALMIC
Status: DISCONTINUED | OUTPATIENT
Start: 2024-01-12 | End: 2024-01-19 | Stop reason: HOSPADM

## 2024-01-12 RX ORDER — ATORVASTATIN CALCIUM 40 MG/1
40 TABLET, FILM COATED ORAL DAILY
Status: DISCONTINUED | OUTPATIENT
Start: 2024-01-13 | End: 2024-01-19 | Stop reason: HOSPADM

## 2024-01-12 RX ORDER — SENNOSIDES 8.6 MG
1 TABLET ORAL DAILY
Status: DISCONTINUED | OUTPATIENT
Start: 2024-01-13 | End: 2024-01-19 | Stop reason: HOSPADM

## 2024-01-12 RX ORDER — ZOLPIDEM TARTRATE 5 MG/1
5 TABLET ORAL
Status: DISCONTINUED | OUTPATIENT
Start: 2024-01-12 | End: 2024-01-19 | Stop reason: HOSPADM

## 2024-01-12 RX ORDER — ASPIRIN 81 MG/1
81 TABLET, CHEWABLE ORAL DAILY
Status: DISCONTINUED | OUTPATIENT
Start: 2024-01-13 | End: 2024-01-19 | Stop reason: HOSPADM

## 2024-01-12 RX ORDER — DOCUSATE SODIUM 100 MG/1
100 CAPSULE, LIQUID FILLED ORAL 2 TIMES DAILY
Status: DISCONTINUED | OUTPATIENT
Start: 2024-01-12 | End: 2024-01-19 | Stop reason: HOSPADM

## 2024-01-12 RX ORDER — ONDANSETRON 2 MG/ML
4 INJECTION INTRAMUSCULAR; INTRAVENOUS EVERY 6 HOURS PRN
Status: DISCONTINUED | OUTPATIENT
Start: 2024-01-12 | End: 2024-01-19 | Stop reason: HOSPADM

## 2024-01-12 RX ORDER — CALCIUM CARBONATE 500 MG/1
1000 TABLET, CHEWABLE ORAL DAILY PRN
Status: DISCONTINUED | OUTPATIENT
Start: 2024-01-12 | End: 2024-01-19 | Stop reason: HOSPADM

## 2024-01-12 RX ADMIN — HEPARIN SODIUM 5000 UNITS: 5000 INJECTION INTRAVENOUS; SUBCUTANEOUS at 21:36

## 2024-01-12 RX ADMIN — DOCUSATE SODIUM 100 MG: 100 CAPSULE, LIQUID FILLED ORAL at 19:44

## 2024-01-12 RX ADMIN — MORPHINE SULFATE 15 MG: 15 TABLET ORAL at 14:49

## 2024-01-12 RX ADMIN — ZOLPIDEM TARTRATE 5 MG: 5 TABLET, COATED ORAL at 22:24

## 2024-01-12 RX ADMIN — OXYCODONE HYDROCHLORIDE 5 MG: 5 TABLET ORAL at 19:59

## 2024-01-12 RX ADMIN — BACLOFEN 10 MG: 10 TABLET ORAL at 22:24

## 2024-01-12 RX ADMIN — CARVEDILOL 12.5 MG: 12.5 TABLET, FILM COATED ORAL at 19:43

## 2024-01-12 NOTE — H&P
Formerly [facility]  H&P  Name: Alejandra Patton 89 y.o. female I MRN: [id number]  Unit/Bed#: -01 I Date of Admission: 1/12/2024   Date of Service: 1/12/2024 I Hospital Day: 0      Assessment/Plan   * Ambulatory dysfunction  Assessment & Plan  Present on admission secondary to acute on chronic hip pain  Had a recent cortisone injection at ortho outpatient office  Was scheduled for outpatient ortho follow up on Tuesday but could not make appointment due to pain  Reports chronic back pain as well   Pain poorly controlled despite ED giving PO morphine  Continue pain control and monitor   Follow up hip xr, Consult PT and OT, requesting ortho consult given follow up is on Tuesday    Hypertension  Assessment & Plan  Normotensive blood pressures  On extensive blood pressure regimen  Hold for now to prevent hypotension           VTE Pharmacologic Prophylaxis:   Moderate Risk (Score 3-4) - Pharmacological DVT Prophylaxis Ordered: heparin.  Code Status: Level 1 - Full Code     Anticipated Length of Stay: Patient will be admitted on an observation basis with an anticipated length of stay of less than 2 midnights secondary to pain control .    Total Time Spent on Date of Encounter in care of patient: 30+ mins. This time was spent on one or more of the following: performing physical exam; counseling and coordination of care; obtaining or reviewing history; documenting in the medical record; reviewing/ordering tests, medications or procedures; communicating with other healthcare professionals and discussing with patient's family/caregivers.    Chief Complaint: right leg pain     History of Present Illness:  Alejandra Patton is a 89 y.o. female with a PMH of chronic hip pain who presents with acute on chronic hip pain. She follows with orthopedic surgery for this hip pain and received a cortisone injection roughly 2 weeks ago. She has follow up on Tuesday but due to pain she came in to the ED for further  evaluation. She was given Morphine without alleviation of pain and was unable to ambulate making dispo unsafe so she was admitted under observation to [facility] for further management.     Review of Systems:  Review of Systems   Constitutional:  Negative for chills and fever.   HENT:  Negative for ear pain and sore throat.    Eyes:  Negative for pain and visual disturbance.   Respiratory:  Negative for cough and shortness of breath.    Cardiovascular:  Negative for chest pain and palpitations.   Gastrointestinal:  Negative for abdominal pain and vomiting.   Genitourinary:  Negative for dysuria and hematuria.   Musculoskeletal:  Positive for arthralgias and gait problem. Negative for back pain.   Skin:  Negative for color change and rash.   Neurological:  Negative for seizures and syncope.   All other systems reviewed and are negative.      Past Medical and Surgical History:   Past Medical History:   Diagnosis Date    Cardiac disorder 01/01/2001    x2 stents after a failed stress test    Cellulitis     last assessed - 19Nov2012    Constipation     last assessed - 14Mar2013    COVID-19 08/15/2022    Diabetes mellitus (HCC)     Disease of thyroid gland     Ecchymosis     last assessed - 03Jan2017    Fall     last assessed - 03Jan2017    Hyperlipidemia     Hypertension     Hypokalemia     last assessed - 02Jun2015    Lower extremity weakness     last assessed - 21May2015    Vitamin D deficiency     last assessed - [id number]       Past Surgical History:   Procedure Laterality Date    BACK SURGERY      CHOLECYSTECTOMY      COLONOSCOPY      CORONARY ANGIOPLASTY WITH STENT PLACEMENT      CT BONE MARROW BIOPSY AND ASPIRATION  1/15/2020    HYSTERECTOMY      OTHER SURGICAL HISTORY      Previous stent placement       Meds/Allergies:  Prior to Admission medications    Medication Sig Start Date End Date Taking? Authorizing Provider   amLODIPine (NORVASC) 5 mg tablet Take 2 tablets (10 mg total) by mouth daily 12/22/23   Margarita BATES  MD Skyler   aspirin 81 mg chewable tablet Chew 81 mg    Historical Provider, MD   atorvastatin (LIPITOR) 40 mg tablet Take 1 tablet (40 mg total) by mouth daily 8/14/23   Margarita Holland MD   baclofen 10 mg tablet TAKE 1 TABLET BY MOUTH NIGHTLY AT BEDTIME AS NEEDED AS DIRECTED 10/14/19   Historical Provider, MD   carvedilol (COREG) 12.5 mg tablet Take 1 tablet (12.5 mg total) by mouth 2 (two) times a day with meals 8/23/23   Margarita Holland MD   Cholecalciferol (VITAMIN D3) 2000 units capsule Take 1 capsule (2,000 Units total) by mouth daily 2/27/19   Sukumar Valencia DO   HYDROcodone-acetaminophen (NORCO) 5-325 mg per tablet Take 1 tablet by mouth 2 (two) times a day as needed for pain May split tablets in half Max Daily Amount: 2 tablets 1/2/24   LAURIE Santiago   latanoprost (XALATAN) 0.005 % ophthalmic solution INSTILL 1 DROP INTO EACH EYE AT BEDTIME 8/3/20   LAURIE Trammell   levothyroxine 50 mcg tablet Take 1 tablet (50 mcg total) by mouth daily 8/2/23   Margarita Holland MD   lidocaine (Lidoderm) 5 % Apply 1 patch topically daily Remove & Discard patch within 12 hours or as directed by MD 12/21/21   Cesar Shipley MD   losartan (COZAAR) 100 MG tablet Take 1 tablet (100 mg total) by mouth daily 8/30/23   Margarita Holland MD   Multiple Vitamins-Minerals (PRESERVISION AREDS 2 PO) Take by mouth    Historical Provider, MD   pioglitazone (ACTOS) 30 mg tablet Take 1 tablet (30 mg total) by mouth daily 6/2/23   Margarita Holland MD   traMADol (Ultram) 50 mg tablet Take 1 tablet (50 mg total) by mouth every 8 (eight) hours as needed for severe pain 12/21/23   Emeterio Marinelli MD   zolpidem (AMBIEN) 5 mg tablet Take 5 mg by mouth    Historical Provider, MD ARMAS have reviewed home medications using recent Epic encounter.    Allergies:   Allergies   Allergen Reactions    Iodine - Food Allergy Other (See Comments)     IV CONTRAST DYE    Iv Dye  [Iodinated Contrast Media] Hives    Other        Social  "History:  Marital Status: /Civil Union   Occupation: na  Patient Pre-hospital Living Situation: Home  Patient Pre-hospital Level of Mobility: unable to be assessed at time of evaluation  Patient Pre-hospital Diet Restrictions: na  Substance Use History:   Social History     Substance and Sexual Activity   Alcohol Use Never     Social History     Tobacco Use   Smoking Status Former    Types: Cigarettes    Start date: 5/7/1958   Smokeless Tobacco Never     Social History     Substance and Sexual Activity   Drug Use No       Family History:  Family History   Problem Relation Age of Onset    Heart disease Mother     Other Father         cerebral embolism - with cerebral infarction       Physical Exam:     Vitals:   Blood Pressure: (!) 107/46 (01/12/24 2122)  Pulse: 59 (01/12/24 2122)  Temperature: (!) 97 °F (36.1 °C) (01/12/24 2122)  Temp Source: Oral (01/12/24 2122)  Respirations: 19 (01/12/24 2122)  Height: 5' 2\" (157.5 cm) (01/12/24 2122)  Weight - Scale: 65.6 kg (144 lb 10 oz) (01/12/24 2122)  SpO2: 93 % (01/12/24 2122)    Physical Exam  Constitutional:       General: She is not in acute distress.     Appearance: Normal appearance. She is not toxic-appearing.   Cardiovascular:      Rate and Rhythm: Normal rate and regular rhythm.      Heart sounds: Normal heart sounds. No murmur heard.  Pulmonary:      Effort: Pulmonary effort is normal. No respiratory distress.      Breath sounds: Normal breath sounds. No wheezing.   Abdominal:      General: Abdomen is flat. There is no distension.      Palpations: Abdomen is soft.      Tenderness: There is no abdominal tenderness.   Neurological:      General: No focal deficit present.      Mental Status: She is alert and oriented to person, place, and time. Mental status is at baseline.      Motor: No weakness.          Additional Data:     Lab Results:  Results from last 7 days   Lab Units 01/12/24  1634   WBC Thousand/uL 9.14   HEMOGLOBIN g/dL 11.6   HEMATOCRIT % 36.3 "   PLATELETS Thousands/uL 267   NEUTROS PCT % 69   LYMPHS PCT % 19   MONOS PCT % 10   EOS PCT % 1     Results from last 7 days   Lab Units 01/12/24  1634   SODIUM mmol/L 138   POTASSIUM mmol/L 3.8   CHLORIDE mmol/L 105   CO2 mmol/L 27   BUN mg/dL 26*   CREATININE mg/dL 0.90   ANION GAP mmol/L 6   CALCIUM mg/dL 9.0   ALBUMIN g/dL 3.5   TOTAL BILIRUBIN mg/dL 0.84   ALK PHOS U/L 41   ALT U/L 23   AST U/L 39   GLUCOSE RANDOM mg/dL 142*                       Lines/Drains:  Invasive Devices       Peripheral Intravenous Line  Duration             Peripheral IV 01/12/24 Left Antecubital <1 day                        Imaging: Reviewed radiology reports from this admission including: xray(s)  XR hip/pelv 2-3 vws right if performed    (Results Pending)       ** Please Note: This note has been constructed using a voice recognition system. **

## 2024-01-12 NOTE — ASSESSMENT & PLAN NOTE
Present on admission secondary to acute on chronic hip pain  Had a recent cortisone injection at ortho outpatient office  Was scheduled for outpatient ortho follow up on Tuesday but could not make appointment due to pain  Reports chronic back pain as well   Pain poorly controlled despite ED giving PO morphine  Continue pain control and monitor   Follow up hip xr, Consult PT and OT, requesting ortho consult given follow up is on Tuesday

## 2024-01-12 NOTE — ED PROVIDER NOTES
History  Chief Complaint   Patient presents with    Leg Pain     Pt has right leg pain that goes down to right knee as well as back pain that started yesterday. Pt isn't able to walk.      89 yo with right hip pain. Acute on chronic. Admitted in November for hip and back pain. Had cortisone injection in right hip 2 weeks ago at ortho. Has a follow up this coming Tuesday. Has appointment for epidural with spine center on 1/30. She denies fever, chills, n/v. No extremity weakness. Reports pain feels typical of prior chronic pain. No chest pain or sob. No abd pain. No recent falls. Tried norco, motrin, tylenol without relief.       History provided by:  Patient   used: No    Leg Pain  Associated symptoms: no back pain and no fever        Prior to Admission Medications   Prescriptions Last Dose Informant Patient Reported? Taking?   Cholecalciferol (VITAMIN D3) 2000 units capsule  Self No No   Sig: Take 1 capsule (2,000 Units total) by mouth daily   HYDROcodone-acetaminophen (NORCO) 5-325 mg per tablet   No No   Sig: Take 1 tablet by mouth 2 (two) times a day as needed for pain May split tablets in half Max Daily Amount: 2 tablets   Multiple Vitamins-Minerals (PRESERVISION AREDS 2 PO)  Self Yes No   Sig: Take by mouth   amLODIPine (NORVASC) 5 mg tablet  Self No No   Sig: Take 2 tablets (10 mg total) by mouth daily   aspirin 81 mg chewable tablet  Self Yes No   Sig: Chew 81 mg   atorvastatin (LIPITOR) 40 mg tablet  Self No No   Sig: Take 1 tablet (40 mg total) by mouth daily   baclofen 10 mg tablet  Self Yes No   Sig: TAKE 1 TABLET BY MOUTH NIGHTLY AT BEDTIME AS NEEDED AS DIRECTED   carvedilol (COREG) 12.5 mg tablet  Self No No   Sig: Take 1 tablet (12.5 mg total) by mouth 2 (two) times a day with meals   latanoprost (XALATAN) 0.005 % ophthalmic solution  Self No No   Sig: INSTILL 1 DROP INTO EACH EYE AT BEDTIME   levothyroxine 50 mcg tablet  Self No No   Sig: Take 1 tablet (50 mcg total) by mouth  daily   lidocaine (Lidoderm) 5 %  Self No No   Sig: Apply 1 patch topically daily Remove & Discard patch within 12 hours or as directed by MD   losartan (COZAAR) 100 MG tablet  Self No No   Sig: Take 1 tablet (100 mg total) by mouth daily   pioglitazone (ACTOS) 30 mg tablet  Self No No   Sig: Take 1 tablet (30 mg total) by mouth daily   traMADol (Ultram) 50 mg tablet  Self No No   Sig: Take 1 tablet (50 mg total) by mouth every 8 (eight) hours as needed for severe pain   zolpidem (AMBIEN) 5 mg tablet  Self Yes No   Sig: Take 5 mg by mouth      Facility-Administered Medications Last Administration Doses Remaining   cyanocobalamin injection 1,000 mcg 2/27/2019 12:50 PM           Past Medical History:   Diagnosis Date    Cardiac disorder 01/01/2001    x2 stents after a failed stress test    Cellulitis     last assessed - 19Nov2012    Constipation     last assessed - 14Mar2013    COVID-19 08/15/2022    Diabetes mellitus (HCC)     Disease of thyroid gland     Ecchymosis     last assessed - 03Jan2017    Fall     last assessed - 03Jan2017    Hyperlipidemia     Hypertension     Hypokalemia     last assessed - 02Jun2015    Lower extremity weakness     last assessed - [id number]    Vitamin D deficiency     last assessed - [id number]       Past Surgical History:   Procedure Laterality Date    BACK SURGERY      CHOLECYSTECTOMY      COLONOSCOPY      CORONARY ANGIOPLASTY WITH STENT PLACEMENT      CT BONE MARROW BIOPSY AND ASPIRATION  1/15/2020    HYSTERECTOMY      OTHER SURGICAL HISTORY      Previous stent placement       Family History   Problem Relation Age of Onset    Heart disease Mother     Other Father         cerebral embolism - with cerebral infarction     I have reviewed and agree with the history as documented.    E-Cigarette/Vaping    E-Cigarette Use Never User      E-Cigarette/Vaping Substances    Nicotine No     THC No     CBD No     Flavoring No     Other No     Unknown No      Social History     Tobacco Use     Smoking status: Former     Types: Cigarettes     Start date: 5/7/1958    Smokeless tobacco: Never   Vaping Use    Vaping status: Never Used   Substance Use Topics    Alcohol use: Never    Drug use: No       Review of Systems   Constitutional:  Negative for chills and fever.   HENT:  Negative for ear pain and sore throat.    Eyes:  Negative for pain and visual disturbance.   Respiratory:  Negative for cough and shortness of breath.    Cardiovascular:  Negative for chest pain and palpitations.   Gastrointestinal:  Negative for abdominal pain and vomiting.   Genitourinary:  Negative for dysuria and hematuria.   Musculoskeletal:  Negative for arthralgias and back pain.        Right hip pain   Skin:  Negative for color change and rash.   Neurological:  Negative for seizures and syncope.   All other systems reviewed and are negative.      Physical Exam  Physical Exam  Vitals and nursing note reviewed.   Constitutional:       General: She is not in acute distress.     Appearance: She is well-developed.   HENT:      Head: Normocephalic and atraumatic.   Eyes:      Conjunctiva/sclera: Conjunctivae normal.   Cardiovascular:      Rate and Rhythm: Normal rate and regular rhythm.      Heart sounds: No murmur heard.  Pulmonary:      Effort: Pulmonary effort is normal. No respiratory distress.      Breath sounds: Normal breath sounds.   Abdominal:      Palpations: Abdomen is soft.      Tenderness: There is no abdominal tenderness.   Musculoskeletal:         General: No swelling.      Cervical back: Neck supple.      Right hip: Tenderness and bony tenderness present. No deformity or lacerations. Decreased range of motion (2/2 pain. able to lift right leg off bed and hold against gravity).        Legs:    Skin:     General: Skin is warm and dry.      Capillary Refill: Capillary refill takes less than 2 seconds.   Neurological:      Mental Status: She is alert.   Psychiatric:         Mood and Affect: Mood normal.         Vital  Signs  ED Triage Vitals [01/12/24 1206]   Temperature Pulse Respirations Blood Pressure SpO2   97.9 °F (36.6 °C) 72 18 100/63 98 %      Temp Source Heart Rate Source Patient Position - Orthostatic VS BP Location FiO2 (%)   Temporal Monitor Sitting Left arm --      Pain Score       --           Vitals:    01/12/24 1206 01/12/24 1417   BP: 100/63 152/67   Pulse: 72 60   Patient Position - Orthostatic VS: Sitting Lying         Visual Acuity      ED Medications  Medications   Diclofenac Sodium (VOLTAREN) 1 % topical gel 2 g (2 g Topical Not Given 1/12/24 1645)   carvedilol (COREG) tablet 12.5 mg (has no administration in time range)   atorvastatin (LIPITOR) tablet 40 mg (has no administration in time range)   amLODIPine (NORVASC) tablet 10 mg (has no administration in time range)   losartan (COZAAR) tablet 100 mg (has no administration in time range)   levothyroxine tablet 50 mcg (has no administration in time range)   aspirin chewable tablet 81 mg (has no administration in time range)   acetaminophen (TYLENOL) tablet 650 mg (has no administration in time range)   calcium carbonate (TUMS) chewable tablet 1,000 mg (has no administration in time range)   docusate sodium (COLACE) capsule 100 mg (has no administration in time range)   senna (SENOKOT) tablet 8.6 mg (has no administration in time range)   ondansetron (ZOFRAN) injection 4 mg (has no administration in time range)   heparin (porcine) subcutaneous injection 5,000 Units (has no administration in time range)   HYDROmorphone (DILAUDID) injection 0.5 mg (has no administration in time range)   HYDROmorphone (DILAUDID) injection 0.5 mg (has no administration in time range)   oxyCODONE (ROXICODONE) IR tablet 5 mg (has no administration in time range)   morphine (MSIR) IR tablet 15 mg (15 mg Oral Given 1/12/24 1449)       Diagnostic Studies  Results Reviewed       Procedure Component Value Units Date/Time    CBC and differential [[id number]]  (Abnormal) Collected:  01/12/24 1634    Lab Status: Final result Specimen: Blood from Arm, Left Updated: 01/12/24 1729     WBC 9.14 Thousand/uL      RBC 3.82 Million/uL      Hemoglobin 11.6 g/dL      Hematocrit 36.3 %      MCV 95 fL      MCH 30.4 pg      MCHC 32.0 g/dL      RDW 15.2 %      MPV 9.7 fL      Platelets 267 Thousands/uL      nRBC 0 /100 WBCs      Neutrophils Relative 69 %      Immat GRANS % 1 %      Lymphocytes Relative 19 %      Monocytes Relative 10 %      Eosinophils Relative 1 %      Basophils Relative 0 %      Neutrophils Absolute 6.31 Thousands/µL      Immature Grans Absolute 0.06 Thousand/uL      Lymphocytes Absolute 1.69 Thousands/µL      Monocytes Absolute 0.94 Thousand/µL      Eosinophils Absolute 0.10 Thousand/µL      Basophils Absolute 0.04 Thousands/µL     Comprehensive metabolic panel [[id number]]  (Abnormal) Collected: 01/12/24 1634    Lab Status: Final result Specimen: Blood from Arm, Left Updated: 01/12/24 1714     Sodium 138 mmol/L      Potassium 3.8 mmol/L      Chloride 105 mmol/L      CO2 27 mmol/L      ANION GAP 6 mmol/L      BUN 26 mg/dL      Creatinine 0.90 mg/dL      Glucose 142 mg/dL      Calcium 9.0 mg/dL      AST 39 U/L      ALT 23 U/L      Alkaline Phosphatase 41 U/L      Total Protein 7.2 g/dL      Albumin 3.5 g/dL      Total Bilirubin 0.84 mg/dL      eGFR 56 ml/min/1.73sq m     Narrative:      National Kid[employer] Disease Foundation guidelines for Chronic Kidney Disease (CKD):     Stage 1 with normal or high GFR (GFR > 90 mL/min/1.73 square meters)    Stage 2 Mild CKD (GFR = 60-89 mL/min/1.73 square meters)    Stage 3A Moderate CKD (GFR = 45-59 mL/min/1.73 square meters)    Stage 3B Moderate CKD (GFR = 30-44 mL/min/1.73 square meters)    Stage 4 Severe CKD (GFR = 15-29 mL/min/1.73 square meters)    Stage 5 End Stage CKD (GFR <15 mL/min/1.73 square meters)  Note: GFR calculation is accurate only with a steady state creatinine                   XR hip/pelv 2-3 vws right if performed    (Results Pending)               Procedures  Procedures         ED Course                               SBIRT 22yo+      Flowsheet Row Most Recent Value   Initial Alcohol Screen: US AUDIT-C     1. How often do you have a drink containing alcohol? 0 Filed at: 01/12/2024 1632   2. How many drinks containing alcohol do you have on a typical day you are drinking?  0 Filed at: 01/12/2024 1639   3b. FEMALE Any Age, or MALE 65+: How often do you have 4 or more drinks on one occassion? 0 Filed at: 01/12/2024 1635   Audit-C Score 0 Filed at: 01/12/2024 1635   FELIX: How many times in the past year have you...    Used an illegal drug or used a prescription medication for non-medical reasons? Never Filed at: 01/12/2024 1631                      Medical Decision Making  Differential diagnosis including but not limited to: Gout, arthritis, Lyme disease, Lupus, rheumatoid arthritis, cellulitis, bursitis, tendinitis, dislocation, fracture, sprain, strain, DVT, Baker's cyst; doubt septic arthritis or arterial occlusion. Plan: xr. Labs    MDM: 89 yo with hip pain. Low suspicion for infection given normal WBC and no fever. XR negative. Despite analgesia she continues to be unable to ambulate. Unsafe for discharge given the affect on her ADLs. Will admit for PT/OT eval and placement. Pt in agreement. Stable at time of admission.     Amount and/or Complexity of Data Reviewed  Labs: ordered.  Radiology: ordered.    Risk  Prescription drug management.  Decision regarding hospitalization.             Disposition  Final diagnoses:   Right hip pain   Ambulatory dysfunction     Time reflects when diagnosis was documented in both MDM as applicable and the Disposition within this note       Time User Action Codes Description Comment    1/12/2024  5:54 PM Servando Lakhani Add [M25.551] Right hip pain     1/12/2024  5:54 PM Servando Lakhani Add [R26.2] Ambulatory dysfunction           ED Disposition       ED Disposition   Admit    Condition   Stable    Date/Time    Fri Jan 12, 2024 1754    Comment   Case was discussed with Dr. Darnell and the patient's admission status was agreed to be Admission Status: observation status to the service of Dr. Darnell .               Follow-up Information       Follow up With Specialties Details Why Contact Info    Margarita Holland MD Internal Medicine   [address]  [address] [address]  [phone]              Patient's Medications   Discharge Prescriptions    No medications on file       No discharge procedures on file.    PDMP Review         Value Time User    PDMP Reviewed  Yes 1/2/2024  1:58 PM LAURIE Santiago            ED Provider  Electronically Signed by             Servando Lakhani PA-C  01/12/24 [id number]

## 2024-01-13 LAB
ANION GAP SERPL CALCULATED.3IONS-SCNC: 6 MMOL/L
BUN SERPL-MCNC: 26 MG/DL (ref 5–25)
CALCIUM SERPL-MCNC: 8.7 MG/DL (ref 8.4–10.2)
CHLORIDE SERPL-SCNC: 107 MMOL/L (ref 96–108)
CO2 SERPL-SCNC: 26 MMOL/L (ref 21–32)
CREAT SERPL-MCNC: 0.94 MG/DL (ref 0.6–1.3)
ERYTHROCYTE [DISTWIDTH] IN BLOOD BY AUTOMATED COUNT: 15.3 % (ref 11.6–15.1)
GFR SERPL CREATININE-BSD FRML MDRD: 53 ML/MIN/1.73SQ M
GLUCOSE P FAST SERPL-MCNC: 137 MG/DL (ref 65–99)
GLUCOSE SERPL-MCNC: 137 MG/DL (ref 65–140)
HCT VFR BLD AUTO: 35.6 % (ref 34.8–46.1)
HGB BLD-MCNC: 11.1 G/DL (ref 11.5–15.4)
MAGNESIUM SERPL-MCNC: 1.7 MG/DL (ref 1.9–2.7)
MCH RBC QN AUTO: 30.1 PG (ref 26.8–34.3)
MCHC RBC AUTO-ENTMCNC: 31.2 G/DL (ref 31.4–37.4)
MCV RBC AUTO: 97 FL (ref 82–98)
PLATELET # BLD AUTO: 260 THOUSANDS/UL (ref 149–390)
PMV BLD AUTO: 9.5 FL (ref 8.9–12.7)
POTASSIUM SERPL-SCNC: 3.9 MMOL/L (ref 3.5–5.3)
RBC # BLD AUTO: 3.69 MILLION/UL (ref 3.81–5.12)
SODIUM SERPL-SCNC: 139 MMOL/L (ref 135–147)
WBC # BLD AUTO: 9.43 THOUSAND/UL (ref 4.31–10.16)

## 2024-01-13 PROCEDURE — 85027 COMPLETE CBC AUTOMATED: CPT | Performed by: STUDENT IN AN ORGANIZED HEALTH CARE EDUCATION/TRAINING PROGRAM

## 2024-01-13 PROCEDURE — 99222 1ST HOSP IP/OBS MODERATE 55: CPT | Performed by: ORTHOPAEDIC SURGERY

## 2024-01-13 PROCEDURE — 99232 SBSQ HOSP IP/OBS MODERATE 35: CPT | Performed by: PHYSICIAN ASSISTANT

## 2024-01-13 PROCEDURE — 83735 ASSAY OF MAGNESIUM: CPT | Performed by: STUDENT IN AN ORGANIZED HEALTH CARE EDUCATION/TRAINING PROGRAM

## 2024-01-13 PROCEDURE — 80048 BASIC METABOLIC PNL TOTAL CA: CPT | Performed by: STUDENT IN AN ORGANIZED HEALTH CARE EDUCATION/TRAINING PROGRAM

## 2024-01-13 RX ORDER — METHYLPREDNISOLONE 4 MG/1
4 TABLET ORAL DAILY
Status: COMPLETED | OUTPATIENT
Start: 2024-01-19 | End: 2024-01-19

## 2024-01-13 RX ORDER — ACETAMINOPHEN 325 MG/1
975 TABLET ORAL EVERY 8 HOURS SCHEDULED
Status: DISCONTINUED | OUTPATIENT
Start: 2024-01-13 | End: 2024-01-19 | Stop reason: HOSPADM

## 2024-01-13 RX ORDER — METHYLPREDNISOLONE 4 MG/1
16 TABLET ORAL DAILY
Status: COMPLETED | OUTPATIENT
Start: 2024-01-16 | End: 2024-01-16

## 2024-01-13 RX ORDER — METHYLPREDNISOLONE 4 MG/1
12 TABLET ORAL DAILY
Status: COMPLETED | OUTPATIENT
Start: 2024-01-17 | End: 2024-01-17

## 2024-01-13 RX ORDER — METHYLPREDNISOLONE 4 MG/1
8 TABLET ORAL DAILY
Status: COMPLETED | OUTPATIENT
Start: 2024-01-18 | End: 2024-01-18

## 2024-01-13 RX ORDER — LIDOCAINE 50 MG/G
1 PATCH TOPICAL DAILY
Status: DISCONTINUED | OUTPATIENT
Start: 2024-01-13 | End: 2024-01-19 | Stop reason: HOSPADM

## 2024-01-13 RX ADMIN — SENNOSIDES 8.6 MG: 8.6 TABLET, FILM COATED ORAL at 08:31

## 2024-01-13 RX ADMIN — OXYCODONE HYDROCHLORIDE 5 MG: 5 TABLET ORAL at 22:46

## 2024-01-13 RX ADMIN — HEPARIN SODIUM 5000 UNITS: 5000 INJECTION INTRAVENOUS; SUBCUTANEOUS at 22:46

## 2024-01-13 RX ADMIN — DOCUSATE SODIUM 100 MG: 100 CAPSULE, LIQUID FILLED ORAL at 08:31

## 2024-01-13 RX ADMIN — DOCUSATE SODIUM 100 MG: 100 CAPSULE, LIQUID FILLED ORAL at 17:56

## 2024-01-13 RX ADMIN — ACETAMINOPHEN 975 MG: 325 TABLET, FILM COATED ORAL at 15:04

## 2024-01-13 RX ADMIN — ZOLPIDEM TARTRATE 5 MG: 5 TABLET, COATED ORAL at 22:46

## 2024-01-13 RX ADMIN — LATANOPROST 1 DROP: 50 SOLUTION OPHTHALMIC at 22:47

## 2024-01-13 RX ADMIN — LEVOTHYROXINE SODIUM 50 MCG: 0.05 TABLET ORAL at 05:53

## 2024-01-13 RX ADMIN — HEPARIN SODIUM 5000 UNITS: 5000 INJECTION INTRAVENOUS; SUBCUTANEOUS at 15:04

## 2024-01-13 RX ADMIN — HEPARIN SODIUM 5000 UNITS: 5000 INJECTION INTRAVENOUS; SUBCUTANEOUS at 05:53

## 2024-01-13 RX ADMIN — ASPIRIN 81 MG: 81 TABLET, CHEWABLE ORAL at 08:31

## 2024-01-13 RX ADMIN — CARVEDILOL 12.5 MG: 12.5 TABLET, FILM COATED ORAL at 17:56

## 2024-01-13 RX ADMIN — ACETAMINOPHEN 975 MG: 325 TABLET, FILM COATED ORAL at 22:46

## 2024-01-13 RX ADMIN — LIDOCAINE 5% 1 PATCH: 700 PATCH TOPICAL at 09:11

## 2024-01-13 RX ADMIN — OXYCODONE HYDROCHLORIDE 5 MG: 5 TABLET ORAL at 17:56

## 2024-01-13 RX ADMIN — BACLOFEN 10 MG: 10 TABLET ORAL at 22:46

## 2024-01-13 RX ADMIN — ATORVASTATIN CALCIUM 40 MG: 40 TABLET, FILM COATED ORAL at 08:31

## 2024-01-13 NOTE — CASE MANAGEMENT
Case Management Assessment & Discharge Planning Note    Patient name Alejandra Patton  Location /-01 MRN [id number]  : 1934 Date 2024       Current Admission Date: 2024  Current Admission Diagnosis:Ambulatory dysfunction   Patient Active Problem List    Diagnosis Date Noted    Primary osteoarthritis of right hip 2023    Ambulatory dysfunction 2023    Stage 3 chronic kidney disease, unspecified whether stage 3a or 3b CKD (HCC) 2023    Chronic pain syndrome 2022    Chronic bilateral low back pain without sciatica 2022    Sacroiliitis (HCC) 2022    Trochanteric bursitis of right hip 2021    Non-Hodgkin's lymphoma (HCC) 10/05/2020    Light chain disease, 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HCC) 2020    Medicare annual wellness visit, subsequent 2020    Adhesive capsulitis of shoulder 2020    Triclonal gammopathy 2019    Greater trochanteric bursitis, right 2019    Left sided sciatica 2019    Diabetic peripheral neuropathy (HCC) 2019    Screening mammogram, encounter for 2018    Lumbar compression fracture (HCC) 10/11/2017    Vitamin D deficiency 10/11/2017    Vitamin B12 deficiency 2016    Lumbar spinal stenosis 2015    Normochromic normocytic anemia 2015    Depression 2015    Esophageal reflux 2013    Osteoarthritis of knee 2013    Spinal stenosis 2013    Coronary artery disease of native artery of native heart with stable angina pectoris (HCC) 2013    Hyperlipidemia 2013    Hypertension 2013    Hypothyroidism 2013    Insomnia 2013    Osteoporosis 2013    Type 2 diabetes mellitus (HCC) 2012      LOS (days): 0  Geometric Mean LOS (GMLOS) (days):   Days to GMLOS:     OBJECTIVE:              Current admission status: Observation       Preferred Pharmacy:   [id number] - MALIK HERNÁNDEZ - [address]  [address]  [place] [address]  Phone: [phone] Fax: [phone]    Primary Care Provider: Margarita Holland MD    Primary Insurance: PALMIRA GUTHRIE  Secondary Insurance:     ASSESSMENT:  Active Health Care Proxies    There are no active Health Care Proxies on file.       Advance Directives  Does patient have a Health Care POA?: Yes  Does patient have Advance Directives?: Yes  Advance Directives: Living will, Power of  for health care  Primary Contact: son Jamshid and daughter in law Mili    Obs Notice Signed:  (not on workqueue)    Readmission Root Cause  30 Day Readmission: No    Patient Information  Admitted from:: Home  Mental Status: Alert  During Assessment patient was accompanied by: Not accompanied during assessment  Assessment information provided by:: Patient  Primary Caregiver: Self  Support Systems: Daughter, Son  County of Residence: [place]  What city do you live in?: [place]  Home entry access options. Select all that apply.: Garage, Stairs  Number of steps to enter home.: 2  Do the steps have railings?: No  Type of Current Residence: [address] [place] [address] home  Upon entering residence, is there a bedroom on the main floor (no further steps)?: No  A bedroom is located on the following floor levels of residence (select all that apply):: 2nd Floor (Chair lift present to reach the 2nd floor)  Upon entering residence, is there a bathroom on the main floor (no further steps)?: No  Indicate which floors of current residence have a bathroom (select all the apply):: 2nd Floor  Number of steps to 2nd floor from main floor: One Flight  Living Arrangements: Lives Alone  Is patient a ?: No    Activities of Daily Living Prior to Admission  Functional Status: Independent  Completes ADLs independently?: Yes  Ambulates independently?: Yes  Does patient use assisted devices?: Yes  Assisted Devices (DME) used: Walker  Does patient currently own DME?: Yes  What DME does the patient currently own?: Walker  Does patient have a  history of Outpatient Therapy (PT/OT)?: No  Does the patient have a history of Short-Term Rehab?: Yes ([facility])  Does patient have a history of HHC?: Yes  Does patient currently have HHC?: No         Patient Information Continued  Income Source: Unemployed  Does patient have prescription coverage?: Yes  Does patient receive dialysis treatments?: No  Does patient have a history of substance abuse?: No  Does patient have a history of Mental Health Diagnosis?: No    PHQ 2/9 Screening   Reviewed PHQ 2/9 Depression Screening Score?: No    Means of Transportation  Means of Transport to Appts:: Drives Self      Housing Stability: Low Risk  (1/13/2024)    Housing Stability Vital Sign     Unable to Pay for Housing in the Last Year: No     Number of Places Lived in the Last Year: 1     Unstable Housing in the Last Year: No   Food Insecurity: No Food Insecurity (1/13/2024)    Hunger Vital Sign     Worried About Running Out of Food in the Last Year: Never true     Ran Out of Food in the Last Year: Never true   Transportation Needs: No Transportation Needs (1/13/2024)    PRAPARE - Transportation     Lack of Transportation (Medical): No     Lack of Transportation (Non-Medical): No   Utilities: Not At Risk (1/13/2024)    C Utilities     Threatened with loss of utilities: No       DISCHARGE DETAILS:    Discharge planning discussed with:: patient  Freedom of Choice: Yes  Comments - Freedom of Choice: CM discussed d/c needs including VNA and STR.  Pt is hoping that her pain can be controlled that she can be discharged home, but understands that if it remains as is, she will have to go to a STR-her preference would be [facility].  Referral placed.  Pt is scheduled for an injection at the [address] [place] [address] on Tuesday.  CM contacted family/caregiver?: No- see comments (pt will update her famiy herself)  Were Treatment Team discharge recommendations reviewed with patient/caregiver?: Yes  Did patient/caregiver verbalize  understanding of patient care needs?: Yes  Were patient/caregiver advised of the risks associated with not following Treatment Team discharge recommendations?: Yes    Contacts  Patient Contacts: Mili  Relationship to Patient:: Family    Requested Home Health Care         Is the patient interested in HHC at discharge?: Yes  Home Health Discipline requested:: Home Health Aide, Nursing, Physical Therapy  Home Health Follow-Up Provider:: PCP  Home Health Services Needed:: Evaluate Functional Status and Safety, Gait/ADL Training, Strengthening/Theraputic Exercises to Improve Function  Homebound Criteria Met:: Requires the Assistance of Another Person for Safe Ambulation or to Leave the Home, Uses an Assist Device (i.e. cane, walker, etc)  Supporting Clincal Findings:: Limited Endurance, Fatigues Easliy in Short Distances    DME Referral Provided  Referral made for DME?: No    Other Referral/Resources/Interventions Provided:  Interventions: HHC, Short Term Rehab  Referral Comments: Pt unsure of d/c plan.  Both VNA and STR pended.  CM will continue to follow through hospitalization    Would you like to participate in our [facility] service program?  : No - Declined

## 2024-01-13 NOTE — PHYSICAL THERAPY NOTE
Physical Therapy Evaluation     Patient's Name: Alejandra Patton    Admitting Diagnosis  Leg pain [M79.606]  Right hip pain [M25.551]  Ambulatory dysfunction [R26.2]       01/13/24 0700   PT Last Visit   PT Visit Date 01/20/24   Note Type   Note type Cancelled Session   Cancel Reasons Medical status   Additional Comments PT order received. Chart review performed. At this time, PT evaluation cancelled due to pending X-rays of hip and pelvis. PT will continue to follow and evaluate as patient is medically appropriate for skilled PT interventions.    Syed Holguin, PT         Problem List  Patient Active Problem List   Diagnosis    Coronary artery disease of native artery of native heart with stable angina pectoris (HCC)    Depression    Esophageal reflux    Hyperlipidemia    Hypertension    Hypothyroidism    Insomnia    Lumbar compression fracture (HCC)    Lumbar spinal stenosis    Normochromic normocytic anemia    Osteoarthritis of knee    Osteoporosis    Spinal stenosis    Type 2 diabetes mellitus (HCC)    Vitamin B12 deficiency    Vitamin D deficiency    Screening mammogram, encounter for    Diabetic peripheral neuropathy (HCC)    Greater trochanteric bursitis, right    Left sided sciatica    Triclonal gammopathy    Medicare annual wellness visit, subsequent    Adhesive capsulitis of shoulder    Light chain disease, 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[facility])    Non-Hodgkin's lymphoma (HCC)    Trochanteric bursitis of right hip    Chronic pain syndrome    Chronic bilateral low back pain without sciatica    Sacroiliitis (HCC)    Stage 3 chronic kidney disease, unspecified whether stage 3a or 3b CKD (HCC)    Ambulatory dysfunction    Primary osteoarthritis of right hip       Past Medical History  Past Medical History:   Diagnosis Date    Cardiac disorder 01/01/2001    x2 stents after a failed stress test    Cellulitis     last assessed - 19Nov2012    Constipation     last assessed - 14Mar2013    COVID-19 08/15/2022    Diabetes mellitus (HCC)      Disease of thyroid gland     Ecchymosis     last assessed - 03Jan2017    Fall     last assessed - 03Jan2017    Hyperlipidemia     Hypertension     Hypokalemia     last assessed - 02Jun2015    Lower extremity weakness     last assessed - 21May2015    Vitamin D deficiency     last assessed - 16Aug2017       Past Surgical History  Past Surgical History:   Procedure Laterality Date    BACK SURGERY      CHOLECYSTECTOMY      COLONOSCOPY      CORONARY ANGIOPLASTY WITH STENT PLACEMENT      CT BONE MARROW BIOPSY AND ASPIRATION  1/15/2020    HYSTERECTOMY      OTHER SURGICAL HISTORY      Previous stent placement             Syed Holguin, PT;

## 2024-01-13 NOTE — CONSULTS
Orthopedics   Alejandra Patton 89 y.o. female MRN: [id number]  Unit/Bed#: -01      Chief Complaint:   Acute on chronic right hip and right-sided low back pain    HPI:   89 y.o.female who ambulates with a walker at baseline and with a history of lumbar radiculopathy and mild hip arthritis, complaining of acute on chronic, atraumatic right hip and low back pain. The patient presented to the ED yesterday with difficulty ambulating due to pain. The pain radiates from her right paraspinal muscles to her glut muscles around the hip radiates to the level of the knee. She does also note pain radiation into the groin. Low back pain is sharp in character and severe in intensity when flared by certain movement. Remitting factors include rest, Tylenol, and Advil. The patient denies  numbness, tingling, bowel/bladder incontinence, and saddle anesthesia. No open wounds noted. She denies any chest pain, SOB, dyspnea, dizziness, and N/V currently.     The patient is managed by Pain Management for lumbar radiculopathy. Per her last office visit, it was recommended that the patient undergo a lumbar epidural injection. She was prescribed Norco for pain control and advised to take a maximum of 3000 mg Tylenol daily. The patient has undergone multiple SI joint steroid injections in the past, which do provide adequate, long-term relief. Her most recent injection was in October 2022. She has not received a lumbar spine epidural injection, but is scheduled to have one on 1/30/24. The patient was also evaluated by Dr. Victoria for right hip pain diagnosed as primary hip osteoarthritis and greater trochanteric bursitis. She underwent a greater trochanteric bursa CSI. She is scheduled to see Dr. Victoria in 3 days. The patient has a lidocaine patch placed over the greater troch currently.    The patient does have a history of DM type 2, HTN, HLD, hypothyroidism, CKD Stage 3.    Review Of Systems:   Skin: Normal  Neuro: See  HPI  Musculoskeletal: See HPI  14 point review of systems negative except as stated above     Past Medical History:   Past Medical History:   Diagnosis Date    Cardiac disorder 01/01/2001    x2 stents after a failed stress test    Cellulitis     last assessed - 19Nov2012    Constipation     last assessed - 14Mar2013    COVID-19 08/15/2022    Diabetes mellitus (HCC)     Disease of thyroid gland     Ecchymosis     last assessed - 03Jan2017    Fall     last assessed - 03Jan2017    Hyperlipidemia     Hypertension     Hypokalemia     last assessed - 02Jun2015    Lower extremity weakness     last assessed - [id number]    Vitamin D deficiency     last assessed - [id number]       Past Surgical History:   Past Surgical History:   Procedure Laterality Date    BACK SURGERY      CHOLECYSTECTOMY      COLONOSCOPY      CORONARY ANGIOPLASTY WITH STENT PLACEMENT      CT BONE MARROW BIOPSY AND ASPIRATION  1/15/2020    HYSTERECTOMY      OTHER SURGICAL HISTORY      Previous stent placement       Family History:  Family history reviewed and non-contributory  Family History   Problem Relation Age of Onset    Heart disease Mother     Other Father         cerebral embolism - with cerebral infarction       Social History:  Social History     Socioeconomic History    Marital status: /Civil Union     Spouse name: None    Number of children: None    Years of education: None    Highest education level: None   Occupational History    None   Tobacco Use    Smoking status: Former     Types: Cigarettes     Start date: 5/7/1958    Smokeless tobacco: Never   Vaping Use    Vaping status: Never Used   Substance and Sexual Activity    Alcohol use: Never    Drug use: No    Sexual activity: Never     Partners: Male   Other Topics Concern    None   Social History Narrative    Caffeine use     Social Determinants of Health     Financial Resource Strain: Low Risk  (3/24/2023)    Overall Financial Resource Strain (CARDIA)     Difficulty of Paying Living  Expenses: Not hard at all   Food Insecurity: No Food Insecurity (1/12/2024)    Hunger Vital Sign     Worried About Running Out of Food in the Last Year: Never true     Ran Out of Food in the Last Year: Never true   Transportation Needs: No Transportation Needs (1/12/2024)    PRAPARE - Transportation     Lack of Transportation (Medical): No     Lack of Transportation (Non-Medical): No   Physical Activity: Not on file   Stress: Not on file   Social Connections: Not on file   Intimate Partner Violence: Not on file   Housing Stability: Low Risk  (1/12/2024)    Housing Stability Vital Sign     Unable to Pay for Housing in the Last Year: No     Number of Places Lived in the Last Year: 1     Unstable Housing in the Last Year: No       Allergies:   Allergies   Allergen Reactions    Iodine - Food Allergy Other (See Comments)     IV CONTRAST DYE    Iv Dye  [Iodinated Contrast Media] Hives    Other            Labs:  0   Lab Value Date/Time    HCT 35.6 01/13/2024 0541    HCT 36.3 01/12/2024 1634    HCT 30.8 (L) 11/19/2023 0457    HCT 35.9 02/08/2016 1108    HGB 11.1 (L) 01/13/2024 0541    HGB 11.6 01/12/2024 1634    HGB 9.9 (L) 11/19/2023 0457    HGB 12.2 02/08/2016 1108    WBC 9.43 01/13/2024 0541    WBC 9.14 01/12/2024 1634    WBC 4.74 11/19/2023 0457    WBC 5.8 02/08/2016 1108       Meds:    Current Facility-Administered Medications:     acetaminophen (TYLENOL) tablet 650 mg, 650 mg, Oral, Q6H PRN, Calvin Darnell MD    aspirin chewable tablet 81 mg, 81 mg, Oral, Daily, Calvin Darnell MD, 81 mg at 01/13/24 0831    atorvastatin (LIPITOR) tablet 40 mg, 40 mg, Oral, Daily, Calvin Darnell MD, 40 mg at 01/13/24 0831    baclofen tablet 10 mg, 10 mg, Oral, HS, LAURIE Cooley, 10 mg at 01/12/24 2224    calcium carbonate (TUMS) chewable tablet 1,000 mg, 1,000 mg, Oral, Daily PRN, Calvin Darnell MD    carvedilol (COREG) tablet 12.5 mg, 12.5 mg, Oral, BID With Meals, Calvin Darnell MD, 12.5 mg at  "01/12/24 1943    Diclofenac Sodium (VOLTAREN) 1 % topical gel 2 g, 2 g, Topical, Once, Servando Lakhani PA-C    docusate sodium (COLACE) capsule 100 mg, 100 mg, Oral, BID, Calvin Darnell MD, 100 mg at 01/13/24 0831    heparin (porcine) subcutaneous injection 5,000 Units, 5,000 Units, Subcutaneous, Q8H GERA, Calvin Darnell MD, 5,000 Units at 01/13/24 0553    HYDROmorphone (DILAUDID) injection 0.5 mg, 0.5 mg, Intravenous, Q4H PRN, Calvin Darnell MD    HYDROmorphone (DILAUDID) injection 0.5 mg, 0.5 mg, Intravenous, Q4H PRN, Calvin Darnell MD    latanoprost (XALATAN) 0.005 % ophthalmic solution 1 drop, 1 drop, Both Eyes, HS, LAURIE Cooley    levothyroxine tablet 50 mcg, 50 mcg, Oral, Early Morning, Calvin Darnell MD, 50 mcg at 01/13/24 0553    lidocaine (LIDODERM) 5 % patch 1 patch, 1 patch, Topical, Daily, Rebeca Fonseca PA-C, 1 patch at 01/13/24 0911    ondansetron (ZOFRAN) injection 4 mg, 4 mg, Intravenous, Q6H PRN, Calvin Darnell MD    oxyCODONE (ROXICODONE) IR tablet 5 mg, 5 mg, Oral, Q4H PRN, Calvin Darnell MD, 5 mg at 01/12/24 1959    senna (SENOKOT) tablet 8.6 mg, 1 tablet, Oral, Daily, Calvin Darnell MD, 8.6 mg at 01/13/24 0831    zolpidem (AMBIEN) tablet 5 mg, 5 mg, Oral, HS, LAURIE Cooley, 5 mg at 01/12/24 2224    Blood Culture:   No results found for: \"BLOODCX\"    Wound Culture:   No results found for: \"WOUNDCULT\"    Ins and Outs:  I/O last 24 hours:  In: -   Out: 700 [Urine:700]          Physical Exam:   /67   Pulse 59   Temp 99.1 °F (37.3 °C)   Resp 20   Ht 5' 2\" (1.575 m)   Wt 65.6 kg (144 lb 10 oz)   SpO2 96%   BMI 26.45 kg/m²   Gen: No acute distress, resting comfortably in bed  HEENT: Eyes clear, moist mucus membranes, hearing intact  Respiratory: No audible wheezing or stridor  Cardiovascular: Well Perfused peripherally, 2+ distal pulse  Abdomen: nondistended, no peritoneal signs  Musculoskeletal: low back and right " hip  Skin intact, no open lesions, ecchymosis, or wounds  No lumbar midline tenderness. Moderate tenderness over the paraspinal and glut musculature  No greater trochanteric tenderness  Negative FADIR and ELMER  5/5 strength with hip flexion/extension/abduction, knee flexion/extension, ankle dorsi/plantar flexion, EHL/FHL bilateral lower extremities without pain  Sensation intact L2-S1 bilateral lower extremities  Able to SLR without pain  Leg Lengths equal  2+ DP pulse  Brisk cap refill   Compartments are soft and compressible. No pain to passive stretch    Radiology:   I personally reviewed the films.  X-rays right hip/pelvis from 1/12/24 show no acute osseous abnormalities or significant degenerative changes.     MRI of lumbar spine reviewed, which demonstrates IMPRESSION:     Scoliosis and multilevel thoracolumbar degenerative change, superimposed upon developmental spinal canal stenosis. Moderate canal stenosis at the L1-2, L2-3 and L3-4 levels. Multilevel foraminal narrowing, most pronounced at L2-3 on the right.     Previous posterior decompression at L3-4, L4-5 and L5-S1. No residual canal stenosis at the L4-5 or L5-S1 levels.     Mild chronic compression deformity of the inferior endplate of L1. No acute osseous abnormality.    _*_*_*_*_*_*_*_*_*_*_*_*_*_*_*_*_*_*_*_*_*_*_*_*_*_*_*_*_*_*_*_*_*_*_*_*_*_*_*_*_*    Assessment:  89 y.o.female with lumbar radiculopathy    Plan:   WBAT bilateral lower extremities  Up and out of bed to ambulate with a walker for assistance  PT/OT eval  Continue analgesics PRN per primary team  Recommend medrol dose mango for pain control   Plan to follow up with Dr. Julien paiz scheduldlisa regarding her right hip. Outpatient PT may be beneficial for residual symptoms from mild hip OA and greater trochanteric bursitis. We discussed that I believe her lumbar radiculopathy is the main source of her pain, and she will plan to undergo her lumbar epidural injection later this month  Dispo: OK  for discharge from Ortho perspective as long as the patient is cleared by PT/OT and is able to safely ambulate.      Magda Gates PA-C

## 2024-01-13 NOTE — ASSESSMENT & PLAN NOTE
Normotensive to low Blood Pressure: 107/67  On extensive blood pressure regimen  Holding majority of home regimen due to lower BPs   Holding parameters of medications

## 2024-01-13 NOTE — ASSESSMENT & PLAN NOTE
Present on admission secondary to acute on chronic hip pain  Had a recent cortisone injection at ortho office  Was scheduled for outpatient ortho follow up on Tuesday but could not make appointment due to pain  Reports chronic back pain as well   Pain poorly controlled despite medications in ER   Consult PT and OT, fall precautions  Requesting ortho consult given follow up is on Tuesday  XR hip is negative for acute abnormalities

## 2024-01-13 NOTE — OCCUPATIONAL THERAPY NOTE
Occupational Therapy Cancellation Note        Patient Name: Alejandra Patton  Today's Date: 1/13/2024 01/13/24 0701   OT Last Visit   OT Visit Date 01/13/24   Note Type   Note type Evaluation;Cancelled Session   Cancel Reasons Medical status   Additional Comments OT order received. Chart review performed. At this time, OT evaluation cancelled due to pending X-rays of hip and pelvis and ortho Consult. OT will continue to follow and evaluate as patient is medically appropriate.

## 2024-01-13 NOTE — PLAN OF CARE
Problem: INFECTION - ADULT  Goal: Absence or prevention of progression during hospitalization  Description: INTERVENTIONS:  - Assess and monitor for signs and symptoms of infection  - Monitor lab/diagnostic results  - Monitor all insertion sites, i.e. indwelling lines, tubes, and drains  - Monitor endotracheal if appropriate and nasal secretions for changes in amount and color  - [place] appropriate cooling/warming therapies per order  - Administer medications as ordered  - Instruct and encourage patient and family to use good hand hygiene technique  - Identify and instruct in appropriate isolation precautions for identified infection/condition  Outcome: Progressing      Calm

## 2024-01-13 NOTE — ASSESSMENT & PLAN NOTE
Normotensive blood pressures  On extensive blood pressure regimen  Hold for now to prevent hypotension

## 2024-01-13 NOTE — UTILIZATION REVIEW
Initial Clinical Review    Admission: Date/Time/Statement:   Admission Orders (From admission, onward)       Ordered        01/12/24 1754  Place in Observation  Once                          01/13/24 1537  Inpatient Admission  Once        Transfer Service: Hospitalist   Question Answer Comment   Level of Care Med Surg    Estimated length of stay More than 2 Midnights    Certification I certify that inpatient services are medically necessary for this patient for a duration of greater than two midnights. See H&P and MD Progress Notes for additional information about the patient's course of treatment.        01/13/24 1537   OBSERVATION   1/12 @   1755  CHANGED TO IP ADMISSIOn 1/13  @   1537  The patient, admitted on an observation basis, will now require > 2 midnight hospital stay due to pending therapy evals for safe discharge planning and ortho eval for further recs of treatment     ED Arrival Information       Expected   -    Arrival   1/12/2024 11:54    Acuity   Urgent              Means of arrival   Walk-In    Escorted by   Family Member    Service   Hospitalist    Admission type   Emergency              Arrival complaint   LEG PAIN             Chief Complaint   Patient presents with    Leg Pain     Pt has right leg pain that goes down to right knee as well as back pain that started yesterday. Pt isn't able to walk.        Initial Presentation: 89 y.o. female presents to ED from home  with acute/chronic hip  pain, follows  with ortho and received a  cortisone injection about  2 weeks ago.  Has  f/u  next week,  came to ED  D/T  pain.  Given  morphine in ED with pain relief, unable to ambulate.  PMH  is  chronic hip pain and  HTN.   Admit  Observation with  Ambulatory dysfunction and plan is  pain control, PT/OT, continue home  meds  and ortho consult.    1/13  IP ADMISSION  Ortho consult  Lumbar radiculopathy  Continue  PT/OT.  Continue pain control as needed, medrol dose pack.  Can be  WBAT   B/L  LE.      Date:    1/14    Day 3: Has surpassed a 2nd midnight with active treatments and services, which include .   Needs  PT/OT.  Still with significant right hi pain, difficulty walking.   Continue current meds/treatment plan.        ED Triage Vitals   Temperature Pulse Respirations Blood Pressure SpO2   01/12/24 1206 01/12/24 1206 01/12/24 1206 01/12/24 1206 01/12/24 1206   97.9 °F (36.6 °C) 72 18 100/63 98 %      Temp Source Heart Rate Source Patient Position - Orthostatic VS BP Location FiO2 (%)   01/12/24 1206 01/12/24 1206 01/12/24 1206 01/12/24 1206 --   Temporal Monitor Sitting Left arm       Pain Score       01/12/24 1943       6          Wt Readings from Last 1 Encounters:   01/12/24 65.6 kg (144 lb 10 oz)     Additional Vital Signs:   99.1 °F (37.3 °C) 59 20 107/67 80 96 % -- --    01/12/24 21:22:26 97 °F (36.1 °C) Abnormal  59 19 107/46 Abnormal  66 93 % None (Room air) Lying   01/12/24 21:21:24 -- 59 -- 107/46 Abnormal  66 94 % -- --   01/12/24 2100 -- 59 20 108/56 77 90 % None (Room air) Lying   01/12/24 2030 -- 56 20 116/59 84 94 % None (Room air) Lying   01/12/24 1943 -- 69 20 153/74 107 95 % None (Room air) Lying   01/12/24 1635 -- -- -- -- -- -- None (Room air) --   01/12/24 1417 -- 60 -- 152/67 96 93 % None (Room air) Lying   01/12/24 1206 97.9 °F (36.6 °C) 72 18 100/63 -- 98 % None (Room air) Sittin     Pertinent Labs/Diagnostic Test Results:   XR hip/pelv 2-3 vws right if performed   Final Result by Jae Gilliam MD (01/13 0840)      No acute osseous abnormality.      Degenerative changes as described.            Workstation performed: [id number]               Results from last 7 days   Lab Units 01/13/24  0541 01/12/24  1634   WBC Thousand/uL 9.43 9.14   HEMOGLOBIN g/dL 11.1* 11.6   HEMATOCRIT % 35.6 36.3   PLATELETS Thousands/uL 260 267   NEUTROS ABS Thousands/µL  --  6.31         Results from last 7 days   Lab Units 01/13/24  0541 01/12/24  1634   SODIUM mmol/L 139 138   POTASSIUM mmol/L 3.9 3.8    CHLORIDE mmol/L 107 105   CO2 mmol/L 26 27   ANION GAP mmol/L 6 6   BUN mg/dL 26* 26*   CREATININE mg/dL 0.94 0.90   EGFR ml/min/1.73sq m 53 56   CALCIUM mg/dL 8.7 9.0   MAGNESIUM mg/dL 1.7*  --      Results from last 7 days   Lab Units 01/12/24  1634   AST U/L 39   ALT U/L 23   ALK PHOS U/L 41   TOTAL PROTEIN g/dL 7.2   ALBUMIN g/dL 3.5   TOTAL BILIRUBIN mg/dL 0.84         Results from last 7 days   Lab Units 01/13/24  0541 01/12/24  1634   GLUCOSE RANDOM mg/dL 137 142*                             ED Treatment:   Medication Administration from 01/12/2024 1154 to 01/12/2024 2119         Date/Time Order Dose Route Action Comments     01/12/2024 1449 EST morphine (MSIR) IR tablet 15 mg 15 mg Oral Given --     01/12/2024 1645 EST Diclofenac Sodium (VOLTAREN) 1 % topical gel 2 g 2 g Topical Not Given --     01/12/2024 1943 EST carvedilol (COREG) tablet 12.5 mg 12.5 mg Oral Given --     01/12/2024 1944 EST docusate sodium (COLACE) capsule 100 mg 100 mg Oral Given --     01/12/2024 1959 EST oxyCODONE (ROXICODONE) IR tablet 5 mg 5 mg Oral Given --            Present on Admission:   Chronic bilateral low back pain without sciatica   Ambulatory dysfunction   Hypertension      Admitting Diagnosis: Leg pain [M79.606]  Right hip pain [M25.551]  Ambulatory dysfunction [R26.2]  Age/Sex: 89 y.o. female  Admission Orders:  Scheduled Medications:  aspirin, 81 mg, Oral, Daily  atorvastatin, 40 mg, Oral, Daily  baclofen, 10 mg, Oral, HS  carvedilol, 12.5 mg, Oral, BID With Meals  Diclofenac Sodium, 2 g, Topical, Once  docusate sodium, 100 mg, Oral, BID  heparin (porcine), 5,000 Units, Subcutaneous, Q8H G[facility]A  latanoprost, 1 drop, Both Eyes, HS  levothyroxine, 50 mcg, Oral, Early Morning  lidocaine, 1 patch, Topical, Daily  senna, 1 tablet, Oral, Daily  zolpidem, 5 mg, Oral, HS      Continuous IV Infusions:     PRN Meds:  acetaminophen, 650 mg, Oral, Q6H PRN  calcium carbonate, 1,000 mg, Oral, Daily PRN  HYDROmorphone, 0.5 mg,  Intravenous, Q4H PRN  HYDROmorphone, 0.5 mg, Intravenous, Q4H PRN  ondansetron, 4 mg, Intravenous, Q6H PRN  oxyCODONE, 5 mg, Oral, Q4H PRN        IP CONSULT TO ORTHOPEDIC SURGERY    Network Utilization Review Department  ATTENTION: Please call with any questions or concerns to [phone] and carefully listen to the prompts so that you are directed to the right person. All voicemails are confidential.   For Discharge needs, contact Care Management DC Support Team at [phone] opt. [phone]  Send all requests for admission clinical reviews, approved or denied determinations and any other requests to dedicated fax number below belonging to the campus where the patient is receiving treatment. List of dedicated fax numbers for the Facilities:  FACILITY NAME UR FAX NUMBER   ADMISSION DENIALS (Administrative/Medical Necessity) [phone]   DISCHARGE SUPPORT TEAM (NETWORK) [phone]   PARENT CHILD HEALTH (Maternity/NICU/Pediatrics) [phone]   [facility] [phone]   [facility] [phone]   [facility] [phone]   [facility] [phone]   [facility] [phone]   [facility] [phone]   [facility] [phone]   [facility] [phone]   [facility] [phone]   [facility] [phone]   [facility] [phone]

## 2024-01-13 NOTE — PROGRESS NOTES
[facility]  Progress Note  Name: Alejandra Patton I  MRN: [id number]  Unit/Bed#: -01 I Date of Admission: 1/12/2024   Date of Service: 1/13/2024  Hospital Day: 0    Assessment/Plan   * Ambulatory dysfunction  Assessment & Plan  Present on admission secondary to acute on chronic hip pain  Had a recent cortisone injection at ortho office  Was scheduled for outpatient ortho follow up on Tuesday but could not make appointment due to pain  Reports chronic back pain as well   Pain poorly controlled despite medications in ER   Consult PT and OT, fall precautions  Requesting ortho consult given follow up is on Tuesday  XR hip is negative for acute abnormalities     Chronic bilateral low back pain without sciatica  Assessment & Plan  Consider hip pain related to back pain vs hip pain     Hypertension  Assessment & Plan  Normotensive to low Blood Pressure: 107/67  On extensive blood pressure regimen  Holding majority of home regimen due to lower BPs   Holding parameters of medications            VTE Pharmacologic Prophylaxis: VTE Score: 4 Moderate Risk (Score 3-4) - Pharmacological DVT Prophylaxis Ordered: heparin.    Mobility:   Basic Mobility Inpatient Raw Score: 18  -HLM Goal: 6: Walk 10 steps or more  JH-HLM Achieved: 2: Bed activities/Dependent transfer  HLM Goal NOT achieved. Continue with multidisciplinary rounding and encourage appropriate mobility to improve upon HLM goals.    Patient Centered Rounds: I performed bedside rounds with nursing staff today.   Discussions with Specialists or Other Care Team Provider: ortho consult pending at time of eval    Education and Discussions with Family / Patient: Patient declined call to .     Total Time Spent on Date of Encounter in care of patient: 35 mins. This time was spent on one or more of the following: performing physical exam; counseling and coordination of care; obtaining or reviewing history; documenting in the medical  record; reviewing/ordering tests, medications or procedures; communicating with other healthcare professionals and discussing with patient's family/caregivers.    Current Length of Stay: 0 day(s)  Current Patient Status: Observation   Certification Statement: The patient, admitted on an observation basis, will now require > 2 midnight hospital stay due to pending therapy evals for safe discharge planning and ortho eval for further recs of treatment  Discharge Plan: Anticipate discharge in 24-48 hrs to discharge location to be determined pending rehab evaluations.    Code Status: Level 1 - Full Code    Subjective:   Patient seen lying in bed, she is doing ok but pain remains to be controlled. Last BM was yesterday. She would like to go home, reports poor experience at [facility] in November due to uncomfortable bed but therapy itself was OK.     Objective:     Vitals:   Temp (24hrs), Av °F (36.7 °C), Min:97 °F (36.1 °C), Max:99.1 °F (37.3 °C)    Temp:  [97 °F (36.1 °C)-99.1 °F (37.3 °C)] 99.1 °F (37.3 °C)  HR:  [56-72] 59  Resp:  [18-20] 20  BP: (100-153)/(46-74) 107/67  SpO2:  [90 %-98 %] 96 %  Body mass index is 26.45 kg/m².     Input and Output Summary (last 24 hours):     Intake/Output Summary (Last 24 hours) at 2024 0948  Last data filed at 2024 2122  Gross per 24 hour   Intake --   Output 700 ml   Net -700 ml       Physical Exam:   Physical Exam  Vitals and nursing note reviewed.   Constitutional:       General: She is awake. She is not in acute distress.     Appearance: Normal appearance. She is well-developed and well-groomed. She is obese. She is not ill-appearing or toxic-appearing.   Cardiovascular:      Rate and Rhythm: Normal rate.   Pulmonary:      Effort: Pulmonary effort is normal. No respiratory distress.   Abdominal:      General: There is no distension.      Palpations: Abdomen is soft.   Musculoskeletal:         General: No swelling.      Right lower leg: No edema.      Left lower  leg: No edema.   Skin:     Coloration: Skin is not pale.   Neurological:      Mental Status: She is alert and oriented to person, place, and time.   Psychiatric:         Mood and Affect: Mood normal.         Behavior: Behavior normal. Behavior is cooperative.           Additional Data:     Labs:  Results from last 7 days   Lab Units 01/13/24  0541 01/12/24  1634   WBC Thousand/uL 9.43 9.14   HEMOGLOBIN g/dL 11.1* 11.6   HEMATOCRIT % 35.6 36.3   PLATELETS Thousands/uL 260 267   NEUTROS PCT %  --  69   LYMPHS PCT %  --  19   MONOS PCT %  --  10   EOS PCT %  --  1     Results from last 7 days   Lab Units 01/13/24  0541 01/12/24  1634   SODIUM mmol/L 139 138   POTASSIUM mmol/L 3.9 3.8   CHLORIDE mmol/L 107 105   CO2 mmol/L 26 27   BUN mg/dL 26* 26*   CREATININE mg/dL 0.94 0.90   ANION GAP mmol/L 6 6   CALCIUM mg/dL 8.7 9.0   ALBUMIN g/dL  --  3.5   TOTAL BILIRUBIN mg/dL  --  0.84   ALK PHOS U/L  --  41   ALT U/L  --  23   AST U/L  --  39   GLUCOSE RANDOM mg/dL 137 142*                       Lines/Drains:  Invasive Devices       Peripheral Intravenous Line  Duration             Peripheral IV 01/12/24 Left Antecubital <1 day                          Imaging: Reviewed radiology reports from this admission including: xray(s)    Recent Cultures (last 7 days):         Last 24 Hours Medication List:   Current Facility-Administered Medications   Medication Dose Route Frequency Provider Last Rate    acetaminophen  650 mg Oral Q6H PRN Calvin Darnell MD      aspirin  81 mg Oral Daily Calvin Darnell MD      atorvastatin  40 mg Oral Daily Calvin Darnell MD      baclofen  10 mg Oral HS LAURIE Cooley      calcium carbonate  1,000 mg Oral Daily PRN Calvin Darnell MD      carvedilol  12.5 mg Oral BID With Meals Calvin Darnell MD      Diclofenac Sodium  2 g Topical Once Servando Lakhani PA-C      docusate sodium  100 mg Oral BID Calvin Darnell MD      heparin (porcine)  5,000 Units Subcutaneous  Q8H [facility] Calvin Darnell MD      HYDROmorphone  0.5 mg Intravenous Q4H PRN Calvin Darnell MD      HYDROmorphone  0.5 mg Intravenous Q4H PRN Calvin Darnell MD      latanoprost  1 drop Both Eyes HS LAURIE Cooley      levothyroxine  50 mcg Oral Early Morning Calvin Darnell MD      lidocaine  1 patch Topical Daily Rebeca Fonseca PA-C      ondansetron  4 mg Intravenous Q6H PRN Calvin Darnell MD      oxyCODONE  5 mg Oral Q4H PRN Calvin Darnell MD      senna  1 tablet Oral Daily Calvin Darnell MD      zolpidem  5 mg Oral HS LAURIE Cooley          Today, Patient Was Seen By: Rebeca Fonseca PA-C    **Please Note: This note may have been constructed using a voice recognition system.**

## 2024-01-14 PROCEDURE — 97166 OT EVAL MOD COMPLEX 45 MIN: CPT

## 2024-01-14 PROCEDURE — 97163 PT EVAL HIGH COMPLEX 45 MIN: CPT

## 2024-01-14 PROCEDURE — 99232 SBSQ HOSP IP/OBS MODERATE 35: CPT

## 2024-01-14 RX ADMIN — DOCUSATE SODIUM 100 MG: 100 CAPSULE, LIQUID FILLED ORAL at 09:07

## 2024-01-14 RX ADMIN — ZOLPIDEM TARTRATE 5 MG: 5 TABLET, COATED ORAL at 21:38

## 2024-01-14 RX ADMIN — ASPIRIN 81 MG: 81 TABLET, CHEWABLE ORAL at 09:07

## 2024-01-14 RX ADMIN — ACETAMINOPHEN 975 MG: 325 TABLET, FILM COATED ORAL at 05:43

## 2024-01-14 RX ADMIN — LIDOCAINE 5% 1 PATCH: 700 PATCH TOPICAL at 09:08

## 2024-01-14 RX ADMIN — OXYCODONE HYDROCHLORIDE 5 MG: 5 TABLET ORAL at 09:08

## 2024-01-14 RX ADMIN — METHYLPREDNISOLONE 24 MG: 16 TABLET ORAL at 09:09

## 2024-01-14 RX ADMIN — ACETAMINOPHEN 975 MG: 325 TABLET, FILM COATED ORAL at 15:00

## 2024-01-14 RX ADMIN — HEPARIN SODIUM 5000 UNITS: 5000 INJECTION INTRAVENOUS; SUBCUTANEOUS at 15:00

## 2024-01-14 RX ADMIN — SENNOSIDES 8.6 MG: 8.6 TABLET, FILM COATED ORAL at 09:07

## 2024-01-14 RX ADMIN — ATORVASTATIN CALCIUM 40 MG: 40 TABLET, FILM COATED ORAL at 09:07

## 2024-01-14 RX ADMIN — CARVEDILOL 12.5 MG: 12.5 TABLET, FILM COATED ORAL at 17:42

## 2024-01-14 RX ADMIN — BACLOFEN 10 MG: 10 TABLET ORAL at 21:38

## 2024-01-14 RX ADMIN — ACETAMINOPHEN 975 MG: 325 TABLET, FILM COATED ORAL at 21:38

## 2024-01-14 RX ADMIN — LATANOPROST 1 DROP: 50 SOLUTION OPHTHALMIC at 22:54

## 2024-01-14 RX ADMIN — HEPARIN SODIUM 5000 UNITS: 5000 INJECTION INTRAVENOUS; SUBCUTANEOUS at 05:43

## 2024-01-14 RX ADMIN — DOCUSATE SODIUM 100 MG: 100 CAPSULE, LIQUID FILLED ORAL at 17:42

## 2024-01-14 RX ADMIN — CALCIUM CARBONATE (ANTACID) CHEW TAB 500 MG 1000 MG: 500 CHEW TAB at 11:59

## 2024-01-14 RX ADMIN — LEVOTHYROXINE SODIUM 50 MCG: 0.05 TABLET ORAL at 05:43

## 2024-01-14 RX ADMIN — CARVEDILOL 12.5 MG: 12.5 TABLET, FILM COATED ORAL at 09:17

## 2024-01-14 NOTE — OCCUPATIONAL THERAPY NOTE
Pt is a 89 y.o. female seen for OT evaluation s/p admit to [facility] on 1/12/2024 w/ Ambulatory dysfunction, lumbar radiculopathy; WBAT b/l LEs.  X-ray spine: Scoliosis and multilevel thoracolumbar degenerative change, superimposed upon developmental spinal canal stenosis. Moderate canal stenosis at the L1-2, L2-3 and L3-4 level. X-rays right hip/pelvis from 1/12/24 show no acute osseous abnormalities or significant degenerative changes. Comorbidities affecting pt's functional performance at time of assessment include: HTN,  Personal factors affecting pt at time of IE include:{tspersonalfactors:44569}. Prior to admission, pt was ***. Upon evaluation: Pt requires *** 2* the following deficits impacting occupational performance: {tsoccupationaldeficits:68647}. Pt to benefit from continued skilled OT tx while in the hospital to address deficits as defined above and maximize level of functional independence w ADL's and functional mobility. Occupational Performance areas to address include: {tsoccupationalperformanceareas:66889}. From OT standpoint, recommendation at time of d/c would be ***.

## 2024-01-14 NOTE — PLAN OF CARE
Problem: OCCUPATIONAL THERAPY ADULT  Goal: Performs self-care activities at highest level of function for planned discharge setting.  See evaluation for individualized goals.  Description: Treatment Interventions: Functional transfer training, UE strengthening/ROM, Endurance training, ADL retraining, Patient/family training, Equipment evaluation/education, Compensatory technique education, Activityengagement, Energy conservation          See flowsheet documentation for full assessment, interventions and recommendations.   1/14/2024 1227 by Amber Rico OT  Note: Limitation: Decreased ADL status, Decreased cognition, Decreased Safe judgement during ADL, Decreased UE strength, Decreased endurance, Decreased high-level ADLs, Decreased self-care trans  Prognosis: Good  Assessment: Pt is a 89 y.o. female seen for OT evaluation s/p admit to [facility] on 1/12/2024 w/ Ambulatory dysfunction, lumbar radiculopathy; WBAT b/l LEs.  X-ray spine: Scoliosis and multilevel thoracolumbar degenerative change, superimposed upon developmental spinal canal stenosis. Moderate canal stenosis at the L1-2, L2-3 and L3-4 level. X-rays right hip/pelvis from 1/12/24 show no acute osseous abnormalities or significant degenerative changes. Comorbidities affecting pt's functional performance at time of assessment include: HTN, chronic low back pain w/o sciatica, arthritis, DM. X-ray R hip/pelvis: No acute osseous abnormality. Personal factors affecting pt at time of IE include:steps to enter environment, limited home support, difficulty performing ADLS, difficulty performing IADLS , and limited insight into deficits, lives alone. Prior to admission, pt was per pt independent w/ ADLs, independent w/ functional transfers and mobility w/ rollator or RW, independent w/ IADLs except assist w/ cleaning, driving. Pt reports friend who provides assist w/ cleaning has been coming to assist her every morning last few days due to pain. Upon evaluation:  Pt requires MIN assist x1 log rolling techniques, MIN assist supine<>sit bed mobility, MIN assist sit<>stand w/ VCs for hand placement, MOD assist able to take step w/ RLE unable to advance L LE due to pain in R hip, MOD assist LB ADLS, setup UB ADLs 2* the following deficits impacting occupational performance: increased pain R hip to knee, impaired balance, impaired activity tolerance, fall risk, impaired functional reach, decreased insight, fear of falling, decreased strength and endurance. Pt to benefit from continued skilled OT tx while in the hospital to address deficits as defined above and maximize level of functional independence w ADL's and functional mobility. Occupational Performance areas to address include: grooming, bathing/shower, toilet hygiene, dressing, health maintenance, functional mobility, clothing management, cleaning, and meal prep. From OT standpoint, recommendation at time of d/c would be level I maximum resources.   The patient's raw score on the -PAC Daily Activity Inpatient Short Form is 16. A raw score of less than 19 suggests the patient may benefit from discharge to post-acute rehabilitation services. Please refer to the recommendation of the Occupational Therapist for safe discharge planning.     Rehab Resource Intensity Level, OT: I (Maximum Resource Intensity)       1/14/2024 1227 by Amber Rico OT  Note: Limitation: Decreased ADL status, Decreased cognition, Decreased Safe judgement during ADL, Decreased UE strength, Decreased endurance, Decreased high-level ADLs, Decreased self-care trans  Prognosis: Good  Assessment: Pt is a 89 y.o. female seen for OT evaluation s/p admit to [facility] on 1/12/2024 w/ Ambulatory dysfunction, lumbar radiculopathy; WBAT b/l LEs.  X-ray spine: Scoliosis and multilevel thoracolumbar degenerative change, superimposed upon developmental spinal canal stenosis. Moderate canal stenosis at the L1-2, L2-3 and L3-4 level. X-rays right hip/pelvis from  1/12/24 show no acute osseous abnormalities or significant degenerative changes. Comorbidities affecting pt's functional performance at time of assessment include: HTN, chronic low back pain w/o sciatica, arthritis, DM. X-ray R hip/pelvis: No acute osseous abnormality. Personal factors affecting pt at time of IE include:steps to enter environment, limited home support, difficulty performing ADLS, difficulty performing IADLS , and limited insight into deficits, lives alone. Prior to admission, pt was per pt independent w/ ADLs, independent w/ functional transfers and mobility w/ rollator or RW, independent w/ IADLs except assist w/ cleaning, driving. Pt reports friend who provides assist w/ cleaning has been coming to assist her every morning last few days due to pain. Upon evaluation: Pt requires MIN assist x1 log rolling techniques, MIN assist supine<>sit bed mobility, MIN assist sit<>stand w/ VCs for hand placement, MOD assist able to take step w/ RLE unable to advance L LE due to pain in R hip, MOD assist LB ADLS, setup UB ADLs 2* the following deficits impacting occupational performance: increased pain R hip to knee, impaired balance, impaired activity tolerance, fall risk, impaired functional reach, decreased insight, fear of falling, decreased strength and endurance. Pt to benefit from continued skilled OT tx while in the hospital to address deficits as defined above and maximize level of functional independence w ADL's and functional mobility. Occupational Performance areas to address include: grooming, bathing/shower, toilet hygiene, dressing, health maintenance, functional mobility, clothing management, cleaning, and meal prep. From OT standpoint, recommendation at time of d/c would be level I maximum resources.   The patient's raw score on the AM-PAC Daily Activity Inpatient Short Form is 16. A raw score of less than 19 suggests the patient may benefit from discharge to post-acute rehabilitation services.  Please refer to the recommendation of the Occupational Therapist for safe discharge planning.     Rehab Resource Intensity Level, OT: I (Maximum Resource Intensity)

## 2024-01-14 NOTE — PROGRESS NOTES
[facility]  Progress Note  Name: Alejandra Patton I  MRN: [id number]  Unit/Bed#: -01 I Date of Admission: 1/12/2024   Date of Service: 1/14/2024 I Hospital Day: 1    Assessment/Plan   * Ambulatory dysfunction  Assessment & Plan  Present on admission secondary to acute on chronic hip pain  Had a recent cortisone injection at ortho office  Was scheduled for outpatient ortho follow up on Tuesday but could not make appointment due to pain  Reports chronic back pain as well   Pain poorly controlled despite medications in ER   PT OT recommending Level One intensity  Ortho was consulted and recommended Medrol pack and continued follow-up outpatient  Patient additionally has neurosurgical outpatient follow-up on January 30  XR hip is negative for acute abnormalities     Chronic bilateral low back pain without sciatica  Assessment & Plan  Consider hip pain related to back pain vs hip pain     Hypertension  Assessment & Plan  Normotensive to low Blood Pressure: 125/72  On extensive blood pressure regimen  Holding majority of home regimen due to lower BPs   Holding parameters of medications            VTE Pharmacologic Prophylaxis: VTE Score: 4 Moderate Risk (Score 3-4) - Pharmacological DVT Prophylaxis Ordered: heparin.    Mobility:   Basic Mobility Inpatient Raw Score: 16  JH-HLM Goal: 5: Stand one or more mins  JH-HLM Achieved: 4: Move to chair/commode  HLM Goal achieved. Continue to encourage appropriate mobility.    Patient Centered Rounds: I performed bedside rounds with nursing staff today.   Discussions with Specialists or Other Care Team Provider: CM    Education and Discussions with Family / Patient: Patient declined call to .     Total Time Spent on Date of Encounter in care of patient: 35 mins. This time was spent on one or more of the following: performing physical exam; counseling and coordination of care; obtaining or reviewing history; documenting in the medical record;  reviewing/ordering tests, medications or procedures; communicating with other healthcare professionals and discussing with patient's family/caregivers.    Current Length of Stay: 1 day(s)  Current Patient Status: Inpatient   Certification Statement: The patient will continue to require additional inpatient hospital stay due to awaiting placement  Discharge Plan: Anticipate discharge in 24-48 hrs to rehab facility.    Code Status: Level 1 - Full Code    Subjective:   Patient reports inability to walk and having significant right-sided hip pain.  We had lengthy discussion about need for rehabilitation and patient agrees that she currently requires inpatient rehab while she cannot walk and take care of herself as she lives home alone.  She denies chest pain/pressure, palpitations, lightheadedness, nausea, shortness breath, or chills.    Objective:     Vitals:   Temp (24hrs), Av.5 °F (36.9 °C), Min:98.1 °F (36.7 °C), Max:98.8 °F (37.1 °C)    Temp:  [98.1 °F (36.7 °C)-98.8 °F (37.1 °C)] 98.8 °F (37.1 °C)  HR:  [58-61] 58  Resp:  [18] 18  BP: (107-125)/(57-72) 125/72  SpO2:  [96 %-97 %] 97 %  Body mass index is 26.45 kg/m².     Input and Output Summary (last 24 hours):     Intake/Output Summary (Last 24 hours) at 2024 1316  Last data filed at 2024 0546  Gross per 24 hour   Intake 240 ml   Output 400 ml   Net -160 ml       Physical Exam:   Physical Exam  Vitals and nursing note reviewed.   Constitutional:       Appearance: She is normal weight.   HENT:      Head: Normocephalic.      Nose: Nose normal.      Mouth/Throat:      Mouth: Mucous membranes are moist.      Pharynx: Oropharynx is clear.   Eyes:      General: No scleral icterus.     Conjunctiva/sclera: Conjunctivae normal.      Pupils: Pupils are equal, round, and reactive to light.   Cardiovascular:      Rate and Rhythm: Normal rate and regular rhythm.      Heart sounds: No murmur heard.     No friction rub. No gallop.   Pulmonary:      Effort:  Pulmonary effort is normal. No respiratory distress.      Breath sounds: Normal breath sounds. No stridor. No wheezing, rhonchi or rales.   Abdominal:      General: Abdomen is flat.      Palpations: Abdomen is soft.   Musculoskeletal:         General: Normal range of motion.      Cervical back: Normal range of motion and neck supple.      Right lower leg: No edema.      Left lower leg: No edema.   Lymphadenopathy:      Cervical: No cervical adenopathy.   Skin:     General: Skin is warm.      Coloration: Skin is not jaundiced or pale.      Findings: No bruising, erythema or lesion.   Neurological:      General: No focal deficit present.      Mental Status: She is alert and oriented to person, place, and time. Mental status is at baseline.      Cranial Nerves: No cranial nerve deficit.      Motor: No weakness.   Psychiatric:         Mood and Affect: Mood normal.         Behavior: Behavior normal.         Thought Content: Thought content normal.          Additional Data:     Labs:  Results from last 7 days   Lab Units 01/13/24  0541 01/12/24  1634   WBC Thousand/uL 9.43 9.14   HEMOGLOBIN g/dL 11.1* 11.6   HEMATOCRIT % 35.6 36.3   PLATELETS Thousands/uL 260 267   NEUTROS PCT %  --  69   LYMPHS PCT %  --  19   MONOS PCT %  --  10   EOS PCT %  --  1     Results from last 7 days   Lab Units 01/13/24  0541 01/12/24  1634   SODIUM mmol/L 139 138   POTASSIUM mmol/L 3.9 3.8   CHLORIDE mmol/L 107 105   CO2 mmol/L 26 27   BUN mg/dL 26* 26*   CREATININE mg/dL 0.94 0.90   ANION GAP mmol/L 6 6   CALCIUM mg/dL 8.7 9.0   ALBUMIN g/dL  --  3.5   TOTAL BILIRUBIN mg/dL  --  0.84   ALK PHOS U/L  --  41   ALT U/L  --  23   AST U/L  --  39   GLUCOSE RANDOM mg/dL 137 142*                       Lines/Drains:  Invasive Devices       Peripheral Intravenous Line  Duration             Peripheral IV 01/12/24 Left Antecubital 1 day                          Imaging: Reviewed radiology reports from this admission including: xray(s)    Recent  Cultures (last 7 days):         Last 24 Hours Medication List:   Current Facility-Administered Medications   Medication Dose Route Frequency Provider Last Rate    acetaminophen  650 mg Oral Q6H PRN Calvin Darnell MD      acetaminophen  975 mg Oral Q8H GERA Rebeca Fonseca PA-C      aspirin  81 mg Oral Daily Calvin Darnell MD      atorvastatin  40 mg Oral Daily Calvin Darnell MD      baclofen  10 mg Oral HS LAURIE Cooley      calcium carbonate  1,000 mg Oral Daily PRN Calvin Darnell MD      carvedilol  12.5 mg Oral BID With Meals Calvin Darnell MD      Diclofenac Sodium  2 g Topical Once Servando Lakhani PA-C      docusate sodium  100 mg Oral BID Calvin Darnell MD      heparin (porcine)  5,000 Units Subcutaneous Q8H [facility] Calvin Darnell MD      HYDROmorphone  0.5 mg Intravenous Q4H PRN Calvin Darnell MD      HYDROmorphone  0.5 mg Intravenous Q4H PRN Calvin Darnell MD      latanoprost  1 drop Both Eyes HS LAURIE Cooley      levothyroxine  50 mcg Oral Early Morning Calvin Darnell MD      lidocaine  1 patch Topical Daily Rebeca Fonseca PA-C      [START ON 1/15/2024] methylPREDNISolone  20 mg Oral Daily Magda Gates PA-C      Followed by    [START ON 1/16/2024] methylPREDNISolone  16 mg Oral Daily Magda Gates PA-C      Followed by    [START ON 1/17/2024] methylPREDNISolone  12 mg Oral Daily Magda Gates PA-C      Followed by    [START ON 1/18/2024] methylPREDNISolone  8 mg Oral Daily Magda Gates PA-C      Followed by    [START ON 1/19/2024] methylPREDNISolone  4 mg Oral Daily Magda Gates PA-C      ondansetron  4 mg Intravenous Q6H PRN Calvin Darnell MD      oxyCODONE  5 mg Oral Q4H PRN Calvin Darnell MD      senna  1 tablet Oral Daily Calvin Darnell MD      zolpidem  5 mg Oral HS LAURIE Cooley          Today, Patient Was Seen By: Germán Sumner PA-C    **Please Note: This note may have been constructed using a  voice recognition system.**

## 2024-01-14 NOTE — OCCUPATIONAL THERAPY NOTE
Occupational Therapy Evaluation     Patient Name: Alejandra Patton  Today's Date: 1/14/2024  Problem List  Principal Problem:    Ambulatory dysfunction  Active Problems:    Hypertension    Chronic bilateral low back pain without sciatica    Past Medical History  Past Medical History:   Diagnosis Date    Cardiac disorder 01/01/2001    x2 stents after a failed stress test    Cellulitis     last assessed - 19Nov2012    Constipation     last assessed - 14Mar2013    COVID-19 08/15/2022    Diabetes mellitus (HCC)     Disease of thyroid gland     Ecchymosis     last assessed - 03Jan2017    Fall     last assessed - 03Jan2017    Hyperlipidemia     Hypertension     Hypokalemia     last assessed - 02Jun2015    Lower extremity weakness     last assessed - 21May2015    Vitamin D deficiency     last assessed - 16Aug2017     Past Surgical History  Past Surgical History:   Procedure Laterality Date    BACK SURGERY      CHOLECYSTECTOMY      COLONOSCOPY      CORONARY ANGIOPLASTY WITH STENT PLACEMENT      CT BONE MARROW BIOPSY AND ASPIRATION  1/15/2020    HYSTERECTOMY      OTHER SURGICAL HISTORY      Previous stent placement           01/14/24 0829   OT Last Visit   OT Visit Date 01/14/24   Note Type   Note type Evaluation   Pain Assessment   Pain Assessment Tool 0-10   Pain Score No Pain   Restrictions/Precautions   Weight Bearing Precautions Per Order Yes   RLE Weight Bearing Per Order WBAT   LLE Weight Bearing Per Order WBAT   Home Living   Type of Home House   Home Layout Two level;Stairs to enter without rails;1/2 bath on main level  (garage 2 steps can grab along side)   Bathroom Shower/Tub Walk-in shower   Bathroom Toilet Standard   Bathroom Equipment Shower chair   Bathroom Accessibility Accessible   Home Equipment Walker;Sock aid;Reacher;Long-handled shoehorn  (rollator downstairs 1st floor, 2nd floor standard wheeled walker)   Additional Comments pt typically independent w/ ADLs, functional transfers and mobility, reports  "pain has been limiting her function   Prior Function   Level of [place] Independent with ADLs;Independent with functional mobility;Independent with IADLS   Lives With Alone   Receives Help From   (has a  who was helping, everymorning to assist her up)   IADLs Independent with driving;Independent with meal prep;Independent with medication management  (assist cleaning and laundry at times)   Falls in the last 6 months 0   Vocational Retired   Comments reports 2  days ago not able to function due to pain   Lifestyle   Autonomy per pt independent w/ ADLs, independent w/ functional transfers and mobility w/ rollator or RW, independent w/ IADLs, driving   Reciprocal Relationships friend, daughter in law close by but she has a spinal stimulator   Service to Others retired [employer] and worked for [employer]   Intrinsic Gratification knitting, crocheting,sewing   Subjective   Subjective \"I am okay, when I do not move\"   ADL   Where Assessed Edge of bed   Eating Assistance 5  Supervision/Setup   Grooming Assistance 4  Minimal Assistance   UB Bathing Assistance 5  Supervision/Setup   LB Bathing Assistance 3  Moderate Assistance   UB Dressing Assistance 5  Supervision/Setup   LB Dressing Assistance 3  Moderate Assistance   Toileting Assistance  3  Moderate Assistance   Functional Assistance 3  Moderate Assistance   Bed Mobility   Rolling R 5  Supervision   Additional items Assist x 1;Increased time required;Verbal cues;HOB elevated;Bedrails   Supine to Sit 4  Minimal assistance   Additional items Assist x 1;Increased time required;Verbal cues;LE management   Sit to Supine 4  Minimal assistance   Additional items Assist x 1;Increased time required;Verbal cues;LE management;Bedrails   Additional Comments log rolling techniques   Transfers   Sit to Stand 4  Minimal assistance   Additional items Assist x 1;Increased time required;Verbal cues;Armrests   Stand to Sit 4  Minimal assistance "   Additional items Assist x 1;Increased time required;Verbal cues;Armrests   Additional Comments cues for hand placement and positioning   Functional Mobility   Functional Mobility 3  Moderate assistance   Additional Comments assist x1 for 1-2 steps w/ increased time to complete   Additional items Rolling walker   Balance   Static Sitting Fair +   Dynamic Sitting Fair   Static Standing Fair -   Dynamic Standing Poor +   Ambulatory Poor +   Activity Tolerance   Activity Tolerance Patient limited by fatigue;Patient limited by pain;Treatment limited secondary to medical complications (Comment)   Nurse Made Aware appropriate to see per RN   RUE Assessment   RUE Assessment WFL  (4-/5)   LUE Assessment   LUE Assessment WFL  (4-/5)   Hand Function   Gross Motor Coordination Functional   Fine Motor Coordination Functional   Sensation   Light Touch No apparent deficits   Proprioception   Proprioception No apparent deficits   Vision-Basic Assessment   Current Vision Wears glasses all the time   Vision - Complex Assessment   Ocular Range of Motion Intact   Cognition   Overall Cognitive Status WFL   Arousal/Participation Alert;Responsive;Cooperative   Attention Within functional limits   Orientation Level Oriented X4   Memory Within functional limits   Following Commands Follows one step commands with increased time or repetition   Comments pleasant and cooperative, engages in appropriate conversations   Assessment   Limitation Decreased ADL status;Decreased cognition;Decreased Safe judgement during ADL;Decreased UE strength;Decreased endurance;Decreased high-level ADLs;Decreased self-care trans   Prognosis Good   Assessment Pt is a 89 y.o. female seen for OT evaluation s/p admit to [facility] on [facility]/12/2024 w/ Ambulatory dysfunction, lumbar radiculopathy; WBAT b/l LEs.  X-ray spine: Scoliosis and multilevel thoracolumbar degenerative change, superimposed upon developmental spinal canal stenosis. Moderate canal stenosis at the L1-2,  L2-3 and L3-4 level. X-rays right hip/pelvis from 1/12/24 show no acute osseous abnormalities or significant degenerative changes. Comorbidities affecting pt's functional performance at time of assessment include: HTN, chronic low back pain w/o sciatica, arthritis, DM. X-ray R hip/pelvis: No acute osseous abnormality. Personal factors affecting pt at time of IE include:steps to enter environment, limited home support, difficulty performing ADLS, difficulty performing IADLS , and limited insight into deficits, lives alone. Prior to admission, pt was per pt independent w/ ADLs, independent w/ functional transfers and mobility w/ rollator or RW, independent w/ IADLs except assist w/ cleaning, driving. Pt reports friend who provides assist w/ cleaning has been coming to assist her every morning last few days due to pain. Upon evaluation: Pt requires MIN assist x1 log rolling techniques, MIN assist supine<>sit bed mobility, MIN assist sit<>stand w/ VCs for hand placement, MOD assist able to take step w/ RLE unable to advance L LE due to pain in R hip, MOD assist LB ADLS, setup UB ADLs 2* the following deficits impacting occupational performance: increased pain R hip to knee, impaired balance, impaired activity tolerance, fall risk, impaired functional reach, decreased insight, fear of falling, decreased strength and endurance. Pt to benefit from continued skilled OT tx while in the hospital to address deficits as defined above and maximize level of functional independence w ADL's and functional mobility. Occupational Performance areas to address include: grooming, bathing/shower, toilet hygiene, dressing, health maintenance, functional mobility, clothing management, cleaning, and meal prep. From OT standpoint, recommendation at time of d/c would be level I maximum resources.   The patient's raw score on the AM-PAC Daily Activity Inpatient Short Form is 16. A raw score of less than 19 suggests the patient may benefit from  "discharge to post-acute rehabilitation services. Please refer to the recommendation of the Occupational Therapist for safe discharge planning.   Goals   Patient Goals \"get better   LTG Time Frame 10-14   Plan   Treatment Interventions Functional transfer training;UE strengthening/ROM;Endurance training;ADL retraining;Patient/family training;Equipment evaluation/education;Compensatory technique education;Activityengagement;Energy conservation   Goal Expiration Date 01/28/24   OT Frequency 3-5x/wk   Discharge Recommendation   Rehab Resource Intensity Level, OT I (Maximum Resource Intensity)   AM-PAC Daily Activity Inpatient   Lower Body Dressing 2   Bathing 2   Toileting 2   Upper Body Dressing 3   Grooming 3   Eating 4   Daily Activity Raw Score 16   Daily Activity Standardized Score (Calc for Raw Score >=11) 35.96   AM-PAC Applied Cognition Inpatient   Following a Speech/Presentation 4   Understanding Ordinary Conversation 4   Taking Medications 4   Remembering Where Things Are Placed or Put Away 3   Remembering List of 4-5 Errands 3   Taking Care of Complicated Tasks 3   Applied Cognition Raw Score 21   Applied Cognition Standardized Score 44.3   Modified [place] Scale   Modified [place] Scale 4   End of Consult   Education Provided Yes   Patient Position at End of Consult Bedside chair;Bed/Chair alarm activated;All needs within reach   Nurse Communication Nurse aware of consult     Occupational Therapy Goals to be met in 10-14 days:  1) Pt will improve activity tolerance to G for 30 min txment sessions to enhance ADLs  2) Pt will complete ADLs/self care w/ mod I w/ LHAE prn  3) Pt will complete toileting w/ mod I w/ G hygiene/thoroughness using DME PRN  4) Pt will improve functional transfers on/off all surfaces using DME PRN w/ G balance/safety including toileting w/ mod I  5) Pt will improve fx'l mobility during I/ADl/leisure tasks using DME PRN w/ g balance/safety w/ mod I  6) Pt will engage in ongoing cognitive " assessment w/ G participation to A w/ safe d/c planning/recommendations  7) Pt will demonstrate G carryover of pt/caregiver education and training as appropriate w/ mod I  w/ G tolerance  8) Pt will engage in depression screen/leisure interest checklist w/ G participation to monitor s/s depression and ID 3 positive coping strategies to A w/ emotional regulation and management  9) Pt will demonstrate 100% carryover of E.C. techniques w/ mod I t/o fx'l I/ADL/leisure tasks w/o cues s/p skilled education  10) Pt will demonstrate improved bed mobility to MOD I w/ log rolling techniques to enhance ADLs  11) Pt will engage in activity configuration activity w/ G participation and mod I to increase time management skills and improve participation in a structured routine to improve overall quality of life  12) Pt will demonstrate 100% carryover of LHAE for LB ADLs/self care and leisure s/p skilled education w/ mod I and G participation  13) Pt will demonstrate improved standing tolerance to 3-5 minutes during functional tasks w/ good - dynamic standing balance to enhance ADL performance  14) Pt will demonstrate improved b/l UE strength by 1 MMT grade to enhance ADLS and functional transfers   Documentation completed by: Amber Rico MS, OTR/L

## 2024-01-14 NOTE — ASSESSMENT & PLAN NOTE
Normotensive to low Blood Pressure: 125/72  On extensive blood pressure regimen  Holding majority of home regimen due to lower BPs   Holding parameters of medications

## 2024-01-14 NOTE — PLAN OF CARE
Problem: PAIN - ADULT  Goal: Verbalizes/displays adequate comfort level or baseline comfort level  Description: Interventions:  - Encourage patient to monitor pain and request assistance  - Assess pain using appropriate pain scale  - Administer analgesics based on type and severity of pain and evaluate response  - Implement non-pharmacological measures as appropriate and evaluate response  - Consider cultural and social influences on pain and pain management  - Notify physician/advanced practitioner if interventions unsuccessful or patient reports new pain  Outcome: Progressing     Problem: INFECTION - ADULT  Goal: Absence or prevention of progression during hospitalization  Description: INTERVENTIONS:  - Assess and monitor for signs and symptoms of infection  - Monitor lab/diagnostic results  - Monitor all insertion sites, i.e. indwelling lines, tubes, and drains  - Monitor endotracheal if appropriate and nasal secretions for changes in amount and color  - [place] appropriate cooling/warming therapies per order  - Administer medications as ordered  - Instruct and encourage patient and family to use good hand hygiene technique  - Identify and instruct in appropriate isolation precautions for identified infection/condition  Outcome: Progressing

## 2024-01-14 NOTE — PHYSICAL THERAPY NOTE
Physical Therapy Evaluation     Patient's Name: Alejandra Patton    Admitting Diagnosis  Leg pain [M79.606]  Right hip pain [M25.551]  Ambulatory dysfunction [R26.2]    Problem List  Patient Active Problem List   Diagnosis    Coronary artery disease of native artery of native heart with stable angina pectoris (HCC)    Depression    Esophageal reflux    Hyperlipidemia    Hypertension    Hypothyroidism    Insomnia    Lumbar compression fracture (HCC)    Lumbar spinal stenosis    Normochromic normocytic anemia    Osteoarthritis of knee    Osteoporosis    Spinal stenosis    Type 2 diabetes mellitus (HCC)    Vitamin B12 deficiency    Vitamin D deficiency    Screening mammogram, encounter for    Diabetic peripheral neuropathy (HCC)    Greater trochanteric bursitis, right    Left sided sciatica    Triclonal gammopathy    Medicare annual wellness visit, subsequent    Adhesive capsulitis of shoulder    Light chain disease, 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HCC)    Non-Hodgkin's lymphoma (HCC)    Trochanteric bursitis of right hip    Chronic pain syndrome    Chronic bilateral low back pain without sciatica    Sacroiliitis (HCC)    Stage 3 chronic kidney disease, unspecified whether stage 3a or 3b CKD (HCC)    Ambulatory dysfunction    Primary osteoarthritis of right hip       Past Medical History  Past Medical History:   Diagnosis Date    Cardiac disorder 01/01/2001    x2 stents after a failed stress test    Cellulitis     last assessed - 19Nov2012    Constipation     last assessed - 14Mar2013    COVID-19 08/15/2022    Diabetes mellitus (HCC)     Disease of thyroid gland     Ecchymosis     last assessed - 03Jan2017    Fall     last assessed - 03Jan2017    Hyperlipidemia     Hypertension     Hypokalemia     last assessed - 02Jun2015    Lower extremity weakness     last assessed - 21May2015    Vitamin D deficiency     last assessed - 16Aug2017       Past Surgical History  Past Surgical History:   Procedure Laterality Date    BACK SURGERY       CHOLECYSTECTOMY      COLONOSCOPY      CORONARY ANGIOPLASTY WITH STENT PLACEMENT      CT BONE MARROW BIOPSY AND ASPIRATION  1/15/2020    HYSTERECTOMY      OTHER SURGICAL HISTORY      Previous stent placement          01/14/24 1235   PT Last Visit   PT Visit Date 01/14/24   Note Type   Note type Evaluation   Pain Assessment   Pain Assessment Tool 0-10   Pain Score 6   Pain Location/Orientation Orientation: Right;Location: Hip   Restrictions/Precautions   Weight Bearing Precautions Per Order Yes   RLE Weight Bearing Per Order WBAT   LLE Weight Bearing Per Order WBAT   Home Living   Type of Home House   Home Layout Two level;1/2 bath on main level  (garage 2 steps can grab along side)   Bathroom Shower/Tub Walk-in shower   Bathroom Toilet Standard   Bathroom Equipment Shower chair   Bathroom Accessibility Accessible   Home Equipment Walker;Sock aid;Reacher;Long-handled shoehorn  (ollator downstairs 1st floor, 2nd floor standard wheeled walker)   Prior Function   Level of [place] Independent with ADLs;Independent with functional mobility;Independent with IADLS   Lives With Alone   Receives Help From   (has a  who was helping, everymorning to assist her up)   IADLs Independent with driving;Independent with meal prep;Independent with medication management  (assist cleaning and laundry at times)   Falls in the last 6 months 0   Vocational Retired   Cognition   Overall Cognitive Status WFL   Attention Within functional limits   Orientation Level Oriented X4   Memory Within functional limits   Following Commands Follows one step commands with increased time or repetition   Subjective   Subjective I am doing a little better than earlier.   RUE Assessment   RUE Assessment   (4-/5)   LUE Assessment   LUE Assessment   (4-/5)   RLE Assessment   RLE Assessment X   Strength RLE   R Hip Flexion 3-/5   R Knee Extension 4-/5   R Ankle Dorsiflexion 4-/5   LLE Assessment   LLE Assessment X   Strength LLE   LLE Overall  Strength 4-/5   Vision-Basic Assessment   Current Vision Wears glasses all the time   Light Touch   RLE Light Touch Grossly intact   LLE Light Touch Grossly intact   Bed Mobility   Rolling R 5  Supervision   Additional items Assist x 1;Increased time required;Verbal cues;HOB elevated;Bedrails   Supine to Sit 4  Minimal assistance   Additional items Assist x 1;Increased time required;Verbal cues;LE management   Sit to Supine 4  Minimal assistance   Additional items Assist x 1;Increased time required;Verbal cues;LE management;Bedrails   Transfers   Sit to Stand 4  Minimal assistance   Additional items Assist x 1;Increased time required;Verbal cues;Armrests   Stand to Sit 4  Minimal assistance   Additional items Assist x 1;Increased time required;Verbal cues;Armrests   Additional Comments cues for hand placement for transfers. Cues for sequencing with gait   Ambulation/Elevation   Gait pattern Antalgic;Decreased foot clearance;Short stride;Excessively slow;Decreased heel strike;Decreased toe off;Step through pattern;Decreased R stance   Gait Assistance 4  Minimal assist   Additional items Assist x 1;Verbal cues;Tactile cues   Assistive Device Rolling walker   Distance 4 steps forward   Stair Management Assistance Not tested   Balance   Static Sitting Fair +   Dynamic Sitting Fair   Static Standing Fair -   Dynamic Standing Poor +   Ambulatory Poor +   Activity Tolerance   Activity Tolerance Patient limited by fatigue;Patient limited by pain;Treatment limited secondary to medical complications (Comment)   Nurse Made Aware RN ok to see   Assessment   Prognosis Good   Problem List Decreased strength;Decreased range of motion;Decreased endurance;Impaired balance;Pain;Decreased cognition;Orthopedic restrictions   Assessment Pt is 89 y.o. female seen for PT evaluation s/p admit to [facility] on 1/12/2024 w/ Ambulatory dysfunction. PT consulted to assess pt's functional mobility and d/c needs. Order placed for PT evertal  and tx, w/  WBAT, activity  order. Comorbidities affecting pt's physical performance at time of assessment include: HTN, Low back pain without sciatica, ambulatory dysfunction, B/L hip OA, Osteoporosis, T2 DM. PTA, pt was independent w/ all functional mobility w/ RW PTA . Personal factors affecting pt at time of IE include: lives in multi story house, ambulating w/ assistive device, stairs to enter home, inability to ambulate household distances, inability to perform IADLs, and inability to perform ADLs. Please find objective findings from PT assessment regarding body systems outlined above with impairments and limitations including weakness, impaired balance, decreased endurance, gait deviations, pain, decreased functional mobility tolerance, and fall risk. The following objective measures performed on IE also reveal limitations: AM-PAC 6-Clicks: 16/24. Pt's clinical presentation is currently unstable/unpredictable seen in pt's presentation. Pt to benefit from continued PT tx to address deficits as defined above and maximize level of functional independent mobility and consistency. From PT/mobility standpoint, recommendation at time of d/c would be Moderate Resource Intensity pending progress in order to facilitate return to PLOF.   Barriers to Discharge Decreased caregiver support;Inaccessible home environment   Goals   Patient Goals to get better   STG Expiration Date 01/28/24   Short Term Goal #1 1. Pt will complete bed mobility with Mod I to increase functional mobility. 2. Pt will complete sit to stand transfers with Mod I to increase functional mobility. 3. Pt will ambulate 150 ft with RW with Mod I without LOB 4. Pt will increase B/L LE strength by 1 grade to facilitate improved functional mobility with decreased risk of falls. 5. Pt will increase standing balance to fair in order to decrease risk of falls. 6. PT to see for stair goals   PT Treatment Day 0   Plan   Treatment/Interventions Functional transfer  training;LE strengthening/ROM;Therapeutic exercise;Endurance training;Gait training;Bed mobility;Equipment eval/education;Patient/family training;Spoke to nursing;OT   PT Frequency 3-5x/wk   Discharge Recommendation   Rehab Resource Intensity Level, PT II (Moderate Resource Intensity)   AM-PAC Basic Mobility Inpatient   Turning in Flat Bed Without Bedrails 3   Lying on Back to Sitting on Edge of Flat Bed Without Bedrails 3   Moving Bed to Chair 3   Standing Up From Chair Using Arms 3   Walk in Room 3   Climb 3-5 Stairs With Railing 1   Basic Mobility Inpatient Raw Score 16   Basic Mobility Standardized Score 38.32   Highest Level Of Mobility   JH-HLM Goal 5: Stand one or more mins   JH-HLM Achieved 4: Move to chair/commode         Mariela Paulino, PT

## 2024-01-14 NOTE — ASSESSMENT & PLAN NOTE
Present on admission secondary to acute on chronic hip pain  Had a recent cortisone injection at ortho office  Was scheduled for outpatient ortho follow up on Tuesday but could not make appointment due to pain  Reports chronic back pain as well   Pain poorly controlled despite medications in ER   PT OT recommending Level One intensity  Ortho was consulted and recommended Medrol pack and continued follow-up outpatient  Patient additionally has neurosurgical outpatient follow-up on January 30  XR hip is negative for acute abnormalities

## 2024-01-15 PROCEDURE — 99232 SBSQ HOSP IP/OBS MODERATE 35: CPT

## 2024-01-15 RX ADMIN — DOCUSATE SODIUM 100 MG: 100 CAPSULE, LIQUID FILLED ORAL at 17:08

## 2024-01-15 RX ADMIN — CARVEDILOL 12.5 MG: 12.5 TABLET, FILM COATED ORAL at 10:18

## 2024-01-15 RX ADMIN — LEVOTHYROXINE SODIUM 50 MCG: 0.05 TABLET ORAL at 06:01

## 2024-01-15 RX ADMIN — SENNOSIDES 8.6 MG: 8.6 TABLET, FILM COATED ORAL at 10:18

## 2024-01-15 RX ADMIN — BACLOFEN 10 MG: 10 TABLET ORAL at 21:52

## 2024-01-15 RX ADMIN — LIDOCAINE 5% 1 PATCH: 700 PATCH TOPICAL at 10:18

## 2024-01-15 RX ADMIN — ZOLPIDEM TARTRATE 5 MG: 5 TABLET, COATED ORAL at 21:52

## 2024-01-15 RX ADMIN — ACETAMINOPHEN 975 MG: 325 TABLET, FILM COATED ORAL at 16:11

## 2024-01-15 RX ADMIN — ATORVASTATIN CALCIUM 40 MG: 40 TABLET, FILM COATED ORAL at 10:18

## 2024-01-15 RX ADMIN — METHYLPREDNISOLONE 20 MG: 16 TABLET ORAL at 10:20

## 2024-01-15 RX ADMIN — OXYCODONE HYDROCHLORIDE 5 MG: 5 TABLET ORAL at 17:44

## 2024-01-15 RX ADMIN — OXYCODONE HYDROCHLORIDE 5 MG: 5 TABLET ORAL at 21:52

## 2024-01-15 RX ADMIN — DOCUSATE SODIUM 100 MG: 100 CAPSULE, LIQUID FILLED ORAL at 10:18

## 2024-01-15 RX ADMIN — ACETAMINOPHEN 975 MG: 325 TABLET, FILM COATED ORAL at 21:52

## 2024-01-15 RX ADMIN — ACETAMINOPHEN 975 MG: 325 TABLET, FILM COATED ORAL at 06:01

## 2024-01-15 RX ADMIN — LATANOPROST 1 DROP: 50 SOLUTION OPHTHALMIC at 21:53

## 2024-01-15 RX ADMIN — ASPIRIN 81 MG: 81 TABLET, CHEWABLE ORAL at 10:18

## 2024-01-15 RX ADMIN — CARVEDILOL 12.5 MG: 12.5 TABLET, FILM COATED ORAL at 17:08

## 2024-01-15 NOTE — ASSESSMENT & PLAN NOTE
Normotensive to low Blood Pressure: 146/70  On extensive blood pressure regimen  Holding majority of home regimen due to lower BPs   Holding parameters of medications

## 2024-01-15 NOTE — CASE MANAGEMENT
Case Management Progress Note    Patient name Alejandra Patton  Location /-01 MRN [id number]  : 1934 Date 1/15/2024       LOS (days): 2  Geometric Mean LOS (GMLOS) (days):   Days to GMLOS:        OBJECTIVE:        Current admission status: Inpatient  Preferred Pharmacy:   [id number]  [place] - [address]  [address]  [place] [address]  Phone: [phone] Fax: [phone]    Primary Care Provider: Margarita Holland MD    Primary Insurance: [employer] REP  Secondary Insurance:     PROGRESS NOTE:    Called facilities closest to patient's home- [place] (full), [facility], [facility] ([facility] and [facility] ([facility] for [facility].

## 2024-01-15 NOTE — ASSESSMENT & PLAN NOTE
Present on admission secondary to acute on chronic hip pain  Had a recent cortisone injection at ortho office  Was scheduled for outpatient ortho follow up on Tuesday but could not make appointment due to pain  Reports chronic back pain as well   Pain poorly controlled despite medications in ER   PT OT recommending Level One/2 intensity  CM placed referral for STR  Ortho was consulted and recommended Medrol pack and continued follow-up outpatient  Patient additionally has neurosurgical outpatient follow-up on January 30  XR hip is negative for acute abnormalities

## 2024-01-15 NOTE — CASE MANAGEMENT
Case Management Discharge Planning Note    Patient name Alejandra Patton  Location /-01 MRN [id number]  : 1934 Date 1/15/2024       Current Admission Date: 2024  Current Admission Diagnosis:Ambulatory dysfunction   Patient Active Problem List    Diagnosis Date Noted    Primary osteoarthritis of right hip 2023    Ambulatory dysfunction 2023    Stage 3 chronic kidney disease, unspecified whether stage 3a or 3b CKD (HCC) 2023    Chronic pain syndrome 2022    Chronic bilateral low back pain without sciatica 2022    Sacroiliitis (HCC) 2022    Trochanteric bursitis of right hip 2021    Non-Hodgkin's lymphoma (HCC) 10/05/2020    Light chain disease, 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HCC) 2020    Medicare annual wellness visit, subsequent 2020    Adhesive capsulitis of shoulder 2020    Triclonal gammopathy 2019    Greater trochanteric bursitis, right 2019    Left sided sciatica 2019    Diabetic peripheral neuropathy (HCC) 2019    Screening mammogram, encounter for 2018    Lumbar compression fracture (HCC) 10/11/2017    Vitamin D deficiency 10/11/2017    Vitamin B12 deficiency 2016    Lumbar spinal stenosis 2015    Normochromic normocytic anemia 2015    Depression 2015    Esophageal reflux 2013    Osteoarthritis of knee 2013    Spinal stenosis 2013    Coronary artery disease of native artery of native heart with stable angina pectoris (HCC) 2013    Hyperlipidemia 2013    Hypertension 2013    Hypothyroidism 2013    Insomnia 2013    Osteoporosis 2013    Type 2 diabetes mellitus (HCC) 2012      LOS (days): 2  Geometric Mean LOS (GMLOS) (days):   Days to GMLOS:     OBJECTIVE:  Risk of Unplanned Readmission Score: 18.7         Current admission status: Inpatient   Preferred Pharmacy:   [id number] - [place] - [address]  [address]  [place] [address]  Phone: [phone] Fax: [phone]    Primary Care Provider: Margarita Holland MD    Primary Insurance: [employer]E  Secondary Insurance:     DISCHARGE DETAILS:                                          Other Referral/Resources/Interventions Provided:  Referral Comments: [facility] informed CM that patient needs STR, and that patient only wants one facility- [facility]. Call placed to admissions and LVM, awaiting callback

## 2024-01-15 NOTE — NURSING NOTE
Mili Patton at bedside: addresses concerns that the patient should see a spine doctor/surgeon before going to rehab. Requesting transfer to [place] to see  Dr. WOODS for a lower back epidural. Concerns the decreased mobility is associated with her spine pain and recommends that the pain is under control first before resorting to short term rehab. States when patient was in rehab last time the pain got in the way of therapy and would like to talk about other resources prior to rehab.     Will notify provider

## 2024-01-15 NOTE — PLAN OF CARE
Problem: PAIN - ADULT  Goal: Verbalizes/displays adequate comfort level or baseline comfort level  Description: Interventions:  - Encourage patient to monitor pain and request assistance  - Assess pain using appropriate pain scale  - Administer analgesics based on type and severity of pain and evaluate response  - Implement non-pharmacological measures as appropriate and evaluate response  - Consider cultural and social influences on pain and pain management  - Notify physician/advanced practitioner if interventions unsuccessful or patient reports new pain  Outcome: Progressing     Problem: INFECTION - ADULT  Goal: Absence or prevention of progression during hospitalization  Description: INTERVENTIONS:  - Assess and monitor for signs and symptoms of infection  - Monitor lab/diagnostic results  - Monitor all insertion sites, i.e. indwelling lines, tubes, and drains  - Monitor endotracheal if appropriate and nasal secretions for changes in amount and color  - [place] appropriate cooling/warming therapies per order  - Administer medications as ordered  - Instruct and encourage patient and family to use good hand hygiene technique  - Identify and instruct in appropriate isolation precautions for identified infection/condition  Outcome: Progressing  Goal: Absence of fever/infection during neutropenic period  Description: INTERVENTIONS:  - Monitor WBC    Outcome: Progressing     Problem: SAFETY ADULT  Goal: Patient will remain free of falls  Description: INTERVENTIONS:  - Educate patient/family on patient safety including physical limitations  - Instruct patient to call for assistance with activity   - Consult OT/PT to assist with strengthening/mobility   - Keep Call bell within reach  - Keep bed low and locked with side rails adjusted as appropriate  - Keep care items and personal belongings within reach  - Initiate and maintain comfort rounds  - Apply yellow socks and bracelet for high fall risk patients  - Consider  moving patient to room near nurses station  Outcome: Progressing  Goal: Maintain or return to baseline ADL function  Description: INTERVENTIONS:  -  Assess patient's ability to carry out ADLs; assess patient's baseline for ADL function and identify physical deficits which impact ability to perform ADLs (bathing, care of mouth/teeth, toileting, grooming, dressing, etc.)  - Assess/evaluate cause of self-care deficits   - Assess range of motion  - Assess patient's mobility; develop plan if impaired  - Assess patient's need for assistive devices and provide as appropriate  - Encourage maximum independence but intervene and supervise when necessary  - Involve family in performance of ADLs  - Assess for home care needs following discharge   - Consider OT consult to assist with ADL evaluation and planning for discharge  - Provide patient education as appropriate  Outcome: Progressing  Goal: Maintains/Returns to pre admission functional level  Description: INTERVENTIONS:  - Perform AM-PAC 6 Click Basic Mobility/ Daily Activity assessment daily.  - Set and communicate daily mobility goal to care team and patient/family/caregiver.   - Collaborate with rehabilitation services on mobility goals if consulted  - Out of bed for toileting  - Record patient progress and toleration of activity level   Outcome: Progressing     Problem: DISCHARGE PLANNING  Goal: Discharge to home or other facility with appropriate resources  Description: INTERVENTIONS:  - Identify barriers to discharge w/patient and caregiver  - Arrange for needed discharge resources and transportation as appropriate  - Identify discharge learning needs (meds, wound care, etc.)  - Arrange for interpretive services to assist at discharge as needed  - Refer to Case Management Department for coordinating discharge planning if the patient needs post-hospital services based on physician/advanced practitioner order or complex needs related to functional status, cognitive  ability, or social support system  Outcome: Progressing     Problem: Knowledge Deficit  Goal: Patient/family/caregiver demonstrates understanding of disease process, treatment plan, medications, and discharge instructions  Description: Complete learning assessment and assess knowledge base.  Interventions:  - Provide teaching at level of understanding  - Provide teaching via preferred learning methods  Outcome: Progressing     Problem: Prexisting or High Potential for Compromised Skin Integrity  Goal: Skin integrity is maintained or improved  Description: INTERVENTIONS:  - Identify patients at risk for skin breakdown  - Assess and monitor skin integrity  - Assess and monitor nutrition and hydration status  - Monitor labs   - Assess for incontinence   - Turn and reposition patient  - Assist with mobility/ambulation  - Relieve pressure over bony prominences  - Avoid friction and shearing  - Provide appropriate hygiene as needed including keeping skin clean and dry  - Evaluate need for skin moisturizer/barrier cream  - Collaborate with interdisciplinary team   - Patient/family teaching  - Consider wound care consult   Outcome: Progressing

## 2024-01-16 LAB
ANION GAP SERPL CALCULATED.3IONS-SCNC: 9 MMOL/L
BUN SERPL-MCNC: 32 MG/DL (ref 5–25)
CALCIUM SERPL-MCNC: 9.5 MG/DL (ref 8.4–10.2)
CHLORIDE SERPL-SCNC: 108 MMOL/L (ref 96–108)
CO2 SERPL-SCNC: 25 MMOL/L (ref 21–32)
CREAT SERPL-MCNC: 0.87 MG/DL (ref 0.6–1.3)
ERYTHROCYTE [DISTWIDTH] IN BLOOD BY AUTOMATED COUNT: 14.9 % (ref 11.6–15.1)
GFR SERPL CREATININE-BSD FRML MDRD: 59 ML/MIN/1.73SQ M
GLUCOSE SERPL-MCNC: 143 MG/DL (ref 65–140)
HCT VFR BLD AUTO: 33.9 % (ref 34.8–46.1)
HGB BLD-MCNC: 10.9 G/DL (ref 11.5–15.4)
MCH RBC QN AUTO: 30.3 PG (ref 26.8–34.3)
MCHC RBC AUTO-ENTMCNC: 32.2 G/DL (ref 31.4–37.4)
MCV RBC AUTO: 94 FL (ref 82–98)
PLATELET # BLD AUTO: 265 THOUSANDS/UL (ref 149–390)
PMV BLD AUTO: 9.4 FL (ref 8.9–12.7)
POTASSIUM SERPL-SCNC: 3.9 MMOL/L (ref 3.5–5.3)
RBC # BLD AUTO: 3.6 MILLION/UL (ref 3.81–5.12)
SODIUM SERPL-SCNC: 142 MMOL/L (ref 135–147)
WBC # BLD AUTO: 11.23 THOUSAND/UL (ref 4.31–10.16)

## 2024-01-16 PROCEDURE — 80048 BASIC METABOLIC PNL TOTAL CA: CPT

## 2024-01-16 PROCEDURE — 99232 SBSQ HOSP IP/OBS MODERATE 35: CPT

## 2024-01-16 PROCEDURE — 85027 COMPLETE CBC AUTOMATED: CPT

## 2024-01-16 PROCEDURE — 97116 GAIT TRAINING THERAPY: CPT

## 2024-01-16 PROCEDURE — 97112 NEUROMUSCULAR REEDUCATION: CPT

## 2024-01-16 RX ADMIN — ASPIRIN 81 MG: 81 TABLET, CHEWABLE ORAL at 08:49

## 2024-01-16 RX ADMIN — ACETAMINOPHEN 975 MG: 325 TABLET, FILM COATED ORAL at 05:44

## 2024-01-16 RX ADMIN — OXYCODONE HYDROCHLORIDE 5 MG: 5 TABLET ORAL at 14:20

## 2024-01-16 RX ADMIN — ATORVASTATIN CALCIUM 40 MG: 40 TABLET, FILM COATED ORAL at 08:49

## 2024-01-16 RX ADMIN — ZOLPIDEM TARTRATE 5 MG: 5 TABLET, COATED ORAL at 22:46

## 2024-01-16 RX ADMIN — LEVOTHYROXINE SODIUM 50 MCG: 0.05 TABLET ORAL at 05:45

## 2024-01-16 RX ADMIN — CARVEDILOL 12.5 MG: 12.5 TABLET, FILM COATED ORAL at 17:14

## 2024-01-16 RX ADMIN — ACETAMINOPHEN 975 MG: 325 TABLET, FILM COATED ORAL at 13:37

## 2024-01-16 RX ADMIN — METHYLPREDNISOLONE 16 MG: 4 TABLET ORAL at 08:48

## 2024-01-16 RX ADMIN — CARVEDILOL 12.5 MG: 12.5 TABLET, FILM COATED ORAL at 08:54

## 2024-01-16 RX ADMIN — DOCUSATE SODIUM 100 MG: 100 CAPSULE, LIQUID FILLED ORAL at 08:49

## 2024-01-16 RX ADMIN — BACLOFEN 10 MG: 10 TABLET ORAL at 22:46

## 2024-01-16 RX ADMIN — OXYCODONE HYDROCHLORIDE 5 MG: 5 TABLET ORAL at 18:46

## 2024-01-16 RX ADMIN — OXYCODONE HYDROCHLORIDE 5 MG: 5 TABLET ORAL at 22:46

## 2024-01-16 RX ADMIN — LATANOPROST 1 DROP: 50 SOLUTION OPHTHALMIC at 22:46

## 2024-01-16 RX ADMIN — LIDOCAINE 5% 1 PATCH: 700 PATCH TOPICAL at 08:49

## 2024-01-16 RX ADMIN — SENNOSIDES 8.6 MG: 8.6 TABLET, FILM COATED ORAL at 08:49

## 2024-01-16 RX ADMIN — OXYCODONE HYDROCHLORIDE 5 MG: 5 TABLET ORAL at 10:14

## 2024-01-16 RX ADMIN — DOCUSATE SODIUM 100 MG: 100 CAPSULE, LIQUID FILLED ORAL at 17:14

## 2024-01-16 RX ADMIN — ACETAMINOPHEN 975 MG: 325 TABLET, FILM COATED ORAL at 22:45

## 2024-01-16 RX ADMIN — OXYCODONE HYDROCHLORIDE 5 MG: 5 TABLET ORAL at 05:45

## 2024-01-16 NOTE — CASE MANAGEMENT
Case Management Discharge Planning Note    Patient name Alejandra Patton  Location /-01 MRN [id number]  : 1934 Date 2024       Current Admission Date: 2024  Current Admission Diagnosis:Ambulatory dysfunction   Patient Active Problem List    Diagnosis Date Noted    Primary osteoarthritis of right hip 2023    Ambulatory dysfunction 2023    Stage 3 chronic kidney disease, unspecified whether stage 3a or 3b CKD (HCC) 2023    Chronic pain syndrome 2022    Chronic bilateral low back pain without sciatica 2022    Sacroiliitis (HCC) 2022    Trochanteric bursitis of right hip 2021    Non-Hodgkin's lymphoma (HCC) 10/05/2020    Light chain disease, 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HCC) 2020    Medicare annual wellness visit, subsequent 2020    Adhesive capsulitis of shoulder 2020    Triclonal gammopathy 2019    Greater trochanteric bursitis, right 2019    Left sided sciatica 2019    Diabetic peripheral neuropathy (HCC) 2019    Screening mammogram, encounter for 2018    Lumbar compression fracture (HCC) 10/11/2017    Vitamin D deficiency 10/11/2017    Vitamin B12 deficiency 2016    Lumbar spinal stenosis 2015    Normochromic normocytic anemia 2015    Depression 2015    Esophageal reflux 2013    Osteoarthritis of knee 2013    Spinal stenosis 2013    Coronary artery disease of native artery of native heart with stable angina pectoris (HCC) 2013    Hyperlipidemia 2013    Hypertension 2013    Hypothyroidism 2013    Insomnia 2013    Osteoporosis 2013    Type 2 diabetes mellitus (HCC) 2012      LOS (days): 3  Geometric Mean LOS (GMLOS) (days):   Days to GMLOS:     OBJECTIVE:  Risk of Unplanned Readmission Score: 18.63         Current admission status: Inpatient   Preferred Pharmacy:   [id number] - [place] - [address]  [address]  [place] [address]  Phone: [phone] Fax: [phone]    Primary Care Provider: Margarita Holland MD    Primary Insurance: [facility]  Secondary Insurance:     DISCHARGE DETAILS:                                                                                        IMM Given (Date):: 01/16/24  IMM Given to:: Patient     Additional Comments: IMM and Medicare rights reviewed with patient at the bedside. Patient verbalized understanding and signed original. Copy given to patient, signed original filed to medical records.

## 2024-01-16 NOTE — PROGRESS NOTES
[facility]  Progress Note  Name: Alejandra Patton I  MRN: [id number]  Unit/Bed#: -01 I Date of Admission: 1/12/2024   Date of Service: 1/16/2024 I Hospital Day: 3    Assessment/Plan   * Ambulatory dysfunction  Assessment & Plan  Present on admission secondary to acute on chronic hip pain  Had a recent cortisone injection at ortho office  Was scheduled for outpatient ortho follow up on Tuesday but could not make appointment due to pain  Reports chronic back pain as well   Pain poorly controlled despite medications in ER   PT OT recommending Level One/2 intensity  CM placed referral for STR, facility accepted, pending insurance authorization  Ortho was consulted and recommended Medrol pack and continued follow-up outpatient  Patient additionally has neurosurgical outpatient follow-up on January 30  XR hip is negative for acute abnormalities     Chronic bilateral low back pain without sciatica  Assessment & Plan  Consider hip pain related to back pain vs hip pain     Hypertension  Assessment & Plan  Normotensive to low Blood Pressure: 133/65  On extensive blood pressure regimen  Holding majority of home regimen due to lower BPs   Holding parameters of medications              VTE Pharmacologic Prophylaxis: VTE Score: 4 Moderate Risk (Score 3-4) - Pharmacological DVT Prophylaxis Ordered: heparin.    Mobility:   Basic Mobility Inpatient Raw Score: 18  JH-HLM Goal: 6: Walk 10 steps or more  JH-HLM Achieved: 6: Walk 10 steps or more  HLM Goal achieved. Continue to encourage appropriate mobility.    Patient Centered Rounds: I performed bedside rounds with nursing staff today.   Discussions with Specialists or Other Care Team Provider: CM    Education and Discussions with Family / Patient: Updated  (daughter) via phone.    Total Time Spent on Date of Encounter in care of patient: 35 mins. This time was spent on one or more of the following: performing physical exam; counseling and  coordination of care; obtaining or reviewing history; documenting in the medical record; reviewing/ordering tests, medications or procedures; communicating with other healthcare professionals and discussing with patient's family/caregivers.    Current Length of Stay: 3 day(s)  Current Patient Status: Inpatient   Certification Statement: The patient will continue to require additional inpatient hospital stay due to awaiting placement and safe dispo  Discharge Plan: Anticipate discharge in 24-48 hrs to rehab facility.    Code Status: Level 1 - Full Code    Subjective:   Patient reports feeling significantly improved compared to days prior.  She still continues to have hip pain although it is very minimal today.  She denies chest pain/pressure, palpitations, lightheadedness, nausea, shortness of breath, or chills.    Objective:     Vitals:   Temp (24hrs), Av °F (37.2 °C), Min:98.6 °F (37 °C), Max:99.4 °F (37.4 °C)    Temp:  [98.6 °F (37 °C)-99.4 °F (37.4 °C)] 98.6 °F (37 °C)  HR:  [62-80] 62  BP: (133-156)/(65-74) 133/65  SpO2:  [93 %-97 %] 97 %  Body mass index is 26.45 kg/m².     Input and Output Summary (last 24 hours):     Intake/Output Summary (Last 24 hours) at 2024 1242  Last data filed at 2024 0534  Gross per 24 hour   Intake 240 ml   Output 350 ml   Net -110 ml       Physical Exam:   Physical Exam  Vitals and nursing note reviewed.   Constitutional:       Appearance: She is normal weight.   HENT:      Head: Normocephalic.      Nose: Nose normal.      Mouth/Throat:      Mouth: Mucous membranes are moist.      Pharynx: Oropharynx is clear.   Eyes:      General: No scleral icterus.     Conjunctiva/sclera: Conjunctivae normal.      Pupils: Pupils are equal, round, and reactive to light.   Cardiovascular:      Rate and Rhythm: Normal rate and regular rhythm.      Heart sounds: No murmur heard.     No friction rub. No gallop.   Pulmonary:      Effort: Pulmonary effort is normal. No respiratory  distress.      Breath sounds: Normal breath sounds. No stridor. No wheezing, rhonchi or rales.   Abdominal:      General: Abdomen is flat.      Palpations: Abdomen is soft.   Musculoskeletal:         General: Normal range of motion.      Cervical back: Normal range of motion and neck supple.      Right lower leg: No edema.      Left lower leg: No edema.   Lymphadenopathy:      Cervical: No cervical adenopathy.   Skin:     General: Skin is warm.      Coloration: Skin is not jaundiced or pale.      Findings: No bruising, erythema or lesion.   Neurological:      General: No focal deficit present.      Mental Status: She is alert and oriented to person, place, and time. Mental status is at baseline.      Cranial Nerves: No cranial nerve deficit.      Motor: No weakness.   Psychiatric:         Mood and Affect: Mood normal.         Behavior: Behavior normal.         Thought Content: Thought content normal.          Additional Data:     Labs:  Results from last 7 days   Lab Units 01/16/24 0530 01/13/24  0541 01/12/24  1634   WBC Thousand/uL 11.23*   < > 9.14   HEMOGLOBIN g/dL 10.9*   < > 11.6   HEMATOCRIT % 33.9*   < > 36.3   PLATELETS Thousands/uL 265   < > 267   NEUTROS PCT %  --   --  69   LYMPHS PCT %  --   --  19   MONOS PCT %  --   --  10   EOS PCT %  --   --  1    < > = values in this interval not displayed.     Results from last 7 days   Lab Units 01/16/24 0530 01/13/24  0541 01/12/24  1634   SODIUM mmol/L 142   < > 138   POTASSIUM mmol/L 3.9   < > 3.8   CHLORIDE mmol/L 108   < > 105   CO2 mmol/L 25   < > 27   BUN mg/dL 32*   < > 26*   CREATININE mg/dL 0.87   < > 0.90   ANION GAP mmol/L 9   < > 6   CALCIUM mg/dL 9.5   < > 9.0   ALBUMIN g/dL  --   --  3.5   TOTAL BILIRUBIN mg/dL  --   --  0.84   ALK PHOS U/L  --   --  41   ALT U/L  --   --  23   AST U/L  --   --  39   GLUCOSE RANDOM mg/dL 143*   < > 142*    < > = values in this interval not displayed.                       Lines/Drains:  Invasive Devices        Peripheral Intravenous Line  Duration             Peripheral IV 01/12/24 Left Antecubital 3 days              Drain  Duration             External Urinary Catheter 2 days                          Imaging: Reviewed radiology reports from this admission including: xray(s)    Recent Cultures (last 7 days):         Last 24 Hours Medication List:   Current Facility-Administered Medications   Medication Dose Route Frequency Provider Last Rate    acetaminophen  650 mg Oral Q6H PRN Calvin Darnell MD      acetaminophen  975 mg Oral Q8H [facility] Rebeca Fonseca PA-C      aspirin  81 mg Oral Daily Calvin Darnell MD      atorvastatin  40 mg Oral Daily Calvin Darnell MD      baclofen  10 mg Oral HS LAURIE Cooley      calcium carbonate  1,000 mg Oral Daily PRN Calvin Darnell MD      carvedilol  12.5 mg Oral BID With Meals Calvin Darnell MD      Diclofenac Sodium  2 g Topical Once Servando Lakhani PA-C      docusate sodium  100 mg Oral BID Calvin Darnell MD      heparin (porcine)  5,000 Units Subcutaneous Q8H [facility] Calvin Darnell MD      HYDROmorphone  0.5 mg Intravenous Q4H PRN Calvin Darnell MD      HYDROmorphone  0.5 mg Intravenous Q4H PRN Calvin Darnell MD      latanoprost  1 drop Both Eyes HS LAURIE Cooley      levothyroxine  50 mcg Oral Early Morning Calvin Darnell MD      lidocaine  1 patch Topical Daily Rebeca Fonseca PA-C      [START ON 1/17/2024] methylPREDNISolone  12 mg Oral Daily Magda Gates PA-C      Followed by    [START ON 1/18/2024] methylPREDNISolone  8 mg Oral Daily Magda Gates PA-C      Followed by    [START ON 1/19/2024] methylPREDNISolone  4 mg Oral Daily Magda Gates PA-C      ondansetron  4 mg Intravenous Q6H PRN Calvin Darnell MD      oxyCODONE  5 mg Oral Q4H PRN Calvin Darnell MD      senna  1 tablet Oral Daily Calvin Darnell MD      zolpidem  5 mg Oral HS LAURIE Cooley          Today, Patient Was Seen By:  Germán Sumner PA-C    **Please Note: This note may have been constructed using a voice recognition system.**

## 2024-01-16 NOTE — PHYSICAL THERAPY NOTE
PHYSICAL THERAPY TREATMENT  NAME:  Alejandra Patton  DATE: 01/16/24    AGE:   89 y.o.  Mrn:   [id number]  ADMIT DX:  Leg pain [M79.606]  Right hip pain [M25.551]  Ambulatory dysfunction [R26.2]  Problem List:   Patient Active Problem List   Diagnosis    Coronary artery disease of native artery of native heart with stable angina pectoris (HCC)    Depression    Esophageal reflux    Hyperlipidemia    Hypertension    Hypothyroidism    Insomnia    Lumbar compression fracture (HCC)    Lumbar spinal stenosis    Normochromic normocytic anemia    Osteoarthritis of knee    Osteoporosis    Spinal stenosis    Type 2 diabetes mellitus (HCC)    Vitamin B12 deficiency    Vitamin D deficiency    Screening mammogram, encounter for    Diabetic peripheral neuropathy (HCC)    Greater trochanteric bursitis, right    Left sided sciatica    Triclonal gammopathy    Medicare annual wellness visit, subsequent    Adhesive capsulitis of shoulder    Light chain disease, 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HCC)    Non-Hodgkin's lymphoma (HCC)    Trochanteric bursitis of right hip    Chronic pain syndrome    Chronic bilateral low back pain without sciatica    Sacroiliitis (HCC)    Stage 3 chronic kidney disease, unspecified whether stage 3a or 3b CKD (HCC)    Ambulatory dysfunction    Primary osteoarthritis of right hip       Past Medical History  Past Medical History:   Diagnosis Date    Cardiac disorder 01/01/2001    x2 stents after a failed stress test    Cellulitis     last assessed - 19Nov2012    Constipation     last assessed - 14Mar2013    COVID-19 08/15/2022    Diabetes mellitus (HCC)     Disease of thyroid gland     Ecchymosis     last assessed - 03Jan2017    Fall     last assessed - 03Jan2017    Hyperlipidemia     Hypertension     Hypokalemia     last assessed - 02Jun2015    Lower extremity weakness     last assessed - [id number]    Vitamin D deficiency     last assessed - [id number]       Past Surgical History  Past Surgical History:   Procedure Laterality Date     BACK SURGERY      CHOLECYSTECTOMY      COLONOSCOPY      CORONARY ANGIOPLASTY WITH STENT PLACEMENT      CT BONE MARROW BIOPSY AND ASPIRATION  1/15/2020    HYSTERECTOMY      OTHER SURGICAL HISTORY      Previous stent placement       Length Of Stay: 3  Performed at least 2 patient identifiers during session: Name and Birthday       01/16/24 1046   PT Last Visit   PT Visit Date 01/16/24   Note Type   Note Type Treatment   Pain Assessment   Pain Assessment Tool 0-10   Pain Score 3   Pain Location/Orientation Orientation: Right;Orientation: Lower;Location: Back;Location: Hip   Pain Radiating Towards buttocks   Pain Onset/Description Onset: Ongoing;Frequency: Constant/Continuous   Patient's Stated Pain Goal No pain   Hospital Pain Intervention(s) Repositioned;Ambulation/increased activity;Emotional support   Multiple Pain Sites No   Restrictions/Precautions   Weight Bearing Precautions Per Order Yes   RLE Weight Bearing Per Order WBAT  (per ortho)   LLE Weight Bearing Per Order WBAT  (per ortho)   Other Precautions Fall Risk;Pain;Chair Alarm;Bed Alarm;Spinal precautions   General   Chart Reviewed Yes   Response to Previous Treatment Patient with no complaints from previous session.   Family/Caregiver Present No   Cognition   Overall Cognitive Status WFL   Arousal/Participation Alert;Responsive;Cooperative   Attention Within functional limits   Orientation Level Oriented X4   Memory Within functional limits   Following Commands Follows all commands and directions without difficulty   Comments Pt agreeable to PT session   Bed Mobility   Supine to Sit 5  Supervision   Additional items Assist x 1;Increased time required;Verbal cues;LE management  (side lying > sitting EOB)   Sit to Supine   (not tested as pt seated in bedside chair at end of session)   Additional Comments Pt without complaints of lightheadedness, SOB, chest pain, dizziness   Transfers   Sit to Stand   (CGA)   Additional items Assist x 1;Increased time  required;Verbal cues   Stand to Sit   (CGA)   Additional items Assist x 1;Increased time required;Verbal cues   Additional Comments RW used during transfers   Ambulation/Elevation   Gait pattern Decreased foot clearance;Improper Weight shift;Decreased R stance;Antalgic;Short stride   Gait Assistance 4  Minimal assist   Additional items Assist x 1;Verbal cues   Assistive Device Rolling walker   Distance 10', 15'   Stair Management Assistance Not tested   Balance   Static Sitting Fair +   Dynamic Sitting Fair +   Static Standing Fair   Dynamic Standing Fair   Ambulatory Fair -   Endurance Deficit   Endurance Deficit Yes   Endurance Deficit Description decreased activity tolerance, limited by pain levels   Activity Tolerance   Activity Tolerance Patient limited by pain   Nurse Made Aware RN Mitzi   Exercises   Balance training  Pt demonstrated improved dynamic sitting balance and tolerance to FAIR+ for 5 minutes x2 trials and dynamic standing balance and tolerance FAIR x2 minutes x2 trials with unilateral UE support.   Assessment   Prognosis Good   Problem List Decreased strength;Decreased endurance;Impaired balance;Decreased mobility;Pain   Assessment Pt seen for PT treatment session this date, consisting of gt training on level surfaces to improve pt safety in household environment and dynamic balance training in order to increase safety in home. Since previous session, pt has made good progress in terms of assist required for mobility; however, pt continues with impaired standing tolerance due to pain levels. Pt greeted in R S/L and agreeable to PT session. Pt reported 3/10 pain to low back/R hip at rest and 6/10 with standing mobility. PT completed side lying to supine transfer supervision and demonstrated improved dynamic sitting balance and tolerance to FAIR+ for 5 minutes x2 trials and dynamic standing balance and tolerance FAIR x2 minutes x2 trials with unilateral UE support. Pt compelted STS and SPT CGA with  "RW and ambulated 10', 15' with RW Chang; ambulation distance limited by pain levels and pt reported distances ambulated was her \"max today.\" Pertinent barriers during this session include pain. Current goals and POC remain appropriate, pt continues to have rehab potential and is making good progress towards STGs. Pt prognosis for achieving goals is good, pending pt progress with hospitalization/medical status improvements, and indicated by Stimulability, orientation, motivation, previous response to intervention, and responsive to cues/strategies. Pt limited d/t the presence of intractable pain and fear of pain provocation. PT recommends level 2, moderate resource intensity upon discharge. Pt continues to be functioning below baseline level, and remains limited 2* factors listed above. PT will continue to see pt during current hospitalization in order to address the deficits listed above and provide interventions consistent w/ POC in effort to achieve STGs.   Barriers to Discharge Decreased caregiver support;Inaccessible home environment   Goals   Patient Goals to have less pain   STG Expiration Date 01/28/24   PT Treatment Day 1   Plan   Treatment/Interventions Functional transfer training;Therapeutic exercise;Endurance training;Bed mobility;Gait training;Spoke to nursing   Progress Progressing toward goals   PT Frequency 3-5x/wk   Discharge Recommendation   Rehab Resource Intensity Level, PT II (Moderate Resource Intensity)   Equipment Recommended Walker   Walker Package Recommended Wheeled walker   AM-PAC Basic Mobility Inpatient   Turning in Flat Bed Without Bedrails 3   Lying on Back to Sitting on Edge of Flat Bed Without Bedrails 3   Moving Bed to Chair 3   Standing Up From Chair Using Arms 3   Walk in Room 3   Climb 3-5 Stairs With Railing 2   Basic Mobility Inpatient Raw Score 17   Basic Mobility Standardized Score 39.67   Highest Level Of Mobility   -HLM Goal 5: Stand one or more mins   -HLM Achieved 7: " Walk 25 feet or more   End of Consult   Patient Position at End of Consult All needs within reach;Bed/Chair alarm activated;Bedside chair       Time In: 1046  Time Out: 1111  Total Treatment Minutes: 25    Jeny Thomas, PT

## 2024-01-16 NOTE — ASSESSMENT & PLAN NOTE
Present on admission secondary to acute on chronic hip pain  Had a recent cortisone injection at ortho office  Was scheduled for outpatient ortho follow up on Tuesday but could not make appointment due to pain  Reports chronic back pain as well   Pain poorly controlled despite medications in ER   PT OT recommending Level One/2 intensity  CM placed referral for STR, facility accepted, pending insurance authorization  Ortho was consulted and recommended Medrol pack and continued follow-up outpatient  Patient additionally has neurosurgical outpatient follow-up on January 30  XR hip is negative for acute abnormalities

## 2024-01-16 NOTE — PLAN OF CARE
"  Problem: PHYSICAL THERAPY ADULT  Goal: Performs mobility at highest level of function for planned discharge setting.  See evaluation for individualized goals.  Description: Treatment/Interventions: Functional transfer training, Therapeutic exercise, Endurance training, Bed mobility, Gait training, Spoke to nursing  Equipment Recommended: Walker       See flowsheet documentation for full assessment, interventions and recommendations.  Note: Prognosis: Good  Problem List: Decreased strength, Decreased endurance, Impaired balance, Decreased mobility, Pain  Assessment: Pt seen for PT treatment session this date, consisting of gt training on level surfaces to improve pt safety in household environment and dynamic balance training in order to increase safety in home. Since previous session, pt has made good progress in terms of assist required for mobility; however, pt continues with impaired standing tolerance due to pain levels. Pt greeted in R S/L and agreeable to PT session. Pt reported 3/10 pain to low back/R hip at rest and 6/10 with standing mobility. PT completed side lying to supine transfer supervision and demonstrated improved dynamic sitting balance and tolerance to FAIR+ for 5 minutes x2 trials and dynamic standing balance and tolerance FAIR x2 minutes x2 trials with unilateral UE support. Pt compelted STS and SPT CGA with RW and ambulated 10', 15' with RW Chang; ambulation distance limited by pain levels and pt reported distances ambulated was her \"max today.\" Pertinent barriers during this session include pain. Current goals and POC remain appropriate, pt continues to have rehab potential and is making good progress towards STGs. Pt prognosis for achieving goals is good, pending pt progress with hospitalization/medical status improvements, and indicated by Stimulability, orientation, motivation, previous response to intervention, and responsive to cues/strategies. Pt limited d/t the presence of intractable " pain and fear of pain provocation. PT recommends level 2, moderate resource intensity upon discharge. Pt continues to be functioning below baseline level, and remains limited 2* factors listed above. PT will continue to see pt during current hospitalization in order to address the deficits listed above and provide interventions consistent w/ POC in effort to achieve STGs.  Barriers to Discharge: Decreased caregiver support, Inaccessible home environment     Rehab Resource Intensity Level, PT: II (Moderate Resource Intensity)    See flowsheet documentation for full assessment.      Jeny hTomas; PT, DPT

## 2024-01-16 NOTE — ASSESSMENT & PLAN NOTE
Normotensive to low Blood Pressure: 133/65  On extensive blood pressure regimen  Holding majority of home regimen due to lower BPs   Holding parameters of medications

## 2024-01-16 NOTE — PLAN OF CARE
Problem: PAIN - ADULT  Goal: Verbalizes/displays adequate comfort level or baseline comfort level  Description: Interventions:  - Encourage patient to monitor pain and request assistance  - Assess pain using appropriate pain scale  - Administer analgesics based on type and severity of pain and evaluate response  - Implement non-pharmacological measures as appropriate and evaluate response  - Consider cultural and social influences on pain and pain management  - Notify physician/advanced practitioner if interventions unsuccessful or patient reports new pain  Outcome: Progressing     Problem: INFECTION - ADULT  Goal: Absence or prevention of progression during hospitalization  Description: INTERVENTIONS:  - Assess and monitor for signs and symptoms of infection  - Monitor lab/diagnostic results  - Monitor all insertion sites, i.e. indwelling lines, tubes, and drains  - Monitor endotracheal if appropriate and nasal secretions for changes in amount and color  - [place] appropriate cooling/warming therapies per order  - Administer medications as ordered  - Instruct and encourage patient and family to use good hand hygiene technique  - Identify and instruct in appropriate isolation precautions for identified infection/condition  Outcome: Progressing  Goal: Absence of fever/infection during neutropenic period  Description: INTERVENTIONS:  - Monitor WBC    Outcome: Progressing     Problem: SAFETY ADULT  Goal: Patient will remain free of falls  Description: INTERVENTIONS:  - Educate patient/family on patient safety including physical limitations  - Instruct patient to call for assistance with activity   - Consult OT/PT to assist with strengthening/mobility   - Keep Call bell within reach  - Keep bed low and locked with side rails adjusted as appropriate  - Keep care items and personal belongings within reach  - Initiate and maintain comfort rounds  - Make Fall Risk Sign visible to staff  - Apply yellow socks and bracelet  for high fall risk patients  - Consider moving patient to room near nurses station  Outcome: Progressing  Goal: Maintain or return to baseline ADL function  Description: INTERVENTIONS:  -  Assess patient's ability to carry out ADLs; assess patient's baseline for ADL function and identify physical deficits which impact ability to perform ADLs (bathing, care of mouth/teeth, toileting, grooming, dressing, etc.)  - Assess/evaluate cause of self-care deficits   - Assess range of motion  - Assess patient's mobility; develop plan if impaired  - Assess patient's need for assistive devices and provide as appropriate  - Encourage maximum independence but intervene and supervise when necessary  - Involve family in performance of ADLs  - Assess for home care needs following discharge   - Consider OT consult to assist with ADL evaluation and planning for discharge  - Provide patient education as appropriate  Outcome: Progressing  Goal: Maintains/Returns to pre admission functional level  Description: INTERVENTIONS:  - Perform AM-PAC 6 Click Basic Mobility/ Daily Activity assessment daily.  - Set and communicate daily mobility goal to care team and patient/family/caregiver.   - Collaborate with rehabilitation services on mobility goals if consulted  - Out of bed for toileting  - Record patient progress and toleration of activity level   Outcome: Progressing     Problem: DISCHARGE PLANNING  Goal: Discharge to home or other facility with appropriate resources  Description: INTERVENTIONS:  - Identify barriers to discharge w/patient and caregiver  - Arrange for needed discharge resources and transportation as appropriate  - Identify discharge learning needs (meds, wound care, etc.)  - Arrange for interpretive services to assist at discharge as needed  - Refer to Case Management Department for coordinating discharge planning if the patient needs post-hospital services based on physician/advanced practitioner order or complex needs  related to functional status, cognitive ability, or social support system  Outcome: Progressing     Problem: Knowledge Deficit  Goal: Patient/family/caregiver demonstrates understanding of disease process, treatment plan, medications, and discharge instructions  Description: Complete learning assessment and assess knowledge base.  Interventions:  - Provide teaching at level of understanding  - Provide teaching via preferred learning methods  Outcome: Progressing     Problem: Prexisting or High Potential for Compromised Skin Integrity  Goal: Skin integrity is maintained or improved  Description: INTERVENTIONS:  - Identify patients at risk for skin breakdown  - Assess and monitor skin integrity  - Assess and monitor nutrition and hydration status  - Monitor labs   - Assess for incontinence   - Turn and reposition patient  - Assist with mobility/ambulation  - Relieve pressure over bony prominences  - Avoid friction and shearing  - Provide appropriate hygiene as needed including keeping skin clean and dry  - Evaluate need for skin moisturizer/barrier cream  - Collaborate with interdisciplinary team   - Patient/family teaching  - Consider wound care consult   Outcome: Progressing

## 2024-01-16 NOTE — CASE MANAGEMENT
Case Management Progress Note    Patient name Alejandra Patton  Location /-01 MRN [id number]  : 1934 Date 2024       LOS (days): 3  Geometric Mean LOS (GMLOS) (days):   Days to GMLOS:        OBJECTIVE:        Current admission status: Inpatient  Preferred Pharmacy:   [id number] - MALIK HERNÁNDEZ - [address]  [address]  [place] [address]  Phone: [phone] Fax: [phone]    Primary Care Provider: Margarita Holland MD    Primary Insurance: [employer]  Secondary Insurance:     PROGRESS NOTE:  Spoke with patient regarding inability to get in touch with or have availability for [facility]. Requested CM call daughter in law Mili [phone] to discuss options.     Called [facility]AY[facility] Garces to call back,  Mili called back, discussed lack of availability or no answer from several [place] facilities, including [facility]. Mili will try to call facility to get through. Discussed other options, would like patient to stay as close to home as possible, requested [facility] be called. CM also discussed [facility] and [facility] in [place]. Mili agreeable. Called all 3 facilities, [facility] and [facility] answered and requested referral faxed to review, faxed referral to [facility] as well. Pending response

## 2024-01-17 PROCEDURE — 99232 SBSQ HOSP IP/OBS MODERATE 35: CPT

## 2024-01-17 PROCEDURE — 97535 SELF CARE MNGMENT TRAINING: CPT

## 2024-01-17 RX ORDER — OXYCODONE HYDROCHLORIDE 5 MG/1
5 TABLET ORAL EVERY 4 HOURS PRN
Status: DISCONTINUED | OUTPATIENT
Start: 2024-01-17 | End: 2024-01-18

## 2024-01-17 RX ADMIN — CARVEDILOL 12.5 MG: 12.5 TABLET, FILM COATED ORAL at 17:32

## 2024-01-17 RX ADMIN — ONDANSETRON 4 MG: 2 INJECTION INTRAMUSCULAR; INTRAVENOUS at 10:09

## 2024-01-17 RX ADMIN — OXYCODONE HYDROCHLORIDE 5 MG: 5 TABLET ORAL at 22:17

## 2024-01-17 RX ADMIN — ACETAMINOPHEN 975 MG: 325 TABLET, FILM COATED ORAL at 15:42

## 2024-01-17 RX ADMIN — BACLOFEN 10 MG: 10 TABLET ORAL at 22:14

## 2024-01-17 RX ADMIN — DOCUSATE SODIUM 100 MG: 100 CAPSULE, LIQUID FILLED ORAL at 17:32

## 2024-01-17 RX ADMIN — SENNOSIDES 8.6 MG: 8.6 TABLET, FILM COATED ORAL at 08:48

## 2024-01-17 RX ADMIN — ACETAMINOPHEN 975 MG: 325 TABLET, FILM COATED ORAL at 22:14

## 2024-01-17 RX ADMIN — LATANOPROST 1 DROP: 50 SOLUTION OPHTHALMIC at 22:18

## 2024-01-17 RX ADMIN — ATORVASTATIN CALCIUM 40 MG: 40 TABLET, FILM COATED ORAL at 08:48

## 2024-01-17 RX ADMIN — METHYLPREDNISOLONE 12 MG: 4 TABLET ORAL at 08:48

## 2024-01-17 RX ADMIN — CARVEDILOL 12.5 MG: 12.5 TABLET, FILM COATED ORAL at 08:52

## 2024-01-17 RX ADMIN — LIDOCAINE 5% 1 PATCH: 700 PATCH TOPICAL at 08:49

## 2024-01-17 RX ADMIN — ASPIRIN 81 MG: 81 TABLET, CHEWABLE ORAL at 08:48

## 2024-01-17 RX ADMIN — ACETAMINOPHEN 975 MG: 325 TABLET, FILM COATED ORAL at 05:56

## 2024-01-17 RX ADMIN — OXYCODONE HYDROCHLORIDE 5 MG: 5 TABLET ORAL at 08:48

## 2024-01-17 RX ADMIN — ZOLPIDEM TARTRATE 5 MG: 5 TABLET, COATED ORAL at 22:14

## 2024-01-17 RX ADMIN — OXYCODONE HYDROCHLORIDE 5 MG: 5 TABLET ORAL at 17:32

## 2024-01-17 RX ADMIN — LEVOTHYROXINE SODIUM 50 MCG: 0.05 TABLET ORAL at 05:57

## 2024-01-17 RX ADMIN — DOCUSATE SODIUM 100 MG: 100 CAPSULE, LIQUID FILLED ORAL at 08:48

## 2024-01-17 NOTE — ASSESSMENT & PLAN NOTE
Normotensive to low Blood Pressure: 130/58  On extensive blood pressure regimen  Holding majority of home regimen due to lower BPs   Holding parameters of medications

## 2024-01-17 NOTE — CASE MANAGEMENT
Case Management Progress Note    Patient name Alejandra Patton  Location /-01 MRN [id number]  : 1934 Date 2024       LOS (days): 4  Geometric Mean LOS (GMLOS) (days): 2.9  Days to GMLOS:-1.1        OBJECTIVE:        Current admission status: Inpatient  Preferred Pharmacy:   [id number] [address]  [address]  [facility] [address]  Phone: [phone] Fax: [phone]    Primary Care Provider: Margarita Holland MD    Primary Insurance: [employer] RE[employer]  Secondary Insurance:     PROGRESS NOTE:  Made another round of calls to [facility], and [facility]. No answer from [facility], or [facility], [facility] does not take patient's insurance.   Spoke with Mili MISHRA regarding lack of responses or inability to accommodate from preferred facilities and inquired about available facilities in [place]. Interrupted by call from [facility], who states can accept patient , and need updated PT notes as well. CM will request auth through our discharge team. Auth info given.   Informed Mili on other line, agreeable to facility, and spoke with patient who is also agreeable. Made SLIM aware. Auth requested from CM discharge support team. Requested updated PT notes from PT/OT team.

## 2024-01-17 NOTE — ASSESSMENT & PLAN NOTE
Present on admission secondary to acute on chronic hip pain  Had a recent cortisone injection at ortho office  Was scheduled for outpatient ortho follow up on Tuesday but could not make appointment due to pain  Reports chronic back pain as well   Pain poorly controlled despite medications in ER   PT OT recommending Level One/2 intensity  CM placed referral for STR, awaiting placement  Ortho was consulted and recommended Medrol pack and continued follow-up outpatient  Patient additionally has neurosurgical outpatient follow-up on January 30  XR hip is negative for acute abnormalities

## 2024-01-17 NOTE — OCCUPATIONAL THERAPY NOTE
Occupational Therapy Treatment Note     Patient Name: Alejandra Patton  Today's Date: 1/17/2024  Problem List  Principal Problem:    Ambulatory dysfunction  Active Problems:    Hypertension    Chronic bilateral low back pain without sciatica        01/17/24 0900   OT Last Visit   OT Visit Date 01/17/24   Note Type   Note Type Treatment   Pain Assessment   Pain Assessment Tool 0-10   Pain Score 8   Pain Location/Orientation Orientation: Lower;Location: Back   Pain Radiating Towards R hip   Pain Onset/Description Onset: Ongoing;Frequency: Constant/Continuous   Hospital Pain Intervention(s) Ambulation/increased activity;Repositioned;Medication (See MAR)   Restrictions/Precautions   Weight Bearing Precautions Per Order Yes   RLE Weight Bearing Per Order WBAT   LLE Weight Bearing Per Order WBAT   Other Precautions Fall Risk;Pain;Bed Alarm   ADL   Where Assessed Sitting at sink   Grooming Assistance 5  Supervision/Setup   Grooming Deficit Setup;Supervision/safety;Increased time to complete   Grooming Comments Pt completed oral hygiene and combed hair  while seated   UB Bathing Assistance 5  Supervision/Setup   UB Bathing Deficit Setup;Supervision/safety;Increased time to complete   LB Bathing Assistance 3  Moderate Assistance   LB Bathing Deficit Setup;Steadying;Verbal cueing;Supervision/safety;Increased time to complete;Buttocks;Right lower leg including foot;Left lower leg including foot   UB Dressing Assistance 4  Minimal Assistance   UB Dressing Deficit Setup;Verbal cueing;Increased time to complete;Pull around back;Fasteners   UB Dressing Comments Pt donned hospital gown   LB Dressing Assistance 2  Maximal Assistance   LB Dressing Deficit Don/doff R sock;Don/doff L sock   LB Dressing Comments Pt doffed/donned socks only   Bed Mobility   Rolling R 5  Supervision   Additional items Assist x 1;HOB elevated;Bedrails;Increased time required   Supine to Sit 5  Supervision   Additional items Assist x 1;HOB  elevated;Bedrails;Increased time required;Verbal cues   Sit to Supine 4  Minimal assistance   Additional items Assist x 1;Bedrails;HOB elevated;Increased time required;Verbal cues;LE management   Transfers   Sit to Stand   (CGA)   Additional items Assist x 1;Bedrails;Increased time required;Verbal cues   Stand to Sit   (CGA)   Additional items Assist x 1;Armrests;Increased time required;Verbal cues   Toilet transfer   (CGA)   Additional items Assist x 1;Increased time required;Verbal cues;Commode;Armrests   Functional Mobility   Functional Mobility   (CGA)   Additional Comments Pt ambulated to/from bathroom with no overt SOB. Pt limited by pain.   Additional items Rolling walker   Toilet Transfers   Toilet Transfer From Bed   Toilet Transfer Type To and from   Toilet Transfer to Standard bedside commode   Toilet Transfer Technique Ambulating   Toilet Transfers Contact guard   Cognition   Overall Cognitive Status WFL   Arousal/Participation Alert;Responsive;Cooperative   Attention Within functional limits   Orientation Level Oriented X4   Memory Within functional limits   Following Commands Follows all commands and directions without difficulty   Comments Pt agreeable to OT session   Activity Tolerance   Activity Tolerance Patient limited by pain   Medical Staff Made Aware RN provided pain medications at end of session and made aware of outcomes   Assessment   Assessment Patient participated in Skilled OT session this date with interventions consisting of ADL re training with the use of correct body mechnaics,  therapeutic activities to: increase activity tolerance, and increase dynamic sit/ stand balance during functional activity  . Patient agreeable to OT treatment session, upon arrival patient was found supine in bed and in no apparent distress. Patient completed supine>sit with supervision and functional transfers with CGA. Pt ambulated to bathroom with CGA utilizing RW and completed commode transfer with CGA.  While seated at sink, pt required sup-min assist for UB ADLs and mod-max assist for LB ADLs. Pt required sup-set-up for grooming tasks. In comparison to previous session, patient with improvements in endurance and functional transfers. Patient requiring verbal cues for correct technique and frequent rest periods. Patient continues to be functioning below baseline level, occupational performance remains limited secondary to factors listed above and increased risk for falls and injury.   From OT standpoint, recommendation at time of d/c would be Level I (maximum resource intensity).   Patient to benefit from continued Occupational Therapy treatment while in the hospital to address deficits as defined above and maximize level of functional independence with ADLs and functional mobility.   Plan   Treatment Interventions ADL retraining;Functional transfer training;Endurance training;Patient/family training   Goal Expiration Date 01/28/24   OT Treatment Day 1   OT Frequency 3-5x/wk   Discharge Recommendation   Rehab Resource Intensity Level, OT I (Maximum Resource Intensity)   AM-PAC Daily Activity Inpatient   Lower Body Dressing 1   Bathing 2   Toileting 2   Upper Body Dressing 3   Grooming 3   Eating 3   Daily Activity Raw Score 14   Daily Activity Standardized Score (Calc for Raw Score >=11) 33.39   AM-PAC Applied Cognition Inpatient   Following a Speech/Presentation 4   Understanding Ordinary Conversation 4   Taking Medications 4   Remembering Where Things Are Placed or Put Away 3   Remembering List of 4-5 Errands 3   Taking Care of Complicated Tasks 3   Applied Cognition Raw Score 21   Applied Cognition Standardized Score 44.3   Modified [place] Scale   Modified [place] Scale 4   End of Consult   Patient Position at End of Consult Supine;All needs within reach;Bed/Chair alarm activated  (pt declined recliner at this time d/t pain. Pt stated she would get OOB for lunch)   Nurse Communication Nurse aware of consult      Raquel Fitzgerald OTD, OTR/L

## 2024-01-17 NOTE — PLAN OF CARE
Problem: OCCUPATIONAL THERAPY ADULT  Goal: Performs self-care activities at highest level of function for planned discharge setting.  See evaluation for individualized goals.  Description: Treatment Interventions: Functional transfer training, UE strengthening/ROM, Endurance training, ADL retraining, Patient/family training, Equipment evaluation/education, Compensatory technique education, Activityengagement, Energy conservation          See flowsheet documentation for full assessment, interventions and recommendations.   Outcome: Progressing  Note: Limitation: Decreased ADL status, Decreased cognition, Decreased Safe judgement during ADL, Decreased UE strength, Decreased endurance, Decreased high-level ADLs, Decreased self-care trans  Prognosis: Good  Assessment: Patient participated in Skilled OT session this date with interventions consisting of ADL re training with the use of correct body mechnaics,  therapeutic activities to: increase activity tolerance, and increase dynamic sit/ stand balance during functional activity  . Patient agreeable to OT treatment session, upon arrival patient was found supine in bed and in no apparent distress. Patient completed supine>sit with supervision and functional transfers with CGA. Pt ambulated to bathroom with CGA utilizing RW and completed commode transfer with CGA. While seated at sink, pt required sup-min assist for UB ADLs and mod-max assist for LB ADLs. Pt required sup-set-up for grooming tasks. In comparison to previous session, patient with improvements in endurance and functional transfers. Patient requiring verbal cues for correct technique and frequent rest periods. Patient continues to be functioning below baseline level, occupational performance remains limited secondary to factors listed above and increased risk for falls and injury.   From OT standpoint, recommendation at time of d/c would be Level I (maximum resource intensity).   Patient to benefit from  continued Occupational Therapy treatment while in the hospital to address deficits as defined above and maximize level of functional independence with ADLs and functional mobility.     Rehab Resource Intensity Level, OT: I (Maximum Resource Intensity)        Raquel CRESPO, OTR/L

## 2024-01-17 NOTE — PLAN OF CARE
Problem: PAIN - ADULT  Goal: Verbalizes/displays adequate comfort level or baseline comfort level  Description: Interventions:  - Encourage patient to monitor pain and request assistance  - Assess pain using appropriate pain scale  - Administer analgesics based on type and severity of pain and evaluate response  - Implement non-pharmacological measures as appropriate and evaluate response  - Consider cultural and social influences on pain and pain management  - Notify physician/advanced practitioner if interventions unsuccessful or patient reports new pain  Outcome: Progressing     Problem: INFECTION - ADULT  Goal: Absence or prevention of progression during hospitalization  Description: INTERVENTIONS:  - Assess and monitor for signs and symptoms of infection  - Monitor lab/diagnostic results  - Monitor all insertion sites, i.e. indwelling lines, tubes, and drains  - Monitor endotracheal if appropriate and nasal secretions for changes in amount and color  - [place] appropriate cooling/warming therapies per order  - Administer medications as ordered  - Instruct and encourage patient and family to use good hand hygiene technique  - Identify and instruct in appropriate isolation precautions for identified infection/condition  Outcome: Progressing  Goal: Absence of fever/infection during neutropenic period  Description: INTERVENTIONS:  - Monitor WBC    Outcome: Progressing     Problem: SAFETY ADULT  Goal: Patient will remain free of falls  Description: INTERVENTIONS:  - Educate patient/family on patient safety including physical limitations  - Instruct patient to call for assistance with activity   - Consult OT/PT to assist with strengthening/mobility   - Keep Call bell within reach  - Keep bed low and locked with side rails adjusted as appropriate  - Keep care items and personal belongings within reach  - Initiate and maintain comfort rounds  - Make Fall Risk Sign visible to staff  - Apply yellow socks and bracelet  for high fall risk patients  - Consider moving patient to room near nurses station  Outcome: Progressing  Goal: Maintain or return to baseline ADL function  Description: INTERVENTIONS:  -  Assess patient's ability to carry out ADLs; assess patient's baseline for ADL function and identify physical deficits which impact ability to perform ADLs (bathing, care of mouth/teeth, toileting, grooming, dressing, etc.)  - Assess/evaluate cause of self-care deficits   - Assess range of motion  - Assess patient's mobility; develop plan if impaired  - Assess patient's need for assistive devices and provide as appropriate  - Encourage maximum independence but intervene and supervise when necessary  - Involve family in performance of ADLs  - Assess for home care needs following discharge   - Consider OT consult to assist with ADL evaluation and planning for discharge  - Provide patient education as appropriate  Outcome: Progressing  Goal: Maintains/Returns to pre admission functional level  Description: INTERVENTIONS:  - Perform AM-PAC 6 Click Basic Mobility/ Daily Activity assessment daily.  - Set and communicate daily mobility goal to care team and patient/family/caregiver.   - Collaborate with rehabilitation services on mobility goals if consulted  - Out of bed for toileting  - Record patient progress and toleration of activity level   Outcome: Progressing     Problem: DISCHARGE PLANNING  Goal: Discharge to home or other facility with appropriate resources  Description: INTERVENTIONS:  - Identify barriers to discharge w/patient and caregiver  - Arrange for needed discharge resources and transportation as appropriate  - Identify discharge learning needs (meds, wound care, etc.)  - Arrange for interpretive services to assist at discharge as needed  - Refer to Case Management Department for coordinating discharge planning if the patient needs post-hospital services based on physician/advanced practitioner order or complex needs  related to functional status, cognitive ability, or social support system  Outcome: Progressing     Problem: Knowledge Deficit  Goal: Patient/family/caregiver demonstrates understanding of disease process, treatment plan, medications, and discharge instructions  Description: Complete learning assessment and assess knowledge base.  Interventions:  - Provide teaching at level of understanding  - Provide teaching via preferred learning methods  Outcome: Progressing     Problem: Prexisting or High Potential for Compromised Skin Integrity  Goal: Skin integrity is maintained or improved  Description: INTERVENTIONS:  - Identify patients at risk for skin breakdown  - Assess and monitor skin integrity  - Assess and monitor nutrition and hydration status  - Monitor labs   - Assess for incontinence   - Turn and reposition patient  - Assist with mobility/ambulation  - Relieve pressure over bony prominences  - Avoid friction and shearing  - Provide appropriate hygiene as needed including keeping skin clean and dry  - Evaluate need for skin moisturizer/barrier cream  - Collaborate with interdisciplinary team   - Patient/family teaching  - Consider wound care consult   Outcome: Progressing

## 2024-01-17 NOTE — PROGRESS NOTES
[facility]  Progress Note  Name: Alejandra Patton I  MRN: [id number]  Unit/Bed#: -01 I Date of Admission: 1/12/2024   Date of Service: 1/17/2024 I Hospital Day: 4    Assessment/Plan   * Ambulatory dysfunction  Assessment & Plan  Present on admission secondary to acute on chronic hip pain  Had a recent cortisone injection at ortho office  Was scheduled for outpatient ortho follow up on Tuesday but could not make appointment due to pain  Reports chronic back pain as well   Pain poorly controlled despite medications in ER   PT OT recommending Level One/2 intensity  CM placed referral for STR, awaiting placement  Ortho was consulted and recommended Medrol pack and continued follow-up outpatient  Patient additionally has neurosurgical outpatient follow-up on January 30  XR hip is negative for acute abnormalities     Chronic bilateral low back pain without sciatica  Assessment & Plan  Consider hip pain related to back pain vs hip pain     Hypertension  Assessment & Plan  Normotensive to low Blood Pressure: 130/58  On extensive blood pressure regimen  Holding majority of home regimen due to lower BPs   Holding parameters of medications              VTE Pharmacologic Prophylaxis: VTE Score: 4 Moderate Risk (Score 3-4) - Pharmacological DVT Prophylaxis Ordered: heparin.    Mobility:   Basic Mobility Inpatient Raw Score: 18  JH-HLM Goal: 6: Walk 10 steps or more  JH-HLM Achieved: 6: Walk 10 steps or more  HLM Goal achieved. Continue to encourage appropriate mobility.    Patient Centered Rounds: I performed bedside rounds with nursing staff today.   Discussions with Specialists or Other Care Team Provider: CM    Education and Discussions with Family / Patient: Attempted to update  (daughter) via phone. Left voicemail.     Total Time Spent on Date of Encounter in care of patient: 35 mins. This time was spent on one or more of the following: performing physical exam; counseling and  coordination of care; obtaining or reviewing history; documenting in the medical record; reviewing/ordering tests, medications or procedures; communicating with other healthcare professionals and discussing with patient's family/caregivers.    Current Length of Stay: 4 day(s)  Current Patient Status: Inpatient   Certification Statement: The patient will continue to require additional inpatient hospital stay due to awaiting placement  Discharge Plan: Anticipate discharge tomorrow to rehab facility.    Code Status: Level 1 - Full Code    Subjective:   Patient reports feeling little nauseous after receiving pain medication.  She denies chest pain/pressure, palpitations, lightheadedness, nausea, shortness of breath, or chills    Objective:     Vitals:   Temp (24hrs), Av.1 °F (37.3 °C), Min:98.4 °F (36.9 °C), Max:99.5 °F (37.5 °C)    Temp:  [98.4 °F (36.9 °C)-99.5 °F (37.5 °C)] 99.3 °F (37.4 °C)  HR:  [61-67] 67  Resp:  [16] 16  BP: (130-143)/(58-72) 130/58  SpO2:  [96 %] 96 %  Body mass index is 26.45 kg/m².     Input and Output Summary (last 24 hours):     Intake/Output Summary (Last 24 hours) at 2024 1557  Last data filed at 2024 0905  Gross per 24 hour   Intake 840 ml   Output --   Net 840 ml       Physical Exam:  Physical Exam  Vitals and nursing note reviewed.   Constitutional:       Appearance: She is normal weight.   HENT:      Head: Normocephalic.      Nose: Nose normal.      Mouth/Throat:      Mouth: Mucous membranes are moist.      Pharynx: Oropharynx is clear.   Eyes:      General: No scleral icterus.     Conjunctiva/sclera: Conjunctivae normal.      Pupils: Pupils are equal, round, and reactive to light.   Cardiovascular:      Rate and Rhythm: Normal rate and regular rhythm.      Heart sounds: No murmur heard.     No friction rub. No gallop.   Pulmonary:      Effort: Pulmonary effort is normal. No respiratory distress.      Breath sounds: Normal breath sounds. No stridor. No wheezing, rhonchi  or rales.   Abdominal:      General: Abdomen is flat.      Palpations: Abdomen is soft.   Musculoskeletal:         General: Normal range of motion.      Cervical back: Normal range of motion and neck supple.      Right lower leg: No edema.      Left lower leg: No edema.   Lymphadenopathy:      Cervical: No cervical adenopathy.   Skin:     General: Skin is warm.      Coloration: Skin is not jaundiced or pale.      Findings: No bruising, erythema or lesion.   Neurological:      General: No focal deficit present.      Mental Status: She is alert and oriented to person, place, and time. Mental status is at baseline.      Cranial Nerves: No cranial nerve deficit.      Motor: No weakness.   Psychiatric:         Mood and Affect: Mood normal.         Behavior: Behavior normal.         Thought Content: Thought content normal.          Additional Data:     Labs:  Results from last 7 days   Lab Units 01/16/24 0530 01/13/24  0541 01/12/24  1634   WBC Thousand/uL 11.23*   < > 9.14   HEMOGLOBIN g/dL 10.9*   < > 11.6   HEMATOCRIT % 33.9*   < > 36.3   PLATELETS Thousands/uL 265   < > 267   NEUTROS PCT %  --   --  69   LYMPHS PCT %  --   --  19   MONOS PCT %  --   --  10   EOS PCT %  --   --  1    < > = values in this interval not displayed.     Results from last 7 days   Lab Units 01/16/24 0530 01/13/24  0541 01/12/24  1634   SODIUM mmol/L 142   < > 138   POTASSIUM mmol/L 3.9   < > 3.8   CHLORIDE mmol/L 108   < > 105   CO2 mmol/L 25   < > 27   BUN mg/dL 32*   < > 26*   CREATININE mg/dL 0.87   < > 0.90   ANION GAP mmol/L 9   < > 6   CALCIUM mg/dL 9.5   < > 9.0   ALBUMIN g/dL  --   --  3.5   TOTAL BILIRUBIN mg/dL  --   --  0.84   ALK PHOS U/L  --   --  41   ALT U/L  --   --  23   AST U/L  --   --  39   GLUCOSE RANDOM mg/dL 143*   < > 142*    < > = values in this interval not displayed.                       Lines/Drains:  Invasive Devices       Peripheral Intravenous Line  Duration             Peripheral IV 01/12/24 Left  Antecubital 4 days              Drain  Duration             External Urinary Catheter 4 days                          Imaging: No pertinent imaging reviewed.    Recent Cultures (last 7 days):         Last 24 Hours Medication List:   Current Facility-Administered Medications   Medication Dose Route Frequency Provider Last Rate    acetaminophen  650 mg Oral Q6H PRN Calvin Darnell MD      acetaminophen  975 mg Oral Q8H [facility] Rebeca Fonseca PA-C      aspirin  81 mg Oral Daily Calvin Darnell MD      atorvastatin  40 mg Oral Daily Calvin Darnell MD      baclofen  10 mg Oral HS LAURIE Cooley      calcium carbonate  1,000 mg Oral Daily PRN Calvin Darnell MD      carvedilol  12.5 mg Oral BID With Meals Calvin Darnell MD      Diclofenac Sodium  2 g Topical Once Servando Lakhani PA-C      docusate sodium  100 mg Oral BID Calvin Darnell MD      heparin (porcine)  5,000 Units Subcutaneous Q8H [facility] Calvin Darnell MD      HYDROmorphone  0.5 mg Intravenous Q4H PRN Calvin Darnell MD      HYDROmorphone  0.5 mg Intravenous Q4H PRN Calvin Darnell MD      latanoprost  1 drop Both Eyes HS LAURIE Cooley      levothyroxine  50 mcg Oral Early Morning Calvin Darnell MD      lidocaine  1 patch Topical Daily Rebeca Fonseca PA-C      [START ON 1/18/2024] methylPREDNISolone  8 mg Oral Daily Magda Gates PA-C      Followed by    [START ON 1/19/2024] methylPREDNISolone  4 mg Oral Daily Magda Gates PA-C      ondansetron  4 mg Intravenous Q6H PRN Calvin Darnell MD      oxyCODONE  5 mg Oral Q4H PRN Calvin Darnell MD      senna  1 tablet Oral Daily Calvin Darnell MD      zolpidem  5 mg Oral HS LAURIE Cooley          Today, Patient Was Seen By: Germán Sumner PA-C    **Please Note: This note may have been constructed using a voice recognition system.**

## 2024-01-17 NOTE — CASE MANAGEMENT
[facility] received request for authorization from Care Manager.  Authorization request for: SNF  Facility Name: [facility]  NPI: [id number]  Facility MD:   Dr Walton  NPI: [id number]  Authorization initiated by contacting insurance:  [employer]  Via: Mosaic   Clinicals submitted via: Mosaic attachment   Pending Reference #: [id number]

## 2024-01-17 NOTE — CASE MANAGEMENT
Case Management Progress Note    Patient name Alejandra Patton  Location /-01 MRN [id number]  : 1934 Date 2024       LOS (days): 4  Geometric Mean LOS (GMLOS) (days): 2.9  Days to GMLOS:-1        OBJECTIVE:        Current admission status: Inpatient  Preferred Pharmacy:   [id number]  [place] - [address]  [address]  [place] [address]  Phone: [phone] Fax: [phone]    Primary Care Provider: Margarita Holland MD    Primary Insurance: [employer]  Secondary Insurance:     PROGRESS NOTE:  [facility] re-faxed referral as they do not have it, [facility] also re-faxed referral for same. Pending responses.  Spoke with daughter in law Mili who called and left  at [facility]. No answer so far.

## 2024-01-18 LAB
ANION GAP SERPL CALCULATED.3IONS-SCNC: 7 MMOL/L
BUN SERPL-MCNC: 32 MG/DL (ref 5–25)
CALCIUM SERPL-MCNC: 9.5 MG/DL (ref 8.4–10.2)
CHLORIDE SERPL-SCNC: 106 MMOL/L (ref 96–108)
CO2 SERPL-SCNC: 27 MMOL/L (ref 21–32)
CREAT SERPL-MCNC: 0.91 MG/DL (ref 0.6–1.3)
ERYTHROCYTE [DISTWIDTH] IN BLOOD BY AUTOMATED COUNT: 15.3 % (ref 11.6–15.1)
GFR SERPL CREATININE-BSD FRML MDRD: 56 ML/MIN/1.73SQ M
GLUCOSE SERPL-MCNC: 133 MG/DL (ref 65–140)
HCT VFR BLD AUTO: 33.3 % (ref 34.8–46.1)
HGB BLD-MCNC: 10.5 G/DL (ref 11.5–15.4)
MCH RBC QN AUTO: 30.3 PG (ref 26.8–34.3)
MCHC RBC AUTO-ENTMCNC: 31.5 G/DL (ref 31.4–37.4)
MCV RBC AUTO: 96 FL (ref 82–98)
PLATELET # BLD AUTO: 243 THOUSANDS/UL (ref 149–390)
PMV BLD AUTO: 9.9 FL (ref 8.9–12.7)
POTASSIUM SERPL-SCNC: 4.2 MMOL/L (ref 3.5–5.3)
RBC # BLD AUTO: 3.46 MILLION/UL (ref 3.81–5.12)
SODIUM SERPL-SCNC: 140 MMOL/L (ref 135–147)
WBC # BLD AUTO: 9.57 THOUSAND/UL (ref 4.31–10.16)

## 2024-01-18 PROCEDURE — 80048 BASIC METABOLIC PNL TOTAL CA: CPT

## 2024-01-18 PROCEDURE — 85027 COMPLETE CBC AUTOMATED: CPT

## 2024-01-18 PROCEDURE — 99232 SBSQ HOSP IP/OBS MODERATE 35: CPT

## 2024-01-18 RX ORDER — OXYCODONE HYDROCHLORIDE 5 MG/1
5 TABLET ORAL EVERY 6 HOURS PRN
Status: DISCONTINUED | OUTPATIENT
Start: 2024-01-18 | End: 2024-01-19 | Stop reason: HOSPADM

## 2024-01-18 RX ADMIN — OXYCODONE HYDROCHLORIDE 5 MG: 5 TABLET ORAL at 17:19

## 2024-01-18 RX ADMIN — OXYCODONE HYDROCHLORIDE 5 MG: 5 TABLET ORAL at 10:51

## 2024-01-18 RX ADMIN — METHYLPREDNISOLONE 8 MG: 4 TABLET ORAL at 08:47

## 2024-01-18 RX ADMIN — SENNOSIDES 8.6 MG: 8.6 TABLET, FILM COATED ORAL at 08:47

## 2024-01-18 RX ADMIN — BACLOFEN 10 MG: 10 TABLET ORAL at 21:39

## 2024-01-18 RX ADMIN — ATORVASTATIN CALCIUM 40 MG: 40 TABLET, FILM COATED ORAL at 08:47

## 2024-01-18 RX ADMIN — CARVEDILOL 12.5 MG: 12.5 TABLET, FILM COATED ORAL at 17:19

## 2024-01-18 RX ADMIN — ACETAMINOPHEN 975 MG: 325 TABLET, FILM COATED ORAL at 05:12

## 2024-01-18 RX ADMIN — ACETAMINOPHEN 975 MG: 325 TABLET, FILM COATED ORAL at 21:38

## 2024-01-18 RX ADMIN — DOCUSATE SODIUM 100 MG: 100 CAPSULE, LIQUID FILLED ORAL at 17:19

## 2024-01-18 RX ADMIN — CARVEDILOL 12.5 MG: 12.5 TABLET, FILM COATED ORAL at 08:47

## 2024-01-18 RX ADMIN — DOCUSATE SODIUM 100 MG: 100 CAPSULE, LIQUID FILLED ORAL at 08:47

## 2024-01-18 RX ADMIN — LEVOTHYROXINE SODIUM 50 MCG: 0.05 TABLET ORAL at 05:12

## 2024-01-18 RX ADMIN — LIDOCAINE 5% 1 PATCH: 700 PATCH TOPICAL at 08:48

## 2024-01-18 RX ADMIN — ZOLPIDEM TARTRATE 5 MG: 5 TABLET, COATED ORAL at 21:39

## 2024-01-18 RX ADMIN — ACETAMINOPHEN 975 MG: 325 TABLET, FILM COATED ORAL at 14:16

## 2024-01-18 RX ADMIN — ASPIRIN 81 MG: 81 TABLET, CHEWABLE ORAL at 08:47

## 2024-01-18 RX ADMIN — LATANOPROST 1 DROP: 50 SOLUTION OPHTHALMIC at 21:39

## 2024-01-18 NOTE — ASSESSMENT & PLAN NOTE
Normotensive to low Blood Pressure: 140/61  On extensive blood pressure regimen  Holding majority of home regimen due to lower BPs   Holding parameters of medications

## 2024-01-18 NOTE — ASSESSMENT & PLAN NOTE
Present on admission secondary to acute on chronic hip pain  Had a recent cortisone injection at ortho office  Was scheduled for outpatient ortho follow up on Tuesday but could not make appointment due to pain  Reports chronic back pain as well   Pain poorly controlled despite medications in ER   PT OT recommending Level One/2 intensity  CM placed referral for STR, awaiting placement, [facility] can accept the patient on 1/19  Ortho was consulted and recommended Medrol pack and continued follow-up outpatient  Patient additionally has neurosurgical outpatient follow-up on January 30  XR hip is negative for acute abnormalities

## 2024-01-18 NOTE — CASE MANAGEMENT
Case Management Discharge Planning Note    Patient name Alejandra Patton  Location /-01 MRN [id number]  : 1934 Date 2024       Current Admission Date: 2024  Current Admission Diagnosis:Ambulatory dysfunction   Patient Active Problem List    Diagnosis Date Noted    Primary osteoarthritis of right hip 2023    Ambulatory dysfunction 2023    Stage 3 chronic kidney disease, unspecified whether stage 3a or 3b CKD (HCC) 2023    Chronic pain syndrome 2022    Chronic bilateral low back pain without sciatica 2022    Sacroiliitis (HCC) 2022    Trochanteric bursitis of right hip 2021    Non-Hodgkin's lymphoma (HCC) 10/05/2020    Light chain disease, 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HCC) 2020    Medicare annual wellness visit, subsequent 2020    Adhesive capsulitis of shoulder 2020    Triclonal gammopathy 2019    Greater trochanteric bursitis, right 2019    Left sided sciatica 2019    Diabetic peripheral neuropathy (HCC) 2019    Screening mammogram, encounter for 2018    Lumbar compression fracture (HCC) 10/11/2017    Vitamin D deficiency 10/11/2017    Vitamin B12 deficiency 2016    Lumbar spinal stenosis 2015    Normochromic normocytic anemia 2015    Depression 2015    Esophageal reflux 2013    Osteoarthritis of knee 2013    Spinal stenosis 2013    Coronary artery disease of native artery of native heart with stable angina pectoris (HCC) 2013    Hyperlipidemia 2013    Hypertension 2013    Hypothyroidism 2013    Insomnia 2013    Osteoporosis 2013    Type 2 diabetes mellitus (HCC) 2012      LOS (days): 5  Geometric Mean LOS (GMLOS) (days): 2.9  Days to GMLOS:-2     OBJECTIVE:  Risk of Unplanned Readmission Score: 18.4         Current admission status: Inpatient   Preferred Pharmacy:   [id number] - [place] - [address]  [address]  [place] [address]  Phone: [phone] Fax: [phone]    Primary Care Provider: Margarita Holland MD    Primary Insurance: [facility]  Secondary Insurance:     DISCHARGE DETAILS:                                                                                        IMM Given (Date):: 01/18/24  IMM Given to:: Patient     Additional Comments: IMM and Medicare rights reviewed with patient at the bedside. Patient verbalized understanding and signed original. Copy given to patient, signed original filed to medical records.

## 2024-01-18 NOTE — PLAN OF CARE
Problem: PAIN - ADULT  Goal: Verbalizes/displays adequate comfort level or baseline comfort level  Description: Interventions:  - Encourage patient to monitor pain and request assistance  - Assess pain using appropriate pain scale  - Administer analgesics based on type and severity of pain and evaluate response  - Implement non-pharmacological measures as appropriate and evaluate response  - Consider cultural and social influences on pain and pain management  - Notify physician/advanced practitioner if interventions unsuccessful or patient reports new pain  Outcome: Progressing     Problem: INFECTION - ADULT  Goal: Absence or prevention of progression during hospitalization  Description: INTERVENTIONS:  - Assess and monitor for signs and symptoms of infection  - Monitor lab/diagnostic results  - Monitor all insertion sites, i.e. indwelling lines, tubes, and drains  - Monitor endotracheal if appropriate and nasal secretions for changes in amount and color  - Terral appropriate cooling/warming therapies per order  - Administer medications as ordered  - Instruct and encourage patient and family to use good hand hygiene technique  - Identify and instruct in appropriate isolation precautions for identified infection/condition  Outcome: Progressing     Problem: SAFETY ADULT  Goal: Patient will remain free of falls  Description: INTERVENTIONS:  - Educate patient/family on patient safety including physical limitations  - Instruct patient to call for assistance with activity   - Consult OT/PT to assist with strengthening/mobility   - Keep Call bell within reach  - Keep bed low and locked with side rails adjusted as appropriate  - Keep care items and personal belongings within reach  - Initiate and maintain comfort rounds  - Make Fall Risk Sign visible to staff  - Offer Toileting every 2 Hours, in advance of need  - Initiate/Maintain bed alarm  - Obtain necessary fall risk management equipment:    - Apply yellow socks  and bracelet for high fall risk patients  - Consider moving patient to room near nurses station  Outcome: Progressing

## 2024-01-18 NOTE — PROGRESS NOTES
[facility]  Progress Note  Name: Alejandra Patton I  MRN: [id number]  Unit/Bed#: -01 I Date of Admission: 1/12/2024   Date of Service: 1/18/2024 I Hospital Day: 5    Assessment/Plan   * Ambulatory dysfunction  Assessment & Plan  Present on admission secondary to acute on chronic hip pain  Had a recent cortisone injection at ortho office  Was scheduled for outpatient ortho follow up on Tuesday but could not make appointment due to pain  Reports chronic back pain as well   Pain poorly controlled despite medications in ER   PT OT recommending Level One/2 intensity  CM placed referral for STR, awaiting placement, [facility] can accept the patient on 1/19  Ortho was consulted and recommended Medrol pack and continued follow-up outpatient  Patient additionally has neurosurgical outpatient follow-up on January 30  XR hip is negative for acute abnormalities     Chronic bilateral low back pain without sciatica  Assessment & Plan  Consider hip pain related to back pain vs hip pain     Hypertension  Assessment & Plan  Normotensive to low Blood Pressure: 140/61  On extensive blood pressure regimen  Holding majority of home regimen due to lower BPs   Holding parameters of medications            VTE Pharmacologic Prophylaxis: VTE Score: 4 Moderate Risk (Score 3-4) - Pharmacological DVT Prophylaxis Ordered: heparin.    Mobility:   Basic Mobility Inpatient Raw Score: 18  JH-HLM Goal: 6: Walk 10 steps or more  JH-HLM Achieved: 6: Walk 10 steps or more  HLM Goal achieved. Continue to encourage appropriate mobility.    Patient Centered Rounds: I performed bedside rounds with nursing staff today.   Discussions with Specialists or Other Care Team Provider: CM    Education and Discussions with Family / Patient: Attempted to update  (daughter) via phone. Left voicemail.     Total Time Spent on Date of Encounter in care of patient: 35 mins. This time was spent on one or more of the following:  performing physical exam; counseling and coordination of care; obtaining or reviewing history; documenting in the medical record; reviewing/ordering tests, medications or procedures; communicating with other healthcare professionals and discussing with patient's family/caregivers.    Current Length of Stay: 5 day(s)  Current Patient Status: Inpatient   Certification Statement: The patient will continue to require additional inpatient hospital stay due to awaiting placement, Eduardo available tomorrow  Discharge Plan: Anticipate discharge tomorrow to rehab facility.    Code Status: Level 1 - Full Code    Subjective:   Patient reports feeling well.  She denies chest pain/pressure, palpitations, headedness, nausea, shortness breath, or chills.    Objective:     Vitals:   Temp (24hrs), Av.5 °F (37.5 °C), Min:99.3 °F (37.4 °C), Max:99.6 °F (37.6 °C)    Temp:  [99.3 °F (37.4 °C)-99.6 °F (37.6 °C)] 99.6 °F (37.6 °C)  HR:  [62-68] 62  Resp:  [16-18] 18  BP: (130-140)/(58-61) 140/61  SpO2:  [94 %-97 %] 97 %  Body mass index is 26.45 kg/m².     Input and Output Summary (last 24 hours):     Intake/Output Summary (Last 24 hours) at 2024 1219  Last data filed at 2024 0824  Gross per 24 hour   Intake 300 ml   Output 300 ml   Net 0 ml       Physical Exam:   Physical Exam  Vitals and nursing note reviewed.   Constitutional:       Appearance: She is normal weight.   HENT:      Head: Normocephalic.      Nose: Nose normal.      Mouth/Throat:      Mouth: Mucous membranes are moist.      Pharynx: Oropharynx is clear.   Eyes:      General: No scleral icterus.     Conjunctiva/sclera: Conjunctivae normal.      Pupils: Pupils are equal, round, and reactive to light.   Cardiovascular:      Rate and Rhythm: Normal rate and regular rhythm.      Heart sounds: No murmur heard.     No friction rub. No gallop.   Pulmonary:      Effort: Pulmonary effort is normal. No respiratory distress.      Breath sounds: Normal breath sounds. No  stridor. No wheezing, rhonchi or rales.   Abdominal:      General: Abdomen is flat.      Palpations: Abdomen is soft.   Musculoskeletal:         General: Normal range of motion.      Cervical back: Normal range of motion and neck supple.      Right lower leg: No edema.      Left lower leg: No edema.   Lymphadenopathy:      Cervical: No cervical adenopathy.   Skin:     General: Skin is warm.      Coloration: Skin is not jaundiced or pale.      Findings: No bruising, erythema or lesion.   Neurological:      General: No focal deficit present.      Mental Status: She is alert and oriented to person, place, and time. Mental status is at baseline.      Cranial Nerves: No cranial nerve deficit.      Motor: No weakness.   Psychiatric:         Mood and Affect: Mood normal.         Behavior: Behavior normal.         Thought Content: Thought content normal.          Additional Data:     Labs:  Results from last 7 days   Lab Units 01/18/24  0510 01/13/24  0541 01/12/24  1634   WBC Thousand/uL 9.57   < > 9.14   HEMOGLOBIN g/dL 10.5*   < > 11.6   HEMATOCRIT % 33.3*   < > 36.3   PLATELETS Thousands/uL 243   < > 267   NEUTROS PCT %  --   --  69   LYMPHS PCT %  --   --  19   MONOS PCT %  --   --  10   EOS PCT %  --   --  1    < > = values in this interval not displayed.     Results from last 7 days   Lab Units 01/18/24  0510 01/13/24  0541 01/12/24  1634   SODIUM mmol/L 140   < > 138   POTASSIUM mmol/L 4.2   < > 3.8   CHLORIDE mmol/L 106   < > 105   CO2 mmol/L 27   < > 27   BUN mg/dL 32*   < > 26*   CREATININE mg/dL 0.91   < > 0.90   ANION GAP mmol/L 7   < > 6   CALCIUM mg/dL 9.5   < > 9.0   ALBUMIN g/dL  --   --  3.5   TOTAL BILIRUBIN mg/dL  --   --  0.84   ALK PHOS U/L  --   --  41   ALT U/L  --   --  23   AST U/L  --   --  39   GLUCOSE RANDOM mg/dL 133   < > 142*    < > = values in this interval not displayed.                       Lines/Drains:  Invasive Devices       Peripheral Intravenous Line  Duration             Peripheral  IV 01/18/24 Distal;Dorsal (posterior);Right Forearm <1 day                          Imaging: No pertinent imaging reviewed.    Recent Cultures (last 7 days):         Last 24 Hours Medication List:   Current Facility-Administered Medications   Medication Dose Route Frequency Provider Last Rate    acetaminophen  650 mg Oral Q6H PRN Calvin Darnell MD      acetaminophen  975 mg Oral Q8H [facility] Rebeca Fonseca PA-C      aspirin  81 mg Oral Daily Calvin Darnell MD      atorvastatin  40 mg Oral Daily Calvin Darnell MD      baclofen  10 mg Oral HS LAURIE Cooley      calcium carbonate  1,000 mg Oral Daily PRN Calvin Darnell MD      carvedilol  12.5 mg Oral BID With Meals Calvin Darnell MD      Diclofenac Sodium  2 g Topical Once Servando Lakhani PA-C      docusate sodium  100 mg Oral BID Calvin Darnell MD      heparin (porcine)  5,000 Units Subcutaneous Q8H [facility] Calvin Darnell MD      latanoprost  1 drop Both Eyes HS LAURIE Cooley      levothyroxine  50 mcg Oral Early Morning Calvin Darnell MD      lidocaine  1 patch Topical Daily Rebeca Fonseca PA-C      [START ON 1/19/2024] methylPREDNISolone  4 mg Oral Daily Magda Gates PA-C      ondansetron  4 mg Intravenous Q6H PRN Calvin Darnell MD      oxyCODONE  5 mg Oral Q6H PRN Germán Sumner PA-C      senna  1 tablet Oral Daily Calvin Darnell MD      zolpidem  5 mg Oral HS LAURIE Cooley          Today, Patient Was Seen By: Germán Sumner PA-C    **Please Note: This note may have been constructed using a voice recognition system.**

## 2024-01-19 VITALS
RESPIRATION RATE: 18 BRPM | TEMPERATURE: 99.1 F | WEIGHT: 144.62 LBS | HEART RATE: 61 BPM | DIASTOLIC BLOOD PRESSURE: 73 MMHG | HEIGHT: 62 IN | SYSTOLIC BLOOD PRESSURE: 158 MMHG | OXYGEN SATURATION: 96 % | BODY MASS INDEX: 26.61 KG/M2

## 2024-01-19 PROCEDURE — 99239 HOSP IP/OBS DSCHRG MGMT >30: CPT | Performed by: INTERNAL MEDICINE

## 2024-01-19 RX ADMIN — OXYCODONE HYDROCHLORIDE 5 MG: 5 TABLET ORAL at 08:08

## 2024-01-19 RX ADMIN — ATORVASTATIN CALCIUM 40 MG: 40 TABLET, FILM COATED ORAL at 08:08

## 2024-01-19 RX ADMIN — CARVEDILOL 12.5 MG: 12.5 TABLET, FILM COATED ORAL at 15:56

## 2024-01-19 RX ADMIN — CARVEDILOL 12.5 MG: 12.5 TABLET, FILM COATED ORAL at 08:08

## 2024-01-19 RX ADMIN — LEVOTHYROXINE SODIUM 50 MCG: 0.05 TABLET ORAL at 05:14

## 2024-01-19 RX ADMIN — ACETAMINOPHEN 975 MG: 325 TABLET, FILM COATED ORAL at 15:01

## 2024-01-19 RX ADMIN — LIDOCAINE 5% 1 PATCH: 700 PATCH TOPICAL at 08:08

## 2024-01-19 RX ADMIN — OXYCODONE HYDROCHLORIDE 5 MG: 5 TABLET ORAL at 17:28

## 2024-01-19 RX ADMIN — ASPIRIN 81 MG: 81 TABLET, CHEWABLE ORAL at 08:08

## 2024-01-19 RX ADMIN — ACETAMINOPHEN 975 MG: 325 TABLET, FILM COATED ORAL at 05:14

## 2024-01-19 RX ADMIN — SENNOSIDES 8.6 MG: 8.6 TABLET, FILM COATED ORAL at 08:08

## 2024-01-19 RX ADMIN — DOCUSATE SODIUM 100 MG: 100 CAPSULE, LIQUID FILLED ORAL at 08:08

## 2024-01-19 RX ADMIN — METHYLPREDNISOLONE 4 MG: 4 TABLET ORAL at 08:10

## 2024-01-19 NOTE — CASE MANAGEMENT
Case Management Discharge Planning Note    Patient name Alejandra Patton  Location /-01 MRN [id number]  : 1934 Date 2024       Current Admission Date: 2024  Current Admission Diagnosis:Ambulatory dysfunction   Patient Active Problem List    Diagnosis Date Noted    Primary osteoarthritis of right hip 2023    Ambulatory dysfunction 2023    Stage 3 chronic kidney disease, unspecified whether stage 3a or 3b CKD (HCC) 2023    Chronic pain syndrome 2022    Chronic bilateral low back pain without sciatica 2022    Sacroiliitis (HCC) 2022    Trochanteric bursitis of right hip 2021    Non-Hodgkin's lymphoma (HCC) 10/05/2020    Light chain disease, 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HCC) 2020    Medicare annual wellness visit, subsequent 2020    Adhesive capsulitis of shoulder 2020    Triclonal gammopathy 2019    Greater trochanteric bursitis, right 2019    Left sided sciatica 2019    Diabetic peripheral neuropathy (HCC) 2019    Screening mammogram, encounter for 2018    Lumbar compression fracture (HCC) 10/11/2017    Vitamin D deficiency 10/11/2017    Vitamin B12 deficiency 2016    Lumbar spinal stenosis 2015    Normochromic normocytic anemia 2015    Depression 2015    Esophageal reflux 2013    Osteoarthritis of knee 2013    Spinal stenosis 2013    Coronary artery disease of native artery of native heart with stable angina pectoris (HCC) 2013    Hyperlipidemia 2013    Hypertension 2013    Hypothyroidism 2013    Insomnia 2013    Osteoporosis 2013    Type 2 diabetes mellitus (HCC) 2012      LOS (days): 6  Geometric Mean LOS (GMLOS) (days): 2.9  Days to GMLOS:-2.9     OBJECTIVE:  Risk of Unplanned Readmission Score: 18.62         Current admission status: Inpatient   Preferred Pharmacy:   [id number] - [place] - [address]  [address] [place] [address]  Phone: [phone] Fax: [phone]    Primary Care Provider: Margarita Holland MD    Primary Insurance: [employer]  Secondary Insurance:     DISCHARGE DETAILS:                                                                                                               Facility Insurance Auth Number: [id number]

## 2024-01-19 NOTE — CASE MANAGEMENT
Case Management Discharge Planning Note    Patient name Alejandra Patton  Location /-01 MRN [id number]  : 1934 Date 2024       Current Admission Date: 2024  Current Admission Diagnosis:Ambulatory dysfunction   Patient Active Problem List    Diagnosis Date Noted    Primary osteoarthritis of right hip 2023    Ambulatory dysfunction 2023    Stage 3 chronic kidney disease, unspecified whether stage 3a or 3b CKD (HCC) 2023    Chronic pain syndrome 2022    Chronic bilateral low back pain without sciatica 2022    Sacroiliitis (HCC) 2022    Trochanteric bursitis of right hip 2021    Non-Hodgkin's lymphoma (HCC) 10/05/2020    Light chain disease, 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HCC) 2020    Medicare annual wellness visit, subsequent 2020    Adhesive capsulitis of shoulder 2020    Triclonal gammopathy 2019    Greater trochanteric bursitis, right 2019    Left sided sciatica 2019    Diabetic peripheral neuropathy (HCC) 2019    Screening mammogram, encounter for 2018    Lumbar compression fracture (HCC) 10/11/2017    Vitamin D deficiency 10/11/2017    Vitamin B12 deficiency 2016    Lumbar spinal stenosis 2015    Normochromic normocytic anemia 2015    Depression 2015    Esophageal reflux 2013    Osteoarthritis of knee 2013    Spinal stenosis 2013    Coronary artery disease of native artery of native heart with stable angina pectoris (HCC) 2013    Hyperlipidemia 2013    Hypertension 2013    Hypothyroidism 2013    Insomnia 2013    Osteoporosis 2013    Type 2 diabetes mellitus (HCC) 2012      LOS (days): 6  Geometric Mean LOS (GMLOS) (days): 2.9  Days to GMLOS:-2.9     OBJECTIVE:  Risk of Unplanned Readmission Score: 18.62         Current admission status: Inpatient   Preferred Pharmacy:   [id number] - [place] [address] - [address]  [address]  [place] [address]  Phone: [phone] Fax: [phone]    Primary Care Provider: Margarita Holland MD    Primary Insurance: [facility]  Secondary Insurance:     DISCHARGE DETAILS:    Discharge planning discussed with:: Daughter in Law Mili  Freedom of Choice: Yes  Comments - Freedom of Choice: CM informed Mili of authorization approval and transport setup.  CM contacted family/caregiver?: Yes  Were Treatment Team discharge recommendations reviewed with patient/caregiver?: Yes  Did patient/caregiver verbalize understanding of patient care needs?: N/A- going to facility  Were patient/caregiver advised of the risks associated with not following Treatment Team discharge recommendations?: Yes    Contacts  Patient Contacts: Mili  Relationship to Patient:: Family  Contact Method: Phone  Phone Number: [phone]  Reason/Outcome: Continuity of Care, Discharge Planning    Requested Home Health Care         Is the patient interested in HHC at discharge?: No         Other Referral/Resources/Interventions Provided:  Interventions: Transportation to treatment  Referral Comments: Pending confirmation of transport pickup time                                                           Accepting Facility Name, City & State : [facility]// [facility] [address]  Receiving Facility/Agency Phone Number: [phone]  Facility/Agency Fax Number: [phone]

## 2024-01-19 NOTE — CASE MANAGEMENT
Case Management Progress Note    Patient name Aljeandra Patton  Location /-01 MRN [id number]  : 1934 Date 2024       LOS (days): 6  Geometric Mean LOS (GMLOS) (days): 2.9  Days to GMLOS:-3.1        OBJECTIVE:        Current admission status: Inpatient  Preferred Pharmacy:   [facility] [employer] [address] - [address]  [address]  [place] [address]  Phone: [phone] Fax: [phone]    Primary Care Provider: Margarita Holland MD    Primary Insurance: [facility]  Secondary Insurance:     PROGRESS NOTE:  Called daughter in law Mili to inform of transport time  Faxed copy of AVS to facility since patient will be arriving so late.

## 2024-01-19 NOTE — CASE MANAGEMENT
[facility] has received approved authorization from insurance:   [employer]  Authorization received for: [facility]  Facility: [place]   Authorization #: [id number]   Start of Care: 1/19  Next Review Date: 1/21  Continued Stay Care Coordinator:   Bettie ISSA#: [phone]  Submit next review to: [phone]   Care Manager notified: Hyacinth Martin

## 2024-01-19 NOTE — PLAN OF CARE
Problem: PAIN - ADULT  Goal: Verbalizes/displays adequate comfort level or baseline comfort level  Description: Interventions:  - Encourage patient to monitor pain and request assistance  - Assess pain using appropriate pain scale  - Administer analgesics based on type and severity of pain and evaluate response  - Implement non-pharmacological measures as appropriate and evaluate response  - Consider cultural and social influences on pain and pain management  - Notify physician/advanced practitioner if interventions unsuccessful or patient reports new pain  Outcome: Progressing     Problem: INFECTION - ADULT  Goal: Absence or prevention of progression during hospitalization  Description: INTERVENTIONS:  - Assess and monitor for signs and symptoms of infection  - Monitor lab/diagnostic results  - Monitor all insertion sites, i.e. indwelling lines, tubes, and drains  - Monitor endotracheal if appropriate and nasal secretions for changes in amount and color  - [place] appropriate cooling/warming therapies per order  - Administer medications as ordered  - Instruct and encourage patient and family to use good hand hygiene technique  - Identify and instruct in appropriate isolation precautions for identified infection/condition  Outcome: Progressing  Goal: Absence of fever/infection during neutropenic period  Description: INTERVENTIONS:  - Monitor WBC    Outcome: Progressing     Problem: SAFETY ADULT  Goal: Patient will remain free of falls  Description: INTERVENTIONS:  - Educate patient/family on patient safety including physical limitations  - Instruct patient to call for assistance with activity   - Consult OT/PT to assist with strengthening/mobility   - Keep Call bell within reach  - Keep bed low and locked with side rails adjusted as appropriate  - Keep care items and personal belongings within reach  - Initiate and maintain comfort rounds  - Make Fall Risk Sign visible to staff  - Offer Toileting every 2 Hours,  in advance of need  - Initiate/Maintain bed alarm  - Obtain necessary fall risk management equipment: bed alarm  - Apply yellow socks and bracelet for high fall risk patients  - Consider moving patient to room near nurses station  Outcome: Progressing  Goal: Maintain or return to baseline ADL function  Description: INTERVENTIONS:  -  Assess patient's ability to carry out ADLs; assess patient's baseline for ADL function and identify physical deficits which impact ability to perform ADLs (bathing, care of mouth/teeth, toileting, grooming, dressing, etc.)  - Assess/evaluate cause of self-care deficits   - Assess range of motion  - Assess patient's mobility; develop plan if impaired  - Assess patient's need for assistive devices and provide as appropriate  - Encourage maximum independence but intervene and supervise when necessary  - Involve family in performance of ADLs  - Assess for home care needs following discharge   - Consider OT consult to assist with ADL evaluation and planning for discharge  - Provide patient education as appropriate  Outcome: Progressing  Goal: Maintains/Returns to pre admission functional level  Description: INTERVENTIONS:  - Perform AM-PAC 6 Click Basic Mobility/ Daily Activity assessment daily.  - Set and communicate daily mobility goal to care team and patient/family/caregiver.   - Collaborate with rehabilitation services on mobility goals if consulted  - Perform Range of Motion 4 times a day.  - Reposition patient every 2 hours.  - Dangle patient 3 times a day  - Stand patient 3 times a day  - Ambulate patient 3 times a day  - Out of bed to chair 3 times a day   - Out of bed for meals 3 times a day  - Out of bed for toileting  - Record patient progress and toleration of activity level   Outcome: Progressing     Problem: DISCHARGE PLANNING  Goal: Discharge to home or other facility with appropriate resources  Description: INTERVENTIONS:  - Identify barriers to discharge w/patient and  caregiver  - Arrange for needed discharge resources and transportation as appropriate  - Identify discharge learning needs (meds, wound care, etc.)  - Arrange for interpretive services to assist at discharge as needed  - Refer to Case Management Department for coordinating discharge planning if the patient needs post-hospital services based on physician/advanced practitioner order or complex needs related to functional status, cognitive ability, or social support system  Outcome: Progressing     Problem: Knowledge Deficit  Goal: Patient/family/caregiver demonstrates understanding of disease process, treatment plan, medications, and discharge instructions  Description: Complete learning assessment and assess knowledge base.  Interventions:  - Provide teaching at level of understanding  - Provide teaching via preferred learning methods  Outcome: Progressing     Problem: Prexisting or High Potential for Compromised Skin Integrity  Goal: Skin integrity is maintained or improved  Description: INTERVENTIONS:  - Identify patients at risk for skin breakdown  - Assess and monitor skin integrity  - Assess and monitor nutrition and hydration status  - Monitor labs   - Assess for incontinence   - Turn and reposition patient  - Assist with mobility/ambulation  - Relieve pressure over bony prominences  - Avoid friction and shearing  - Provide appropriate hygiene as needed including keeping skin clean and dry  - Evaluate need for skin moisturizer/barrier cream  - Collaborate with interdisciplinary team   - Patient/family teaching  - Consider wound care consult   Outcome: Progressing

## 2024-01-19 NOTE — DISCHARGE SUMMARY
[facility]   Discharge - Name: Alejandra Patton I  MRN: [id number]  Unit/Bed#: -01 I Date of Admission: 1/12/2024   Date of Service: 1/19/2024 I Hospital Day: 6    Principal Problem:    Ambulatory dysfunction  Active Problems:    Hypertension    Chronic bilateral low back pain without sciatica      Medical Problems       Resolved Problems  Date Reviewed: 1/13/2024   None       Discharging Physician / Practitioner: Pool Giron DO  PCP: Margarita Holland MD  Admission Date:   Admission Orders (From admission, onward)       Ordered        01/13/24 1537  Inpatient Admission  Once            01/12/24 1754  Place in Observation  Once                          Discharge Date: 01/19/24    Disposition:    Other Skilled Nursing Facility at [place]    Discharge Diagnoses:   Please see assessment and plan section above for further details regarding discharge diagnoses.     Consultations During Hospital Stay:  IP CONSULT TO ORTHOPEDIC SURGERY    Procedures Performed:   None     Significant Findings / Test Results:   XR hip/pelv 2-3 vws right if performed    Result Date: 1/13/2024  Impression: No acute osseous abnormality. Degenerative changes as described. Workstation performed: [id number]       No Chest XR results available for this patient.       Incidental Findings:   None other than noted above. I reviewed the above mentioned incidental findings with the patient and/or family and they expressed understanding    Test Results Pending at Discharge (will require follow up):   None      Outpatient Tests Requested:  None     Complications:  none     Reason for Admission:   Chief Complaint   Patient presents with    Leg Pain     Pt has right leg pain that goes down to right knee as well as back pain that started yesterday. Pt isn't able to walk.        Hospital Course:      Alejandra Patton is a 89 y.o. female patient who originally presented to the hospital on 1/12/2024 due to ambulation issues,  "acute on chronic pain. Has chronic sciatica and hip pain.  PTA, Lives home alone, she would not be keen on rehab. Has family near hospital, but not near her home. Was seen by Ortho who recommended Medrol pack, awaiting short-term rehab placement, seeing spine specialist 1/30 outpatient setting.    Accepted to [facility].     Condition at Discharge: stable    Discharge Day Visit / Exam:   Subjective: Offers no new complaints at this time. No acute events reported overnight. Understanding of plan.  All questions answered. Eager for discharge.    Vitals: Blood Pressure: 141/70 (01/19/24 0713)  Pulse: 61 (01/19/24 0713)  Temperature: 99.1 °F (37.3 °C) (01/19/24 0713)  Temp Source: Oral (01/12/24 2122)  Respirations: 18 (01/18/24 0802)  Height: 5' 2\" (157.5 cm) (01/12/24 2122)  Weight - Scale: 65.6 kg (144 lb 10 oz) (01/12/24 2122)  SpO2: 96 % (01/19/24 0713)  Exam:   Physical Exam  Vitals and nursing note reviewed.   Constitutional:       Appearance: She is normal weight.   HENT:      Head: Normocephalic.      Nose: Nose normal.      Mouth/Throat:      Mouth: Mucous membranes are moist.      Pharynx: Oropharynx is clear.   Eyes:      General: No scleral icterus.     Conjunctiva/sclera: Conjunctivae normal.      Pupils: Pupils are equal, round, and reactive to light.   Cardiovascular:      Rate and Rhythm: Normal rate and regular rhythm.      Heart sounds: No murmur heard.     No friction rub. No gallop.   Pulmonary:      Effort: Pulmonary effort is normal. No respiratory distress.      Breath sounds: Normal breath sounds. No stridor. No wheezing, rhonchi or rales.   Abdominal:      General: Abdomen is flat.      Palpations: Abdomen is soft.   Musculoskeletal:         General: Normal range of motion.      Cervical back: Normal range of motion and neck supple.      Right lower leg: No edema.      Left lower leg: No edema.   Lymphadenopathy:      Cervical: No cervical adenopathy.   Skin:     General: Skin is warm.    "   Coloration: Skin is not jaundiced or pale.      Findings: No bruising, erythema or lesion.   Neurological:      General: No focal deficit present.      Mental Status: She is alert and oriented to person, place, and time. Mental status is at baseline.      Cranial Nerves: No cranial nerve deficit.      Motor: No weakness.   Psychiatric:         Mood and Affect: Mood normal.         Behavior: Behavior normal.         Thought Content: Thought content normal.        Discussion with Family: Patient declined family contact    Medication Adjustments and Discharge Medications:  Discharge Medication List: See after visit summary for reconciled discharge medications.   Medication Dosing Tapers - Please refer to Discharge Medication List for details on any medication dosing tapers (if applicable to patient).   Summary of Medication Adjustments made as a result of this hospitalization: as noted on med rec  Medications being temporarily held (include recommended restart time): as noted on med rec    Wound Care Recommendations:  When applicable, please see wound care section of After Visit Summary.    Instructions for any Catheters / Lines Present at Discharge (including removal date, if applicable): n/a    Diet Recommendations at Discharge:  Diet -        Diet Orders   (From admission, onward)                 Start     Ordered    01/19/24 1532  Dietary nutrition supplements  Once        Question Answer Comment   Select Supplement: Ensure Compact-Chocolate    Frequency Lunch        01/19/24 1531    01/19/24 1531  Dietary nutrition supplements  Once        Question Answer Comment   Select Supplement: Ensure Compact-Vanilla    Frequency Breakfast        01/19/24 1531    01/12/24 1755  Diet Regular; Regular House  Diet effective now        References:    Adult Nutrition Support Algorithm    RD Therapeutic Diet Order Protocol   Question Answer Comment   Diet Type Regular    Regular Regular House    RD to adjust diet per protocol?  Yes        01/12/24 6629                    Mobility at time of Discharge:   Basic Mobility Inpatient Raw Score: 18  JH-HLM Goal: 6: Walk 10 steps or more  JH-HLM Achieved: 7: Walk 25 feet or more  HLM Goal achieved. Continue to encourage appropriate mobility.    Goals of Care Discussions:  Code Status at Discharge: Level 1 - Full Code  Goals of care were not discussed during this admission.    Discharge instructions/Information to patient and family:   See after visit summary section titled Discharge Instructions for information provided to patient and family.      Planned Readmission: none      Discharge Statement:  I spent 39 minutes discharging the patient. This time was spent on the day of discharge. I had direct contact with the patient on the day of discharge. Greater than 50% of the total time was spent examining patient, answering all patient questions, arranging and discussing plan of care with patient as well as directly providing post-discharge instructions.  Additional time then spent on discharge activities.    **Please Note: This note may have been constructed using a voice recognition system.**

## 2024-01-22 ENCOUNTER — TRANSITIONAL CARE MANAGEMENT (OUTPATIENT)
Dept: INTERNAL MEDICINE CLINIC | Facility: CLINIC | Age: 89
End: 2024-01-22

## 2024-01-23 ENCOUNTER — TELEMEDICINE (OUTPATIENT)
Dept: INTERNAL MEDICINE CLINIC | Facility: CLINIC | Age: 89
End: 2024-01-23
Payer: COMMERCIAL

## 2024-01-23 DIAGNOSIS — M70.61 GREATER TROCHANTERIC BURSITIS, RIGHT: ICD-10-CM

## 2024-01-23 DIAGNOSIS — G89.4 CHRONIC PAIN SYNDROME: Primary | ICD-10-CM

## 2024-01-23 DIAGNOSIS — R26.2 AMBULATORY DYSFUNCTION: ICD-10-CM

## 2024-01-23 DIAGNOSIS — M16.11 PRIMARY OSTEOARTHRITIS OF RIGHT HIP: ICD-10-CM

## 2024-01-23 PROCEDURE — 99496 TRANSJ CARE MGMT HIGH F2F 7D: CPT | Performed by: INTERNAL MEDICINE

## 2024-01-23 RX ORDER — PREDNISONE 10 MG/1
TABLET ORAL
Qty: 30 TABLET | Refills: 0 | Status: SHIPPED | OUTPATIENT
Start: 2024-01-23 | End: 2024-02-02

## 2024-01-23 NOTE — PROGRESS NOTES
Virtual TCM Visit:    Verification of patient location:    Patient is located at Home in the following state in which I hold an active license [place]    Assessment/Plan:          Problem List Items Addressed This Visit       Greater trochanteric bursitis, right    Relevant Medications    predniSONE 10 mg tablet    Chronic pain syndrome - Primary    Relevant Medications    predniSONE 10 mg tablet    Ambulatory dysfunction    Relevant Medications    predniSONE 10 mg tablet    Primary osteoarthritis of right hip    Relevant Medications    predniSONE 10 mg tablet        Reason for visit is TCM visit.    Encounter provider Margarita Holland MD     Provider located at [facility] INTERNAL MEDICINE [place]  [address]  [place] [id number] [phone]    Recent Visits  No visits were found meeting these conditions.  Showing recent visits within past 7 days and meeting all other requirements  Today's Visits  Date Type Provider Dept   01/23/24 Telemedicine Margarita Holland MD  Internal [facility]   Showing today's visits and meeting all other requirements  Future Appointments  No visits were found meeting these conditions.  Showing future appointments within next 150 days and meeting all other requirements       After connecting through [employer], the patient was identified by name and date of birth. Alejandra Patton was informed that this is a telemedicine visit and that the visit is being conducted through the Epic Embedded platform. She agrees to proceed..  My office door was closed. No one else was in the room.  She acknowledged consent and understanding of privacy and security of the video platform. The patient has agreed to participate and understands they can discontinue the visit at any time.    Patient is aware this is a billable service.    Transitional Care Management Review:  Alejandra Patton is a 89 y.o. female here for TCM follow up.     During the TCM phone call  patient stated:    TCM Call       Date and time call was made  1/22/2024 11:47 AM    Hospital care reviewed  Records reviewed    Patient was hospitialized at  Other (comment)    Comment  rehab    Date of Admission  01/12/24    Date of discharge  01/21/24    Diagnosis  Ambulatory dysfunction    Disposition  Home    Were the patients medications reviewed and updated  Yes    Current Symptoms  None          TCM Call       Post hospital issues  None    Scheduled for follow up?  Yes    Did you obtain your prescribed medications  Yes    Do you need help managing your prescriptions or medications  No    Is transportation to your appointment needed  No    I have advised the patient to call PCP with any new or worsening symptoms  April Christina, Practice Coordinator    Living Arrangements  Family members          Subjective:     Patient ID: Alejandra Patton is a 89 y.o. female.    Here for TCM visit; we reviewed  recent hospitalization, dc summary and dc instructions.    Admitted on 1/12/2024 due to ambulation issues, acute on chronic pain. Has chronic sciatica and hip pain. Declined rehab, will be seeing pain management on 1/30, asking for pain medication until then, pain management did give her 1 week's worth of Vicodin on 1/2, will defer pain medication to them. Steroids sent. Patient says she has the support of her family who lives nearby.      Review of Systems   Constitutional:  Negative for chills and fever.   HENT:  Negative for ear pain and sore throat.    Eyes:  Negative for pain and visual disturbance.   Respiratory:  Negative for cough and shortness of breath.    Cardiovascular:  Negative for chest pain and palpitations.   Gastrointestinal:  Negative for abdominal pain and vomiting.   Genitourinary:  Negative for dysuria and hematuria.   Musculoskeletal:  Positive for arthralgias, back pain and gait problem.   Skin:  Negative for color change and rash.   Neurological:  Negative for seizures and syncope.   All  other systems reviewed and are negative.        Objective:    There were no vitals filed for this visit.    Physical Exam  Vitals and nursing note reviewed.   Pulmonary:      Effort: Pulmonary effort is normal.   Neurological:      General: No focal deficit present.      Mental Status: She is alert and oriented to person, place, and time.   Psychiatric:         Mood and Affect: Mood normal.         Medications have been reviewed by provider in current encounter    I spent 20 minutes with the patient during this visit.    Margarita Holland MD      VIRTUAL VISIT DISCLAIMER    Alejandrakirby Patton verbally agrees to participate in Virtual Care Services. Pt is aware that Virtual Care Services could be limited without vital signs or the ability to perform a full hands-on physical exam. Alejandra Abdi understands she or the provider may request at any time to terminate the video visit and request the patient to seek care or treatment in person.

## 2024-01-23 NOTE — UTILIZATION REVIEW
NOTIFICATION OF ADMISSION DISCHARGE   This is a Notification of Discharge from [facility]. Please be advised that this patient has been discharge from our facility. Below you will find the admission and discharge date and time including the patient’s disposition.   UTILIZATION REVIEW CONTACT:  Manju Mcgarry  Utilization   Network Utilization Review Department  Phone: [phone] x carefully listen to the prompts. All voicemails are confidential.  Email: [email or web address]     ADMISSION INFORMATION  PRESENTATION DATE: 1/12/2024  1:59 PM  OBERVATION ADMISSION DATE: 1/12/24  INPATIENT ADMISSION DATE: 1/13/24  3:37 PM   DISCHARGE DATE: 1/19/2024  7:45 PM   DISPOSITION:Non [facility] SNF/TCU/SNU    Network Utilization Review Department  ATTENTION: Please call with any questions or concerns to [phone] and carefully listen to the prompts so that you are directed to the right person. All voicemails are confidential.   For Discharge needs, contact Care Management DC Support Team at [phone] opt. 2  Send all requests for admission clinical reviews, approved or denied determinations and any other requests to dedicated fax number below belonging to the campus where the patient is receiving treatment. List of dedicated fax numbers for the Facilities:  FACILITY NAME UR FAX NUMBER   ADMISSION DENIALS (Administrative/Medical Necessity) [phone]   DISCHARGE SUPPORT TEAM ([facility]) [phone]   PARENT CHILD HEALTH (Maternity/NICU/Pediatrics) [phone]   General acute hospital [phone]   [facility] [phone]   [facility] [phone]   [facility] [phone]   Critical access hospital [phone]   [facility] [phone]   [facility] [phone]   [facility]  [place] [phone]   [facility] [phone]   Critical access hospital [phone]   [facility] [phone]

## 2024-01-30 ENCOUNTER — HOSPITAL ENCOUNTER (OUTPATIENT)
Dept: RADIOLOGY | Facility: CLINIC | Age: 89
Discharge: HOME | End: 2024-01-30
Payer: COMMERCIAL

## 2024-01-30 VITALS
RESPIRATION RATE: 20 BRPM | OXYGEN SATURATION: 95 % | TEMPERATURE: 99 F | DIASTOLIC BLOOD PRESSURE: 89 MMHG | SYSTOLIC BLOOD PRESSURE: 157 MMHG | HEART RATE: 78 BPM

## 2024-01-30 DIAGNOSIS — M54.16 LUMBAR RADICULOPATHY: ICD-10-CM

## 2024-01-30 RX ORDER — METHYLPREDNISOLONE ACETATE 80 MG/ML
80 INJECTION, SUSPENSION INTRA-ARTICULAR; INTRALESIONAL; INTRAMUSCULAR; PARENTERAL; SOFT TISSUE ONCE
Status: COMPLETED | OUTPATIENT
Start: 2024-01-30 | End: 2024-01-30

## 2024-01-30 RX ADMIN — METHYLPREDNISOLONE ACETATE 80 MG: 80 INJECTION, SUSPENSION INTRA-ARTICULAR; INTRALESIONAL; INTRAMUSCULAR; PARENTERAL; SOFT TISSUE at 13:41

## 2024-01-30 NOTE — INTERVAL H&P NOTE
Update: (This section must be completed if the H&P was completed greater than 24 hrs to procedure or admission)    H&P reviewed. After examining the patient, I find no changed to the H&P since it had been written.    Patient re-evaluated. Accept as history and physical.    Emeterio Marinelli MD/January 30, 2024/1:00 PM

## 2024-01-30 NOTE — DISCHARGE INSTR - LAB
Epidural Steroid Injection   WHAT YOU NEED TO KNOW:   An epidural steroid injection (VIVEK) is a procedure to inject steroid medicine into the epidural space. The epidural space is between your spinal cord and vertebrae. Steroids reduce inflammation and fluid buildup in your spine that may be causing pain. You may be given pain medicine along with the steroids.          ACTIVITY  Do not drive or operate machinery today.  No strenuous activity today - bending, lifting, etc.  You may resume normal activites starting tomorrow - start slowly and as tolerated.  You may shower today, but no tub baths or hot tubs.  You may have numbness for several hours from the local anesthetic. Please use caution and common sense, especially with weight-bearing activities.    CARE OF THE INJECTION SITE  If you have soreness or pain, apply ice to the area today (20 minutes on/20 minutes off).  Starting tomorrow, you may use warm, moist heat or ice if needed.  You may have an increase or change in your discomfort for 36-48 hours after your treatment.  Apply ice and continue with any pain medication you have been prescribed.  Notify the [facility] if you have any of the following: redness, drainage, swelling, headache, stiff neck or fever above 100°F.    SPECIAL INSTRUCTIONS  Our office will contact you in approximately 7 days for a progress report.    MEDICATIONS  Continue to take all routine medications.  Our office may have instructed you to hold some medications.    As no general anesthesia was used in today's procedure, you should not experience any side effects related to anesthesia.     If you are diabetic, the steroids used in today's injection may temporarily increase your blood sugar levels after the first few days after your injection. Please keep a close eye on your sugars and alert the doctor who manages your diabetes if your sugars are significantly high from your baseline or you are symptomatic.     If you have a  problem specifically related to your procedure, please call our office at [phone].  Problems not related to your procedure should be directed to your primary care physician.

## 2024-02-06 ENCOUNTER — TELEPHONE (OUTPATIENT)
Dept: PAIN MEDICINE | Facility: CLINIC | Age: 89
End: 2024-02-06

## 2024-02-06 NOTE — TELEPHONE ENCOUNTER
S/w pt, explained that the injection can take up to 2 weeks for the full effects. Pt is taking Tylenol, pain patches and ice with some relief. Please call pt next week for a 2 week f/u call, pt does not use [facility].

## 2024-02-06 NOTE — TELEPHONE ENCOUNTER
Caller: Patient    Doctor: Isis    CB#: [phone]    % of improvement: 50%    Pain Scale (0-10): 8/10      Pt is audibly upset on the phone and began crying when I let her know we would be checking in with her next week. Pt states she is in too much pain and needs some relief now.    Pt requesting a call next week instead of a MyChart message

## 2024-02-13 NOTE — TELEPHONE ENCOUNTER
Pt reports 50-60% improvement 2wk post inj   Pain level 1-2/10  She wants to know if there is something she should be doing to help her

## 2024-02-13 NOTE — TELEPHONE ENCOUNTER
S/w pt. She does want to make a f/u appointment to discuss as she feels she needs more relief. Appt scheduled for 2/21

## 2024-02-13 NOTE — TELEPHONE ENCOUNTER
May consider repeat if it is helping but she hasn't gotten enough relief. Injection does not eliminate pain 100%. Should have a follow up visit after injection if she wishes to repeat

## 2024-02-21 ENCOUNTER — OFFICE VISIT (OUTPATIENT)
Dept: PAIN MEDICINE | Facility: CLINIC | Age: 89
End: 2024-02-21
Payer: COMMERCIAL

## 2024-02-21 VITALS
BODY MASS INDEX: 26.5 KG/M2 | HEIGHT: 62 IN | DIASTOLIC BLOOD PRESSURE: 76 MMHG | SYSTOLIC BLOOD PRESSURE: 118 MMHG | WEIGHT: 144 LBS | HEART RATE: 73 BPM

## 2024-02-21 DIAGNOSIS — Z98.890 HISTORY OF LUMBAR LAMINECTOMY: ICD-10-CM

## 2024-02-21 DIAGNOSIS — G89.4 CHRONIC PAIN SYNDROME: ICD-10-CM

## 2024-02-21 DIAGNOSIS — M47.816 LUMBAR FACET ARTHROPATHY: Primary | ICD-10-CM

## 2024-02-21 DIAGNOSIS — M53.3 SACROILIAC PAIN: ICD-10-CM

## 2024-02-21 PROBLEM — Z00.00 MEDICARE ANNUAL WELLNESS VISIT, SUBSEQUENT: Status: RESOLVED | Noted: 2020-06-18 | Resolved: 2024-02-21

## 2024-02-21 PROCEDURE — 99214 OFFICE O/P EST MOD 30 MIN: CPT | Performed by: STUDENT IN AN ORGANIZED HEALTH CARE EDUCATION/TRAINING PROGRAM

## 2024-02-21 NOTE — H&P (VIEW-ONLY)
Pain Medicine Follow-Up Note    Assessment:  1. Lumbar facet arthropathy    2. History of lumbar laminectomy    3. Chronic pain syndrome    4. Sacroiliac pain        Plan:  Orders Placed This Encounter   Procedures    [place] spine and pain procedure     Standing Status:   Future     Standing Expiration Date:   2/21/2028     Order Specific Question:   Reason for Exam:     Answer:   right L4-5 and L5-S1 MBB #1     Order Specific Question:   Anticoagulant hold needed?     Answer:   No       No orders of the defined types were placed in this encounter.      My impressions and treatment recommendations were discussed in detail with the patient who verbalized understanding and had no further questions.      89-year-old female returns her office following L1-2 lumbar epidural steroid injection with about 60% improvement.  She is able to tolerate which she is at without significant pain.  However still continues to have ongoing persistent nagging right-sided lower back pain that is notably worsened with extending her back and facet loading.  Given negative regards to injection and LESI with this particular issue, it never seems that her facet joints may be a source of her symptoms.  Discussed right L4-5 and L5-S1 medial branch block with possible radiofrequency ablation of greater than 80% relief.      We did discuss possible low-dose opioid treatment, however much of these medications make her sick, including tramadol and oxycodone.  Injection is not helpful, consider [facility] Prescription Drug Monitoring Program report was reviewed and was appropriate         Complete risks and benefits including bleeding, infection, tissue reaction, nerve injury and allergic reaction were discussed. The approach was demonstrated using models and literature was provided. Verbal and written consent was obtained.    Discharge instructions were provided. I personally saw and examined the patient and I agree with the above  discussed plan of care.    History of Present Illness:    Alejandra Patton is a 89 y.o. female who presents to [facility] for interval re-evaluation of the above stated pain complaints. The patient has a past medical and chronic pain history as outlined in the assessment section. She was last seen on 1/30/2024 for L1-2 LESI with about 60% relief initially.  Reports 5 out of 10 pain today in the lumbar paraspinal region. On the right  Worse in the morning.  Intermittent, sharp in nature..    Other than as stated above, the patient denies any interval changes in medications, medical condition, mental condition, symptoms, or allergies since the last office visit.         Review of Systems:    Review of Systems   Musculoskeletal:  Positive for back pain and gait problem.         Past Medical History:   Diagnosis Date    Cardiac disorder 01/01/2001    x2 stents after a failed stress test    Cellulitis     last assessed - 19Nov2012    Constipation     last assessed - 14Mar2013    COVID-19 08/15/2022    Diabetes mellitus (HCC)     Disease of thyroid gland     Ecchymosis     last assessed - 03Jan2017    Fall     last assessed - 03Jan2017    Hyperlipidemia     Hypertension     Hypokalemia     last assessed - 02Jun2015    Lower extremity weakness     last assessed - [id number]    Vitamin D deficiency     last assessed - [id number]       Past Surgical History:   Procedure Laterality Date    BACK SURGERY      CHOLECYSTECTOMY      COLONOSCOPY      CORONARY ANGIOPLASTY WITH STENT PLACEMENT      CT BONE MARROW BIOPSY AND ASPIRATION  1/15/2020    HYSTERECTOMY      OTHER SURGICAL HISTORY      Previous stent placement       Family History   Problem Relation Age of Onset    Heart disease Mother     Other Father         cerebral embolism - with cerebral infarction       Social History     Occupational History    Not on file   Tobacco Use    Smoking status: Former     Types: Cigarettes     Start date: 5/7/1958     "Smokeless tobacco: Never   Vaping Use    Vaping status: Never Used   Substance and Sexual Activity    Alcohol use: Never    Drug use: No    Sexual activity: Never     Partners: Male         Current Outpatient Medications:     amLODIPine (NORVASC) 5 mg tablet, Take 2 tablets (10 mg total) by mouth daily, Disp: 180 tablet, Rfl: 0    aspirin 81 mg chewable tablet, Chew 81 mg, Disp: , Rfl:     atorvastatin (LIPITOR) 40 mg tablet, Take 1 tablet (40 mg total) by mouth daily, Disp: 90 tablet, Rfl: 3    baclofen 10 mg tablet, TAKE 1 TABLET BY MOUTH NIGHTLY AT BEDTIME AS NEEDED AS DIRECTED, Disp: , Rfl: 3    carvedilol (COREG) 12.5 mg tablet, Take 1 tablet (12.5 mg total) by mouth 2 (two) times a day with meals, Disp: 180 tablet, Rfl: 1    Cholecalciferol (VITAMIN D3) 2000 units capsule, Take 1 capsule (2,000 Units total) by mouth daily, Disp: 100 capsule, Rfl: 2    latanoprost (XALATAN) 0.005 % ophthalmic solution, INSTILL 1 DROP INTO EACH EYE AT BEDTIME, Disp: 9 mL, Rfl: 0    levothyroxine 50 mcg tablet, Take 1 tablet (50 mcg total) by mouth daily, Disp: 90 tablet, Rfl: 1    lidocaine (Lidoderm) 5 %, Apply 1 patch topically daily Remove & Discard patch within 12 hours or as directed by MD, Disp: 30 patch, Rfl: 0    Multiple Vitamins-Minerals (PRESERVISION AREDS 2 PO), Take by mouth, Disp: , Rfl:     pioglitazone (ACTOS) 30 mg tablet, Take 1 tablet (30 mg total) by mouth daily, Disp: 90 tablet, Rfl: 1    zolpidem (AMBIEN) 5 mg tablet, Take 5 mg by mouth, Disp: , Rfl:     Current Facility-Administered Medications:     cyanocobalamin injection 1,000 mcg, 1,000 mcg, Intramuscular, Q30 Days, Sukumar Valencia DO, 1,000 mcg at 02/27/19 1250    Allergies   Allergen Reactions    Iodine - Food Allergy Other (See Comments)     IV CONTRAST DYE    Iv Dye  [Iodinated Contrast Media] Hives    Other        Physical Exam:    /76   Pulse 73   Ht 5' 2\" (1.575 m)   Wt 65.3 kg (144 lb)   BMI 26.34 kg/m²     Constitutional:normal, well " developed, well nourished, alert, in no distress and non-toxic and no overt pain behavior.  Eyes:anicteric  HEENT:grossly intact  Neck:supple, symmetric, trachea midline and no masses   Pulmonary:even and unlabored  Cardiovascular:No edema or pitting edema present  Skin:Normal without rashes or lesions and well hydrated  Psychiatric:Mood and affect appropriate  Neurologic:Cranial Nerves II-XII grossly intact  Musculoskeletal: Facet loading positive on the right      Imaging  FL spine and pain procedure    (Results Pending)         Orders Placed This Encounter   Procedures    FL spine and pain procedure

## 2024-02-21 NOTE — PROGRESS NOTES
Pain Medicine Follow-Up Note    Assessment:  1. Lumbar facet arthropathy    2. History of lumbar laminectomy    3. Chronic pain syndrome    4. Sacroiliac pain        Plan:  Orders Placed This Encounter   Procedures    FL spine and pain procedure     Standing Status:   Future     Standing Expiration Date:   2/21/2028     Order Specific Question:   Reason for Exam:     Answer:   right L4-5 and L5-S1 MBB #1     Order Specific Question:   Anticoagulant hold needed?     Answer:   No       No orders of the defined types were placed in this encounter.      My impressions and treatment recommendations were discussed in detail with the patient who verbalized understanding and had no further questions.      89-year-old female returns her office following L1-2 lumbar epidural steroid injection with about 60% improvement.  She is able to tolerate which she is at without significant pain.  However still continues to have ongoing persistent nagging right-sided lower back pain that is notably worsened with extending her back and facet loading.  Given negative regards to injection and LESI with this particular issue, it never seems that her facet joints may be a source of her symptoms.  Discussed right L4-5 and L5-S1 medial branch block with possible radiofrequency ablation of greater than 80% relief.      We did discuss possible low-dose opioid treatment, however much of these medications make her sick, including tramadol and oxycodone.  Injection is not helpful, consider [facility] Prescription Drug Monitoring Program report was reviewed and was appropriate         Complete risks and benefits including bleeding, infection, tissue reaction, nerve injury and allergic reaction were discussed. The approach was demonstrated using models and literature was provided. Verbal and written consent was obtained.    Discharge instructions were provided. I personally saw and examined the patient and I agree with the above  discussed plan of care.    History of Present Illness:    Alejandra Patton is a 89 y.o. female who presents to [facility] for interval re-evaluation of the above stated pain complaints. The patient has a past medical and chronic pain history as outlined in the assessment section. She was last seen on 1/30/2024 for L1-2 LESI with about 60% relief initially.  Reports 5 out of 10 pain today in the lumbar paraspinal region. On the right  Worse in the morning.  Intermittent, sharp in nature..    Other than as stated above, the patient denies any interval changes in medications, medical condition, mental condition, symptoms, or allergies since the last office visit.         Review of Systems:    Review of Systems   Musculoskeletal:  Positive for back pain and gait problem.         Past Medical History:   Diagnosis Date    Cardiac disorder 01/01/2001    x2 stents after a failed stress test    Cellulitis     last assessed - 19Nov2012    Constipation     last assessed - 14Mar2013    COVID-19 08/15/2022    Diabetes mellitus (HCC)     Disease of thyroid gland     Ecchymosis     last assessed - 03Jan2017    Fall     last assessed - 03Jan2017    Hyperlipidemia     Hypertension     Hypokalemia     last assessed - 02Jun2015    Lower extremity weakness     last assessed - [id number]    Vitamin D deficiency     last assessed - [id number]       Past Surgical History:   Procedure Laterality Date    BACK SURGERY      CHOLECYSTECTOMY      COLONOSCOPY      CORONARY ANGIOPLASTY WITH STENT PLACEMENT      CT BONE MARROW BIOPSY AND ASPIRATION  1/15/2020    HYSTERECTOMY      OTHER SURGICAL HISTORY      Previous stent placement       Family History   Problem Relation Age of Onset    Heart disease Mother     Other Father         cerebral embolism - with cerebral infarction       Social History     Occupational History    Not on file   Tobacco Use    Smoking status: Former     Types: Cigarettes     Start date: 5/7/1958     "Smokeless tobacco: Never   Vaping Use    Vaping status: Never Used   Substance and Sexual Activity    Alcohol use: Never    Drug use: No    Sexual activity: Never     Partners: Male         Current Outpatient Medications:     amLODIPine (NORVASC) 5 mg tablet, Take 2 tablets (10 mg total) by mouth daily, Disp: 180 tablet, Rfl: 0    aspirin 81 mg chewable tablet, Chew 81 mg, Disp: , Rfl:     atorvastatin (LIPITOR) 40 mg tablet, Take 1 tablet (40 mg total) by mouth daily, Disp: 90 tablet, Rfl: 3    baclofen 10 mg tablet, TAKE 1 TABLET BY MOUTH NIGHTLY AT BEDTIME AS NEEDED AS DIRECTED, Disp: , Rfl: 3    carvedilol (COREG) 12.5 mg tablet, Take 1 tablet (12.5 mg total) by mouth 2 (two) times a day with meals, Disp: 180 tablet, Rfl: 1    Cholecalciferol (VITAMIN D3) 2000 units capsule, Take 1 capsule (2,000 Units total) by mouth daily, Disp: 100 capsule, Rfl: 2    latanoprost (XALATAN) 0.005 % ophthalmic solution, INSTILL 1 DROP INTO EACH EYE AT BEDTIME, Disp: 9 mL, Rfl: 0    levothyroxine 50 mcg tablet, Take 1 tablet (50 mcg total) by mouth daily, Disp: 90 tablet, Rfl: 1    lidocaine (Lidoderm) 5 %, Apply 1 patch topically daily Remove & Discard patch within 12 hours or as directed by MD, Disp: 30 patch, Rfl: 0    Multiple Vitamins-Minerals (PRESERVISION AREDS 2 PO), Take by mouth, Disp: , Rfl:     pioglitazone (ACTOS) 30 mg tablet, Take 1 tablet (30 mg total) by mouth daily, Disp: 90 tablet, Rfl: 1    zolpidem (AMBIEN) 5 mg tablet, Take 5 mg by mouth, Disp: , Rfl:     Current Facility-Administered Medications:     cyanocobalamin injection 1,000 mcg, 1,000 mcg, Intramuscular, Q30 Days, Sukumar Valencia DO, 1,000 mcg at 02/27/19 1250    Allergies   Allergen Reactions    Iodine - Food Allergy Other (See Comments)     IV CONTRAST DYE    Iv Dye  [Iodinated Contrast Media] Hives    Other        Physical Exam:    /76   Pulse 73   Ht 5' 2\" (1.575 m)   Wt 65.3 kg (144 lb)   BMI 26.34 kg/m²     Constitutional:normal, well " developed, well nourished, alert, in no distress and non-toxic and no overt pain behavior.  Eyes:anicteric  HEENT:grossly intact  Neck:supple, symmetric, trachea midline and no masses   Pulmonary:even and unlabored  Cardiovascular:No edema or pitting edema present  Skin:Normal without rashes or lesions and well hydrated  Psychiatric:Mood and affect appropriate  Neurologic:Cranial Nerves II-XII grossly intact  Musculoskeletal: Facet loading positive on the right      Imaging  FL spine and pain procedure    (Results Pending)         Orders Placed This Encounter   Procedures    FL spine and pain procedure

## 2024-02-28 DIAGNOSIS — E11.8 TYPE 2 DIABETES MELLITUS WITH COMPLICATION, WITHOUT LONG-TERM CURRENT USE OF INSULIN (HCC): ICD-10-CM

## 2024-02-28 DIAGNOSIS — E78.5 HYPERLIPIDEMIA, UNSPECIFIED HYPERLIPIDEMIA TYPE: ICD-10-CM

## 2024-02-28 RX ORDER — PIOGLITAZONEHYDROCHLORIDE 30 MG/1
30 TABLET ORAL DAILY
Qty: 90 TABLET | Refills: 1 | Status: SHIPPED | OUTPATIENT
Start: 2024-02-28

## 2024-03-19 ENCOUNTER — HOSPITAL ENCOUNTER (OUTPATIENT)
Dept: RADIOLOGY | Facility: CLINIC | Age: 89
Discharge: HOME | End: 2024-03-19
Payer: COMMERCIAL

## 2024-03-19 VITALS
HEART RATE: 77 BPM | OXYGEN SATURATION: 93 % | TEMPERATURE: 99.1 F | SYSTOLIC BLOOD PRESSURE: 155 MMHG | RESPIRATION RATE: 18 BRPM | DIASTOLIC BLOOD PRESSURE: 80 MMHG

## 2024-03-19 DIAGNOSIS — M47.816 LUMBAR FACET ARTHROPATHY: ICD-10-CM

## 2024-03-19 PROCEDURE — 64494 INJ PARAVERT F JNT L/S 2 LEV: CPT | Performed by: STUDENT IN AN ORGANIZED HEALTH CARE EDUCATION/TRAINING PROGRAM

## 2024-03-19 PROCEDURE — 64493 INJ PARAVERT F JNT L/S 1 LEV: CPT | Performed by: STUDENT IN AN ORGANIZED HEALTH CARE EDUCATION/TRAINING PROGRAM

## 2024-03-19 RX ORDER — BUPIVACAINE HCL/PF 2.5 MG/ML
1.5 VIAL (ML) INJECTION ONCE
Status: COMPLETED | OUTPATIENT
Start: 2024-03-19 | End: 2024-03-19

## 2024-03-19 RX ADMIN — Medication 1.5 ML: at 14:15

## 2024-03-19 NOTE — DISCHARGE INSTR - LAB

## 2024-03-19 NOTE — INTERVAL H&P NOTE
Update: (This section must be completed if the H&P was completed greater than 24 hrs to procedure or admission)    H&P reviewed. After examining the patient, I find no changed to the H&P since it had been written.    Patient re-evaluated. Accept as history and physical.    Emeterio Marinelli MD/March 19, 2024/2:05 PM

## 2024-03-26 DIAGNOSIS — I25.10 CORONARY ARTERY DISEASE DUE TO CALCIFIED CORONARY LESION: ICD-10-CM

## 2024-03-26 DIAGNOSIS — I25.84 CORONARY ARTERY DISEASE DUE TO CALCIFIED CORONARY LESION: ICD-10-CM

## 2024-03-26 RX ORDER — CARVEDILOL 12.5 MG/1
12.5 TABLET ORAL 2 TIMES DAILY WITH MEALS
Qty: 180 TABLET | Refills: 1 | Status: SHIPPED | OUTPATIENT
Start: 2024-03-26

## 2024-03-28 ENCOUNTER — TELEPHONE (OUTPATIENT)
Age: 89
End: 2024-03-28

## 2024-03-28 NOTE — TELEPHONE ENCOUNTER
Caller: Patient    Doctor: Isis    Reason for call: Pt calling for update on MBB that she had on 3/19/24. Pt states she faxed pain diary at a [employer] store on 3/21. I advised I did not see it scanned into chart.. Pt does not have a smart phone to upload diary via [employer] and states she does not have it anymore..    Pt verbally reported;   Immediately after procedure- very painful- no relief    1 hr after, about 50% relief while laying on hip (but typically always gets this relief while laying on hip)    2 hours and on- no relief for the rest of the day    Call back#: [phone]

## 2024-03-28 NOTE — TELEPHONE ENCOUNTER
Failed trial.We've now done several injections with no relief. I think she should return to see [facility] for medication mgmt to at least get her chronic symptoms under control. At that visit can reevaluate and see if she needs referral to ortho

## 2024-04-02 DIAGNOSIS — E03.9 HYPOTHYROIDISM, UNSPECIFIED TYPE: ICD-10-CM

## 2024-04-02 RX ORDER — LEVOTHYROXINE SODIUM 0.05 MG/1
50 TABLET ORAL DAILY
Qty: 90 TABLET | Refills: 1 | Status: SHIPPED | OUTPATIENT
Start: 2024-04-02

## 2024-04-03 NOTE — PROGRESS NOTES
Pain Medicine Follow-Up Note    Assessment:  1. Chronic pain syndrome    2. Spinal stenosis of lumbar region with neurogenic claudication    3. Primary osteoarthritis of right hip    4. Lumbar spondylosis        Plan:    New Medications Ordered This Visit   Medications    acetaminophen (TYLENOL) 500 mg tablet     Sig: Take 500 mg by mouth    transdermal buprenorphine (Butrans) 5 mcg/hr TD patch     Sig: Place 1 patch on the skin over 7 days every 7 days     Dispense:  4 patch     Refill:  0     Trialing butrans, will have opioid agreement if patient is able to tolerate butrans       My impressions and treatment recommendations were discussed in detail with the patient who verbalized understanding and had no further questions.      The patient returns to the office after having a failed medial branch block of her lumbar spine on 3/19/2024.  Today the patient has severe low back pain and right hip pain that she is rating 10 out of 10 on the verbal numeric pain scale.  Unfortunately in the past the patient has trialed tramadol, hydrocodone, and oxycodone which have all given her significant side effects, such as nausea and vomiting.  Expressed to the patient that we are limited but we can trial buprenorphine transdermal patch.  Educated the patient on buprenorphine.  Explained to the patient that she needs to place the patch on her skin for 7 days and then at that time she would remove the patch and apply a new patch in a different area of her body.  Also explained to the patient that the first time trialing this medication she may not have any effect for the first 24 to 48 hours.  If the patient develops any kind of rash or skin irritation we then would then try Belbuca next.  Patient verbalized understanding.    [place] Prescription Drug Monitoring Program report was reviewed and was appropriate     There are risks associated with opioid medications, including dependence, addiction and tolerance. The patient  understands and agrees to use these medications only as prescribed. Potential side effects of the medications include, but are not limited to, constipation, drowsiness, addiction, impaired judgment and risk of fatal overdose if not taken as prescribed. The patient was warned against driving while taking sedation medications.  Sharing medications is a felony. At this point in time, the patient is showing no signs of addiction, abuse, diversion or suicidal ideation.    Follow-up is planned in 4 weeks time or sooner as warranted.  Discharge instructions were provided. I personally saw and examined the patient and I agree with the above discussed plan of care.    History of Present Illness:    Alejandra Patton is a 89 y.o. female who presents to [facility] for interval re-evaluation of the above stated pain complaints. The patient has a past medical and chronic pain history as outlined in the assessment section. She was last seen on 3/19/2024.    At today's visit patient states that their pain symptoms are worse with a pain score of 10/10 on the verbal numeric pain scale.  The patient's pain is worse in the morning.  The patient's pain is intermittent in nature.  And the quality of the patient's pain is described as dull-aching, sharp, .  The patient's pain is located in the bilateral low back and right hip..    Other than as stated above, the patient denies any interval changes in medications, medical condition, mental condition, symptoms, or allergies since the last office visit.         Review of Systems:    Review of Systems   Respiratory:  Negative for shortness of breath.    Cardiovascular:  Negative for chest pain.   Gastrointestinal:  Negative for constipation, diarrhea, nausea and vomiting.   Musculoskeletal:  Positive for back pain, gait problem and myalgias. Negative for arthralgias and joint swelling.        DROM  Muscle weakness  Joint stiffness   Skin:  Negative for rash.    Neurological:  Negative for dizziness, seizures and weakness.   All other systems reviewed and are negative.        Past Medical History:   Diagnosis Date    Cardiac disorder 01/01/2001    x2 stents after a failed stress test    Cellulitis     last assessed - 19Nov2012    Constipation     last assessed - 14Mar2013    COVID-19 08/15/2022    Diabetes mellitus (HCC)     Disease of thyroid gland     Ecchymosis     last assessed - 03Jan2017    Fall     last assessed - 03Jan2017    Hyperlipidemia     Hypertension     Hypokalemia     last assessed - 02Jun2015    Lower extremity weakness     last assessed - 21May2015    Vitamin D deficiency     last assessed - 16Aug2017       Past Surgical History:   Procedure Laterality Date    BACK SURGERY      CHOLECYSTECTOMY      COLONOSCOPY      CORONARY ANGIOPLASTY WITH STENT PLACEMENT      CT BONE MARROW BIOPSY AND ASPIRATION  1/15/2020    HYSTERECTOMY      OTHER SURGICAL HISTORY      Previous stent placement       Family History   Problem Relation Age of Onset    Heart disease Mother     Other Father         cerebral embolism - with cerebral infarction       Social History     Occupational History    Not on file   Tobacco Use    Smoking status: Former     Types: Cigarettes     Start date: 5/7/1958    Smokeless tobacco: Never   Vaping Use    Vaping status: Never Used   Substance and Sexual Activity    Alcohol use: Never    Drug use: No    Sexual activity: Never     Partners: Male         Current Outpatient Medications:     acetaminophen (TYLENOL) 500 mg tablet, Take 500 mg by mouth, Disp: , Rfl:     amLODIPine (NORVASC) 5 mg tablet, Take 2 tablets (10 mg total) by mouth daily, Disp: 180 tablet, Rfl: 0    aspirin 81 mg chewable tablet, Chew 81 mg, Disp: , Rfl:     atorvastatin (LIPITOR) 40 mg tablet, Take 1 tablet (40 mg total) by mouth daily, Disp: 90 tablet, Rfl: 3    baclofen 10 mg tablet, TAKE 1 TABLET BY MOUTH NIGHTLY AT BEDTIME AS NEEDED AS DIRECTED, Disp: , Rfl: 3     "carvedilol (COREG) 12.5 mg tablet, Take 1 tablet (12.5 mg total) by mouth 2 (two) times a day with meals, Disp: 180 tablet, Rfl: 1    Cholecalciferol (VITAMIN D3) 2000 units capsule, Take 1 capsule (2,000 Units total) by mouth daily, Disp: 100 capsule, Rfl: 2    latanoprost (XALATAN) 0.005 % ophthalmic solution, INSTILL 1 DROP INTO EACH EYE AT BEDTIME, Disp: 9 mL, Rfl: 0    levothyroxine 50 mcg tablet, Take 1 tablet (50 mcg total) by mouth daily, Disp: 90 tablet, Rfl: 1    lidocaine (Lidoderm) 5 %, Apply 1 patch topically daily Remove & Discard patch within 12 hours or as directed by MD, Disp: 30 patch, Rfl: 0    Multiple Vitamins-Minerals (PRESERVISION AREDS 2 PO), Take by mouth, Disp: , Rfl:     pioglitazone (ACTOS) 30 mg tablet, Take 1 tablet (30 mg total) by mouth daily, Disp: 90 tablet, Rfl: 1    transdermal buprenorphine (Butrans) 5 mcg/hr TD patch, Place 1 patch on the skin over 7 days every 7 days, Disp: 4 patch, Rfl: 0    zolpidem (AMBIEN) 5 mg tablet, Take 5 mg by mouth, Disp: , Rfl:     Current Facility-Administered Medications:     cyanocobalamin injection 1,000 mcg, 1,000 mcg, Intramuscular, Q30 Days, Sukumar Valencia DO, 1,000 mcg at 02/27/19 1250    Allergies   Allergen Reactions    Iodine - Food Allergy Other (See Comments)     IV CONTRAST DYE    Iv Dye  [Iodinated Contrast Media] Hives    Other        Physical Exam:    /80 (BP Location: Left arm)   Pulse 72   Ht 5' 2\" (1.575 m)   Wt 65.3 kg (144 lb)   BMI 26.34 kg/m²     Constitutional:normal, well developed, well nourished, alert, in no distress and non-toxic and no overt pain behavior.  Eyes:anicteric  HEENT:grossly intact  Neck:supple, symmetric, trachea midline and no masses   Pulmonary:even and unlabored  Cardiovascular:No edema or pitting edema present  Skin:Normal without rashes or lesions and well hydrated  Psychiatric:Mood and affect appropriate  Neurologic:Cranial Nerves II-XII grossly intact  Musculoskeletal:in " wheelchair        This document was created using speech voice recognition software.   Grammatical errors, random word insertions, pronoun errors, and incomplete sentences are an occasional consequence of this system due to software limitations, ambient noise, and hardware issues.   Any formal questions or concerns about content, text, or information contained within the body of this dictation should be directly addressed to the provider for clarification.

## 2024-04-04 ENCOUNTER — OFFICE VISIT (OUTPATIENT)
Dept: PAIN MEDICINE | Facility: CLINIC | Age: 89
End: 2024-04-04
Payer: MEDICARE

## 2024-04-04 VITALS
HEART RATE: 72 BPM | WEIGHT: 144 LBS | DIASTOLIC BLOOD PRESSURE: 80 MMHG | HEIGHT: 62 IN | BODY MASS INDEX: 26.5 KG/M2 | SYSTOLIC BLOOD PRESSURE: 134 MMHG

## 2024-04-04 DIAGNOSIS — M48.062 SPINAL STENOSIS OF LUMBAR REGION WITH NEUROGENIC CLAUDICATION: ICD-10-CM

## 2024-04-04 DIAGNOSIS — M47.816 LUMBAR SPONDYLOSIS: ICD-10-CM

## 2024-04-04 DIAGNOSIS — G89.4 CHRONIC PAIN SYNDROME: Primary | ICD-10-CM

## 2024-04-04 DIAGNOSIS — M16.11 PRIMARY OSTEOARTHRITIS OF RIGHT HIP: ICD-10-CM

## 2024-04-04 PROCEDURE — 99214 OFFICE O/P EST MOD 30 MIN: CPT

## 2024-04-04 RX ORDER — ACETAMINOPHEN 500 MG
500 TABLET ORAL
COMMUNITY
Start: 2024-01-19

## 2024-04-04 RX ORDER — BUPRENORPHINE 5 UG/H
1 PATCH TRANSDERMAL
Qty: 4 PATCH | Refills: 0 | Status: SHIPPED | OUTPATIENT
Start: 2024-04-04

## 2024-04-04 NOTE — PATIENT INSTRUCTIONS

## 2024-04-09 DIAGNOSIS — E11.8 TYPE 2 DIABETES MELLITUS WITH COMPLICATION, WITHOUT LONG-TERM CURRENT USE OF INSULIN (HCC): ICD-10-CM

## 2024-04-09 DIAGNOSIS — I10 HYPERTENSION, UNSPECIFIED TYPE: Primary | ICD-10-CM

## 2024-04-09 RX ORDER — LOSARTAN POTASSIUM 100 MG/1
100 TABLET ORAL DAILY
Qty: 90 TABLET | Refills: 3 | Status: SHIPPED | OUTPATIENT
Start: 2024-04-09

## 2024-04-09 NOTE — TELEPHONE ENCOUNTER
Patient is asking for a refill of losartan 100mg. I do not see on active medication list, please advise.

## 2024-04-10 ENCOUNTER — TELEPHONE (OUTPATIENT)
Age: 89
End: 2024-04-10

## 2024-04-10 DIAGNOSIS — G89.4 CHRONIC PAIN SYNDROME: Primary | ICD-10-CM

## 2024-04-10 DIAGNOSIS — E11.42 DIABETIC PERIPHERAL NEUROPATHY (HCC): ICD-10-CM

## 2024-04-10 DIAGNOSIS — M48.062 SPINAL STENOSIS OF LUMBAR REGION WITH NEUROGENIC CLAUDICATION: ICD-10-CM

## 2024-04-10 NOTE — TELEPHONE ENCOUNTER
PLAN HAS DENIED BUTRANS   RATIONALE IS THAT SHE HAS NOT BEEN ON AN OPIOID IR FOR AT LEAST A WEEK OR AN ER OPIOID IN THE LAST 30 DAYS.    I CAN APPEAL THEIR DECISION BUT WOULD NEED ADDITIONAL INFORMATION     IT LOOKS LIKE SHE HAS NOT HAD AN OPIOID SINCE JAN 2024    DENIAL IS IN MEDIA

## 2024-04-10 NOTE — TELEPHONE ENCOUNTER
PA for BUTRANS 5 MCG    Submitted via    [x]CMM-KEY [id number]    []Surescripts-Case ID #   []Faxed to plan   []Other website   []Phone call Case ID #     Office notes sent, clinical questions answered. Awaiting determination    Turnaround time for your insurance to make a decision on your Prior Authorization can take 7-21 business days.

## 2024-04-10 NOTE — TELEPHONE ENCOUNTER
PA for BUTRANS appealed via     []CMM  []SS    [x]Letter sent to insurance via fax  [employer] APPEALS    []Other site or means     All necessary records sent. Will await response from insurance company    Turnaround time for a decision to be made on an appeal could take up to 30 business days

## 2024-04-10 NOTE — TELEPHONE ENCOUNTER
Unfortunately in the past the patient has trialed tramadol, hydrocodone, and oxycodone which have all given her significant side effects, such as nausea and vomiting.  Unfortunately buprenorphine does not come in an immediate release we were trialing this medication because it was a different than the one she has tried in the past that have given her significant GI issues.

## 2024-04-10 NOTE — TELEPHONE ENCOUNTER
Patient called the RX Refill Line. Message is being forwarded to the office.     Patient is requesting the office call [facility].  She was prescribed   transdermal buprenorphine (Butrans) 5 mcg/hr TD patch  on 4/4/2024 and [facility] told her first it needed prior auth I see nothing showing this and then told her that the Doctor needed to approve it.        Please contact [facility]   [address] [place] [address]  Phone: [phone]  Fax: [phone]

## 2024-04-12 NOTE — TELEPHONE ENCOUNTER
PA for BUTRANS 5 MCG Approved   Date(s) approved UNTIL 12/31/2024      Patient advised by [x] ProtAbhart Message                      [] Phone call       Pharmacy advised by []Fax                                     [x]Phone call    Approval letter scanned into Media Yes

## 2024-05-03 ENCOUNTER — OFFICE VISIT (OUTPATIENT)
Dept: PAIN MEDICINE | Facility: CLINIC | Age: 89
End: 2024-05-03

## 2024-05-03 VITALS
HEART RATE: 73 BPM | BODY MASS INDEX: 26.5 KG/M2 | SYSTOLIC BLOOD PRESSURE: 143 MMHG | WEIGHT: 144 LBS | HEIGHT: 62 IN | DIASTOLIC BLOOD PRESSURE: 80 MMHG

## 2024-05-03 DIAGNOSIS — M47.816 LUMBAR SPONDYLOSIS: ICD-10-CM

## 2024-05-03 DIAGNOSIS — M48.062 SPINAL STENOSIS OF LUMBAR REGION WITH NEUROGENIC CLAUDICATION: ICD-10-CM

## 2024-05-03 DIAGNOSIS — G89.4 CHRONIC PAIN SYNDROME: Primary | ICD-10-CM

## 2024-05-03 DIAGNOSIS — Z79.891 LONG-TERM CURRENT USE OF OPIATE ANALGESIC: ICD-10-CM

## 2024-05-03 DIAGNOSIS — M16.11 PRIMARY OSTEOARTHRITIS OF RIGHT HIP: ICD-10-CM

## 2024-05-03 RX ORDER — BUPRENORPHINE 7.5 UG/H
1 PATCH TRANSDERMAL
Qty: 4 PATCH | Refills: 0 | Status: SHIPPED | OUTPATIENT
Start: 2024-05-03

## 2024-05-03 NOTE — PATIENT INSTRUCTIONS

## 2024-05-03 NOTE — PROGRESS NOTES
Pain Medicine Follow-Up Note    Assessment:  1. Chronic pain syndrome    2. Lumbar spondylosis    3. Spinal stenosis of lumbar region with neurogenic claudication    4. Primary osteoarthritis of right hip    5. Long-term current use of opiate analgesic        Plan:  Orders Placed This Encounter   Procedures    FL spine and pain procedure     Standing Status:   Future     Standing Expiration Date:   5/3/2028     Order Specific Question:   Reason for Exam:     Answer:   MBB #1 at L2-L3  and L3-L4     Order Specific Question:   Anticoagulant hold needed?     Answer:   no       New Medications Ordered This Visit   Medications    transdermal buprenorphine (Butrans) 7.5 mcg/hr TD patch     Sig: Place 1 patch on the skin over 7 days every 7 days     Dispense:  4 patch     Refill:  0       My impressions and treatment recommendations were discussed in detail with the patient who verbalized understanding and had no further questions.      The patient returns to the office stating that her pain symptoms are worse and currently has a pain score of 10 out of 10 on the verbal numeric pain scale.  On 3/19/2024 patient had a failed medial branch block at L4-L5, L5-S1 bilaterally.  Patient spoke with her neurologist and feels that the patient's symptoms are likely coming from higher up within her back.  We reviewed patient's imaging and symptoms I will order a bilateral medial branch block at L2-3, L3-L4.  Patient is in agreement with this plan. Complete risks and benefits including bleeding, infection, tissue reaction, nerve injury and allergic reaction were discussed. The approach was demonstrated using models and literature was provided. Verbal and written consent was obtained.    The patient reports an overall reduction of her pain level and improvement with her functioning without significant side effects using Butrans transdermal patch 5 mcg, but is asking if there is a higher dose states it barely takes the edge off and that  we started her on the lowest dose.  And we can slowly titrate her up until she reaches a therapeutic range.  Butrans 7.5 transdermal patch e-prescribed to the patient's pharmacy with a do not fill until date of today 5/3/2024.     [place] Prescription Drug Monitoring Program report was reviewed and was appropriate     There are risks associated with opioid medications, including dependence, addiction and tolerance. The patient understands and agrees to use these medications only as prescribed. Potential side effects of the medications include, but are not limited to, constipation, drowsiness, addiction, impaired judgment and risk of fatal overdose if not taken as prescribed. The patient was warned against driving while taking sedation medications.  Sharing medications is a felony. At this point in time, the patient is showing no signs of addiction, abuse, diversion or suicidal ideation.    Follow-up is planned in 6 weeks after RFA, 8 weeks for med refill time or sooner as warranted.  Discharge instructions were provided. I personally saw and examined the patient and I agree with the above discussed plan of care.    History of Present Illness:    Alejandra Patton is a 89 y.o. female who presents to [facility] for interval re-evaluation of the above stated pain complaints. The patient has a past medical and chronic pain history as outlined in the assessment section. She was last seen on 4/4/2024.    At today's visit patient states that their pain symptoms are worse with a pain score of 10/10 on the verbal numeric pain scale.  The patient's pain is worse in the morning.  The patient's pain is constant in nature.  And the quality of the patient's pain is described as sharp and pressure-like.  The patient's pain is located in the bilateral low back.  The patient states the amount of pain relief she is obtaining from her current pain relievers is not enough to make a difference in her life  since she only feels this medication is helping her with some of her right sided hip pain.  Patient denies any significant side effects using the Butrans patch.    Other than as stated above, the patient denies any interval changes in medications, medical condition, mental condition, symptoms, or allergies since the last office visit.         Review of Systems:    Review of Systems   Respiratory:  Negative for shortness of breath.    Cardiovascular:  Negative for chest pain.   Gastrointestinal:  Negative for constipation, diarrhea, nausea and vomiting.   Musculoskeletal:  Positive for back pain and gait problem. Negative for arthralgias, joint swelling and myalgias.   Skin:  Negative for rash.   Neurological:  Negative for dizziness, seizures and weakness.   All other systems reviewed and are negative.        Past Medical History:   Diagnosis Date    Cardiac disorder 01/01/2001    x2 stents after a failed stress test    Cellulitis     last assessed - 19Nov2012    Constipation     last assessed - 14Mar2013    COVID-19 08/15/2022    Diabetes mellitus (HCC)     Disease of thyroid gland     Ecchymosis     last assessed - 03Jan2017    Fall     last assessed - 03Jan2017    Hyperlipidemia     Hypertension     Hypokalemia     last assessed - 02Jun2015    Lower extremity weakness     last assessed - [id number]    Vitamin D deficiency     last assessed - [id number]       Past Surgical History:   Procedure Laterality Date    BACK SURGERY      CHOLECYSTECTOMY      COLONOSCOPY      CORONARY ANGIOPLASTY WITH STENT PLACEMENT      CT BONE MARROW BIOPSY AND ASPIRATION  1/15/2020    HYSTERECTOMY      OTHER SURGICAL HISTORY      Previous stent placement       Family History   Problem Relation Age of Onset    Heart disease Mother     Other Father         cerebral embolism - with cerebral infarction       Social History     Occupational History    Not on file   Tobacco Use    Smoking status: Former     Types: Cigarettes     Start date:  5/7/1958    Smokeless tobacco: Never   Vaping Use    Vaping status: Never Used   Substance and Sexual Activity    Alcohol use: Never    Drug use: No    Sexual activity: Never     Partners: Male         Current Outpatient Medications:     acetaminophen (TYLENOL) 500 mg tablet, Take 500 mg by mouth, Disp: , Rfl:     amLODIPine (NORVASC) 5 mg tablet, Take 2 tablets (10 mg total) by mouth daily, Disp: 180 tablet, Rfl: 0    aspirin 81 mg chewable tablet, Chew 81 mg, Disp: , Rfl:     atorvastatin (LIPITOR) 40 mg tablet, Take 1 tablet (40 mg total) by mouth daily, Disp: 90 tablet, Rfl: 3    baclofen 10 mg tablet, TAKE 1 TABLET BY MOUTH NIGHTLY AT BEDTIME AS NEEDED AS DIRECTED, Disp: , Rfl: 3    carvedilol (COREG) 12.5 mg tablet, Take 1 tablet (12.5 mg total) by mouth 2 (two) times a day with meals, Disp: 180 tablet, Rfl: 1    Cholecalciferol (VITAMIN D3) 2000 units capsule, Take 1 capsule (2,000 Units total) by mouth daily, Disp: 100 capsule, Rfl: 2    latanoprost (XALATAN) 0.005 % ophthalmic solution, INSTILL 1 DROP INTO EACH EYE AT BEDTIME, Disp: 9 mL, Rfl: 0    levothyroxine 50 mcg tablet, Take 1 tablet (50 mcg total) by mouth daily, Disp: 90 tablet, Rfl: 1    lidocaine (Lidoderm) 5 %, Apply 1 patch topically daily Remove & Discard patch within 12 hours or as directed by MD, Disp: 30 patch, Rfl: 0    losartan (COZAAR) 100 MG tablet, Take 1 tablet (100 mg total) by mouth daily, Disp: 90 tablet, Rfl: 3    Multiple Vitamins-Minerals (PRESERVISION AREDS 2 PO), Take by mouth, Disp: , Rfl:     pioglitazone (ACTOS) 30 mg tablet, Take 1 tablet (30 mg total) by mouth daily, Disp: 90 tablet, Rfl: 1    transdermal buprenorphine (Butrans) 5 mcg/hr TD patch, Place 1 patch on the skin over 7 days every 7 days, Disp: 4 patch, Rfl: 0    transdermal buprenorphine (Butrans) 7.5 mcg/hr TD patch, Place 1 patch on the skin over 7 days every 7 days, Disp: 4 patch, Rfl: 0    zolpidem (AMBIEN) 5 mg tablet, Take 5 mg by mouth, Disp: , Rfl:  "    Current Facility-Administered Medications:     cyanocobalamin injection 1,000 mcg, 1,000 mcg, Intramuscular, Q30 Days, Sukumar Valencia DO, 1,000 mcg at 02/27/19 1250    Allergies   Allergen Reactions    Iodine - Food Allergy Other (See Comments)     IV CONTRAST DYE    Iv Dye  [Iodinated Contrast Media] Hives    Other        Physical Exam:    /80   Pulse 73   Ht 5' 2\" (1.575 m)   Wt 65.3 kg (144 lb)   BMI 26.34 kg/m²     Constitutional:normal, well developed, well nourished, alert, in no distress and non-toxic and no overt pain behavior.  Eyes:anicteric  HEENT:grossly intact  Neck:supple, symmetric, trachea midline and no masses   Pulmonary:even and unlabored  Cardiovascular:No edema or pitting edema present  Skin:Normal without rashes or lesions and well hydrated  Psychiatric:Mood and affect appropriate  Neurologic:Cranial Nerves II-XII grossly intact  Musculoskeletal:in wheelchair and tenderness along bilateral facet joints and pain with facet loading bilaterally      Imaging  FL spine and pain procedure    (Results Pending)         Orders Placed This Encounter   Procedures    FL spine and pain procedure       This document was created using speech voice recognition software.   Grammatical errors, random word insertions, pronoun errors, and incomplete sentences are an occasional consequence of this system due to software limitations, ambient noise, and hardware issues.   Any formal questions or concerns about content, text, or information contained within the body of this dictation should be directly addressed to the provider for clarification.  "

## 2024-05-06 ENCOUNTER — PATIENT MESSAGE (OUTPATIENT)
Dept: RADIOLOGY | Facility: CLINIC | Age: 89
End: 2024-05-06

## 2024-05-06 NOTE — PATIENT COMMUNICATION
Pt scheduled for MBB on 6/6/24 with Dr Marinelli    Pts chart states she is diabetic, however, no diabetes related meds list on chart    No medication holds, other than 6 hour PRN med holds needed for MBB    Pt given instructions in office and via myc message    Have you completed PT/HEP/Chiro in the past 6 months for dedicated area? At home PT with [facility]   If yes, how long did you complete? December 2023  What was the frequency?  Did it provide relief? No, per discharge summary scanned in on 1/15/24, pt requested discharge due to remaining pain/PT not helping with pain  If no, reason therapy was not completed?   *pt has also tried injection/MBB at another level unsuccessfully

## 2024-05-13 PROBLEM — N18.31 STAGE 3A CHRONIC KIDNEY DISEASE (HCC): Status: ACTIVE | Noted: 2023-03-24

## 2024-06-06 ENCOUNTER — TELEPHONE (OUTPATIENT)
Dept: RADIOLOGY | Facility: CLINIC | Age: 89
End: 2024-06-06

## 2024-06-06 ENCOUNTER — HOSPITAL ENCOUNTER (OUTPATIENT)
Dept: RADIOLOGY | Facility: CLINIC | Age: 89
Discharge: HOME | End: 2024-06-06
Admitting: STUDENT IN AN ORGANIZED HEALTH CARE EDUCATION/TRAINING PROGRAM
Payer: COMMERCIAL

## 2024-06-06 VITALS
HEART RATE: 72 BPM | OXYGEN SATURATION: 95 % | TEMPERATURE: 98.6 F | DIASTOLIC BLOOD PRESSURE: 74 MMHG | SYSTOLIC BLOOD PRESSURE: 159 MMHG | RESPIRATION RATE: 19 BRPM

## 2024-06-06 DIAGNOSIS — M47.816 LUMBAR SPONDYLOSIS: ICD-10-CM

## 2024-06-06 DIAGNOSIS — M48.062 SPINAL STENOSIS OF LUMBAR REGION WITH NEUROGENIC CLAUDICATION: ICD-10-CM

## 2024-06-06 DIAGNOSIS — M16.11 PRIMARY OSTEOARTHRITIS OF RIGHT HIP: ICD-10-CM

## 2024-06-06 PROCEDURE — 64493 INJ PARAVERT F JNT L/S 1 LEV: CPT | Performed by: STUDENT IN AN ORGANIZED HEALTH CARE EDUCATION/TRAINING PROGRAM

## 2024-06-06 PROCEDURE — 64494 INJ PARAVERT F JNT L/S 2 LEV: CPT | Performed by: STUDENT IN AN ORGANIZED HEALTH CARE EDUCATION/TRAINING PROGRAM

## 2024-06-06 RX ORDER — BUPRENORPHINE 7.5 UG/H
1 PATCH TRANSDERMAL
Qty: 4 PATCH | Refills: 1 | Status: SHIPPED | OUTPATIENT
Start: 2024-06-06

## 2024-06-06 RX ORDER — BUPIVACAINE HCL/PF 2.5 MG/ML
3 VIAL (ML) INJECTION ONCE
Status: COMPLETED | OUTPATIENT
Start: 2024-06-06 | End: 2024-06-06

## 2024-06-06 RX ADMIN — Medication 3 ML: at 13:32

## 2024-06-06 NOTE — TELEPHONE ENCOUNTER
Pt in for procedure today    Needs butrans sent. Per SP, please review dosing and send script.  Pt will make f/u ov for future refills

## 2024-06-06 NOTE — TELEPHONE ENCOUNTER
Actually since the patient is having medial branch blocks and the patient's pain is worse in her back we will keep the patches the same.  So we will know if the procedure is helpful.    Buprenorphine 7.5 transdermal patches sent

## 2024-06-06 NOTE — DISCHARGE INSTR - LAB

## 2024-06-06 NOTE — TELEPHONE ENCOUNTER
Patient called the RX Refill Line. Message is being forwarded to the office.     Patient called stating that Her pain patch was suppose to be sent to her pharmacy as per visit today. Patient stated that the dose was going to be increased.  Patient is out of patches and is due to apply one tomorrow. Please review and send script to patients pharmacy  [id number] - [place] - [address]   Please contact patient at [phone]

## 2024-06-06 NOTE — H&P
History of Present Illness: The patient is a 89 y.o. female who presents with complaints of b/l low back pain    Past Medical History:   Diagnosis Date    Cardiac disorder 01/01/2001    x2 stents after a failed stress test    Cellulitis     last assessed - 19Nov2012    Constipation     last assessed - 14Mar2013    COVID-19 08/15/2022    Diabetes mellitus (HCC)     Disease of thyroid gland     Ecchymosis     last assessed - 03Jan2017    Fall     last assessed - 03Jan2017    Hyperlipidemia     Hypertension     Hypokalemia     last assessed - 02Jun2015    Lower extremity weakness     last assessed - 21May2015    Vitamin D deficiency     last assessed - 16Aug2017       Past Surgical History:   Procedure Laterality Date    BACK SURGERY      CHOLECYSTECTOMY      COLONOSCOPY      CORONARY ANGIOPLASTY WITH STENT PLACEMENT      CT BONE MARROW BIOPSY AND ASPIRATION  1/15/2020    HYSTERECTOMY      OTHER SURGICAL HISTORY      Previous stent placement         Current Outpatient Medications:     acetaminophen (TYLENOL) 500 mg tablet, Take 500 mg by mouth, Disp: , Rfl:     amLODIPine (NORVASC) 5 mg tablet, Take 2 tablets (10 mg total) by mouth daily, Disp: 180 tablet, Rfl: 0    aspirin 81 mg chewable tablet, Chew 81 mg, Disp: , Rfl:     atorvastatin (LIPITOR) 40 mg tablet, Take 1 tablet (40 mg total) by mouth daily, Disp: 90 tablet, Rfl: 3    baclofen 10 mg tablet, TAKE 1 TABLET BY MOUTH NIGHTLY AT BEDTIME AS NEEDED AS DIRECTED, Disp: , Rfl: 3    carvedilol (COREG) 12.5 mg tablet, Take 1 tablet (12.5 mg total) by mouth 2 (two) times a day with meals, Disp: 180 tablet, Rfl: 1    Cholecalciferol (VITAMIN D3) 2000 units capsule, Take 1 capsule (2,000 Units total) by mouth daily, Disp: 100 capsule, Rfl: 2    latanoprost (XALATAN) 0.005 % ophthalmic solution, INSTILL 1 DROP INTO EACH EYE AT BEDTIME, Disp: 9 mL, Rfl: 0    levothyroxine 50 mcg tablet, Take 1 tablet (50 mcg total) by mouth daily, Disp: 90 tablet, Rfl: 1    lidocaine  (Lidoderm) 5 %, Apply 1 patch topically daily Remove & Discard patch within 12 hours or as directed by MD, Disp: 30 patch, Rfl: 0    losartan (COZAAR) 100 MG tablet, Take 1 tablet (100 mg total) by mouth daily, Disp: 90 tablet, Rfl: 3    Multiple Vitamins-Minerals (PRESERVISION AREDS 2 PO), Take by mouth, Disp: , Rfl:     pioglitazone (ACTOS) 30 mg tablet, Take 1 tablet (30 mg total) by mouth daily, Disp: 90 tablet, Rfl: 1    transdermal buprenorphine (Butrans) 5 mcg/hr TD patch, Place 1 patch on the skin over 7 days every 7 days, Disp: 4 patch, Rfl: 0    transdermal buprenorphine (Butrans) 7.5 mcg/hr TD patch, Place 1 patch on the skin over 7 days every 7 days, Disp: 4 patch, Rfl: 0    zolpidem (AMBIEN) 5 mg tablet, Take 5 mg by mouth, Disp: , Rfl:     Current Facility-Administered Medications:     cyanocobalamin injection 1,000 mcg, 1,000 mcg, Intramuscular, Q30 Days, Sukumar Valencia DO, 1,000 mcg at 02/27/19 1250    Allergies   Allergen Reactions    Iodine - Food Allergy Other (See Comments)     IV CONTRAST DYE    Iv Dye  [Iodinated Contrast Media] Hives    Other        Physical Exam: There were no vitals filed for this visit.  General: Awake, Alert, Oriented x 3, Mood and affect appropriate  Respiratory: Respirations even and unlabored  Cardiovascular: Peripheral pulses intact; no edema  Musculoskeletal Exam: facet loading positive bilaterally    ASA Score: 3         Assessment:   1. Lumbar spondylosis        Plan: MBB #1 at L2-L3  and L3-L4

## 2024-06-08 ENCOUNTER — PATIENT MESSAGE (OUTPATIENT)
Dept: PAIN MEDICINE | Facility: CLINIC | Age: 89
End: 2024-06-08

## 2024-06-08 DIAGNOSIS — M47.816 LUMBAR SPONDYLOSIS: Primary | ICD-10-CM

## 2024-06-10 ENCOUNTER — PATIENT MESSAGE (OUTPATIENT)
Dept: RADIOLOGY | Facility: CLINIC | Age: 89
End: 2024-06-10

## 2024-06-10 NOTE — PATIENT COMMUNICATION
Pt scheduled for MBB on 7/24/24 with Dr Marinelli     Pts chart states she is diabetic, however, no diabetes related meds list on chart    Pt does not have a pacemaker or defibrillator     No medication holds, other than 6 hour PRN med holds needed for MBB     Pt given instructions in office and via myc message     Have you completed PT/HEP/Chiro in the past 6 months for dedicated area? At home PT with [facility]   If yes, how long did you complete? December 2023  What was the frequency?  Did it provide relief? No, per discharge summary scanned in on 1/15/24, pt requested discharge due to remaining pain/PT not helping with pain  If no, reason therapy was not completed?   *pt has also tried injection/MBB at another level unsuccessfully

## 2024-06-26 NOTE — H&P (VIEW-ONLY)
Pain Medicine Follow-Up Note    Assessment:  1. Chronic pain syndrome    2. Spinal stenosis of lumbar region with neurogenic claudication    3. Lumbar spondylosis    4. Long-term current use of opiate analgesic        Plan:    New Medications Ordered This Visit   Medications    transdermal buprenorphine (Butrans) 10 mcg/hr TD patch     Sig: Place 1 patch on the skin over 7 days every 7 days     Dispense:  4 patch     Refill:  2       My impressions and treatment recommendations were discussed in detail with the patient who verbalized understanding and had no further questions.      The patient returns today stating that her pain symptoms are the same when she is sitting it is a 3 when she is walking it is severe patient has been using Butrans patch 7.5 mcg 7-day transdermal patch which she states has helped with her hip pain but she continues to still have bilateral low back pain.  The patient did have a medial branch block on 6/6/2024 that did provide her excellent pain relief for 4 hours she will be having the second block on 7/24/2024.  Patient denies any side effects using the Butrans patch therefore I will increase it to 10 mcg/h 7-day TD patch.  Patient also stated that she has pain on her left bones when she sits I informed her of a ischial bursa cushion that she may use to help alleviate some of her.  Patient appreciative.  Butrans patch 10 mcg/h TD patch e-prescribed to the patient's pharmacy to be filled today.    [place] Prescription Drug Monitoring Program report was reviewed and was appropriate     There are risks associated with opioid medications, including dependence, addiction and tolerance. The patient understands and agrees to use these medications only as prescribed. Potential side effects of the medications include, but are not limited to, constipation, drowsiness, addiction, impaired judgment and risk of fatal overdose if not taken as prescribed. The patient was warned against driving  while taking sedation medications.  Sharing medications is a felony. At this point in time, the patient is showing no signs of addiction, abuse, diversion or suicidal ideation.    Follow-up is planned in 3 months time or sooner as warranted.  Discharge instructions were provided. I personally saw and examined the patient and I agree with the above discussed plan of care.    History of Present Illness:    Alejandra Patton is a 89 y.o. female who presents to [facility] for interval re-evaluation of the above stated pain complaints. The patient has a past medical and chronic pain history as outlined in the assessment section. She was last seen on 6/6/2024.    At today's visit patient states that their pain symptoms are the same with a pain score of 3/10 on the verbal numeric pain scale when sitting severe upon standing.  The patient's pain is worse in the morning and when standing.  The patient's pain is intermittent in nature.  And the quality of the patient's pain is described as dull-aching.  The patient's pain is located in the bilateral low back.  Patient states the amount of pain relief she is now obtaining from her current pain relievers is not enough to make a difference in her life due to it only reducing her pain by 2%.  Patient denies any side effects using Butrans patch.  Patient is also using Tylenol 1000 mg 3 times daily.    Other than as stated above, the patient denies any interval changes in medications, medical condition, mental condition, symptoms, or allergies since the last office visit.         Review of Systems:    Review of Systems   Respiratory:  Negative for shortness of breath.    Cardiovascular:  Negative for chest pain.   Gastrointestinal:  Negative for constipation, diarrhea, nausea and vomiting.   Musculoskeletal:  Positive for back pain and gait problem. Negative for arthralgias, joint swelling and myalgias.   Skin:  Negative for rash.   Neurological:  Negative for  dizziness, seizures and weakness.   All other systems reviewed and are negative.        Past Medical History:   Diagnosis Date    Cardiac disorder 01/01/2001    x2 stents after a failed stress test    Cellulitis     last assessed - 19Nov2012    Constipation     last assessed - 14Mar2013    COVID-19 08/15/2022    Diabetes mellitus (HCC)     Disease of thyroid gland     Ecchymosis     last assessed - 03Jan2017    Fall     last assessed - 03Jan2017    Hyperlipidemia     Hypertension     Hypokalemia     last assessed - 02Jun2015    Lower extremity weakness     last assessed - 21May2015    Vitamin D deficiency     last assessed - 16Aug2017       Past Surgical History:   Procedure Laterality Date    BACK SURGERY      CHOLECYSTECTOMY      COLONOSCOPY      CORONARY ANGIOPLASTY WITH STENT PLACEMENT      CT BONE MARROW BIOPSY AND ASPIRATION  1/15/2020    HYSTERECTOMY      OTHER SURGICAL HISTORY      Previous stent placement       Family History   Problem Relation Age of Onset    Heart disease Mother     Other Father         cerebral embolism - with cerebral infarction       Social History     Occupational History    Not on file   Tobacco Use    Smoking status: Former     Types: Cigarettes     Start date: 5/7/1958    Smokeless tobacco: Never   Vaping Use    Vaping status: Never Used   Substance and Sexual Activity    Alcohol use: Never    Drug use: No    Sexual activity: Never     Partners: Male         Current Outpatient Medications:     acetaminophen (TYLENOL) 500 mg tablet, Take 500 mg by mouth, Disp: , Rfl:     amLODIPine (NORVASC) 5 mg tablet, Take 2 tablets (10 mg total) by mouth daily, Disp: 180 tablet, Rfl: 0    aspirin 81 mg chewable tablet, Chew 81 mg, Disp: , Rfl:     atorvastatin (LIPITOR) 40 mg tablet, Take 1 tablet (40 mg total) by mouth daily, Disp: 90 tablet, Rfl: 3    baclofen 10 mg tablet, TAKE 1 TABLET BY MOUTH NIGHTLY AT BEDTIME AS NEEDED AS DIRECTED, Disp: , Rfl: 3    carvedilol (COREG) 12.5 mg tablet,  "Take 1 tablet (12.5 mg total) by mouth 2 (two) times a day with meals, Disp: 180 tablet, Rfl: 1    Cholecalciferol (VITAMIN D3) 2000 units capsule, Take 1 capsule (2,000 Units total) by mouth daily, Disp: 100 capsule, Rfl: 2    latanoprost (XALATAN) 0.005 % ophthalmic solution, INSTILL 1 DROP INTO EACH EYE AT BEDTIME, Disp: 9 mL, Rfl: 0    levothyroxine 50 mcg tablet, Take 1 tablet (50 mcg total) by mouth daily, Disp: 90 tablet, Rfl: 1    lidocaine (Lidoderm) 5 %, Apply 1 patch topically daily Remove & Discard patch within 12 hours or as directed by MD, Disp: 30 patch, Rfl: 0    losartan (COZAAR) 100 MG tablet, Take 1 tablet (100 mg total) by mouth daily, Disp: 90 tablet, Rfl: 3    Multiple Vitamins-Minerals (PRESERVISION AREDS 2 PO), Take by mouth, Disp: , Rfl:     pioglitazone (ACTOS) 30 mg tablet, Take 1 tablet (30 mg total) by mouth daily, Disp: 90 tablet, Rfl: 1    transdermal buprenorphine (Butrans) 10 mcg/hr TD patch, Place 1 patch on the skin over 7 days every 7 days, Disp: 4 patch, Rfl: 2    zolpidem (AMBIEN) 5 mg tablet, Take 5 mg by mouth, Disp: , Rfl:     Current Facility-Administered Medications:     cyanocobalamin injection 1,000 mcg, 1,000 mcg, Intramuscular, Q30 Days, Sukumar Valencia DO, 1,000 mcg at 02/27/19 1250    Allergies   Allergen Reactions    Iodine - Food Allergy Other (See Comments)     IV CONTRAST DYE    Iv Dye  [Iodinated Contrast Media] Hives    Other        Physical Exam:    /75   Pulse 65   Ht 5' 2\" (1.575 m)   Wt 65.3 kg (144 lb)   BMI 26.34 kg/m²     Constitutional:normal, well developed, well nourished, alert, in no distress and non-toxic and no overt pain behavior.  Eyes:anicteric  HEENT:grossly intact  Neck:supple, symmetric, trachea midline and no masses   Pulmonary:even and unlabored  Cardiovascular:No edema or pitting edema present  Skin:Normal without rashes or lesions and well hydrated  Psychiatric:Mood and affect appropriate  Neurologic:Cranial Nerves II-XII " grossly intact  Musculoskeletal:in wheelchair      This document was created using speech voice recognition software.   Grammatical errors, random word insertions, pronoun errors, and incomplete sentences are an occasional consequence of this system due to software limitations, ambient noise, and hardware issues.   Any formal questions or concerns about content, text, or information contained within the body of this dictation should be directly addressed to the provider for clarification.

## 2024-06-26 NOTE — PROGRESS NOTES
Pain Medicine Follow-Up Note    Assessment:  1. Chronic pain syndrome    2. Spinal stenosis of lumbar region with neurogenic claudication    3. Lumbar spondylosis    4. Long-term current use of opiate analgesic        Plan:    New Medications Ordered This Visit   Medications    transdermal buprenorphine (Butrans) 10 mcg/hr TD patch     Sig: Place 1 patch on the skin over 7 days every 7 days     Dispense:  4 patch     Refill:  2       My impressions and treatment recommendations were discussed in detail with the patient who verbalized understanding and had no further questions.      The patient returns today stating that her pain symptoms are the same when she is sitting it is a 3 when she is walking it is severe patient has been using Butrans patch 7.5 mcg 7-day transdermal patch which she states has helped with her hip pain but she continues to still have bilateral low back pain.  The patient did have a medial branch block on 6/6/2024 that did provide her excellent pain relief for 4 hours she will be having the second block on 7/24/2024.  Patient denies any side effects using the Butrans patch therefore I will increase it to 10 mcg/h 7-day TD patch.  Patient also stated that she has pain on her left bones when she sits I informed her of a ischial bursa cushion that she may use to help alleviate some of her.  Patient appreciative.  Butrans patch 10 mcg/h TD patch e-prescribed to the patient's pharmacy to be filled today.    [place] Prescription Drug Monitoring Program report was reviewed and was appropriate     There are risks associated with opioid medications, including dependence, addiction and tolerance. The patient understands and agrees to use these medications only as prescribed. Potential side effects of the medications include, but are not limited to, constipation, drowsiness, addiction, impaired judgment and risk of fatal overdose if not taken as prescribed. The patient was warned against driving  while taking sedation medications.  Sharing medications is a felony. At this point in time, the patient is showing no signs of addiction, abuse, diversion or suicidal ideation.    Follow-up is planned in 3 months time or sooner as warranted.  Discharge instructions were provided. I personally saw and examined the patient and I agree with the above discussed plan of care.    History of Present Illness:    Alejandra Patton is a 89 y.o. female who presents to [facility] for interval re-evaluation of the above stated pain complaints. The patient has a past medical and chronic pain history as outlined in the assessment section. She was last seen on 6/6/2024.    At today's visit patient states that their pain symptoms are the same with a pain score of 3/10 on the verbal numeric pain scale when sitting severe upon standing.  The patient's pain is worse in the morning and when standing.  The patient's pain is intermittent in nature.  And the quality of the patient's pain is described as dull-aching.  The patient's pain is located in the bilateral low back.  Patient states the amount of pain relief she is now obtaining from her current pain relievers is not enough to make a difference in her life due to it only reducing her pain by 2%.  Patient denies any side effects using Butrans patch.  Patient is also using Tylenol 1000 mg 3 times daily.    Other than as stated above, the patient denies any interval changes in medications, medical condition, mental condition, symptoms, or allergies since the last office visit.         Review of Systems:    Review of Systems   Respiratory:  Negative for shortness of breath.    Cardiovascular:  Negative for chest pain.   Gastrointestinal:  Negative for constipation, diarrhea, nausea and vomiting.   Musculoskeletal:  Positive for back pain and gait problem. Negative for arthralgias, joint swelling and myalgias.   Skin:  Negative for rash.   Neurological:  Negative for  dizziness, seizures and weakness.   All other systems reviewed and are negative.        Past Medical History:   Diagnosis Date    Cardiac disorder 01/01/2001    x2 stents after a failed stress test    Cellulitis     last assessed - 19Nov2012    Constipation     last assessed - 14Mar2013    COVID-19 08/15/2022    Diabetes mellitus (HCC)     Disease of thyroid gland     Ecchymosis     last assessed - 03Jan2017    Fall     last assessed - 03Jan2017    Hyperlipidemia     Hypertension     Hypokalemia     last assessed - 02Jun2015    Lower extremity weakness     last assessed - 21May2015    Vitamin D deficiency     last assessed - 16Aug2017       Past Surgical History:   Procedure Laterality Date    BACK SURGERY      CHOLECYSTECTOMY      COLONOSCOPY      CORONARY ANGIOPLASTY WITH STENT PLACEMENT      CT BONE MARROW BIOPSY AND ASPIRATION  1/15/2020    HYSTERECTOMY      OTHER SURGICAL HISTORY      Previous stent placement       Family History   Problem Relation Age of Onset    Heart disease Mother     Other Father         cerebral embolism - with cerebral infarction       Social History     Occupational History    Not on file   Tobacco Use    Smoking status: Former     Types: Cigarettes     Start date: 5/7/1958    Smokeless tobacco: Never   Vaping Use    Vaping status: Never Used   Substance and Sexual Activity    Alcohol use: Never    Drug use: No    Sexual activity: Never     Partners: Male         Current Outpatient Medications:     acetaminophen (TYLENOL) 500 mg tablet, Take 500 mg by mouth, Disp: , Rfl:     amLODIPine (NORVASC) 5 mg tablet, Take 2 tablets (10 mg total) by mouth daily, Disp: 180 tablet, Rfl: 0    aspirin 81 mg chewable tablet, Chew 81 mg, Disp: , Rfl:     atorvastatin (LIPITOR) 40 mg tablet, Take 1 tablet (40 mg total) by mouth daily, Disp: 90 tablet, Rfl: 3    baclofen 10 mg tablet, TAKE 1 TABLET BY MOUTH NIGHTLY AT BEDTIME AS NEEDED AS DIRECTED, Disp: , Rfl: 3    carvedilol (COREG) 12.5 mg tablet,  "Take 1 tablet (12.5 mg total) by mouth 2 (two) times a day with meals, Disp: 180 tablet, Rfl: 1    Cholecalciferol (VITAMIN D3) 2000 units capsule, Take 1 capsule (2,000 Units total) by mouth daily, Disp: 100 capsule, Rfl: 2    latanoprost (XALATAN) 0.005 % ophthalmic solution, INSTILL 1 DROP INTO EACH EYE AT BEDTIME, Disp: 9 mL, Rfl: 0    levothyroxine 50 mcg tablet, Take 1 tablet (50 mcg total) by mouth daily, Disp: 90 tablet, Rfl: 1    lidocaine (Lidoderm) 5 %, Apply 1 patch topically daily Remove & Discard patch within 12 hours or as directed by MD, Disp: 30 patch, Rfl: 0    losartan (COZAAR) 100 MG tablet, Take 1 tablet (100 mg total) by mouth daily, Disp: 90 tablet, Rfl: 3    Multiple Vitamins-Minerals (PRESERVISION AREDS 2 PO), Take by mouth, Disp: , Rfl:     pioglitazone (ACTOS) 30 mg tablet, Take 1 tablet (30 mg total) by mouth daily, Disp: 90 tablet, Rfl: 1    transdermal buprenorphine (Butrans) 10 mcg/hr TD patch, Place 1 patch on the skin over 7 days every 7 days, Disp: 4 patch, Rfl: 2    zolpidem (AMBIEN) 5 mg tablet, Take 5 mg by mouth, Disp: , Rfl:     Current Facility-Administered Medications:     cyanocobalamin injection 1,000 mcg, 1,000 mcg, Intramuscular, Q30 Days, Sukumar Valencia DO, 1,000 mcg at 02/27/19 1250    Allergies   Allergen Reactions    Iodine - Food Allergy Other (See Comments)     IV CONTRAST DYE    Iv Dye  [Iodinated Contrast Media] Hives    Other        Physical Exam:    /75   Pulse 65   Ht 5' 2\" (1.575 m)   Wt 65.3 kg (144 lb)   BMI 26.34 kg/m²     Constitutional:normal, well developed, well nourished, alert, in no distress and non-toxic and no overt pain behavior.  Eyes:anicteric  HEENT:grossly intact  Neck:supple, symmetric, trachea midline and no masses   Pulmonary:even and unlabored  Cardiovascular:No edema or pitting edema present  Skin:Normal without rashes or lesions and well hydrated  Psychiatric:Mood and affect appropriate  Neurologic:Cranial Nerves II-XII " grossly intact  Musculoskeletal:in wheelchair      This document was created using speech voice recognition software.   Grammatical errors, random word insertions, pronoun errors, and incomplete sentences are an occasional consequence of this system due to software limitations, ambient noise, and hardware issues.   Any formal questions or concerns about content, text, or information contained within the body of this dictation should be directly addressed to the provider for clarification.

## 2024-06-27 ENCOUNTER — OFFICE VISIT (OUTPATIENT)
Dept: PAIN MEDICINE | Facility: CLINIC | Age: 89
End: 2024-06-27
Payer: COMMERCIAL

## 2024-06-27 VITALS
BODY MASS INDEX: 26.5 KG/M2 | HEART RATE: 65 BPM | HEIGHT: 62 IN | SYSTOLIC BLOOD PRESSURE: 132 MMHG | WEIGHT: 144 LBS | DIASTOLIC BLOOD PRESSURE: 75 MMHG

## 2024-06-27 DIAGNOSIS — Z79.891 LONG-TERM CURRENT USE OF OPIATE ANALGESIC: ICD-10-CM

## 2024-06-27 DIAGNOSIS — M48.062 SPINAL STENOSIS OF LUMBAR REGION WITH NEUROGENIC CLAUDICATION: ICD-10-CM

## 2024-06-27 DIAGNOSIS — G89.4 CHRONIC PAIN SYNDROME: Primary | ICD-10-CM

## 2024-06-27 DIAGNOSIS — M47.816 LUMBAR SPONDYLOSIS: ICD-10-CM

## 2024-06-27 PROCEDURE — 99214 OFFICE O/P EST MOD 30 MIN: CPT

## 2024-06-27 RX ORDER — BUPRENORPHINE 10 UG/H
1 PATCH TRANSDERMAL
Qty: 4 PATCH | Refills: 2 | Status: SHIPPED | OUTPATIENT
Start: 2024-06-27

## 2024-06-27 NOTE — PATIENT INSTRUCTIONS

## 2024-07-15 DIAGNOSIS — I10 HYPERTENSION, UNSPECIFIED TYPE: ICD-10-CM

## 2024-07-15 RX ORDER — AMLODIPINE BESYLATE 5 MG/1
10 TABLET ORAL DAILY
Qty: 180 TABLET | Refills: 0 | Status: SHIPPED | OUTPATIENT
Start: 2024-07-15

## 2024-07-15 NOTE — TELEPHONE ENCOUNTER
Reason for call:   [x] Refill   [] Prior Auth  [] Other:     Office:   [x] PCP/Provider - Afaneh  [] Specialty/Provider -     Medication:       Does the patient have enough for 3 days?   [x] Yes   [] No - Send as HP to POD

## 2024-07-24 ENCOUNTER — HOSPITAL ENCOUNTER (OUTPATIENT)
Dept: RADIOLOGY | Facility: CLINIC | Age: 89
Discharge: HOME | End: 2024-07-24
Payer: COMMERCIAL

## 2024-07-24 VITALS
OXYGEN SATURATION: 96 % | RESPIRATION RATE: 18 BRPM | SYSTOLIC BLOOD PRESSURE: 155 MMHG | HEART RATE: 68 BPM | TEMPERATURE: 98.3 F | DIASTOLIC BLOOD PRESSURE: 79 MMHG

## 2024-07-24 DIAGNOSIS — M47.816 LUMBAR SPONDYLOSIS: ICD-10-CM

## 2024-07-24 PROCEDURE — 64494 INJ PARAVERT F JNT L/S 2 LEV: CPT | Performed by: STUDENT IN AN ORGANIZED HEALTH CARE EDUCATION/TRAINING PROGRAM

## 2024-07-24 PROCEDURE — 64493 INJ PARAVERT F JNT L/S 1 LEV: CPT | Performed by: STUDENT IN AN ORGANIZED HEALTH CARE EDUCATION/TRAINING PROGRAM

## 2024-07-24 RX ORDER — BUPIVACAINE HYDROCHLORIDE 7.5 MG/ML
3 INJECTION, SOLUTION EPIDURAL; RETROBULBAR ONCE
Status: COMPLETED | OUTPATIENT
Start: 2024-07-24 | End: 2024-07-24

## 2024-07-24 RX ADMIN — BUPIVACAINE HYDROCHLORIDE 3 ML: 7.5 INJECTION, SOLUTION EPIDURAL; RETROBULBAR at 12:17

## 2024-07-24 NOTE — INTERVAL H&P NOTE
Update: (This section must be completed if the H&P was completed greater than 24 hrs to procedure or admission)    H&P reviewed. After examining the patient, I find no changed to the H&P since it had been written.    Patient re-evaluated. Accept as history and physical.    Emeterio Marinelli MD/July 24, 2024/12:08 PM

## 2024-07-24 NOTE — DISCHARGE INSTR - LAB

## 2024-07-26 ENCOUNTER — TELEPHONE (OUTPATIENT)
Dept: INTERNAL MEDICINE CLINIC | Facility: CLINIC | Age: 89
End: 2024-07-26

## 2024-07-26 DIAGNOSIS — E11.8 TYPE 2 DIABETES MELLITUS WITH COMPLICATION, WITHOUT LONG-TERM CURRENT USE OF INSULIN (HCC): Primary | ICD-10-CM

## 2024-07-26 DIAGNOSIS — R39.9 UTI SYMPTOMS: Primary | ICD-10-CM

## 2024-07-26 RX ORDER — NITROFURANTOIN 25; 75 MG/1; MG/1
100 CAPSULE ORAL 2 TIMES DAILY
Qty: 10 CAPSULE | Refills: 0 | Status: SHIPPED | OUTPATIENT
Start: 2024-07-26 | End: 2024-07-31

## 2024-07-26 NOTE — TELEPHONE ENCOUNTER
Spoke with patient's daughter and advised.     Patient scheduled for a follow up appointment, can you please enter labs

## 2024-07-26 NOTE — TELEPHONE ENCOUNTER
Pts daughter is saying she has Burning in her labia and urethra,  possible uti going on....    She had a back procedure recent also she said     [facility]     Is there anything that could be sent in?

## 2024-07-29 DIAGNOSIS — E11.8 TYPE 2 DIABETES MELLITUS WITH COMPLICATION, WITHOUT LONG-TERM CURRENT USE OF INSULIN (HCC): ICD-10-CM

## 2024-07-29 DIAGNOSIS — E78.5 HYPERLIPIDEMIA, UNSPECIFIED HYPERLIPIDEMIA TYPE: ICD-10-CM

## 2024-07-29 RX ORDER — ATORVASTATIN CALCIUM 40 MG/1
40 TABLET, FILM COATED ORAL DAILY
Qty: 90 TABLET | Refills: 3 | Status: SHIPPED | OUTPATIENT
Start: 2024-07-29

## 2024-07-30 ENCOUNTER — TELEPHONE (OUTPATIENT)
Age: 89
End: 2024-07-30

## 2024-07-30 NOTE — TELEPHONE ENCOUNTER
Caller: alhaji Roberts    Doctor: Isis    Reason for call: pt called to make sure we received her pain dairy.  I don't see anything in her chart.  She will have her grandson resend thru my chart    Call back#: [phone]

## 2024-07-31 ENCOUNTER — PATIENT MESSAGE (OUTPATIENT)
Dept: RADIOLOGY | Facility: CLINIC | Age: 89
End: 2024-07-31

## 2024-07-31 NOTE — PATIENT COMMUNICATION
Pt scheduled for RFAs with Dr Marinelli on 8/29/24 and 9/12/24     Pts chart states she is diabetic, however, no diabetes related meds list on chart     Pt does not have a pacemaker or defibrillator     Pt given review of instructions over phone and via myc message     Have you completed PT/HEP/Chiro in the past 6 months for dedicated area? At home PT with [facility]   If yes, how long did you complete? December 2023  What was the frequency?  Did it provide relief? No, per discharge summary scanned in on 1/15/24, pt requested discharge due to remaining pain/PT not helping with pain  If no, reason therapy was not completed?   *pt has completed MBBs

## 2024-08-26 DIAGNOSIS — E11.8 TYPE 2 DIABETES MELLITUS WITH COMPLICATION, WITHOUT LONG-TERM CURRENT USE OF INSULIN (HCC): ICD-10-CM

## 2024-08-26 DIAGNOSIS — E78.5 HYPERLIPIDEMIA, UNSPECIFIED HYPERLIPIDEMIA TYPE: ICD-10-CM

## 2024-08-26 RX ORDER — PIOGLITAZONEHYDROCHLORIDE 30 MG/1
30 TABLET ORAL DAILY
Qty: 90 TABLET | Refills: 1 | Status: SHIPPED | OUTPATIENT
Start: 2024-08-26

## 2024-08-29 ENCOUNTER — HOSPITAL ENCOUNTER (OUTPATIENT)
Dept: RADIOLOGY | Facility: CLINIC | Age: 89
Discharge: HOME | End: 2024-08-29
Admitting: STUDENT IN AN ORGANIZED HEALTH CARE EDUCATION/TRAINING PROGRAM
Payer: COMMERCIAL

## 2024-08-29 VITALS
DIASTOLIC BLOOD PRESSURE: 80 MMHG | RESPIRATION RATE: 20 BRPM | SYSTOLIC BLOOD PRESSURE: 145 MMHG | HEART RATE: 70 BPM | OXYGEN SATURATION: 93 %

## 2024-08-29 DIAGNOSIS — M47.816 LUMBAR SPONDYLOSIS: ICD-10-CM

## 2024-08-29 PROCEDURE — 64635 DESTROY LUMB/SAC FACET JNT: CPT | Performed by: STUDENT IN AN ORGANIZED HEALTH CARE EDUCATION/TRAINING PROGRAM

## 2024-08-29 PROCEDURE — 64636 DESTROY L/S FACET JNT ADDL: CPT | Performed by: STUDENT IN AN ORGANIZED HEALTH CARE EDUCATION/TRAINING PROGRAM

## 2024-08-29 RX ADMIN — Medication 3 ML: at 13:24

## 2024-08-29 NOTE — DISCHARGE INSTR - LAB

## 2024-08-29 NOTE — H&P
History of Present Illness: The patient is a 89 y.o. female who presents with complaints of right lower back pain    Past Medical History:   Diagnosis Date    Cardiac disorder 01/01/2001    x2 stents after a failed stress test    Cellulitis     last assessed - 19Nov2012    Constipation     last assessed - 14Mar2013    COVID-19 08/15/2022    Diabetes mellitus (HCC)     Disease of thyroid gland     Ecchymosis     last assessed - 03Jan2017    Fall     last assessed - 03Jan2017    Hyperlipidemia     Hypertension     Hypokalemia     last assessed - 02Jun2015    Lower extremity weakness     last assessed - 21May2015    Vitamin D deficiency     last assessed - 16Aug2017       Past Surgical History:   Procedure Laterality Date    BACK SURGERY      CHOLECYSTECTOMY      COLONOSCOPY      CORONARY ANGIOPLASTY WITH STENT PLACEMENT      CT BONE MARROW BIOPSY AND ASPIRATION  1/15/2020    HYSTERECTOMY      OTHER SURGICAL HISTORY      Previous stent placement         Current Outpatient Medications:     acetaminophen (TYLENOL) 500 mg tablet, Take 500 mg by mouth, Disp: , Rfl:     amLODIPine (NORVASC) 5 mg tablet, Take 2 tablets (10 mg total) by mouth daily, Disp: 180 tablet, Rfl: 0    aspirin 81 mg chewable tablet, Chew 81 mg, Disp: , Rfl:     atorvastatin (LIPITOR) 40 mg tablet, Take 1 tablet (40 mg total) by mouth daily, Disp: 90 tablet, Rfl: 3    baclofen 10 mg tablet, TAKE 1 TABLET BY MOUTH NIGHTLY AT BEDTIME AS NEEDED AS DIRECTED, Disp: , Rfl: 3    carvedilol (COREG) 12.5 mg tablet, Take 1 tablet (12.5 mg total) by mouth 2 (two) times a day with meals, Disp: 180 tablet, Rfl: 1    Cholecalciferol (VITAMIN D3) 2000 units capsule, Take 1 capsule (2,000 Units total) by mouth daily, Disp: 100 capsule, Rfl: 2    latanoprost (XALATAN) 0.005 % ophthalmic solution, INSTILL 1 DROP INTO EACH EYE AT BEDTIME, Disp: 9 mL, Rfl: 0    levothyroxine 50 mcg tablet, Take 1 tablet (50 mcg total) by mouth daily, Disp: 90 tablet, Rfl: 1    lidocaine  (Lidoderm) 5 %, Apply 1 patch topically daily Remove & Discard patch within 12 hours or as directed by MD, Disp: 30 patch, Rfl: 0    losartan (COZAAR) 100 MG tablet, Take 1 tablet (100 mg total) by mouth daily, Disp: 90 tablet, Rfl: 3    Multiple Vitamins-Minerals (PRESERVISION AREDS 2 PO), Take by mouth, Disp: , Rfl:     pioglitazone (ACTOS) 30 mg tablet, Take 1 tablet (30 mg total) by mouth daily, Disp: 90 tablet, Rfl: 1    transdermal buprenorphine (Butrans) 10 mcg/hr TD patch, Place 1 patch on the skin over 7 days every 7 days, Disp: 4 patch, Rfl: 1    zolpidem (AMBIEN) 5 mg tablet, Take 5 mg by mouth, Disp: , Rfl:     Current Facility-Administered Medications:     cyanocobalamin injection 1,000 mcg, 1,000 mcg, Intramuscular, Q30 Days, Sukumar Valencia DO, 1,000 mcg at 02/27/19 1250    Allergies   Allergen Reactions    Iodine - Food Allergy Other (See Comments)     IV CONTRAST DYE    Iv Dye  [Iodinated Contrast Media] Hives    Other        Physical Exam: There were no vitals filed for this visit.  General: Awake, Alert, Oriented x 3, Mood and affect appropriate  Respiratory: Respirations even and unlabored  Cardiovascular: Peripheral pulses intact; no edema  Musculoskeletal Exam: facet loading positive right    ASA Score: 3         Assessment:   1. Lumbar spondylosis        Plan: right RFA L2-3,L3-4

## 2024-09-12 ENCOUNTER — TELEPHONE (OUTPATIENT)
Dept: RADIOLOGY | Facility: CLINIC | Age: 89
End: 2024-09-12

## 2024-09-12 ENCOUNTER — HOSPITAL ENCOUNTER (OUTPATIENT)
Dept: RADIOLOGY | Facility: CLINIC | Age: 89
Discharge: HOME | End: 2024-09-12
Payer: COMMERCIAL

## 2024-09-12 VITALS
OXYGEN SATURATION: 94 % | HEART RATE: 83 BPM | SYSTOLIC BLOOD PRESSURE: 160 MMHG | DIASTOLIC BLOOD PRESSURE: 75 MMHG | RESPIRATION RATE: 20 BRPM

## 2024-09-12 DIAGNOSIS — M47.816 LUMBAR SPONDYLOSIS: ICD-10-CM

## 2024-09-12 PROCEDURE — 64635 DESTROY LUMB/SAC FACET JNT: CPT | Performed by: STUDENT IN AN ORGANIZED HEALTH CARE EDUCATION/TRAINING PROGRAM

## 2024-09-12 PROCEDURE — 64636 DESTROY L/S FACET JNT ADDL: CPT | Performed by: STUDENT IN AN ORGANIZED HEALTH CARE EDUCATION/TRAINING PROGRAM

## 2024-09-12 RX ADMIN — Medication 7 ML: at 13:26

## 2024-09-12 NOTE — DISCHARGE INSTR - LAB

## 2024-09-12 NOTE — H&P
History of Present Illness: The patient is a 89 y.o. female who presents with complaints of leftl ower back pain    Past Medical History:   Diagnosis Date    Cardiac disorder 01/01/2001    x2 stents after a failed stress test    Cellulitis     last assessed - 19Nov2012    Constipation     last assessed - 14Mar2013    COVID-19 08/15/2022    Diabetes mellitus (HCC)     Disease of thyroid gland     Ecchymosis     last assessed - 03Jan2017    Fall     last assessed - 03Jan2017    Hyperlipidemia     Hypertension     Hypokalemia     last assessed - 02Jun2015    Lower extremity weakness     last assessed - 21May2015    Vitamin D deficiency     last assessed - 16Aug2017       Past Surgical History:   Procedure Laterality Date    BACK SURGERY      CHOLECYSTECTOMY      COLONOSCOPY      CORONARY ANGIOPLASTY WITH STENT PLACEMENT      CT BONE MARROW BIOPSY AND ASPIRATION  1/15/2020    HYSTERECTOMY      OTHER SURGICAL HISTORY      Previous stent placement         Current Outpatient Medications:     acetaminophen (TYLENOL) 500 mg tablet, Take 500 mg by mouth, Disp: , Rfl:     amLODIPine (NORVASC) 5 mg tablet, Take 2 tablets (10 mg total) by mouth daily, Disp: 180 tablet, Rfl: 0    aspirin 81 mg chewable tablet, Chew 81 mg, Disp: , Rfl:     atorvastatin (LIPITOR) 40 mg tablet, Take 1 tablet (40 mg total) by mouth daily, Disp: 90 tablet, Rfl: 3    baclofen 10 mg tablet, TAKE 1 TABLET BY MOUTH NIGHTLY AT BEDTIME AS NEEDED AS DIRECTED, Disp: , Rfl: 3    carvedilol (COREG) 12.5 mg tablet, Take 1 tablet (12.5 mg total) by mouth 2 (two) times a day with meals, Disp: 180 tablet, Rfl: 1    Cholecalciferol (VITAMIN D3) 2000 units capsule, Take 1 capsule (2,000 Units total) by mouth daily, Disp: 100 capsule, Rfl: 2    latanoprost (XALATAN) 0.005 % ophthalmic solution, INSTILL 1 DROP INTO EACH EYE AT BEDTIME, Disp: 9 mL, Rfl: 0    levothyroxine 50 mcg tablet, Take 1 tablet (50 mcg total) by mouth daily, Disp: 90 tablet, Rfl: 1    lidocaine  (Lidoderm) 5 %, Apply 1 patch topically daily Remove & Discard patch within 12 hours or as directed by MD, Disp: 30 patch, Rfl: 0    losartan (COZAAR) 100 MG tablet, Take 1 tablet (100 mg total) by mouth daily, Disp: 90 tablet, Rfl: 3    Multiple Vitamins-Minerals (PRESERVISION AREDS 2 PO), Take by mouth, Disp: , Rfl:     pioglitazone (ACTOS) 30 mg tablet, Take 1 tablet (30 mg total) by mouth daily, Disp: 90 tablet, Rfl: 1    transdermal buprenorphine (Butrans) 10 mcg/hr TD patch, Place 1 patch on the skin over 7 days every 7 days, Disp: 4 patch, Rfl: 1    zolpidem (AMBIEN) 5 mg tablet, Take 5 mg by mouth, Disp: , Rfl:     Current Facility-Administered Medications:     cyanocobalamin injection 1,000 mcg, 1,000 mcg, Intramuscular, Q30 Days, Sukumar Valencia DO, 1,000 mcg at 02/27/19 1250    Allergies   Allergen Reactions    Iodine - Food Allergy Other (See Comments)     IV CONTRAST DYE    Iv Dye  [Iodinated Contrast Media] Hives    Other        Physical Exam: There were no vitals filed for this visit.  General: Awake, Alert, Oriented x 3, Mood and affect appropriate  Respiratory: Respirations even and unlabored  Cardiovascular: Peripheral pulses intact; no edema  Musculoskeletal Exam: facet loading positive left    ASA Score: 3         Assessment:   1. Lumbar spondylosis        Plan: Left RFA L2-3,L3-4

## 2024-09-13 NOTE — TELEPHONE ENCOUNTER
S/w pt. Denies concerns today. States pain is improved but still present today. Advised pt give 4-6 weeks for full benefit. Aware of f/u ov

## 2024-09-15 ENCOUNTER — RA CDI HCC (OUTPATIENT)
Dept: OTHER | Facility: HOSPITAL | Age: 89
End: 2024-09-15

## 2024-09-23 ENCOUNTER — OFFICE VISIT (OUTPATIENT)
Dept: INTERNAL MEDICINE CLINIC | Facility: CLINIC | Age: 89
End: 2024-09-23
Payer: COMMERCIAL

## 2024-09-23 VITALS
RESPIRATION RATE: 120 BRPM | HEART RATE: 67 BPM | WEIGHT: 141.4 LBS | HEIGHT: 62 IN | DIASTOLIC BLOOD PRESSURE: 66 MMHG | OXYGEN SATURATION: 97 % | BODY MASS INDEX: 26.02 KG/M2 | SYSTOLIC BLOOD PRESSURE: 138 MMHG

## 2024-09-23 DIAGNOSIS — I25.118 CORONARY ARTERY DISEASE OF NATIVE ARTERY OF NATIVE HEART WITH STABLE ANGINA PECTORIS (HCC): ICD-10-CM

## 2024-09-23 DIAGNOSIS — G89.4 CHRONIC PAIN SYNDROME: ICD-10-CM

## 2024-09-23 DIAGNOSIS — C85.90 NON-HODGKIN'S LYMPHOMA, UNSPECIFIED BODY REGION, UNSPECIFIED NON-HODGKIN LYMPHOMA TYPE (HCC): ICD-10-CM

## 2024-09-23 DIAGNOSIS — D89.89 LIGHT CHAIN DISEASE, KAPPA TYPE (HCC): ICD-10-CM

## 2024-09-23 DIAGNOSIS — Z23 ENCOUNTER FOR IMMUNIZATION: ICD-10-CM

## 2024-09-23 DIAGNOSIS — E78.5 HYPERLIPIDEMIA, UNSPECIFIED HYPERLIPIDEMIA TYPE: ICD-10-CM

## 2024-09-23 DIAGNOSIS — Z00.00 MEDICARE ANNUAL WELLNESS VISIT, SUBSEQUENT: Primary | ICD-10-CM

## 2024-09-23 DIAGNOSIS — I10 HYPERTENSION, UNSPECIFIED TYPE: ICD-10-CM

## 2024-09-23 DIAGNOSIS — E11.8 TYPE 2 DIABETES MELLITUS WITH COMPLICATION, WITHOUT LONG-TERM CURRENT USE OF INSULIN (HCC): ICD-10-CM

## 2024-09-23 PROBLEM — E11.40 TYPE 2 DIABETES MELLITUS WITH DIABETIC NEUROPATHY, UNSPECIFIED (HCC): Status: ACTIVE | Noted: 2024-01-22

## 2024-09-23 PROBLEM — R26.2 DIFFICULTY IN WALKING, NOT ELSEWHERE CLASSIFIED: Status: ACTIVE | Noted: 2023-11-21

## 2024-09-23 PROBLEM — K21.9 GASTRO-ESOPHAGEAL REFLUX DISEASE WITHOUT ESOPHAGITIS: Status: ACTIVE | Noted: 2024-01-22

## 2024-09-23 PROCEDURE — 90662 IIV NO PRSV INCREASED AG IM: CPT | Performed by: INTERNAL MEDICINE

## 2024-09-23 PROCEDURE — G0008 ADMIN INFLUENZA VIRUS VAC: HCPCS | Performed by: INTERNAL MEDICINE

## 2024-09-23 PROCEDURE — G0439 PPPS, SUBSEQ VISIT: HCPCS | Performed by: INTERNAL MEDICINE

## 2024-09-23 PROCEDURE — 99214 OFFICE O/P EST MOD 30 MIN: CPT | Performed by: INTERNAL MEDICINE

## 2024-09-23 RX ORDER — PIOGLITAZONEHYDROCHLORIDE 30 MG/1
45 TABLET ORAL DAILY
Qty: 90 TABLET | Refills: 1 | Status: SHIPPED | OUTPATIENT
Start: 2024-09-23

## 2024-09-23 RX ORDER — ATORVASTATIN CALCIUM 40 MG/1
40 TABLET, FILM COATED ORAL DAILY
Qty: 90 TABLET | Refills: 3 | Status: SHIPPED | OUTPATIENT
Start: 2024-09-23

## 2024-09-23 NOTE — PROGRESS NOTES
Ambulatory Visit  Name: Alejandra Patton      : 1934      MRN: [id number]  Encounter Provider: Margarita Holland MD  Encounter Date: 2024   Encounter department: [facility]  Medicare annual wellness visit, subsequent  AWV completed. Labs reviewed. Flu vaccine today.       Non-Hodgkin's lymphoma, unspecified body region, unspecified non-Hodgkin lymphoma type (HCC)    -2020 : bone marrow biopsy showed lymphoma;no imaging ( no need)  -   appears to be indolent and low grade.  No further workup or treatment on surveillance.       she has not see hem/onc since past 3 years. clinically patient doing well, no evidence of progression.   Light chain disease, 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HCC)       previously managed at [facility], no bone marrow biopsy, no treatment.   Chronic pain syndrome       continue current regimen. Continue butrans patch./  Hyperlipidemia, unspecified hyperlipidemia type    Orders:    pioglitazone (ACTOS) 30 mg tablet; Take 1.5 tablets (45 mg total) by mouth daily    atorvastatin (LIPITOR) 40 mg tablet; Take 1 tablet (40 mg total) by mouth daily    Continue statin.    Type 2 diabetes mellitus with complication, without long-term current use of insulin ([facility])    Lab Results   Component Value Date    HGBA1C 6.7 2023     Type 2 diabetes is a little worse, increase Actos. Continue statin and ARB. Check A1c in 4 months.    Orders:    pioglitazone (ACTOS) 30 mg tablet; Take 1.5 tablets (45 mg total) by mouth daily    atorvastatin (LIPITOR) 40 mg tablet; Take 1 tablet (40 mg total) by mouth daily    Encounter for immunization    Orders:    influenza vaccine, high-dose, PF 0.5 mL (Fluzone High Dose)    Hypertension, unspecified type  Controlled continue current regimen.         Coronary artery disease of native artery of native heart with stable angina pectoris (HCC)    For her heart disease, continue aspirin, blocker, continue statin. She has good follow-up  with cardiology.          Depression Screening and Follow-up Plan: Patient was screened for depression during today's encounter. They screened negative with a PHQ-9 score of 0.      Preventive health issues were discussed with patient, and age appropriate screening tests were ordered as noted in patient's After Visit Summary. Personalized health advice and appropriate referrals for health education or preventive services given if needed, as noted in patient's After Visit Summary.    History of Present Illness   Patient is here for routine follow up, reviewed chronic medical problems, ordered labs for next visit including CBC CMP TSH A1C LIPID. Reviewed labs for this visit.       Patient Care Team:  Margarita Holland MD as PCP - General (Internal Medicine)  Emeterio Marinelli MD (Pain Medicine)    Review of Systems   Constitutional:  Negative for chills and fever.   HENT:  Negative for ear pain and sore throat.    Eyes:  Negative for pain and visual disturbance.   Respiratory:  Negative for cough and shortness of breath.    Cardiovascular:  Negative for chest pain and palpitations.   Gastrointestinal:  Negative for abdominal pain and vomiting.   Genitourinary:  Negative for dysuria and hematuria.   Musculoskeletal:  Negative for arthralgias and back pain.   Skin:  Negative for color change and rash.   Neurological:  Negative for seizures and syncope.   All other systems reviewed and are negative.    Medical History Reviewed by provider this encounter:  Tobacco  Allergies  Meds  Problems  Med Hx  Surg Hx  Fam Hx       Annual Wellness Visit Questionnaire   Alejandra is here for her Subsequent Wellness visit. Last Medicare Wellness visit information reviewed, patient interviewed and updates made to the record today.      Health Risk Assessment:   Patient rates overall health as good. Patient feels that their physical health rating is slightly better. Patient is very satisfied with their life. Eyesight was rated as  same. Hearing was rated as same. Patient feels that their emotional and mental health rating is much better. Patients states they are never, rarely angry. Patient states they are never, rarely unusually tired/fatigued. Pain experienced in the last 7 days has been a lot. Patient's pain rating has been 10/10. Patient states that she has experienced no weight loss or gain in last 6 months.     Depression Screening:   PHQ-9 Score: 0      Fall Risk Screening:   In the past year, patient has experienced: no history of falling in past year      Urinary Incontinence Screening:   Patient has not leaked urine accidently in the last six months.     Home Safety:  Patient does not have trouble with stairs inside or outside of their home. Patient has working smoke alarms and has working carbon monoxide detector. Home safety hazards include: none.     Nutrition:   Current diet is Regular.     Medications:   Patient is currently taking over-the-counter supplements. OTC medications include: see medication list. Patient is able to manage medications.     Activities of Daily Living (ADLs)/Instrumental Activities of Daily Living (IADLs):   Walk and transfer into and out of bed and chair?: Yes  Dress and groom yourself?: Yes    Bathe or shower yourself?: Yes    Feed yourself? Yes  Do your laundry/housekeeping?: Yes  Manage your money, pay your bills and track your expenses?: Yes  Make your own meals?: No    Do your own shopping?: Yes    ADL comments: Daughter in law helps with meals.    Previous Hospitalizations:   Any hospitalizations or ED visits within the last 12 months?: Yes    How many hospitalizations have you had in the last year?: 1-2    Advance Care Planning:   Living will: Yes    Advanced directive: Yes      Cognitive Screening:   Provider or family/friend/caregiver concerned regarding cognition?: No    PREVENTIVE SCREENINGS      Cardiovascular Screening:    General: History Lipid Disorder and Risks and Benefits Discussed     Due for: Lipid Panel      Diabetes Screening:     General: History Diabetes and Risks and Benefits Discussed    Due for: Blood Glucose      Colorectal Cancer Screening:     General: Screening Not Indicated      Breast Cancer Screening:     General: Screening Current      Cervical Cancer Screening:    General: Screening Not Indicated      Osteoporosis Screening:    General: History Osteoporosis      Abdominal Aortic Aneurysm (AAA) Screening:        General: Screening Not Indicated      Lung Cancer Screening:     General: Screening Not Indicated      Hepatitis C Screening:    General: Screening Not Indicated    Screening, Brief Intervention, and Referral to Treatment (SBIRT)    Screening  Typical number of drinks in a day: 0  Typical number of drinks in a week: 0  Interpretation: Low risk drinking behavior.    AUDIT-C Screenin) How often did you have a drink containing alcohol in the past year? never  2) How many drinks did you have on a typical day when you were drinking in the past year? 0  3) How often did you have 6 or more drinks on one occasion in the past year? never    AUDIT-C Score: 0  Interpretation: Score 0-2 (female): Negative screen for alcohol misuse    Single Item Drug Screening:  How often have you used an illegal drug (including marijuana) or a prescription medication for non-medical reasons in the past year? never    Single Item Drug Screen Score: 0  Interpretation: Negative screen for possible drug use disorder    Brief Intervention  Alcohol & drug use screenings were reviewed. No concerns regarding substance use disorder identified.     Other Counseling Topics:   Car/seat belt/driving safety.     Social Determinants of Health     Financial Resource Strain: Low Risk  (3/24/2023)    Overall Financial Resource Strain (CARDIA)     Difficulty of Paying Living Expenses: Not hard at all   Food Insecurity: No Food Insecurity (2024)    Hunger Vital Sign     Worried About Running Out of Food in the  "Last Year: Never true     Ran Out of Food in the Last Year: Never true   Transportation Needs: No Transportation Needs (9/23/2024)    PRAPARE - Transportation     Lack of Transportation (Medical): No     Lack of Transportation (Non-Medical): No   Housing Stability: Low Risk  (9/23/2024)    Housing Stability Vital Sign     Unable to Pay for Housing in the Last Year: No     Number of Times Moved in the Last Year: 1     Homeless in the Last Year: No   Utilities: Not At Risk (9/23/2024)    [facility] Utilities     Threatened with loss of utilities: No     No results found.    Objective   /66 (BP Location: Left arm, Patient Position: Sitting, Cuff Size: Adult)   Pulse 67   Resp (!) 120   Ht 5' 2\" (1.575 m)   Wt 64.1 kg (141 lb 6.4 oz)   SpO2 97%   BMI 25.86 kg/m²     Physical Exam  Vitals and nursing note reviewed.   Constitutional:       General: She is not in acute distress.     Appearance: She is well-developed.   HENT:      Head: Normocephalic and atraumatic.   Eyes:      Conjunctiva/sclera: Conjunctivae normal.   Cardiovascular:      Rate and Rhythm: Normal rate and regular rhythm.      Heart sounds: No murmur heard.  Pulmonary:      Effort: Pulmonary effort is normal. No respiratory distress.      Breath sounds: Normal breath sounds.   Abdominal:      Palpations: Abdomen is soft.      Tenderness: There is no abdominal tenderness.   Musculoskeletal:         General: No swelling.      Cervical back: Neck supple.   Skin:     General: Skin is warm and dry.      Capillary Refill: Capillary refill takes less than 2 seconds.   Neurological:      Mental Status: She is alert.   Psychiatric:         Mood and Affect: Mood normal.       I have spent a total time of 25 minutes in caring for this patient on the day of the visit/encounter including Instructions for management, Importance of tx compliance, Impressions, Counseling / Coordination of care, and Obtaining or reviewing history  .  "

## 2024-09-23 NOTE — PATIENT INSTRUCTIONS
Medicare Preventive Visit Patient Instructions  Thank you for completing your Welcome to Medicare Visit or Medicare Annual Wellness Visit today. Your next wellness visit will be due in one year (9/24/2025).  The screening/preventive services that you may require over the next 5-10 years are detailed below. Some tests may not apply to you based off risk factors and/or age. Screening tests ordered at today's visit but not completed yet may show as past due. Also, please note that scanned in results may not display below.  Preventive Screenings:  Service Recommendations Previous Testing/Comments   Colorectal Cancer Screening  * Colonoscopy    * Fecal Occult Blood Test (FOBT)/Fecal Immunochemical Test (FIT)  * Fecal DNA/Cologuard Test  * Flexible Sigmoidoscopy Age: 45-75 years old   Colonoscopy: every 10 years (may be performed more frequently if at higher risk)  OR  FOBT/FIT: every 1 year  OR  Cologuard: every 3 years  OR  Sigmoidoscopy: every 5 years  Screening may be recommended earlier than age 45 if at higher risk for colorectal cancer. Also, an individualized decision between you and your healthcare provider will decide whether screening between the ages of 76-85 would be appropriate. Colonoscopy: Not on file  FOBT/FIT: Not on file  Cologuard: Not on file  Sigmoidoscopy: Not on file    Screening Not Indicated     Breast Cancer Screening Age: 40+ years old  Frequency: every 1-2 years  Not required if history of left and right mastectomy Mammogram: 12/08/2022    Screening Current   Cervical Cancer Screening Between the ages of 21-29, pap smear recommended once every 3 years.   Between the ages of 30-65, can perform pap smear with HPV co-testing every 5 years.   Recommendations may differ for women with a history of total hysterectomy, cervical cancer, or abnormal pap smears in past. Pap Smear: Not on file    Screening Not Indicated   Hepatitis C Screening Once for adults born between 1945 and 1965  More frequently  in patients at high risk for Hepatitis C Hep C Antibody: Not on file        Diabetes Screening 1-2 times per year if you're at risk for diabetes or have pre-diabetes Fasting glucose: 137 mg/dL (1/13/2024)  A1C: 6.7 (9/18/2023)  Screening Not Indicated  History Diabetes   Cholesterol Screening Once every 5 years if you don't have a lipid disorder. May order more often based on risk factors. Lipid panel: 09/19/2022    Screening Not Indicated  History Lipid Disorder     Other Preventive Screenings Covered by Medicare:  Abdominal Aortic Aneurysm (AAA) Screening: covered once if your at risk. You're considered to be at risk if you have a family history of AAA.  Lung Cancer Screening: covers low dose CT scan once per year if you meet all of the following conditions: (1) Age 55-77; (2) No signs or symptoms of lung cancer; (3) Current smoker or have quit smoking within the last 15 years; (4) You have a tobacco smoking history of at least 20 pack years (packs per day multiplied by number of years you smoked); (5) You get a written order from a healthcare provider.  Glaucoma Screening: covered annually if you're considered high risk: (1) You have diabetes OR (2) Family history of glaucoma OR (3)  aged 50 and older OR (4)  American aged 65 and older  Osteoporosis Screening: covered every 2 years if you meet one of the following conditions: (1) You're estrogen deficient and at risk for osteoporosis based off medical history and other findings; (2) Have a vertebral abnormality; (3) On glucocorticoid therapy for more than 3 months; (4) Have primary hyperparathyroidism; (5) On osteoporosis medications and need to assess response to drug therapy.   Last bone density test (DXA Scan): 10/01/2019.  HIV Screening: covered annually if you're between the age of 15-65. Also covered annually if you are younger than 15 and older than 65 with risk factors for HIV infection. For pregnant patients, it is covered up to 3  times per pregnancy.    Immunizations:  Immunization Recommendations   Influenza Vaccine Annual influenza vaccination during flu season is recommended for all persons aged >= 6 months who do not have contraindications   Pneumococcal Vaccine   * Pneumococcal conjugate vaccine = PCV13 (Prevnar 13), PCV15 (Vaxneuvance), PCV20 (Prevnar 20)  * Pneumococcal polysaccharide vaccine = PPSV23 (Pneumovax) Adults 19-65 yo with certain risk factors or if 65+ yo  If never received any pneumonia vaccine: recommend Prevnar 20 (PCV20)  Give PCV20 if previously received 1 dose of PCV13 or PPSV23   Hepatitis B Vaccine 3 dose series if at intermediate or high risk (ex: diabetes, end stage renal disease, liver disease)   Respiratory syncytial virus (RSV) Vaccine - COVERED BY MEDICARE PART D  * RSVPreF3 (Arexvy) CDC recommends that adults 60 years of age and older may receive a single dose of RSV vaccine using shared clinical decision-making (SCDM)   Tetanus (Td) Vaccine - COST NOT COVERED BY MEDICARE PART B Following completion of primary series, a booster dose should be given every 10 years to maintain immunity against tetanus. Td may also be given as tetanus wound prophylaxis.   Tdap Vaccine - COST NOT COVERED BY MEDICARE PART B Recommended at least once for all adults. For pregnant patients, recommended with each pregnancy.   Shingles Vaccine (Shingrix) - COST NOT COVERED BY MEDICARE PART B  2 shot series recommended in those 19 years and older who have or will have weakened immune systems or those 50 years and older     Health Maintenance Due:  There are no preventive care reminders to display for this patient.  Immunizations Due:      Topic Date Due   • COVID-19 Vaccine (5 - 2023-24 season) 09/01/2024   • Influenza Vaccine (1) 09/01/2024     Advance Directives   What are advance directives?  Advance directives are legal documents that state your wishes and plans for medical care. These plans are made ahead of time in case you lose  your ability to make decisions for yourself. Advance directives can apply to any medical decision, such as the treatments you want, and if you want to donate organs.   What are the types of advance directives?  There are many types of advance directives, and each state has rules about how to use them. You may choose a combination of any of the following:  Living will:  This is a written record of the treatment you want. You can also choose which treatments you do not want, which to limit, and which to stop at a certain time. This includes surgery, medicine, IV fluid, and tube feedings.   Durable power of  for healthcare (DPAHC):  This is a written record that states who you want to make healthcare choices for you when you are unable to make them for yourself. This person, called a proxy, is usually a family member or a friend. You may choose more than 1 proxy.  Do not resuscitate (DNR) order:  A DNR order is used in case your heart stops beating or you stop breathing. It is a request not to have certain forms of treatment, such as CPR. A DNR order may be included in other types of advance directives.  Medical directive:  This covers the care that you want if you are in a coma, near death, or unable to make decisions for yourself. You can list the treatments you want for each condition. Treatment may include pain medicine, surgery, blood transfusions, dialysis, IV or tube feedings, and a ventilator (breathing machine).  Values history:  This document has questions about your views, beliefs, and how you feel and think about life. This information can help others choose the care that you would choose.  Why are advance directives important?  An advance directive helps you control your care. Although spoken wishes may be used, it is better to have your wishes written down. Spoken wishes can be misunderstood, or not followed. Treatments may be given even if you do not want them. An advance directive may make it  easier for your family to make difficult choices about your care.   Weight Management   Why it is important to manage your weight:  Being overweight increases your risk of health conditions such as heart disease, high blood pressure, type 2 diabetes, and certain types of cancer. It can also increase your risk for osteoarthritis, sleep apnea, and other respiratory problems. Aim for a slow, steady weight loss. Even a small amount of weight loss can lower your risk of health problems.  How to lose weight safely:  A safe and healthy way to lose weight is to eat fewer calories and get regular exercise. You can lose up about 1 pound a week by decreasing the number of calories you eat by 500 calories each day.   Healthy meal plan for weight management:  A healthy meal plan includes a variety of foods, contains fewer calories, and helps you stay healthy. A healthy meal plan includes the following:  Eat whole-grain foods more often.  A healthy meal plan should contain fiber. Fiber is the part of grains, fruits, and vegetables that is not broken down by your body. Whole-grain foods are healthy and provide extra fiber in your diet. Some examples of whole-grain foods are whole-wheat breads and pastas, oatmeal, brown rice, and bulgur.  Eat a variety of vegetables every day.  Include dark, leafy greens such as spinach, kale, ronnie greens, and mustard greens. Eat yellow and orange vegetables such as carrots, sweet potatoes, and winter squash.   Eat a variety of fruits every day.  Choose fresh or canned fruit (canned in its own juice or light syrup) instead of juice. Fruit juice has very little or no fiber.  Eat low-fat dairy foods.  Drink fat-free (skim) milk or 1% milk. Eat fat-free yogurt and low-fat cottage cheese. Try low-fat cheeses such as mozzarella and other reduced-fat cheeses.  Choose meat and other protein foods that are low in fat.  Choose beans or other legumes such as split peas or lentils. Choose fish, skinless  poultry (chicken or turkey), or lean cuts of red meat (beef or pork). Before you cook meat or poultry, cut off any visible fat.   Use less fat and oil.  Try baking foods instead of frying them. Add less fat, such as margarine, sour cream, regular salad dressing and mayonnaise to foods. Eat fewer high-fat foods. Some examples of high-fat foods include french fries, doughnuts, ice cream, and cakes.  Eat fewer sweets.  Limit foods and drinks that are high in sugar. This includes candy, cookies, regular soda, and sweetened drinks.  Exercise:  Exercise at least 30 minutes per day on most days of the week. Some examples of exercise include walking, biking, dancing, and swimming. You can also fit in more physical activity by taking the stairs instead of the elevator or parking farther away from stores. Ask your healthcare provider about the best exercise plan for you.      © Copyright [employer] 2018 Information is for End User's use only and may not be sold, redistributed or otherwise used for commercial purposes. All illustrations and images included in CareNotes® are the copyrighted property of A.D.A.M., Inc. or [employer]

## 2024-09-23 NOTE — ASSESSMENT & PLAN NOTE
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  No further workup or treatment on surveillance.       she has not see hem/onc since past 3 years. clinically patient doing well, no evidence of progression.

## 2024-09-23 NOTE — ASSESSMENT & PLAN NOTE
For her heart disease, continue aspirin, blocker, continue statin. She has good follow-up with cardiology.

## 2024-09-23 NOTE — ASSESSMENT & PLAN NOTE
Orders:    pioglitazone (ACTOS) 30 mg tablet; Take 1.5 tablets (45 mg total) by mouth daily    atorvastatin (LIPITOR) 40 mg tablet; Take 1 tablet (40 mg total) by mouth daily    Continue statin.

## 2024-09-27 ENCOUNTER — OFFICE VISIT (OUTPATIENT)
Dept: PAIN MEDICINE | Facility: CLINIC | Age: 89
End: 2024-09-27

## 2024-09-27 VITALS
HEART RATE: 73 BPM | BODY MASS INDEX: 25.95 KG/M2 | WEIGHT: 141 LBS | DIASTOLIC BLOOD PRESSURE: 77 MMHG | SYSTOLIC BLOOD PRESSURE: 130 MMHG | HEIGHT: 62 IN

## 2024-09-27 DIAGNOSIS — G89.4 CHRONIC PAIN SYNDROME: Primary | ICD-10-CM

## 2024-09-27 DIAGNOSIS — Z79.891 LONG-TERM CURRENT USE OF OPIATE ANALGESIC: ICD-10-CM

## 2024-09-27 DIAGNOSIS — G89.29 CHRONIC BILATERAL LOW BACK PAIN WITHOUT SCIATICA: ICD-10-CM

## 2024-09-27 DIAGNOSIS — F11.20 UNCOMPLICATED OPIOID DEPENDENCE (HCC): ICD-10-CM

## 2024-09-27 DIAGNOSIS — M54.50 CHRONIC BILATERAL LOW BACK PAIN WITHOUT SCIATICA: ICD-10-CM

## 2024-09-27 RX ORDER — BUPRENORPHINE 10 UG/H
1 PATCH TRANSDERMAL
Qty: 4 PATCH | Refills: 1 | Status: SHIPPED | OUTPATIENT
Start: 2024-10-10

## 2024-09-27 NOTE — PROGRESS NOTES
Pain Medicine Follow-Up Note    Assessment:  1. Chronic pain syndrome    2. Chronic bilateral low back pain without sciatica    3. Uncomplicated opioid dependence (HCC)    4. Long-term current use of opiate analgesic        Plan:  Orders Placed This Encounter   Procedures    MM DT_Validity Creatinine    MM ALL_Prescribed Meds and Special Instructions     Order Specific Question:   [facility] Is ATORVASTATIN prescribed?     Answer:   Yes     Order Specific Question:   [facility] Is BUPRENORPHINE Prescribed?     Answer:   Yes     Order Specific Question:   [facility] Is ZOLPIDEM Prescribed?     Answer:   Yes    MM DT_Alprazolam Definitive Test    MM DT_Amphetamine Definitive Test    MM DT_Buprenorphine Definitive Test    MM DT_Butalbital Definitive Test    MM DT_Clonazepam Definitive Test    MM DT_Cocaine Definitive Test    MM DT_Codeine Definitive Test    MM DT_Dextromethorphan Definitive Test    MM Diazepam Definitive Test    MM DT_Ethyl Glucuronide/Ethyl Sulfate Definitive Test    MM DT_Fentanyl Definitive Test    MM DT_Heroin Definitive Test    MM DT_Hydrocodone Definitive Test    MM DT_Hydromorphone Definitive Test    MM DT_Kratom Definitive Test    MM DT_Levorphanol Definitive Test    MM DT_MDMA Definitive Test    MM DT_Meperidine Definitive Test    MM DT_Methadone Definitive Test    MM DT_Methamphetamine Definitive Test    MM DT_Methylphenidate Definitive Test    MM DT_Morphine Definitive Test    MM Lorazepam Definitive Test    MM DT_Oxazepam Definitive Test    MM DT_Oxycodone Definitive Test    MM DT_Oxymorphone Definitive Test    MM DT_Phencyclidine Definitive Test    MM DT_Phenobarbital Definitive Test    MM DT_Phentermine Definitive Test    MM DT_Secobarbital Definitive Test    MM DT_Spice Definitive Test    MM DT_Tapentadol Definitive Test    MM DT_Temazapam Definitive Test    MM DT_THC Definitive Test    MM DT_Tramadol Definitive Test       New Medications Ordered This Visit   Medications    transdermal  buprenorphine (Butrans) 10 mcg/hr TD patch     Sig: Place 1 patch on the skin over 7 days every 7 days Do not start before October 10, 2024.     Dispense:  4 patch     Refill:  1       My impressions and treatment recommendations were discussed in detail with the patient who verbalized understanding and had no further questions.      Patient returns to the office after having a radiofrequency ablation on her L2-L3, L3-L4 facet joints left side performed on 9/12/2024 and the right side performed on 8/29/2024.  Patient is reporting excellent pain relief demonstrated by the patient's ability to ambulate with a cane into the building the last few visits the patient has been in a wheelchair.  At this time the patient is unsure about discontinuing the Butrans 10 mcg/h transdermal patch and fear of her pain returning.  I advised the patient that typically it can take up to 6 weeks for the radiofrequency ablation to display its full results, patient will consider to titrate down the Butrans patch at the end of October in hopes of her pain not returning.  Patient denies any side effects using the Butrans patch therefore I find it reasonable for the patient to continue to use the Butrans 10 mcg/h transdermal patch.    [place] Prescription Drug Monitoring Program report was reviewed and was appropriate     A urine drug screen was collected at today's office visit as part of our medication management protocol. The point of care testing results were appropriate for what was being prescribed. The specimen will be sent for confirmatory testing. The drug screen is medically necessary because the patient is either dependent on opioid medication or is being considered for opioid medication therapy and the results could impact ongoing or future treatment. The drug screen is to evaluate for the presences or absence of prescribed, non-prescribed, and/or illicit drugs/substances.    There are risks associated with opioid  medications, including dependence, addiction and tolerance. The patient understands and agrees to use these medications only as prescribed. Potential side effects of the medications include, but are not limited to, constipation, drowsiness, addiction, impaired judgment and risk of fatal overdose if not taken as prescribed. The patient was warned against driving while taking sedation medications.  Sharing medications is a felony. At this point in time, the patient is showing no signs of addiction, abuse, diversion or suicidal ideation.    Follow-up is planned in 8 weeks time or sooner as warranted.  Discharge instructions were provided. I personally saw and examined the patient and I agree with the above discussed plan of care.    History of Present Illness:    Alejandra Patton is a 89 y.o. female who presents to [facility] for interval re-evaluation of the above stated pain complaints. The patient has a past medical and chronic pain history as outlined in the assessment section. She was last seen on 9/12/2024.    At today's visit patient states that their pain symptoms are better with a pain score of 2/10 on the verbal numeric pain scale.  The patient's pain is worse in the morning.  The patient's pain is occasional in nature.  And the quality of the patient's pain is described as dull-aching.  The patient's pain is located in the mid low back.  Patient is unsure if the pain relief she is obtaining is from the recent procedure where the medications but they are both definitely providing her significant pain relief.  Patient states she does not have any side effects from Butrans patch.    Pain Contract Signed:  9/27/2024  Last Urine Drug Screen:  9/27/2024    Other than as stated above, the patient denies any interval changes in medications, medical condition, mental condition, symptoms, or allergies since the last office visit.         Review of Systems:    Review of Systems   Respiratory:   Negative for shortness of breath.    Cardiovascular:  Negative for chest pain.   Gastrointestinal:  Negative for constipation, diarrhea, nausea and vomiting.   Musculoskeletal:  Positive for back pain and gait problem. Negative for arthralgias, joint swelling and myalgias.   Skin:  Negative for rash.   Neurological:  Negative for dizziness, seizures and weakness.   All other systems reviewed and are negative.        Past Medical History:   Diagnosis Date    Cardiac disorder 01/01/2001    x2 stents after a failed stress test    Cellulitis     last assessed - 19Nov2012    Constipation     last assessed - 14Mar2013    COVID-19 08/15/2022    Diabetes mellitus (HCC)     Disease of thyroid gland     Ecchymosis     last assessed - 03Jan2017    Fall     last assessed - 03Jan2017    Hyperlipidemia     Hypertension     Hypokalemia     last assessed - 02Jun2015    Lower extremity weakness     last assessed - 21May2015    Vitamin D deficiency     last assessed - 16Aug2017       Past Surgical History:   Procedure Laterality Date    BACK SURGERY      CHOLECYSTECTOMY      COLONOSCOPY      CORONARY ANGIOPLASTY WITH STENT PLACEMENT      CT BONE MARROW BIOPSY AND ASPIRATION  1/15/2020    HYSTERECTOMY      OTHER SURGICAL HISTORY      Previous stent placement       Family History   Problem Relation Age of Onset    Heart disease Mother     Other Father         cerebral embolism - with cerebral infarction       Social History     Occupational History    Not on file   Tobacco Use    Smoking status: Former     Types: Cigarettes     Start date: 5/7/1958    Smokeless tobacco: Never   Vaping Use    Vaping status: Never Used   Substance and Sexual Activity    Alcohol use: Never    Drug use: No    Sexual activity: Never     Partners: Male         Current Outpatient Medications:     acetaminophen (TYLENOL) 500 mg tablet, Take 500 mg by mouth, Disp: , Rfl:     amLODIPine (NORVASC) 5 mg tablet, Take 2 tablets (10 mg total) by mouth daily, Disp:  "180 tablet, Rfl: 0    aspirin 81 mg chewable tablet, Chew 81 mg, Disp: , Rfl:     atorvastatin (LIPITOR) 40 mg tablet, Take 1 tablet (40 mg total) by mouth daily, Disp: 90 tablet, Rfl: 3    baclofen 10 mg tablet, TAKE 1 TABLET BY MOUTH NIGHTLY AT BEDTIME AS NEEDED AS DIRECTED, Disp: , Rfl: 3    carvedilol (COREG) 12.5 mg tablet, Take 1 tablet (12.5 mg total) by mouth 2 (two) times a day with meals, Disp: 180 tablet, Rfl: 1    Cholecalciferol (VITAMIN D3) 2000 units capsule, Take 1 capsule (2,000 Units total) by mouth daily, Disp: 100 capsule, Rfl: 2    latanoprost (XALATAN) 0.005 % ophthalmic solution, INSTILL 1 DROP INTO EACH EYE AT BEDTIME, Disp: 9 mL, Rfl: 0    levothyroxine 50 mcg tablet, Take 1 tablet (50 mcg total) by mouth daily, Disp: 90 tablet, Rfl: 1    lidocaine (Lidoderm) 5 %, Apply 1 patch topically daily Remove & Discard patch within 12 hours or as directed by MD, Disp: 30 patch, Rfl: 0    losartan (COZAAR) 100 MG tablet, Take 1 tablet (100 mg total) by mouth daily, Disp: 90 tablet, Rfl: 3    Multiple Vitamins-Minerals (PRESERVISION AREDS 2 PO), Take by mouth, Disp: , Rfl:     pioglitazone (ACTOS) 30 mg tablet, Take 1.5 tablets (45 mg total) by mouth daily, Disp: 90 tablet, Rfl: 1    [START ON 10/10/2024] transdermal buprenorphine (Butrans) 10 mcg/hr TD patch, Place 1 patch on the skin over 7 days every 7 days Do not start before October 10, 2024., Disp: 4 patch, Rfl: 1    zolpidem (AMBIEN) 5 mg tablet, Take 5 mg by mouth, Disp: , Rfl:     Current Facility-Administered Medications:     cyanocobalamin injection 1,000 mcg, 1,000 mcg, Intramuscular, Q30 Days, Sukumar Valencia DO, 1,000 mcg at 02/27/19 1250    Allergies   Allergen Reactions    Iodine - Food Allergy Other (See Comments)     IV CONTRAST DYE    Iv Dye  [Iodinated Contrast Media] Hives    Other        Physical Exam:    /77   Pulse 73   Ht 5' 2\" (1.575 m)   Wt 64 kg (141 lb)   BMI 25.79 kg/m²     Constitutional:normal, well developed, " well nourished, alert, in no distress and non-toxic and no overt pain behavior.  Eyes:anicteric  HEENT:grossly intact  Neck:supple, symmetric, trachea midline and no masses   Pulmonary:even and unlabored  Cardiovascular:No edema or pitting edema present  Skin:Normal without rashes or lesions and well hydrated  Psychiatric:Mood and affect appropriate  Neurologic:Cranial Nerves II-XII grossly intact  Musculoskeletal:antalgic and ambulates with cane    Orders Placed This Encounter   Procedures    MM DT_Validity Creatinine    MM ALL_Prescribed Meds and Special Instructions    MM DT_Alprazolam Definitive Test    MM DT_Amphetamine Definitive Test    MM DT_Buprenorphine Definitive Test    MM DT_Butalbital Definitive Test    MM DT_Clonazepam Definitive Test    MM DT_Cocaine Definitive Test    MM DT_Codeine Definitive Test    MM DT_Dextromethorphan Definitive Test    MM Diazepam Definitive Test    MM DT_Ethyl Glucuronide/Ethyl Sulfate Definitive Test    MM DT_Fentanyl Definitive Test    MM DT_Heroin Definitive Test    MM DT_Hydrocodone Definitive Test    MM DT_Hydromorphone Definitive Test    MM DT_Kratom Definitive Test    MM DT_Levorphanol Definitive Test    MM DT_MDMA Definitive Test    MM DT_Meperidine Definitive Test    MM DT_Methadone Definitive Test    MM DT_Methamphetamine Definitive Test    MM DT_Methylphenidate Definitive Test    MM DT_Morphine Definitive Test    MM Lorazepam Definitive Test    MM DT_Oxazepam Definitive Test    MM DT_Oxycodone Definitive Test    MM DT_Oxymorphone Definitive Test    MM DT_Phencyclidine Definitive Test    MM DT_Phenobarbital Definitive Test    MM DT_Phentermine Definitive Test    MM DT_Secobarbital Definitive Test    MM DT_Spice Definitive Test    MM DT_Tapentadol Definitive Test    MM DT_Temazapam Definitive Test    MM DT_THC Definitive Test    MM DT_Tramadol Definitive Test       This document was created using speech voice recognition software.   Grammatical errors, random word  insertions, pronoun errors, and incomplete sentences are an occasional consequence of this system due to software limitations, ambient noise, and hardware issues.   Any formal questions or concerns about content, text, or information contained within the body of this dictation should be directly addressed to the provider for clarification.

## 2024-09-30 LAB
6MAM UR QL CFM: NEGATIVE NG/ML
7AMINOCLONAZEPAM UR QL CFM: NEGATIVE NG/ML
A-OH ALPRAZ UR QL CFM: NEGATIVE NG/ML
ACCEPTABLE CREAT UR QL: NORMAL MG/DL
AMPHET UR QL CFM: NEGATIVE NG/ML
AMPHET UR QL CFM: NEGATIVE NG/ML
BUPRENORPHINE UR QL CFM: NORMAL NG/ML
BUTALBITAL UR QL CFM: NEGATIVE NG/ML
BZE UR QL CFM: NEGATIVE NG/ML
CODEINE UR QL CFM: NEGATIVE NG/ML
EDDP UR QL CFM: NEGATIVE NG/ML
ETHYL GLUCURONIDE UR QL CFM: NEGATIVE NG/ML
ETHYL SULFATE UR QL SCN: NEGATIVE NG/ML
FENTANYL UR QL CFM: NEGATIVE NG/ML
GLIADIN IGG SER IA-ACNC: NEGATIVE NG/ML
HYDROCODONE UR QL CFM: NEGATIVE NG/ML
HYDROCODONE UR QL CFM: NEGATIVE NG/ML
HYDROMORPHONE UR QL CFM: NEGATIVE NG/ML
LORAZEPAM UR QL CFM: NEGATIVE NG/ML
MDMA UR QL CFM: NEGATIVE NG/ML
ME-PHENIDATE UR QL CFM: NEGATIVE NG/ML
MEPERIDINE UR QL CFM: NEGATIVE NG/ML
METHADONE UR QL CFM: NEGATIVE NG/ML
METHAMPHET UR QL CFM: NEGATIVE NG/ML
MORPHINE UR QL CFM: NEGATIVE NG/ML
MORPHINE UR QL CFM: NEGATIVE NG/ML
NORBUPRENORPHINE UR QL CFM: NEGATIVE NG/ML
NORDIAZEPAM UR QL CFM: NEGATIVE NG/ML
NORFENTANYL UR QL CFM: NEGATIVE NG/ML
NORHYDROCODONE UR QL CFM: NEGATIVE NG/ML
NORHYDROCODONE UR QL CFM: NEGATIVE NG/ML
NORMEPERIDINE UR QL CFM: NEGATIVE NG/ML
NOROXYCODONE UR QL CFM: NEGATIVE NG/ML
OXAZEPAM UR QL CFM: NEGATIVE NG/ML
OXYCODONE UR QL CFM: NEGATIVE NG/ML
OXYMORPHONE UR QL CFM: NEGATIVE NG/ML
OXYMORPHONE UR QL CFM: NEGATIVE NG/ML
PARA-FLUOROFENTANYL QUANTIFICATION: NORMAL NG/ML
PCP UR QL CFM: NEGATIVE NG/ML
PHENOBARB UR QL CFM: NEGATIVE NG/ML
RESULT ALL_PRESCRIBED MEDS AND SPECIAL INSTRUCTIONS: NORMAL
SECOBARBITAL UR QL CFM: NEGATIVE NG/ML
SL AMB 5F-ADB-M7 METABOLITE QUANTIFICATION: NEGATIVE NG/ML
SL AMB 7-OH-MITRAGYNINE (KRATOM ALKALOID) QUANTIFICATION: NEGATIVE NG/ML
SL AMB AB-FUBINACA-M3 METABOLITE QUANTIFICATION: NEGATIVE NG/ML
SL AMB ACETYL FENTANYL QUANTIFICATION: NORMAL NG/ML
SL AMB ACETYL NORFENTANYL QUANTIFICATION: NORMAL NG/ML
SL AMB ACRYL FENTANYL QUANTIFICATION: NORMAL NG/ML
SL AMB CARFENTANIL QUANTIFICATION: NORMAL NG/ML
SL AMB CTHC (MARIJUANA METABOLITE) QUANTIFICATION: NEGATIVE NG/ML
SL AMB DEXTROMETHORPHAN QUANTIFICATION: NEGATIVE NG/ML
SL AMB DEXTRORPHAN (DEXTROMETHORPHAN METABOLITE) QUANT: NEGATIVE NG/ML
SL AMB DEXTRORPHAN (DEXTROMETHORPHAN METABOLITE) QUANT: NEGATIVE NG/ML
SL AMB JWH018 METABOLITE QUANTIFICATION: NEGATIVE NG/ML
SL AMB JWH073 METABOLITE QUANTIFICATION: NEGATIVE NG/ML
SL AMB MDMB-FUBINACA-M1 METABOLITE QUANTIFICATION: NEGATIVE NG/ML
SL AMB N-DESMETHYL-TRAMADOL QUANTIFICATION: NEGATIVE NG/ML
SL AMB PHENTERMINE QUANTIFICATION: NEGATIVE NG/ML
SL AMB RCS4 METABOLITE QUANTIFICATION: NEGATIVE NG/ML
SL AMB RITALINIC ACID QUANTIFICATION: NEGATIVE NG/ML
SPECIMEN DRAWN SERPL: NEGATIVE NG/ML
TAPENTADOL UR QL CFM: NEGATIVE NG/ML
TEMAZEPAM UR QL CFM: NEGATIVE NG/ML
TEMAZEPAM UR QL CFM: NEGATIVE NG/ML
TRAMADOL UR QL CFM: NEGATIVE NG/ML
URATE/CREAT 24H UR: NEGATIVE NG/ML

## 2024-10-08 DIAGNOSIS — I25.10 CORONARY ARTERY DISEASE DUE TO CALCIFIED CORONARY LESION: ICD-10-CM

## 2024-10-08 DIAGNOSIS — I25.84 CORONARY ARTERY DISEASE DUE TO CALCIFIED CORONARY LESION: ICD-10-CM

## 2024-10-09 RX ORDER — CARVEDILOL 12.5 MG/1
12.5 TABLET ORAL 2 TIMES DAILY WITH MEALS
Qty: 180 TABLET | Refills: 1 | Status: SHIPPED | OUTPATIENT
Start: 2024-10-09

## 2024-11-07 DIAGNOSIS — E03.9 HYPOTHYROIDISM, UNSPECIFIED TYPE: ICD-10-CM

## 2024-11-07 RX ORDER — LEVOTHYROXINE SODIUM 50 UG/1
50 TABLET ORAL DAILY
Qty: 90 TABLET | Refills: 1 | Status: SHIPPED | OUTPATIENT
Start: 2024-11-07

## 2025-01-13 DIAGNOSIS — I10 HYPERTENSION, UNSPECIFIED TYPE: ICD-10-CM

## 2025-01-13 RX ORDER — AMLODIPINE BESYLATE 5 MG/1
10 TABLET ORAL DAILY
Qty: 180 TABLET | Refills: 1 | Status: SHIPPED | OUTPATIENT
Start: 2025-01-13

## 2025-01-13 NOTE — TELEPHONE ENCOUNTER
Reason for call:   [x] Refill   [] Prior Auth  [] Other:     Office:   [x] PCP/Provider - Margarita Holland  [] Specialty/Provider -     Medication: Amlodipine    Dose/Frequency: 5 mg 2 tablets Daily     Quantity: 180    Pharmacy: [facility] [place] [facility] [address]    Does the patient have enough for 3 days?   [] Yes   [x] No - Send as HP to POD

## 2025-01-26 ENCOUNTER — RA CDI HCC (OUTPATIENT)
Dept: OTHER | Facility: HOSPITAL | Age: OVER 89
End: 2025-01-26

## 2025-01-26 NOTE — PROGRESS NOTES
E11.22, e11.42  HCC coding opportunities          Chart Reviewed number of suggestions sent to Provider: 2     Patients Insurance     Medicare Insurance: [employer]vantage

## 2025-02-04 ENCOUNTER — OFFICE VISIT (OUTPATIENT)
Dept: INTERNAL MEDICINE CLINIC | Facility: CLINIC | Age: OVER 89
End: 2025-02-04
Payer: COMMERCIAL

## 2025-02-04 VITALS
HEIGHT: 62 IN | WEIGHT: 141 LBS | SYSTOLIC BLOOD PRESSURE: 126 MMHG | BODY MASS INDEX: 25.95 KG/M2 | OXYGEN SATURATION: 98 % | RESPIRATION RATE: 17 BRPM | HEART RATE: 69 BPM | DIASTOLIC BLOOD PRESSURE: 76 MMHG

## 2025-02-04 DIAGNOSIS — C85.90 NON-HODGKIN'S LYMPHOMA, UNSPECIFIED BODY REGION, UNSPECIFIED NON-HODGKIN LYMPHOMA TYPE (HCC): ICD-10-CM

## 2025-02-04 DIAGNOSIS — F33.8 OTHER RECURRENT DEPRESSIVE DISORDERS (HCC): ICD-10-CM

## 2025-02-04 DIAGNOSIS — M46.1 SACROILIITIS (HCC): ICD-10-CM

## 2025-02-04 DIAGNOSIS — N18.30 STAGE 3 CHRONIC KIDNEY DISEASE, UNSPECIFIED WHETHER STAGE 3A OR 3B CKD (HCC): ICD-10-CM

## 2025-02-04 DIAGNOSIS — F11.20 UNCOMPLICATED OPIOID DEPENDENCE (HCC): ICD-10-CM

## 2025-02-04 DIAGNOSIS — E11.8 TYPE 2 DIABETES MELLITUS WITH COMPLICATION, WITHOUT LONG-TERM CURRENT USE OF INSULIN (HCC): ICD-10-CM

## 2025-02-04 DIAGNOSIS — G89.4 CHRONIC PAIN SYNDROME: ICD-10-CM

## 2025-02-04 DIAGNOSIS — N18.31 TYPE 2 DIABETES MELLITUS WITH STAGE 3A CHRONIC KIDNEY DISEASE, WITHOUT LONG-TERM CURRENT USE OF INSULIN (HCC): ICD-10-CM

## 2025-02-04 DIAGNOSIS — F11.20 OPIOID DEPENDENCE, UNCOMPLICATED (HCC): ICD-10-CM

## 2025-02-04 DIAGNOSIS — I25.118 CORONARY ARTERY DISEASE OF NATIVE ARTERY OF NATIVE HEART WITH STABLE ANGINA PECTORIS (HCC): ICD-10-CM

## 2025-02-04 DIAGNOSIS — E11.22 TYPE 2 DIABETES MELLITUS WITH STAGE 3A CHRONIC KIDNEY DISEASE, WITHOUT LONG-TERM CURRENT USE OF INSULIN (HCC): ICD-10-CM

## 2025-02-04 DIAGNOSIS — I10 HYPERTENSION, UNSPECIFIED TYPE: Primary | ICD-10-CM

## 2025-02-04 DIAGNOSIS — D89.89 LIGHT CHAIN DISEASE, KAPPA TYPE (HCC): ICD-10-CM

## 2025-02-04 PROBLEM — C85.99: Status: ACTIVE | Noted: 2020-10-05

## 2025-02-04 PROCEDURE — G2211 COMPLEX E/M VISIT ADD ON: HCPCS | Performed by: INTERNAL MEDICINE

## 2025-02-04 PROCEDURE — 99214 OFFICE O/P EST MOD 30 MIN: CPT | Performed by: INTERNAL MEDICINE

## 2025-02-04 NOTE — PROGRESS NOTES
Name: Alejandra Patton      : 1934      MRN: [id number]  Encounter Provider: Margarita Holland MD  Encounter Date: 2025   Encounter department: [facility] [place]  :  Assessment & Plan  Hypertension, unspecified type  Controlled continue current regimen.           Coronary artery disease of native artery of native heart with stable angina pectoris (HCC)    For her heart disease, continue aspirin, blocker, continue statin. She has good follow-up with cardiology.          Non-Hodgkin's lymphoma, unspecified body region, unspecified non-Hodgkin lymphoma type (HCC)    -2020 : bone marrow biopsy showed lymphoma;no imaging ( no need)  -   appears to be indolent and low grade.  No further workup or treatment on surveillance.         she has not see hem/onc since past 3 years. clinically patient doing well, no evidence of progression.   Light chain disease, 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HCC)         previously managed at [facility], no bone marrow biopsy, no treatment.   Sacroiliitis (HCC)  Controlled.       Other recurrent depressive disorders (HCC)  Controlled.         Type 2 diabetes mellitus with complication, without long-term current use of insulin ([facility])    Lab Results   Component Value Date    HGBA1C 6.7 2023       Orders:    CBC and differential; Future    Hemoglobin A1C; Future    Lipid Panel with Direct LDL reflex; Future    Comprehensive metabolic panel; Future    TSH, 3rd generation with Free T4 reflex; Future    Type 2 diabetes mellitus with stage 3a chronic kidney disease, without long-term current use of insulin ([facility])    Lab Results   Component Value Date    HGBA1C 6.7 2023            Stage 3 chronic kidney disease, unspecified whether stage 3a or 3b CKD ([facility])  Lab Results   Component Value Date    EGFR 56 2024    EGFR 59 2024    EGFR 53 2024    CREATININE 0.91 2024    CREATININE 0.87 2024    CREATININE 0.94 2024            Opioid dependence,  "uncomplicated (HCC)  See below       Uncomplicated opioid dependence (HCC)  See below       Chronic pain syndrome  Continue pain management.        Depression Screening and Follow-up Plan: Patient was screened for depression during today's encounter. They screened negative with a PHQ-9 score of 0.    Falls Plan of Care: balance, strength, and gait training instructions were provided. Recommended assistive device to help with gait and balance. Medications that increase falls were reviewed. Vitamin D supplementation was recommended.     Urinary Incontinence Plan of Care: counseling topics discussed: practice Kegel (pelvic floor strengthening) exercises, limit alcohol, caffeine, spicy foods, and acidic foods, limiting fluid intake 3-4 hours before bed and improving blood sugar control.       History of Present Illness   Patient is here for routine follow up, reviewed chronic medical problems.      Review of Systems   Constitutional:  Negative for chills and fever.   HENT:  Negative for ear pain and sore throat.    Eyes:  Negative for pain and visual disturbance.   Respiratory:  Negative for cough and shortness of breath.    Cardiovascular:  Negative for chest pain and palpitations.   Gastrointestinal:  Negative for abdominal pain and vomiting.   Genitourinary:  Negative for dysuria and hematuria.   Musculoskeletal:  Negative for arthralgias and back pain.   Skin:  Negative for color change and rash.   Neurological:  Negative for seizures and syncope.   All other systems reviewed and are negative.      Objective   /76 (BP Location: Left arm, Patient Position: Sitting, Cuff Size: Adult)   Pulse 69   Resp 17   Ht 5' 2\" (1.575 m)   Wt 64 kg (141 lb)   SpO2 98%   BMI 25.79 kg/m²      Physical Exam  Vitals and nursing note reviewed.   Constitutional:       General: She is not in acute distress.     Appearance: She is well-developed.   HENT:      Head: Normocephalic and atraumatic.   Eyes:      Conjunctiva/sclera: " Conjunctivae normal.   Cardiovascular:      Rate and Rhythm: Normal rate and regular rhythm.      Heart sounds: No murmur heard.  Pulmonary:      Effort: Pulmonary effort is normal. No respiratory distress.      Breath sounds: Normal breath sounds.   Abdominal:      Palpations: Abdomen is soft.      Tenderness: There is no abdominal tenderness.   Musculoskeletal:         General: No swelling.      Cervical back: Neck supple.   Skin:     General: Skin is warm and dry.      Capillary Refill: Capillary refill takes less than 2 seconds.   Neurological:      Mental Status: She is alert.   Psychiatric:         Mood and Affect: Mood normal.

## 2025-02-28 DIAGNOSIS — E78.5 HYPERLIPIDEMIA, UNSPECIFIED HYPERLIPIDEMIA TYPE: ICD-10-CM

## 2025-02-28 DIAGNOSIS — E11.8 TYPE 2 DIABETES MELLITUS WITH COMPLICATION, WITHOUT LONG-TERM CURRENT USE OF INSULIN (HCC): ICD-10-CM

## 2025-02-28 RX ORDER — PIOGLITAZONE 30 MG/1
45 TABLET ORAL DAILY
Qty: 135 TABLET | Refills: 0 | Status: SHIPPED | OUTPATIENT
Start: 2025-02-28

## 2025-02-28 NOTE — TELEPHONE ENCOUNTER
Reason for call:   [x] Refill   [] Prior Auth  [] Other:     Office: [facility] [place]  [x] PCP/Provider - Margarita Holland   [] Specialty/Provider -     Medication: pioglitazone (ACTOS)     Dose/Frequency: 30 mg/ Take 1.5 tablets (45 mg total) by mouth daily     Quantity: 90 day supply     Pharmacy: [facility]     Does the patient have enough for 3 days?   [] Yes   [x] No - Send as HP to POD- patient is out completely.

## 2025-03-03 ENCOUNTER — TELEPHONE (OUTPATIENT)
Age: OVER 89
End: 2025-03-03

## 2025-03-03 NOTE — TELEPHONE ENCOUNTER
[place] pioglitazone (ACTOS) 30 mg tablet for SUBMITTED to   pioglitazone (ACTOS) 30 mg tablet   via    []CMM-KEY:   [x]Surescripts-Case ID # [id number]   []Availity-Auth ID # NDC #   []Faxed to plan   []Other website   []Phone call Case ID #     [x]PA sent as URGENT    All office notes, labs and other pertaining documents and studies sent. Clinical questions answered. Awaiting determination from insurance company.     Turnaround time for your insurance to make a decision on your Prior Authorization can take 7-21 business days.

## 2025-03-04 NOTE — TELEPHONE ENCOUNTER
[place] for pioglitazone (ACTOS) 30 mg table  APPROVED     Date(s) approved  January 1, 2025 to December 31, 2025     Case # [id number]     Patient advised by          []MyChart Message  [x]Phone call   []LMOM  []L/M to call office as no active Communication consent on file  []Unable to leave detailed message as VM not approved on Communication consent       Pharmacy advised by    [x]Fax  []Phone call  []Secure Chat    Specialty Pharmacy    []     Approval letter scanned into Media Yes

## 2025-04-28 DIAGNOSIS — I25.84 CORONARY ARTERY DISEASE DUE TO CALCIFIED CORONARY LESION: ICD-10-CM

## 2025-04-28 DIAGNOSIS — I25.10 CORONARY ARTERY DISEASE DUE TO CALCIFIED CORONARY LESION: ICD-10-CM

## 2025-04-28 DIAGNOSIS — E11.8 TYPE 2 DIABETES MELLITUS WITH COMPLICATION, WITHOUT LONG-TERM CURRENT USE OF INSULIN (HCC): ICD-10-CM

## 2025-04-28 DIAGNOSIS — I10 HYPERTENSION, UNSPECIFIED TYPE: ICD-10-CM

## 2025-04-28 RX ORDER — CARVEDILOL 12.5 MG/1
12.5 TABLET ORAL 2 TIMES DAILY WITH MEALS
Qty: 180 TABLET | Refills: 1 | Status: SHIPPED | OUTPATIENT
Start: 2025-04-28

## 2025-04-28 RX ORDER — LOSARTAN POTASSIUM 100 MG/1
100 TABLET ORAL DAILY
Qty: 90 TABLET | Refills: 1 | Status: SHIPPED | OUTPATIENT
Start: 2025-04-28

## 2025-04-28 NOTE — TELEPHONE ENCOUNTER
Reason for call:   [x] Refill   [] Prior Auth  [] Other:     Office:   [x] PCP/Provider -   [] Specialty/Provider -     Medication:   carvedilol (COREG) 12.5 mg/Take 1 tablet (12.5 mg total) by mouth 2 (two) times a day with meals     losartan (COZAAR) 100 MG/ Take 1 tablet (100 mg total) by mouth daily      Quantity:     Pharmacy: [facility] [address] [place] [address], [place] [id number] [facility]   Does the patient have enough for 3 days?   [] Yes   [x] No - Send as HP to POD    Mail Away Pharmacy   Does the patient have enough for 10 days?   [] Yes   [] No - Send as HP to POD

## 2025-05-30 ENCOUNTER — RA CDI HCC (OUTPATIENT)
Dept: OTHER | Facility: HOSPITAL | Age: OVER 89
End: 2025-05-30

## 2025-05-30 NOTE — PROGRESS NOTES
E11.42  HCC coding opportunities          Chart Reviewed number of suggestions sent to Provider: 1     Patients Insurance     Medicare Insurance: [employer] Medicare Advantage

## 2025-06-10 ENCOUNTER — OFFICE VISIT (OUTPATIENT)
Dept: INTERNAL MEDICINE CLINIC | Facility: CLINIC | Age: OVER 89
End: 2025-06-10
Payer: COMMERCIAL

## 2025-06-10 VITALS
OXYGEN SATURATION: 96 % | BODY MASS INDEX: 27.6 KG/M2 | HEART RATE: 59 BPM | WEIGHT: 150 LBS | SYSTOLIC BLOOD PRESSURE: 148 MMHG | DIASTOLIC BLOOD PRESSURE: 80 MMHG | HEIGHT: 62 IN

## 2025-06-10 DIAGNOSIS — I25.118 CORONARY ARTERY DISEASE OF NATIVE ARTERY OF NATIVE HEART WITH STABLE ANGINA PECTORIS (HCC): Primary | ICD-10-CM

## 2025-06-10 DIAGNOSIS — E11.8 TYPE 2 DIABETES MELLITUS WITH COMPLICATION, WITHOUT LONG-TERM CURRENT USE OF INSULIN (HCC): ICD-10-CM

## 2025-06-10 DIAGNOSIS — E78.5 HYPERLIPIDEMIA, UNSPECIFIED HYPERLIPIDEMIA TYPE: ICD-10-CM

## 2025-06-10 DIAGNOSIS — M46.1 SACROILIITIS (HCC): ICD-10-CM

## 2025-06-10 DIAGNOSIS — G89.4 CHRONIC PAIN SYNDROME: ICD-10-CM

## 2025-06-10 DIAGNOSIS — I10 ESSENTIAL (PRIMARY) HYPERTENSION: ICD-10-CM

## 2025-06-10 DIAGNOSIS — E11.22 TYPE 2 DIABETES MELLITUS WITH STAGE 3A CHRONIC KIDNEY DISEASE, WITHOUT LONG-TERM CURRENT USE OF INSULIN (HCC): ICD-10-CM

## 2025-06-10 DIAGNOSIS — N18.31 TYPE 2 DIABETES MELLITUS WITH STAGE 3A CHRONIC KIDNEY DISEASE, WITHOUT LONG-TERM CURRENT USE OF INSULIN (HCC): ICD-10-CM

## 2025-06-10 DIAGNOSIS — Z99.89 DEPENDENCE ON CANE: ICD-10-CM

## 2025-06-10 DIAGNOSIS — E06.3 HYPOTHYROIDISM DUE TO HASHIMOTO THYROIDITIS: ICD-10-CM

## 2025-06-10 PROCEDURE — G2211 COMPLEX E/M VISIT ADD ON: HCPCS | Performed by: INTERNAL MEDICINE

## 2025-06-10 PROCEDURE — 99214 OFFICE O/P EST MOD 30 MIN: CPT | Performed by: INTERNAL MEDICINE

## 2025-06-10 NOTE — PROGRESS NOTES
Name: Alejandra Patton      : 1934      MRN: [id number]  Encounter Provider: Margarita Holland MD  Encounter Date: 6/10/2025   Encounter department: [facility]IC[facility] [place]  :  Assessment & Plan  Coronary artery disease of native artery of native heart with stable angina pectoris (HCC)    For her heart disease, continue aspirin, blocker, continue statin. She has good follow-up with cardiology.             Essential (primary) hypertension  Controlled continue current regimen.              Hypothyroidism due to Hashimoto thyroiditis  Continue levothyroxine.  Monitor TSH.       Type 2 diabetes mellitus with stage 3a chronic kidney disease, without long-term current use of insulin (HCC)    Lab Results   Component Value Date    HGBA1C 6.7 2023       monitor A1c. Last level was 7.5.  Orders:    CBC and differential; Future    Comprehensive metabolic panel; Future    Lipid Panel with Direct LDL reflex; Future    Hemoglobin A1C; Future    TSH, 3rd generation with Free T4 reflex; Future     Sacroiliitis (HCC)  Controlled.          Chronic pain syndrome  Continue pain management.        Hyperlipidemia, unspecified hyperlipidemia type      Continue statin.        Type 2 diabetes mellitus with complication, without long-term current use of insulin (HCC)    Lab Results   Component Value Date    HGBA1C 6.7 2023            Dependence on cane  Uses walker at home, cane at home.             Depression Screening and Follow-up Plan: Patient was screened for depression during today's encounter. They screened negative with a PHQ-9 score of 0.      Falls Plan of Care: balance, strength, and gait training instructions were provided. Recommended assistive device to help with gait and balance. Medications that increase falls were reviewed. Vitamin D supplementation was recommended.       History of Present Illness   Routine f/u.      Review of Systems   Constitutional:  Negative for chills and fever.  "  HENT:  Negative for ear pain and sore throat.    Eyes:  Negative for pain and visual disturbance.   Respiratory:  Negative for cough and shortness of breath.    Cardiovascular:  Negative for chest pain and palpitations.   Gastrointestinal:  Negative for abdominal pain and vomiting.   Genitourinary:  Negative for dysuria and hematuria.   Musculoskeletal:  Negative for arthralgias and back pain.   Skin:  Negative for color change and rash.   Neurological:  Negative for seizures and syncope.   All other systems reviewed and are negative.      Objective   /80 (BP Location: Left arm, Patient Position: Sitting, Cuff Size: Standard)   Pulse 59   Ht 5' 2\" (1.575 m)   Wt 68 kg (150 lb)   SpO2 96%   BMI 27.44 kg/m²      Physical Exam  Vitals and nursing note reviewed.   Constitutional:       General: She is not in acute distress.     Appearance: She is well-developed.   HENT:      Head: Normocephalic and atraumatic.     Cardiovascular:      Rate and Rhythm: Normal rate and regular rhythm.      Heart sounds: No murmur heard.  Pulmonary:      Effort: Pulmonary effort is normal. No respiratory distress.      Breath sounds: Normal breath sounds.   Abdominal:      Tenderness: There is no abdominal tenderness.     Musculoskeletal:         General: No swelling.     Skin:     General: Skin is warm and dry.     Neurological:      Mental Status: She is alert.      Gait: Gait abnormal.     Psychiatric:         Mood and Affect: Mood normal.         "

## 2025-06-10 NOTE — ASSESSMENT & PLAN NOTE
Lab Results   Component Value Date    HGBA1C 6.7 09/18/2023       monitor A1c. Last level was 7.5.  Orders:    CBC and differential; Future    Comprehensive metabolic panel; Future    Lipid Panel with Direct LDL reflex; Future    Hemoglobin A1C; Future    TSH, 3rd generation with Free T4 reflex; Future

## 2025-07-02 DIAGNOSIS — E78.5 HYPERLIPIDEMIA, UNSPECIFIED HYPERLIPIDEMIA TYPE: ICD-10-CM

## 2025-07-02 DIAGNOSIS — E11.8 TYPE 2 DIABETES MELLITUS WITH COMPLICATION, WITHOUT LONG-TERM CURRENT USE OF INSULIN (HCC): ICD-10-CM

## 2025-07-02 NOTE — TELEPHONE ENCOUNTER
Reason for call:   [x] Refill   [] Prior Auth  [] Other:     Office:   [x] PCP/Provider - Zoraida Holland  [] Specialty/Provider -     Medication: Pioglitazone    Dose/Frequency: 30 mg 1 1/2 tablets Daily     Quantity: 135    [facility] [place] [facility]    [facility]   Does the patient have enough for 3 days?   [x] Yes   [] No - Send as HP to POD    Mail Away Pharmacy   Does the patient have enough for 10 days?   [] Yes   [] No - Send as HP to POD

## 2025-07-03 RX ORDER — PIOGLITAZONE 30 MG/1
45 TABLET ORAL DAILY
Qty: 45 TABLET | Refills: 0 | Status: SHIPPED | OUTPATIENT
Start: 2025-07-03

## 2025-07-15 DIAGNOSIS — I10 HYPERTENSION, UNSPECIFIED TYPE: ICD-10-CM

## 2025-07-15 DIAGNOSIS — E03.9 HYPOTHYROIDISM, UNSPECIFIED TYPE: ICD-10-CM

## 2025-07-16 RX ORDER — AMLODIPINE BESYLATE 5 MG/1
10 TABLET ORAL DAILY
Qty: 180 TABLET | Refills: 1 | Status: SHIPPED | OUTPATIENT
Start: 2025-07-16

## 2025-07-16 RX ORDER — LEVOTHYROXINE SODIUM 50 UG/1
50 TABLET ORAL DAILY
Qty: 90 TABLET | Refills: 1 | Status: SHIPPED | OUTPATIENT
Start: 2025-07-16

## 2025-08-01 ENCOUNTER — TELEPHONE (OUTPATIENT)
Dept: INTERNAL MEDICINE CLINIC | Facility: CLINIC | Age: OVER 89
End: 2025-08-01

## 2025-08-01 DIAGNOSIS — E78.5 HYPERLIPIDEMIA, UNSPECIFIED HYPERLIPIDEMIA TYPE: ICD-10-CM

## 2025-08-01 DIAGNOSIS — E11.8 TYPE 2 DIABETES MELLITUS WITH COMPLICATION, WITHOUT LONG-TERM CURRENT USE OF INSULIN (HCC): ICD-10-CM

## 2025-08-01 RX ORDER — PIOGLITAZONE 45 MG/1
45 TABLET ORAL DAILY
Qty: 90 TABLET | Refills: 3 | Status: SHIPPED | OUTPATIENT
Start: 2025-08-01

## 2025-08-13 ENCOUNTER — TELEPHONE (OUTPATIENT)
Age: OVER 89
End: 2025-08-13